# Patient Record
Sex: MALE | Race: WHITE | Employment: OTHER | URBAN - METROPOLITAN AREA
[De-identification: names, ages, dates, MRNs, and addresses within clinical notes are randomized per-mention and may not be internally consistent; named-entity substitution may affect disease eponyms.]

---

## 2019-10-14 ENCOUNTER — TELEPHONE (OUTPATIENT)
Dept: PULMONOLOGY | Age: 84
End: 2019-10-14

## 2019-10-15 PROBLEM — B02.9 HERPES ZOSTER: Status: ACTIVE | Noted: 2017-08-07

## 2019-10-15 PROBLEM — E78.00 HYPERCHOLESTEROLEMIA: Status: ACTIVE | Noted: 2017-08-07

## 2019-10-15 PROBLEM — J45.909 ASTHMA: Status: ACTIVE | Noted: 2017-08-07

## 2019-10-15 PROBLEM — R13.10 DYSPHAGIA: Status: ACTIVE | Noted: 2017-08-07

## 2019-10-15 PROBLEM — I82.409 DEEP VEIN THROMBOSIS OF LOWER EXTREMITY (HCC): Status: ACTIVE | Noted: 2017-08-07

## 2019-10-15 PROBLEM — Z91.09 ENVIRONMENTAL ALLERGIES: Status: ACTIVE | Noted: 2017-08-07

## 2019-10-15 PROBLEM — J01.90 ACUTE SINUSITIS: Status: ACTIVE | Noted: 2017-08-07

## 2019-10-15 PROBLEM — C78.00 MALIGNANT NEOPLASM METASTATIC TO LUNG (HCC): Status: ACTIVE | Noted: 2017-08-07

## 2019-10-15 PROBLEM — I10 HYPERTENSION: Status: ACTIVE | Noted: 2017-08-07

## 2019-10-15 PROBLEM — J15.9 BACTERIAL PNEUMONIA: Status: ACTIVE | Noted: 2017-08-07

## 2019-10-15 PROBLEM — K21.9 GASTROESOPHAGEAL REFLUX DISEASE: Status: ACTIVE | Noted: 2017-08-07

## 2019-10-15 PROBLEM — R05.8 PRODUCTIVE COUGH: Status: ACTIVE | Noted: 2017-08-07

## 2019-10-15 PROBLEM — H65.20 SIMPLE CHRONIC SEROUS OTITIS MEDIA: Status: ACTIVE | Noted: 2017-08-07

## 2019-10-15 PROBLEM — B37.0 CANDIDIASIS OF MOUTH: Status: ACTIVE | Noted: 2017-08-07

## 2019-10-15 PROBLEM — T30.0 RADIATION BURN: Status: ACTIVE | Noted: 2017-08-07

## 2019-10-17 ENCOUNTER — OFFICE VISIT (OUTPATIENT)
Dept: PULMONOLOGY | Age: 84
End: 2019-10-17
Payer: COMMERCIAL

## 2019-10-17 VITALS
TEMPERATURE: 97 F | DIASTOLIC BLOOD PRESSURE: 83 MMHG | HEIGHT: 68 IN | WEIGHT: 167.4 LBS | HEART RATE: 95 BPM | SYSTOLIC BLOOD PRESSURE: 154 MMHG | BODY MASS INDEX: 25.37 KG/M2 | OXYGEN SATURATION: 97 %

## 2019-10-17 DIAGNOSIS — J31.0 CHRONIC RHINITIS: ICD-10-CM

## 2019-10-17 DIAGNOSIS — J45.909 REACTIVE AIRWAY DISEASE WITH WHEEZING WITHOUT COMPLICATION, UNSPECIFIED ASTHMA SEVERITY, UNSPECIFIED WHETHER PERSISTENT: Primary | ICD-10-CM

## 2019-10-17 DIAGNOSIS — R09.82 POST-NASAL DRIP: ICD-10-CM

## 2019-10-17 DIAGNOSIS — Z77.098 CHLORINE GAS EXPOSURE: ICD-10-CM

## 2019-10-17 PROCEDURE — 99204 OFFICE O/P NEW MOD 45 MIN: CPT | Performed by: INTERNAL MEDICINE

## 2019-10-17 RX ORDER — CLINDAMYCIN HYDROCHLORIDE 300 MG/1
1 CAPSULE ORAL 3 TIMES DAILY
Refills: 0 | COMMUNITY
Start: 2019-10-09 | End: 2020-08-06

## 2019-10-17 RX ORDER — FLUTICASONE PROPIONATE 50 MCG
1 SPRAY, SUSPENSION (ML) NASAL 2 TIMES DAILY
COMMUNITY
End: 2020-01-09 | Stop reason: SDUPTHER

## 2019-10-17 RX ORDER — IPRATROPIUM BROMIDE AND ALBUTEROL SULFATE 2.5; .5 MG/3ML; MG/3ML
1 SOLUTION RESPIRATORY (INHALATION) EVERY 6 HOURS PRN
COMMUNITY
End: 2020-02-13 | Stop reason: SDUPTHER

## 2019-10-17 RX ORDER — ALBUTEROL SULFATE 90 UG/1
2 AEROSOL, METERED RESPIRATORY (INHALATION) EVERY 6 HOURS PRN
COMMUNITY
End: 2020-02-13 | Stop reason: SDUPTHER

## 2019-10-17 RX ORDER — SIMVASTATIN 40 MG
1 TABLET ORAL DAILY
COMMUNITY

## 2019-10-17 RX ORDER — GABAPENTIN 100 MG/1
1 CAPSULE ORAL NIGHTLY
Refills: 1 | COMMUNITY
Start: 2019-10-09 | End: 2020-01-09 | Stop reason: ALTCHOICE

## 2019-10-17 RX ORDER — AMLODIPINE BESYLATE 5 MG/1
1 TABLET ORAL DAILY
COMMUNITY
End: 2022-03-24

## 2019-10-17 RX ORDER — BUDESONIDE AND FORMOTEROL FUMARATE DIHYDRATE 160; 4.5 UG/1; UG/1
2 AEROSOL RESPIRATORY (INHALATION) 2 TIMES DAILY
Refills: 11 | COMMUNITY
Start: 2019-09-23 | End: 2020-02-13 | Stop reason: SDUPTHER

## 2019-10-17 RX ORDER — SIMVASTATIN 40 MG
1 TABLET ORAL DAILY
COMMUNITY
Start: 2019-07-10 | End: 2019-10-17

## 2019-10-17 RX ORDER — DEXTROMETHORPHAN HYDROBROMIDE AND PROMETHAZINE HYDROCHLORIDE 15; 6.25 MG/5ML; MG/5ML
5 SYRUP ORAL EVERY 4 HOURS PRN
Refills: 1 | COMMUNITY
Start: 2019-10-09 | End: 2020-10-08 | Stop reason: SDUPTHER

## 2019-10-17 RX ORDER — WARFARIN SODIUM 5 MG/1
1 TABLET ORAL DAILY
COMMUNITY

## 2019-11-07 ENCOUNTER — OFFICE VISIT (OUTPATIENT)
Dept: PULMONOLOGY | Age: 84
End: 2019-11-07
Payer: COMMERCIAL

## 2019-11-07 VITALS
RESPIRATION RATE: 12 BRPM | SYSTOLIC BLOOD PRESSURE: 136 MMHG | HEART RATE: 97 BPM | BODY MASS INDEX: 25.34 KG/M2 | HEIGHT: 68 IN | DIASTOLIC BLOOD PRESSURE: 75 MMHG | WEIGHT: 167.2 LBS | OXYGEN SATURATION: 96 %

## 2019-11-07 DIAGNOSIS — J45.909 REACTIVE AIRWAY DISEASE WITH WHEEZING WITHOUT COMPLICATION, UNSPECIFIED ASTHMA SEVERITY, UNSPECIFIED WHETHER PERSISTENT: Primary | ICD-10-CM

## 2019-11-07 DIAGNOSIS — J31.0 CHRONIC RHINITIS: ICD-10-CM

## 2019-11-07 DIAGNOSIS — R09.82 POST-NASAL DRIP: ICD-10-CM

## 2019-11-07 DIAGNOSIS — Z77.098 CHLORINE GAS EXPOSURE: ICD-10-CM

## 2019-11-07 PROCEDURE — 99214 OFFICE O/P EST MOD 30 MIN: CPT | Performed by: INTERNAL MEDICINE

## 2019-11-07 RX ORDER — IPRATROPIUM BROMIDE 21 UG/1
2 SPRAY, METERED NASAL 2 TIMES DAILY
Qty: 1 BOTTLE | Refills: 3 | Status: SHIPPED | OUTPATIENT
Start: 2019-11-07 | End: 2020-01-09 | Stop reason: ALTCHOICE

## 2019-11-07 RX ORDER — AZELASTINE 1 MG/ML
1 SPRAY, METERED NASAL 2 TIMES DAILY
COMMUNITY
End: 2020-01-09 | Stop reason: SDUPTHER

## 2019-11-08 ENCOUNTER — TELEPHONE (OUTPATIENT)
Dept: PULMONOLOGY | Age: 84
End: 2019-11-08

## 2019-12-05 ENCOUNTER — TELEPHONE (OUTPATIENT)
Dept: PULMONOLOGY | Age: 84
End: 2019-12-05

## 2019-12-20 ENCOUNTER — TELEPHONE (OUTPATIENT)
Dept: PULMONOLOGY | Age: 84
End: 2019-12-20

## 2020-01-09 ENCOUNTER — OFFICE VISIT (OUTPATIENT)
Dept: PULMONOLOGY | Age: 85
End: 2020-01-09
Payer: COMMERCIAL

## 2020-01-09 VITALS
HEIGHT: 68 IN | TEMPERATURE: 97 F | HEART RATE: 74 BPM | BODY MASS INDEX: 24.86 KG/M2 | DIASTOLIC BLOOD PRESSURE: 72 MMHG | RESPIRATION RATE: 18 BRPM | SYSTOLIC BLOOD PRESSURE: 130 MMHG | OXYGEN SATURATION: 94 % | WEIGHT: 164 LBS

## 2020-01-09 PROCEDURE — G8484 FLU IMMUNIZE NO ADMIN: HCPCS | Performed by: INTERNAL MEDICINE

## 2020-01-09 PROCEDURE — 1123F ACP DISCUSS/DSCN MKR DOCD: CPT | Performed by: INTERNAL MEDICINE

## 2020-01-09 PROCEDURE — 4040F PNEUMOC VAC/ADMIN/RCVD: CPT | Performed by: INTERNAL MEDICINE

## 2020-01-09 PROCEDURE — 99214 OFFICE O/P EST MOD 30 MIN: CPT | Performed by: INTERNAL MEDICINE

## 2020-01-09 PROCEDURE — G8420 CALC BMI NORM PARAMETERS: HCPCS | Performed by: INTERNAL MEDICINE

## 2020-01-09 PROCEDURE — 1036F TOBACCO NON-USER: CPT | Performed by: INTERNAL MEDICINE

## 2020-01-09 PROCEDURE — G8427 DOCREV CUR MEDS BY ELIG CLIN: HCPCS | Performed by: INTERNAL MEDICINE

## 2020-01-09 RX ORDER — AZELASTINE 1 MG/ML
1 SPRAY, METERED NASAL 2 TIMES DAILY
Qty: 1 BOTTLE | Refills: 1 | Status: SHIPPED | OUTPATIENT
Start: 2020-01-09

## 2020-01-09 RX ORDER — FLUTICASONE PROPIONATE 50 MCG
1 SPRAY, SUSPENSION (ML) NASAL DAILY
Qty: 1 BOTTLE | Refills: 1 | Status: SHIPPED | OUTPATIENT
Start: 2020-01-09 | End: 2020-05-11

## 2020-01-09 RX ORDER — PREDNISONE 10 MG/1
TABLET ORAL
Qty: 30 TABLET | Refills: 0 | Status: SHIPPED | OUTPATIENT
Start: 2020-01-09 | End: 2020-02-13 | Stop reason: SDUPTHER

## 2020-01-09 ASSESSMENT — SLEEP AND FATIGUE QUESTIONNAIRES
HOW LIKELY ARE YOU TO NOD OFF OR FALL ASLEEP WHILE WATCHING TV: 0
HOW LIKELY ARE YOU TO NOD OFF OR FALL ASLEEP WHILE SITTING AND TALKING TO SOMEONE: 0
HOW LIKELY ARE YOU TO NOD OFF OR FALL ASLEEP WHEN YOU ARE A PASSENGER IN A CAR FOR AN HOUR WITHOUT A BREAK: 0
HOW LIKELY ARE YOU TO NOD OFF OR FALL ASLEEP IN A CAR, WHILE STOPPED FOR A FEW MINUTES IN TRAFFIC: 0
ESS TOTAL SCORE: 1
HOW LIKELY ARE YOU TO NOD OFF OR FALL ASLEEP WHILE LYING DOWN TO REST IN THE AFTERNOON WHEN CIRCUMSTANCES PERMIT: 1
HOW LIKELY ARE YOU TO NOD OFF OR FALL ASLEEP WHILE SITTING AND READING: 0
HOW LIKELY ARE YOU TO NOD OFF OR FALL ASLEEP WHILE SITTING INACTIVE IN A PUBLIC PLACE: 0
HOW LIKELY ARE YOU TO NOD OFF OR FALL ASLEEP WHILE SITTING QUIETLY AFTER LUNCH WITHOUT ALCOHOL: 0

## 2020-01-13 ENCOUNTER — TELEPHONE (OUTPATIENT)
Dept: PULMONOLOGY | Age: 85
End: 2020-01-13

## 2020-01-13 NOTE — PROGRESS NOTES
spray he does not have the Astelin spray and he did not understand that he had to use nasal saline rinse to help clear his secretions prior to using Flonase. LUNG CANCER SCREENING     1. CRITERIA MET    []     CT ORDERED  []      2. CRITERIA NOT MET   [x]      3. REFUSED                    []        REASON CRITERIA NOT MET     1. SMOKING LESS THAN 30 PY  []      2. AGE LESS THAN 55 or GREATER 77 YEARS  []      3. QUIT SMOKING 15 YEARS OR GREATER   []      4. RECENT CT WITH IN 11 MONTHS    []      5. LIFE EXPECTANCY < 5 YEARS   []      6. 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    Asthma 8/7/2017    Bacterial pneumonia 8/7/2017    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/1/2013    Benign prostatic hyperplasia 4/12/2013    Candidiasis of mouth 8/7/2017    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 9/8/2014    Chronic obstructive lung disease ([address] 11/8/2011    Deep vein thrombosis of lower extremity ([address] 8/7/2017    Dizziness and giddiness 12/19/2011    Dysphagia 8/7/2017    Gastroesophageal reflux disease 8/7/2017    Herpes zoster 8/7/2017    Lumbar sprain 9/8/2014    Malignant neoplasm metastatic to lung ([address] 8/7/2017    Malignant tumor of prostate ([address] 2/4/2013    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 No pertinent family history. SURGICAL HISTORY:   History reviewed.  No pertinent surgical history. SOCIAL AND OCCUPATIONAL HEALTH:      There  no history of TB or TB exposure. There  no asbestos or silica dust exposure. The patient reports  no coal, foundry, quarry or [employer] exposure. Travel history reveals non. There  no history of recreational or IV drug use. There  no hot tube exposure. 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 He has never used smokeless tobacco.  ETOH:   reports previous alcohol use. ALLERGIES:      Allergies   Allergen Reactions    Levofloxacin Swelling    Codeine Hives and Rash    Cashew Nut Oil     Levaquin  [Levofloxacin In D5w] Other (See Comments)    Peanut-Containing Drug Products Hives         Home Meds:   Prior to Admission medications    Medication Sig Start Date End Date Taking?  Authorizing Provider   azelastine (ASTELIN) 0.1 % nasal spray 1 spray by Nasal route 2 times daily Use in each nostril as directed 1/9/20  Yes Lucia Douglas MD   predniSONE (DELTASONE) 10 MG tablet 4 tabs once a day for 3 days, 3 tabs once a day for 3 days, 2 tabs once a day for 3 days,1 tabs once a day for 3 days 1/9/20  Yes Lucia Douglas MD   fluticasone (FLONASE) 50 MCG/ACT nasal spray 1 spray by Each Nostril route daily 1/9/20  Yes Lucia Douglas MD   SYMBICORT 160-4.5 MCG/ACT AERO 2 puffs by Infiltration route 2 times daily 9/23/19  Yes Historical Provider, MD   clindamycin (CLEOCIN) 300 MG capsule Take 1 capsule by mouth 3 times daily 10/9/19  Yes Historical Provider, MD   promethazine-dextromethorphan (PROMETHAZINE-DM) 6.25-15 MG/5ML syrup Take 5 mLs by mouth every 4 hours as needed 10/9/19  Yes Historical Provider, MD   simvastatin (ZOCOR) 40 MG tablet Take 1 tablet by mouth daily   Yes Historical Provider, MD   amLODIPine (NORVASC) 5 MG tablet Take 1 tablet by mouth daily   Yes Historical Provider, MD   warfarin (JANTOVEN) 5 MG tablet Take 1 tablet by mouth daily   Yes Historical nasal spray        :                PLAN:      Patient has been complaining his nasal secretions are very thick and hard to clear. I have asked him to stop Atrovent nasal spray and continue his Flonase and Astelin spray. Short course of prednisone for acute bronchitis. Continue bronchodilators DuoNeb and long-acting inhaled corticosteroid and beta agonist.  Follow-up in 3 months        Requested Prescriptions     Signed Prescriptions Disp Refills    azelastine (ASTELIN) 0.1 % nasal spray 1 Bottle 1     Si spray by Nasal route 2 times daily Use in each nostril as directed    predniSONE (DELTASONE) 10 MG tablet 30 tablet 0     Si tabs once a day for 3 days, 3 tabs once a day for 3 days, 2 tabs once a day for 3 days,1 tabs once a day for 3 days    fluticasone (FLONASE) 50 MCG/ACT nasal spray 1 Bottle 1     Si spray by Each Nostril route daily       Medications Discontinued During This Encounter   Medication Reason    gabapentin (NEURONTIN) 100 MG capsule Therapy completed    ipratropium (ATROVENT) 0.03 % nasal spray Therapy completed    azelastine (ASTELIN) 0.1 % nasal spray REORDER    fluticasone (FLONASE) 50 MCG/ACT nasal spray REORDER       Bill received counseling on the following healthy behaviors: nutrition, exercise and medication adherence    Patient given educational materials : see patient instruction       Discussed use, benefit, and side effects of prescribed medications. Barriers to medication compliance addressed. All patient questions answered. Pt voiced understanding. I hope this updates you on my evaluation and clinical thinking. Thank you for allowing me to participate in his care. Sincerely,      Electronically signed by Rakan Geller MD on 2020 at 6:54 PM     Please note that this chart was generated using voice recognition Dragon dictation software.   Although every effort was made to ensure the accuracy of this automated transcription, some errors in

## 2020-01-15 ASSESSMENT — ENCOUNTER SYMPTOMS
EYES NEGATIVE: 1
RESPIRATORY NEGATIVE: 1
RHINORRHEA: 1

## 2020-02-13 ENCOUNTER — TELEPHONE (OUTPATIENT)
Dept: PULMONOLOGY | Age: 85
End: 2020-02-13

## 2020-02-13 ENCOUNTER — OFFICE VISIT (OUTPATIENT)
Dept: PULMONOLOGY | Age: 85
End: 2020-02-13
Payer: COMMERCIAL

## 2020-02-13 VITALS
HEIGHT: 68 IN | OXYGEN SATURATION: 96 % | HEART RATE: 99 BPM | DIASTOLIC BLOOD PRESSURE: 80 MMHG | SYSTOLIC BLOOD PRESSURE: 152 MMHG | WEIGHT: 166 LBS | BODY MASS INDEX: 25.16 KG/M2 | RESPIRATION RATE: 12 BRPM

## 2020-02-13 PROBLEM — Z77.098 CHLORINE GAS EXPOSURE: Status: ACTIVE | Noted: 2020-02-13

## 2020-02-13 PROBLEM — J31.0 CHRONIC RHINITIS: Status: ACTIVE | Noted: 2020-02-13

## 2020-02-13 PROBLEM — R09.82 POST-NASAL DRIP: Status: ACTIVE | Noted: 2020-02-13

## 2020-02-13 PROBLEM — J32.9 CHRONIC SINUSITIS: Status: ACTIVE | Noted: 2020-02-13

## 2020-02-13 PROCEDURE — 99214 OFFICE O/P EST MOD 30 MIN: CPT | Performed by: INTERNAL MEDICINE

## 2020-02-13 RX ORDER — BUDESONIDE AND FORMOTEROL FUMARATE DIHYDRATE 160; 4.5 UG/1; UG/1
2 AEROSOL RESPIRATORY (INHALATION) 2 TIMES DAILY
Qty: 1 INHALER | Refills: 11 | Status: SHIPPED | OUTPATIENT
Start: 2020-02-13 | End: 2021-03-11

## 2020-02-13 RX ORDER — ALBUTEROL SULFATE 90 UG/1
2 AEROSOL, METERED RESPIRATORY (INHALATION) EVERY 6 HOURS PRN
Qty: 1 INHALER | Refills: 11 | Status: SHIPPED | OUTPATIENT
Start: 2020-02-13 | End: 2021-03-08

## 2020-02-13 RX ORDER — IPRATROPIUM BROMIDE AND ALBUTEROL SULFATE 2.5; .5 MG/3ML; MG/3ML
1 SOLUTION RESPIRATORY (INHALATION) EVERY 6 HOURS PRN
Qty: 360 ML | Refills: 11 | Status: SHIPPED | OUTPATIENT
Start: 2020-02-13 | End: 2021-03-11

## 2020-02-13 RX ORDER — PREDNISONE 10 MG/1
10 TABLET ORAL DAILY
Qty: 30 TABLET | Refills: 3 | Status: SHIPPED | OUTPATIENT
Start: 2020-02-13 | End: 2020-03-14

## 2020-02-13 ASSESSMENT — ENCOUNTER SYMPTOMS
RESPIRATORY NEGATIVE: 1
RHINORRHEA: 1
EYES NEGATIVE: 1

## 2020-02-13 NOTE — PROGRESS NOTES
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 year old male    Patient is complaining of worsening wheezing shortness of breath since his prednisone finished. I had given him a prednisone taper but he had been using 10 mg tablet daily while he was on 10 mg tablet daily he felt much better even his nasal discharge was better. Last visit   Since last visit patient did need a course of antibiotic and steroid. That controlled his postnasal drainage and hence his acute bronchitis. He is on nasal steroid nasal his antihistaminics but he continues to have nasal discharge and postnasal drainage. He is tolerating DuoNeb and Symbicort well without any complications. No oral thrush noted. who is to follow with Dr. Alexus Wei . Patient would like to change his pulmonologist.  He has been having recurrent acute bronchitis episode which started as nasal congestion postnasal drainage and then he started wheezing and coughing and is given antibiotic and steroids. Previously he had been given Levaquin which called Achilles tendon so he does not use fluoroquinolones anymore. Patient has had multiple courses of antibiotic and steroids. He has been on Symbicort and using DuoNeb 4 times a day presently he is got Phenergan DM to help with his cough and he is able to sleep better at night. Patient has a history of chlorine gas exposure and because of this he has reactive airway disease and gets exacerbation with weather changes when he had been working with [facility] department. At present he denies any shortness of breath with exertion he denies any wheezing. He is using Symbicort twice a day without a spacer and is using DuoNeb 4 times a day.   He has also seen ENT who advised him to use nasal saline rinse Flonase and Astelin spray he does not have the Astelin spray and he did not understand that he had to use nasal saline rinse to help exposure. The patient reports  no coal, foundry, quarry or [employer] exposure. Travel history reveals non. There  no history of recreational or IV drug use. There  no hot tube exposure. 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 He has never used smokeless tobacco.  ETOH:   reports previous alcohol use. ALLERGIES:      Allergies   Allergen Reactions    Levofloxacin Swelling    Codeine Hives and Rash    Cashew Nut Oil     Levaquin  [Levofloxacin In D5w] Other (See Comments)    Peanut-Containing Drug Products Hives         Home Meds:   Prior to Admission medications    Medication Sig Start Date End Date Taking?  Authorizing Provider   predniSONE (DELTASONE) 10 MG tablet Take 1 tablet by mouth daily 2/13/20 3/14/20 Yes Tabitha Russell MD   albuterol sulfate  (90 Base) MCG/ACT inhaler Inhale 2 puffs into the lungs every 6 hours as needed for Wheezing 2/13/20  Yes Tabitha Russell MD   ipratropium-albuterol (DUONEB) 0.5-2.5 (3) MG/3ML SOLN nebulizer solution Inhale 3 mLs into the lungs every 6 hours as needed for Shortness of Breath 2/13/20  Yes Tabitha Russell MD   SYMBICORT 160-4.5 MCG/ACT AERO Inhale 2 puffs into the lungs 2 times daily 2/13/20  Yes Tabitha Russell MD   azelastine (ASTELIN) 0.1 % nasal spray 1 spray by Nasal route 2 times daily Use in each nostril as directed 1/9/20  Yes Tabitha Russell MD   fluticasone (FLONASE) 50 MCG/ACT nasal spray 1 spray by Each Nostril route daily 1/9/20  Yes Tabitha Russell MD   clindamycin (CLEOCIN) 300 MG capsule Take 1 capsule by mouth 3 times daily 10/9/19  Yes Historical Provider, MD   promethazine-dextromethorphan (PROMETHAZINE-DM) 6.25-15 MG/5ML syrup Take 5 mLs by mouth every 4 hours as needed 10/9/19  Yes Historical Provider, MD   simvastatin (ZOCOR) 40 MG tablet Take 1 tablet by mouth daily   Yes Historical Provider, MD   amLODIPine (NORVASC) 5 MG tablet Take 1 tablet by mouth wants to stay on 10 mg of prednisone chronically because he notices his symptoms stay under control when he is on chronic 10 mg of prednisone. This dose of prednisone also helps with his nasal discharge and chronic rhinitis symptoms  He understands the long-term complications of prednisone but states that at his age he would rather have  a quality of life than worry about long-term complications  Follow-up in 5 weeks        Requested Prescriptions     Signed Prescriptions Disp Refills    predniSONE (DELTASONE) 10 MG tablet 30 tablet 3     Sig: Take 1 tablet by mouth daily    albuterol sulfate  (90 Base) MCG/ACT inhaler 1 Inhaler 11     Sig: Inhale 2 puffs into the lungs every 6 hours as needed for Wheezing    ipratropium-albuterol (DUONEB) 0.5-2.5 (3) MG/3ML SOLN nebulizer solution 360 mL 11     Sig: Inhale 3 mLs into the lungs every 6 hours as needed for Shortness of Breath    SYMBICORT 160-4.5 MCG/ACT AERO 1 Inhaler 11     Sig: Inhale 2 puffs into the lungs 2 times daily       Medications Discontinued During This Encounter   Medication Reason    predniSONE (DELTASONE) 10 MG tablet REORDER    albuterol sulfate  (90 Base) MCG/ACT inhaler REORDER    ipratropium-albuterol (DUONEB) 0.5-2.5 (3) MG/3ML SOLN nebulizer solution REORDER    SYMBICORT 160-4.5 MCG/ACT AERO REORDER       Bill received counseling on the following healthy behaviors: nutrition, exercise and medication adherence    Patient given educational materials : see patient instruction       Discussed use, benefit, and side effects of prescribed medications. Barriers to medication compliance addressed. All patient questions answered. Pt voiced understanding. I hope this updates you on my evaluation and clinical thinking. Thank you for allowing me to participate in his care.      Sincerely,      Electronically signed by Fabrizio Worthington MD on 2/13/2020 at 3:22 PM     Please note that this chart was generated using voice recognition Dragon dictation software. Although every effort was made to ensure the accuracy of this automated transcription, some errors in transcription may have occurred.

## 2020-05-12 RX ORDER — FLUTICASONE PROPIONATE 50 MCG
SPRAY, SUSPENSION (ML) NASAL
Qty: 1 BOTTLE | Refills: 3 | Status: SHIPPED | OUTPATIENT
Start: 2020-05-12 | End: 2020-11-13

## 2020-06-04 ENCOUNTER — TELEPHONE (OUTPATIENT)
Dept: PULMONOLOGY | Age: 85
End: 2020-06-04

## 2020-06-04 ENCOUNTER — OFFICE VISIT (OUTPATIENT)
Dept: PULMONOLOGY | Age: 85
End: 2020-06-04
Payer: MEDICARE

## 2020-06-04 VITALS
RESPIRATION RATE: 12 BRPM | WEIGHT: 167.2 LBS | BODY MASS INDEX: 25.34 KG/M2 | DIASTOLIC BLOOD PRESSURE: 79 MMHG | HEIGHT: 68 IN | OXYGEN SATURATION: 95 % | TEMPERATURE: 97.3 F | HEART RATE: 87 BPM | SYSTOLIC BLOOD PRESSURE: 168 MMHG

## 2020-06-04 PROCEDURE — G8417 CALC BMI ABV UP PARAM F/U: HCPCS | Performed by: INTERNAL MEDICINE

## 2020-06-04 PROCEDURE — G8427 DOCREV CUR MEDS BY ELIG CLIN: HCPCS | Performed by: INTERNAL MEDICINE

## 2020-06-04 PROCEDURE — 4040F PNEUMOC VAC/ADMIN/RCVD: CPT | Performed by: INTERNAL MEDICINE

## 2020-06-04 PROCEDURE — 1036F TOBACCO NON-USER: CPT | Performed by: INTERNAL MEDICINE

## 2020-06-04 PROCEDURE — 99214 OFFICE O/P EST MOD 30 MIN: CPT | Performed by: INTERNAL MEDICINE

## 2020-06-04 PROCEDURE — 1123F ACP DISCUSS/DSCN MKR DOCD: CPT | Performed by: INTERNAL MEDICINE

## 2020-06-04 RX ORDER — PREDNISONE 10 MG/1
1 TABLET ORAL DAILY
COMMUNITY
Start: 2020-05-14 | End: 2020-06-04 | Stop reason: SDUPTHER

## 2020-06-04 RX ORDER — PREDNISONE 10 MG/1
10 TABLET ORAL
Qty: 30 TABLET | Refills: 2 | Status: SHIPPED | OUTPATIENT
Start: 2020-06-05 | End: 2020-10-28

## 2020-06-04 RX ORDER — BUDESONIDE 0.25 MG/2ML
250 INHALANT ORAL 2 TIMES DAILY
Qty: 60 AMPULE | Refills: 3 | Status: SHIPPED | OUTPATIENT
Start: 2020-06-04 | End: 2020-07-07

## 2020-06-05 ENCOUNTER — TELEPHONE (OUTPATIENT)
Dept: PULMONOLOGY | Age: 85
End: 2020-06-05

## 2020-06-05 NOTE — TELEPHONE ENCOUNTER
Patient called and stated that he called yesterday and needed a refill for Prednisone. I informed him that the order to request a refill was to soon and that he has 2 refills left. He stated that when he called the pharmacy they informed him that there were not. I called the [facility] pharmacy and they indeed did have a refill ready for him.  I called and informed patient and he based understanding. -Health system

## 2020-06-08 ASSESSMENT — ENCOUNTER SYMPTOMS
RESPIRATORY NEGATIVE: 1
EYES NEGATIVE: 1
RHINORRHEA: 1

## 2020-06-08 NOTE — PROGRESS NOTES
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 year old male    Patient is taking prednisone 3 times a week because he was concerned about side effects. He is doing well with 3 times a week prednisone but has needed burst 2 times since his last visit. He is compliant with his bronchodilator therapy continues to have chronic symptoms of dyspnea cough and postnasal drip aggravates the symptoms. Last visit   Since last visit patient did need a course of antibiotic and steroid. That controlled his postnasal drainage and hence his acute bronchitis. He is on nasal steroid nasal his antihistaminics but he continues to have nasal discharge and postnasal drainage. He is tolerating DuoNeb and Symbicort well without any complications. No oral thrush noted. who is to follow with Dr. Toney Farrell . Patient would like to change his pulmonologist.  He has been having recurrent acute bronchitis episode which started as nasal congestion postnasal drainage and then he started wheezing and coughing and is given antibiotic and steroids. Previously he had been given Levaquin which called Achilles tendon so he does not use fluoroquinolones anymore. Patient has had multiple courses of antibiotic and steroids. He has been on Symbicort and using DuoNeb 4 times a day presently he is got Phenergan DM to help with his cough and he is able to sleep better at night. Patient has a history of chlorine gas exposure and because of this he has reactive airway disease and gets exacerbation with weather changes when he had been working with [employer] department. At present he denies any shortness of breath with exertion he denies any wheezing. He is using Symbicort twice a day without a spacer and is using DuoNeb 4 times a day.   He has also seen ENT who advised him to use nasal saline rinse Flonase and Astelin spray he does not have the Astelin spray and he did not understand that he had to use nasal saline rinse to help clear his secretions prior to using Flonase. LUNG CANCER SCREENING     1. CRITERIA MET    []     CT ORDERED  []      2. CRITERIA NOT MET   [x]      3. REFUSED                    []        REASON CRITERIA NOT MET     1. SMOKING LESS THAN 30 PY  []      2. AGE LESS THAN 55 or GREATER 77 YEARS  []      3. QUIT SMOKING 15 YEARS OR GREATER   []      4. RECENT CT WITH IN 11 MONTHS    []      5. LIFE EXPECTANCY < 5 YEARS   []      6. 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    Asthma 8/7/2017    Bacterial pneumonia 8/7/2017    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/1/2013    Benign prostatic hyperplasia 4/12/2013    Candidiasis of mouth 8/7/2017    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 9/8/2014    Chronic obstructive lung disease ([address] 11/8/2011    Deep vein thrombosis of lower extremity ([address] 8/7/2017    Dizziness and giddiness 12/19/2011    Dysphagia 8/7/2017    Gastroesophageal reflux disease 8/7/2017    Herpes zoster 8/7/2017    Lumbar sprain 9/8/2014    Malignant neoplasm metastatic to lung ([address] 8/7/2017    Malignant tumor of prostate ([address] 2/4/2013    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 SURGICAL HISTORY:   No past surgical history on file. SOCIAL AND OCCUPATIONAL HEALTH:      There  no history of TB or TB exposure.     There no asbestos or silica dust exposure. The patient reports  no coal, foundry, quarry or [employer] exposure. Travel history reveals non. There  no history of recreational or IV drug use. There  no hot tube exposure. Pets  no    Occupational history [employer]    TOBACCO:   reports that he has never smoked. He has never used smokeless tobacco.  ETOH:   reports previous alcohol use. ALLERGIES:      Allergies   Allergen Reactions    Levofloxacin Swelling    Codeine Hives and Rash    Cashew Nut Oil     Levaquin  [Levofloxacin In D5w] Other (See Comments)    Peanut-Containing Drug Products Hives         Home Meds:   Prior to Admission medications    Medication Sig Start Date End Date Taking?  Authorizing Provider   budesonide (PULMICORT) 0.25 MG/2ML nebulizer suspension Take 2 mLs by nebulization 2 times daily 6/4/20  Yes Franny Vogel MD   predniSONE (DELTASONE) 10 MG tablet Take 1 tablet by mouth three times a week 6/5/20  Yes Franny Vogel MD   fluticasone (FLONASE) 50 MCG/ACT nasal spray SPRAY 1 SPRAY INTO EACH NOSTRIL EVERY DAY 5/12/20  Yes Franny Vogel MD   albuterol sulfate  (90 Base) MCG/ACT inhaler Inhale 2 puffs into the lungs every 6 hours as needed for Wheezing 2/13/20  Yes Franny Vogel MD   ipratropium-albuterol (DUONEB) 0.5-2.5 (3) MG/3ML SOLN nebulizer solution Inhale 3 mLs into the lungs every 6 hours as needed for Shortness of Breath 2/13/20  Yes Franny Vogel MD   SYMBICORT 160-4.5 MCG/ACT AERO Inhale 2 puffs into the lungs 2 times daily 2/13/20  Yes Franny Vogel MD   azelastine (ASTELIN) 0.1 % nasal spray 1 spray by Nasal route 2 times daily Use in each nostril as directed 1/9/20  Yes Franny Vogel MD   clindamycin (CLEOCIN) 300 MG capsule Take 1 capsule by mouth 3 times daily 10/9/19  Yes Historical Provider, MD   promethazine-dextromethorphan (PROMETHAZINE-DM) 6.25-15 MG/5ML syrup Take 5 mLs by mouth every 4 hours as needed 10/9/19  Yes Historical Provider, MD   simvastatin (ZOCOR) 40 MG tablet Take 1 tablet by mouth daily   Yes Historical Provider, MD   amLODIPine (NORVASC) 5 MG tablet Take 1 tablet by mouth daily   Yes Historical Provider, MD   warfarin (JANTOVEN) 5 MG tablet Take 1 tablet by mouth daily   Yes Historical Provider, MD          Review of Systems   Constitutional: Negative. HENT: Positive for congestion, postnasal drip and rhinorrhea. Eyes: Negative. Respiratory: Negative. Cardiovascular: Negative. Vitals:  BP (!) 168/79 (Site: Right Upper Arm, Position: Sitting, Cuff Size: Medium Adult)   Pulse 87   Temp 97.3 °F (36.3 °C) (Temporal)   Resp 12   Ht 5' 8\" (1.727 m)   Wt 167 lb 3.2 oz (75.8 kg)   SpO2 95%   BMI 25.42 kg/m²     PHYSICAL EXAM:  Head and neck atraumatic, normocephalic    Lymph nodes-no cervical, supraclavicular lymphadenopathy    Neck-no JVP elevation    Lungs - Ventilating all lobes without any rales, rhonchi, wheezes or dullness. No bronchial breath sounds. Chest expansion equal bilaterally    CVS- S1, S2 regular. No S3 no S4, no murmurs    Abdomen-nontender, nondistended. Bowel sounds are present. No organomegaly    Lower extremity-no edema    Upper extremity-no edema    Neurological-grossly normal cranial nerves. No overt motor deficit   CT Scans  4/8/2019 done at [facility]  Right apical scarring scarring in the right middle lobe no pulmonary infiltrate no change in intrapulmonary lymph node in the minor fissure    PFT done at [facility] 2015 report reviewed and is normal      IMPRESSION:     Diagnosis Orders   1. Severe persistent asthma without complication  budesonide (PULMICORT) 0.25 MG/2ML nebulizer suspension    predniSONE (DELTASONE) 10 MG tablet   2. Post-nasal drip     3. Chronic rhinitis     4. 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          :                PLAN:       continue DuoNeb as neededContinue prednisone 3 times a week.   Due to his persistent symptoms even with treatment with high dose of

## 2020-06-22 ENCOUNTER — TELEPHONE (OUTPATIENT)
Dept: PULMONOLOGY | Age: 85
End: 2020-06-22

## 2020-06-22 NOTE — TELEPHONE ENCOUNTER
Patient said he got a rash and was itchy when using the Pulmicort for his nebulizer. He stopped using it and is taking Benedryl. I added Pulmicort as an allergy.

## 2020-08-06 ENCOUNTER — OFFICE VISIT (OUTPATIENT)
Dept: PULMONOLOGY | Age: 85
End: 2020-08-06
Payer: COMMERCIAL

## 2020-08-06 VITALS
HEIGHT: 68 IN | RESPIRATION RATE: 16 BRPM | TEMPERATURE: 98 F | DIASTOLIC BLOOD PRESSURE: 82 MMHG | BODY MASS INDEX: 25.85 KG/M2 | SYSTOLIC BLOOD PRESSURE: 140 MMHG | WEIGHT: 170.6 LBS | OXYGEN SATURATION: 96 % | HEART RATE: 68 BPM

## 2020-08-06 PROCEDURE — 99213 OFFICE O/P EST LOW 20 MIN: CPT | Performed by: INTERNAL MEDICINE

## 2020-08-06 ASSESSMENT — ENCOUNTER SYMPTOMS
EYES NEGATIVE: 1
RHINORRHEA: 1
RESPIRATORY NEGATIVE: 1

## 2020-08-06 NOTE — PROGRESS NOTES
4 times a day. He has also seen ENT who advised him to use nasal saline rinse Flonase and Astelin spray he does not have the Astelin spray and he did not understand that he had to use nasal saline rinse to help clear his secretions prior to using Flonase. LUNG CANCER SCREENING     1. CRITERIA MET    []     CT ORDERED  []      2. CRITERIA NOT MET   [x]      3. REFUSED                    []        REASON CRITERIA NOT MET     1. SMOKING LESS THAN 30 PY  []      2. AGE LESS THAN 55 or GREATER 77 YEARS  []      3. QUIT SMOKING 15 YEARS OR GREATER   []      4. RECENT CT WITH IN 11 MONTHS    []      5. LIFE EXPECTANCY < 5 YEARS   []      6. 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    Asthma 8/7/2017    Bacterial pneumonia 8/7/2017    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/1/2013    Benign prostatic hyperplasia 4/12/2013    Candidiasis of mouth 8/7/2017    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 9/8/2014    Chronic obstructive lung disease ([address] 11/8/2011    Deep vein thrombosis of lower extremity ([address] 8/7/2017    Dizziness and giddiness 12/19/2011    Dysphagia 8/7/2017    Gastroesophageal reflux disease 8/7/2017    Herpes zoster 8/7/2017    Lumbar sprain 9/8/2014    Malignant neoplasm metastatic to lung ([address] 8/7/2017    Malignant tumor of prostate ([address] 2/4/2013    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  No pertinent MD   simvastatin (ZOCOR) 40 MG tablet Take 1 tablet by mouth daily   Yes Historical Provider, MD   amLODIPine (NORVASC) 5 MG tablet Take 1 tablet by mouth daily   Yes Historical Provider, MD   warfarin (JANTOVEN) 5 MG tablet Take 1 tablet by mouth daily   Yes Historical Provider, MD          Review of Systems   Constitutional: Negative. HENT: Positive for congestion, postnasal drip and rhinorrhea. Eyes: Negative. Respiratory: Negative. Cardiovascular: Negative. Vitals:  BP (!) 140/82 (Site: Right Lower Arm)   Pulse 68   Temp 98 °F (36.7 °C)   Resp 16   Ht 5' 8\" (1.727 m)   Wt 170 lb 9.6 oz (77.4 kg)   SpO2 96%   BMI 25.94 kg/m²     PHYSICAL EXAM:  Head and neck atraumatic, normocephalic    Lymph nodes-no cervical, supraclavicular lymphadenopathy    Neck-no JVP elevation    Lungs - Ventilating all lobes without any rales, rhonchi, wheezes or dullness. No bronchial breath sounds. Chest expansion equal bilaterally    CVS- S1, S2 regular. No S3 no S4, no murmurs    Abdomen-nontender, nondistended. Bowel sounds are present. No organomegaly    Lower extremity-no edema    Upper extremity-no edema    Neurological-grossly normal cranial nerves. No overt motor deficit   CT Scans  4/8/2019 done at [facility]  Right apical scarring scarring in the right middle lobe no pulmonary infiltrate no change in intrapulmonary lymph node in the minor fissure    PFT done at [facility] 2015 report reviewed and is normal      IMPRESSION:     Diagnosis Orders   1. Severe persistent asthma without complication     2. Chronic rhinitis     3. 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     4.  Chronic sinusitis, unspecified location          :                PLAN:      Continue Symbicort 2 puffs twice a day and continue DuoNeb 4 times a day  Prednisone 10 mg 3 times a week  Continue nasal steroid and antiallergic  Follow-up in 2 months        Requested Prescriptions      No prescriptions requested or ordered in this encounter       Medications Discontinued During This Encounter   Medication Reason    clindamycin (CLEOCIN) 300 MG capsule LIST CLEANUP       Priyank Christianson received counseling on the following healthy behaviors: nutrition, exercise and medication adherence    Patient given educational materials : see patient instruction       Discussed use, benefit, and side effects of prescribed medications. Barriers to medication compliance addressed. All patient questions answered. Pt voiced understanding. I hope this updates you on my evaluation and clinical thinking. Thank you for allowing me to participate in his care. Sincerely,      Electronically signed by Alice Mathias MD on 8/6/2020 at 2:01 PM     Please note that this chart was generated using voice recognition Dragon dictation software. Although every effort was made to ensure the accuracy of this automated transcription, some errors in transcription may have occurred.

## 2020-10-08 ENCOUNTER — OFFICE VISIT (OUTPATIENT)
Dept: PULMONOLOGY | Age: 85
End: 2020-10-08
Payer: COMMERCIAL

## 2020-10-08 VITALS
TEMPERATURE: 97.3 F | DIASTOLIC BLOOD PRESSURE: 78 MMHG | HEART RATE: 93 BPM | SYSTOLIC BLOOD PRESSURE: 132 MMHG | HEIGHT: 68 IN | OXYGEN SATURATION: 96 % | WEIGHT: 169 LBS | BODY MASS INDEX: 25.61 KG/M2

## 2020-10-08 PROBLEM — H25.039 ANTERIOR SUBCAPSULAR POLAR SENILE CATARACT: Status: ACTIVE | Noted: 2020-10-08

## 2020-10-08 PROBLEM — M79.605 PAIN IN LEFT LEG: Status: ACTIVE | Noted: 2020-10-08

## 2020-10-08 PROBLEM — J30.1 ALLERGIC RHINITIS DUE TO POLLEN: Status: ACTIVE | Noted: 2020-10-08

## 2020-10-08 PROBLEM — D68.51 HETEROZYGOUS FACTOR V LEIDEN MUTATION (HCC): Status: ACTIVE | Noted: 2020-10-08

## 2020-10-08 PROBLEM — T34.02XA: Status: ACTIVE | Noted: 2020-10-08

## 2020-10-08 PROBLEM — J44.9 CHRONIC OBSTRUCTIVE PULMONARY DISEASE (HCC): Status: ACTIVE | Noted: 2020-10-08

## 2020-10-08 PROBLEM — K46.9 ABDOMINAL HERNIA: Status: ACTIVE | Noted: 2020-10-08

## 2020-10-08 PROBLEM — M76.60 ACHILLES BURSITIS: Status: ACTIVE | Noted: 2020-10-08

## 2020-10-08 PROBLEM — C68.0 TRANSITIONAL CELL CARCINOMA OF URETHRA (HCC): Status: ACTIVE | Noted: 2020-10-08

## 2020-10-08 PROBLEM — N40.0 ENLARGED PROSTATE: Status: ACTIVE | Noted: 2020-10-08

## 2020-10-08 PROBLEM — R10.31 RIGHT LOWER QUADRANT PAIN: Status: ACTIVE | Noted: 2020-10-08

## 2020-10-08 PROBLEM — R19.7 DIARRHEA: Status: ACTIVE | Noted: 2020-10-08

## 2020-10-08 PROBLEM — K57.32 DIVERTICULITIS OF COLON: Status: ACTIVE | Noted: 2020-10-08

## 2020-10-08 PROBLEM — S39.012A LOW BACK STRAIN: Status: ACTIVE | Noted: 2020-10-08

## 2020-10-08 PROBLEM — R53.83 FATIGUE: Status: ACTIVE | Noted: 2020-10-08

## 2020-10-08 PROBLEM — M54.9 BACKACHE: Status: ACTIVE | Noted: 2020-10-08

## 2020-10-08 PROBLEM — I77.9 DISORDER OF ARTERIES AND ARTERIOLES, UNSPECIFIED (HCC): Status: ACTIVE | Noted: 2020-10-08

## 2020-10-08 PROBLEM — J45.20 MILD INTERMITTENT ASTHMA WITHOUT COMPLICATION: Status: ACTIVE | Noted: 2020-10-08

## 2020-10-08 PROBLEM — L20.9 ATOPIC DERMATITIS: Status: ACTIVE | Noted: 2020-10-08

## 2020-10-08 PROBLEM — R20.0 ANESTHESIA OF SKIN: Status: ACTIVE | Noted: 2020-10-08

## 2020-10-08 PROCEDURE — 99214 OFFICE O/P EST MOD 30 MIN: CPT | Performed by: INTERNAL MEDICINE

## 2020-10-08 RX ORDER — DEXTROMETHORPHAN HYDROBROMIDE AND PROMETHAZINE HYDROCHLORIDE 15; 6.25 MG/5ML; MG/5ML
5 SYRUP ORAL EVERY 4 HOURS PRN
Qty: 180 ML | Refills: 1 | Status: SHIPPED | OUTPATIENT
Start: 2020-10-08

## 2020-10-11 ASSESSMENT — ENCOUNTER SYMPTOMS
RESPIRATORY NEGATIVE: 1
RHINORRHEA: 1
EYES NEGATIVE: 1

## 2020-10-11 NOTE — PROGRESS NOTES
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 year old male    Patient has episode of exacerbation and required prednisone taper , now back on three times a week prednisone   Compliant with bronchodilator   Chronic rhinosinusitis     Last visit   Since last visit patient did need a course of antibiotic and steroid. That controlled his postnasal drainage and hence his acute bronchitis. He is on nasal steroid nasal his antihistaminics but he continues to have nasal discharge and postnasal drainage. He is tolerating DuoNeb and Symbicort well without any complications. No oral thrush noted. who is to follow with Dr. Lupe Bone . Patient would like to change his pulmonologist.  He has been having recurrent acute bronchitis episode which started as nasal congestion postnasal drainage and then he started wheezing and coughing and is given antibiotic and steroids. Previously he had been given Levaquin which called Achilles tendon so he does not use fluoroquinolones anymore. Patient has had multiple courses of antibiotic and steroids. He has been on Symbicort and using DuoNeb 4 times a day presently he is got Phenergan DM to help with his cough and he is able to sleep better at night. Patient has a history of chlorine gas exposure and because of this he has reactive airway disease and gets exacerbation with weather changes when he had been working with [facility] department. At present he denies any shortness of breath with exertion he denies any wheezing. He is using Symbicort twice a day without a spacer and is using DuoNeb 4 times a day. He has also seen ENT who advised him to use nasal saline rinse Flonase and Astelin spray he does not have the Astelin spray and he did not understand that he had to use nasal saline rinse to help clear his secretions prior to using Flonase.        LUNG CANCER SCREENING     1. CRITERIA MET    [] CT ORDERED  []      2. CRITERIA NOT MET   [x]      3. REFUSED                    []        REASON CRITERIA NOT MET     1. SMOKING LESS THAN 30 PY  []      2. AGE LESS THAN 55 or GREATER 77 YEARS  []      3. QUIT SMOKING 15 YEARS OR GREATER   []      4. RECENT CT WITH IN 11 MONTHS    []      5. LIFE EXPECTANCY < 5 YEARS   []      6. SIGNS  AND SYMPTOMS OF LUNG CANCER   []         Immunization   Immunization History   Administered Date(s) Administered    Influenza Vaccine, unspecified formulation 10/06/2015, 11/02/2016    Influenza Whole 11/20/2014    Influenza, High Dose (Fluzone 65 yrs and older) 09/27/2017, 09/27/2018, 10/18/2019    Influenza, High-dose, Quadv, 65 yrs +, IM (Fluzone) 09/09/2020    Pneumococcal Conjugate 13-valent ([id number]) 05/09/2017    Pneumococcal Polysaccharide ([id number]) 11/12/1997, 10/01/2003, 11/20/2017    Tdap (Boostrix, Adacel) 02/08/2018        Pneumococcal Vaccine     [x] Up to date    [] Indicated   [] Refused  [] Contraindicated       Influenza Vaccine   [x] Up to date    [] Indicated   [] Refused  [] Contraindicated          PAST MEDICAL HISTORY:       Diagnosis Date    Acute sinusitis 8/7/2017    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    Asthma 8/7/2017    Bacterial pneumonia 8/7/2017    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/1/2013    Benign prostatic hyperplasia 4/12/2013    Candidiasis of mouth 8/7/2017    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 9/8/2014    Chronic obstructive lung disease ([address] 11/8/2011    Deep vein thrombosis of lower extremity ([address] 8/7/2017    Dizziness and giddiness 12/19/2011    Dysphagia 8/7/2017    Gastroesophageal reflux disease 8/7/2017    Herpes zoster 8/7/2017    Lumbar sprain 9/8/2014    Malignant neoplasm metastatic to lung ([address] 8/7/2017    Malignant tumor of prostate ([address] 2/4/2013    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     SURGICAL HISTORY:   No by mouth daily   Yes Historical Provider, MD   amLODIPine (NORVASC) 5 MG tablet Take 1 tablet by mouth daily   Yes Historical Provider, MD   warfarin (JANTOVEN) 5 MG tablet Take 1 tablet by mouth daily   Yes Historical Provider, MD          Review of Systems   Constitutional: Negative. HENT: Positive for congestion, postnasal drip and rhinorrhea. Eyes: Negative. Respiratory: Negative. Cardiovascular: Negative. Vitals:  /78 (Site: Right Upper Arm, Position: Sitting, Cuff Size: Medium Adult)   Pulse 93   Temp 97.3 °F (36.3 °C) (Temporal)   Ht 5' 8\" (1.727 m)   Wt 169 lb (76.7 kg)   SpO2 96% Comment: room air  BMI 25.70 kg/m²     PHYSICAL EXAM:  Head and neck atraumatic, normocephalic    Lymph nodes-no cervical, supraclavicular lymphadenopathy    Neck-no JVP elevation    Lungs - Ventilating all lobes without any rales, rhonchi, wheezes or dullness. No bronchial breath sounds. Chest expansion equal bilaterally    CVS- S1, S2 regular. No S3 no S4, no murmurs    Abdomen-nontender, nondistended. Bowel sounds are present. No organomegaly    Lower extremity-no edema    Upper extremity-no edema    Neurological-grossly normal cranial nerves. No overt motor deficit   CT Scans  4/8/2019 done at [facility]  Right apical scarring scarring in the right middle lobe no pulmonary infiltrate no change in intrapulmonary lymph node in the minor fissure    PFT done at [facility] 2015 report reviewed and is normal      IMPRESSION:     Diagnosis Orders   1. Severe persistent asthma without complication     2. Chronic rhinitis  promethazine-dextromethorphan (PROMETHAZINE-DM) 6.25-15 MG/5ML syrup   3. 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     4. Chronic sinusitis, unspecified location  promethazine-dextromethorphan (PROMETHAZINE-DM) 6.25-15 MG/5ML syrup   5.  Post-nasal drip          :                PLAN:      Continue Symbicort and duoneb   Cont prednisone   Cont chronic rhino sinusitis treatment with nasal sprays   He is scheduled to follow up with Dr Mala Magana for immunotherapy evaluation . Follow up 3 months         Requested Prescriptions     Signed Prescriptions Disp Refills    promethazine-dextromethorphan (PROMETHAZINE-DM) 6.25-15 MG/5ML syrup 180 mL 1     Sig: Take 5 mLs by mouth every 4 hours as needed for Cough       Medications Discontinued During This Encounter   Medication Reason    promethazine-dextromethorphan (PROMETHAZINE-DM) 6.25-15 MG/5ML syrup Juve Castle received counseling on the following healthy behaviors: nutrition, exercise and medication adherence    Patient given educational materials : see patient instruction       Discussed use, benefit, and side effects of prescribed medications. Barriers to medication compliance addressed. All patient questions answered. Pt voiced understanding. I hope this updates you on my evaluation and clinical thinking. Thank you for allowing me to participate in his care. Sincerely,      Electronically signed by Jelly Cortez MD on 10/11/2020 at 11:06 AM     Please note that this chart was generated using voice recognition Dragon dictation software. Although every effort was made to ensure the accuracy of this automated transcription, some errors in transcription may have occurred.

## 2020-10-28 ENCOUNTER — HOSPITAL ENCOUNTER (EMERGENCY)
Age: 85
Discharge: HOME | End: 2020-10-28
Attending: EMERGENCY MEDICINE
Payer: MEDICARE

## 2020-10-28 ENCOUNTER — APPOINTMENT (OUTPATIENT)
Dept: GENERAL RADIOLOGY | Age: 85
End: 2020-10-28
Payer: MEDICARE

## 2020-10-28 VITALS
RESPIRATION RATE: 19 BRPM | BODY MASS INDEX: 25.76 KG/M2 | TEMPERATURE: 98.4 F | WEIGHT: 170 LBS | OXYGEN SATURATION: 92 % | HEIGHT: 68 IN | SYSTOLIC BLOOD PRESSURE: 160 MMHG | DIASTOLIC BLOOD PRESSURE: 75 MMHG | HEART RATE: 86 BPM

## 2020-10-28 LAB
ABSOLUTE EOS #: 0.46 K/UL (ref 0–0.4)
ABSOLUTE IMMATURE GRANULOCYTE: ABNORMAL K/UL (ref 0–0.3)
ABSOLUTE LYMPH #: 1.08 K/UL (ref 1–4.8)
ABSOLUTE MONO #: 1.08 K/UL (ref 0.1–0.8)
ALBUMIN SERPL-MCNC: 4.1 G/DL (ref 3.5–5.2)
ALBUMIN/GLOBULIN RATIO: 1.8 (ref 1–2.5)
ALP BLD-CCNC: 75 U/L (ref 40–129)
ALT SERPL-CCNC: 17 U/L (ref 5–41)
ANION GAP SERPL CALCULATED.3IONS-SCNC: 13 MMOL/L (ref 9–17)
AST SERPL-CCNC: 23 U/L
BASOPHILS # BLD: 0 % (ref 0–2)
BASOPHILS ABSOLUTE: 0 K/UL (ref 0–0.2)
BILIRUB SERPL-MCNC: 0.58 MG/DL (ref 0.3–1.2)
BNP INTERPRETATION: ABNORMAL
BUN BLDV-MCNC: 15 MG/DL (ref 8–23)
BUN/CREAT BLD: ABNORMAL (ref 9–20)
CALCIUM SERPL-MCNC: 9.5 MG/DL (ref 8.6–10.4)
CHLORIDE BLD-SCNC: 109 MMOL/L (ref 98–107)
CO2: 24 MMOL/L (ref 20–31)
CREAT SERPL-MCNC: 1.01 MG/DL (ref 0.7–1.2)
DIFFERENTIAL TYPE: ABNORMAL
EOSINOPHILS RELATIVE PERCENT: 6 % (ref 1–4)
GFR AFRICAN AMERICAN: >60 ML/MIN
GFR NON-AFRICAN AMERICAN: >60 ML/MIN
GFR SERPL CREATININE-BSD FRML MDRD: ABNORMAL ML/MIN/{1.73_M2}
GFR SERPL CREATININE-BSD FRML MDRD: ABNORMAL ML/MIN/{1.73_M2}
GLUCOSE BLD-MCNC: 91 MG/DL (ref 70–99)
HCT VFR BLD CALC: 42.2 % (ref 41–53)
HEMOGLOBIN: 14.1 G/DL (ref 13.5–17.5)
IMMATURE GRANULOCYTES: ABNORMAL %
LYMPHOCYTES # BLD: 14 % (ref 24–44)
MAGNESIUM: 2.2 MG/DL (ref 1.6–2.6)
MCH RBC QN AUTO: 31 PG (ref 26–34)
MCHC RBC AUTO-ENTMCNC: 33.4 G/DL (ref 31–37)
MCV RBC AUTO: 92.7 FL (ref 80–100)
MONOCYTES # BLD: 14 % (ref 1–7)
MORPHOLOGY: NORMAL
NRBC AUTOMATED: ABNORMAL PER 100 WBC
PDW BLD-RTO: 15.9 % (ref 12.5–15.4)
PLATELET # BLD: 255 K/UL (ref 140–450)
PLATELET ESTIMATE: ABNORMAL
PMV BLD AUTO: 6.8 FL (ref 6–12)
POTASSIUM SERPL-SCNC: 3.9 MMOL/L (ref 3.7–5.3)
PRO-BNP: 435 PG/ML
RBC # BLD: 4.56 M/UL (ref 4.5–5.9)
RBC # BLD: ABNORMAL 10*6/UL
SEG NEUTROPHILS: 66 % (ref 36–66)
SEGMENTED NEUTROPHILS ABSOLUTE COUNT: 5.08 K/UL (ref 1.8–7.7)
SODIUM BLD-SCNC: 146 MMOL/L (ref 135–144)
TOTAL PROTEIN: 6.4 G/DL (ref 6.4–8.3)
TROPONIN INTERP: ABNORMAL
TROPONIN INTERP: ABNORMAL
TROPONIN T: ABNORMAL NG/ML
TROPONIN T: ABNORMAL NG/ML
TROPONIN, HIGH SENSITIVITY: 28 NG/L (ref 0–22)
TROPONIN, HIGH SENSITIVITY: 35 NG/L (ref 0–22)
WBC # BLD: 7.7 K/UL (ref 3.5–11)
WBC # BLD: ABNORMAL 10*3/UL

## 2020-10-28 PROCEDURE — 93005 ELECTROCARDIOGRAM TRACING: CPT | Performed by: EMERGENCY MEDICINE

## 2020-10-28 PROCEDURE — 6370000000 HC RX 637 (ALT 250 FOR IP): Performed by: EMERGENCY MEDICINE

## 2020-10-28 PROCEDURE — 71045 X-RAY EXAM CHEST 1 VIEW: CPT

## 2020-10-28 PROCEDURE — 99284 EMERGENCY DEPT VISIT MOD MDM: CPT

## 2020-10-28 PROCEDURE — 80053 COMPREHEN METABOLIC PANEL: CPT

## 2020-10-28 PROCEDURE — 84484 ASSAY OF TROPONIN QUANT: CPT

## 2020-10-28 PROCEDURE — 83880 ASSAY OF NATRIURETIC PEPTIDE: CPT

## 2020-10-28 PROCEDURE — 85025 COMPLETE CBC W/AUTO DIFF WBC: CPT

## 2020-10-28 PROCEDURE — 36415 COLL VENOUS BLD VENIPUNCTURE: CPT

## 2020-10-28 PROCEDURE — 83735 ASSAY OF MAGNESIUM: CPT

## 2020-10-28 PROCEDURE — 6360000002 HC RX W HCPCS: Performed by: EMERGENCY MEDICINE

## 2020-10-28 PROCEDURE — 2580000003 HC RX 258: Performed by: EMERGENCY MEDICINE

## 2020-10-28 PROCEDURE — 96375 TX/PRO/DX INJ NEW DRUG ADDON: CPT

## 2020-10-28 PROCEDURE — 96365 THER/PROPH/DIAG IV INF INIT: CPT

## 2020-10-28 RX ORDER — DOXYCYCLINE HYCLATE 100 MG
100 TABLET ORAL 2 TIMES DAILY
Qty: 14 TABLET | Refills: 0 | Status: SHIPPED | OUTPATIENT
Start: 2020-10-28 | End: 2020-11-04

## 2020-10-28 RX ORDER — METHYLPREDNISOLONE SODIUM SUCCINATE 125 MG/2ML
125 INJECTION, POWDER, LYOPHILIZED, FOR SOLUTION INTRAMUSCULAR; INTRAVENOUS ONCE
Status: COMPLETED | OUTPATIENT
Start: 2020-10-28 | End: 2020-10-28

## 2020-10-28 RX ORDER — IPRATROPIUM BROMIDE AND ALBUTEROL SULFATE 2.5; .5 MG/3ML; MG/3ML
1 SOLUTION RESPIRATORY (INHALATION) ONCE
Status: COMPLETED | OUTPATIENT
Start: 2020-10-28 | End: 2020-10-28

## 2020-10-28 RX ORDER — PREDNISONE 10 MG/1
10 TABLET ORAL SEE ADMIN INSTRUCTIONS
Qty: 17 TABLET | Refills: 0 | Status: SHIPPED | OUTPATIENT
Start: 2020-10-28 | End: 2020-11-03

## 2020-10-28 RX ORDER — METHYLPREDNISOLONE SODIUM SUCCINATE 125 MG/2ML
80 INJECTION, POWDER, LYOPHILIZED, FOR SOLUTION INTRAMUSCULAR; INTRAVENOUS ONCE
Status: DISCONTINUED | OUTPATIENT
Start: 2020-10-28 | End: 2020-10-28

## 2020-10-28 RX ADMIN — IPRATROPIUM BROMIDE AND ALBUTEROL SULFATE 1 AMPULE: .5; 3 SOLUTION RESPIRATORY (INHALATION) at 11:02

## 2020-10-28 RX ADMIN — METHYLPREDNISOLONE SODIUM SUCCINATE 125 MG: 125 INJECTION, POWDER, FOR SOLUTION INTRAMUSCULAR; INTRAVENOUS at 11:53

## 2020-10-28 RX ADMIN — CEFTRIAXONE SODIUM 1 G: 1 INJECTION, POWDER, FOR SOLUTION INTRAMUSCULAR; INTRAVENOUS at 11:53

## 2020-10-28 ASSESSMENT — ENCOUNTER SYMPTOMS
WHEEZING: 1
SHORTNESS OF BREATH: 0
RHINORRHEA: 1
COUGH: 1
SORE THROAT: 0
DIARRHEA: 0
VOMITING: 0

## 2020-10-28 NOTE — ED PROVIDER NOTES
[id number] [facility] ED  [address] [address] [facility] [place] [address]  Phone: [phone]  Fax: [phone] [facility] ED  Molly      Pt Name: Viraj Dee  MRN: [id number]  [place] 1935  Date of evaluation: 10/28/2020  Provider: Shelley Brown Dr       Chief Complaint   Patient presents with    Cough     pt states he has copd and bronchitis. pt states productive cough with yellow/white sputum.  Shortness of Breath         HISTORY OF PRESENT ILLNESS   (Location/Symptom, Timing/Onset,Context/Setting, Quality, Duration, Modifying Factors, Severity)  Note limiting factors. Viraj Dee is a 81 y.o. male who presents to the emergency department for the evaluation of cough, wheezing and shortness of breath. Patient has a history of chronic and recurring bronchitis and chronic and recurring cough, however, it has been fairly persistent over the course of the last 1 month despite multiple antibiotics, steroids and medication changes. He continues to have cough and wheeze, that the cough is intermittently productive of yellow and clear sputum. No fever. 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 Patient has been on antibiotic, steroid, he takes Flonase, he has nasal spray, he has seen PCP, pulmonology and ENT. Patient states he is just not feeling any better. Denies fever. Denies chest pain. Nursing Notes were reviewed. REVIEW OF SYSTEMS    (2-9systems for level 4, 10 or more for level 5)     Review of Systems   Constitutional: Negative for fever. HENT: Positive for rhinorrhea. Negative for sore throat. Respiratory: Positive for cough and wheezing. Negative for shortness of breath. Cardiovascular: Negative for chest pain. Gastrointestinal: Negative for diarrhea and vomiting. Genitourinary: Negative for dysuria.    Skin: Negative for rash.   Neurological: Negative for weakness. All other systems reviewed and are negative. Except asnoted above the remainder of the review of systems was reviewed and negative. 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 No pertinent surgical history.       CURRENT MEDICATIONS     Previous Medications    ALBUTEROL SULFATE  (90 BASE) MCG/ACT INHALER    Inhale 2 puffs into the lungs every 6 hours as needed for Wheezing    AMLODIPINE (NORVASC) 5 MG TABLET    Take 1 tablet by mouth daily    AZELASTINE (ASTELIN) 0.1 % NASAL SPRAY    1 spray by Nasal route 2 times daily Use in each nostril as directed    CETIRIZINE HCL 10 MG CAPS    Take by mouth    FLUTICASONE (FLONASE) 50 MCG/ACT NASAL SPRAY    SPRAY 1 SPRAY INTO EACH NOSTRIL EVERY DAY    IPRATROPIUM-ALBUTEROL (DUONEB) 0.5-2.5 (3) MG/3ML SOLN NEBULIZER SOLUTION    Inhale 3 mLs into the lungs every 6 hours as needed for Shortness of Breath    PROMETHAZINE-DEXTROMETHORPHAN (PROMETHAZINE-DM) 6.25-15 MG/5ML SYRUP    Take 5 mLs by mouth every 4 hours as needed for Cough    SIMVASTATIN (ZOCOR) 40 MG TABLET    Take 1 tablet by mouth daily    SYMBICORT 160-4.5 MCG/ACT AERO    Inhale 2 puffs into the lungs 2 times daily    WARFARIN (JANTOVEN) 5 MG TABLET    Take 1 tablet by mouth daily       ALLERGIES     Levofloxacin; Codeine; Pulmicort [budesonide]; Cashew nut oil; Levaquin  [levofloxacin in d5w]; and Peanut-containing drug products    FAMILY HISTORY     History reviewed. No pertinent family history. SOCIAL HISTORY       Social History     Socioeconomic History    Marital status:      Spouse name: None    Number of children: None    Years of education: None    Highest education level: None   Occupational History    None   Social Needs    Financial resource strain: None    Food insecurity     Worry: None     Inability: None    Transportation needs     Medical: None     Non-medical: None   Tobacco Use    Smoking status: Never Smoker    Smokeless tobacco: Never Used   Substance and Sexual Activity    Alcohol use: Not Currently    Drug use: Never    Sexual activity: None   Lifestyle    Physical activity     Days per week: None     Minutes per session: None    Stress: None   Relationships    Social connections     Talks on phone: None     Gets together: None     Attends Taoist service: None     Active member of club or organization: None     Attends meetings of clubs or organizations: None     Relationship status: None    Intimate partner violence     Fear of current or ex partner: None     Emotionally abused: None     Physically abused: None     Forced sexual activity: None   Other Topics Concern    None   Social History Narrative    None       SCREENINGS             PHYSICAL EXAM    (up to 7 for level 4, 8 or more for level 5)     ED Triage Vitals [10/28/20 1023]   BP Temp Temp Source Pulse Resp SpO2 Height Weight   (!) 154/94 98.4 °F (36.9 °C) Oral 91 18 94 % 5' 8\" (1.727 m) 170 lb (77.1 kg)       Physical Exam  Vitals signs and nursing note reviewed. Constitutional:       General: He is not in acute distress.      Appearance: Normal appearance. He is not ill-appearing or toxic-appearing. HENT:      Head: Normocephalic and atraumatic. Nose: Nose normal. No congestion. Mouth/Throat:      Mouth: Mucous membranes are moist.   Eyes:      General:         Right eye: No discharge. Left eye: No discharge. Conjunctiva/sclera: Conjunctivae normal.   Neck:      Musculoskeletal: Normal range of motion. Cardiovascular:      Rate and Rhythm: Normal rate and regular rhythm. Pulses: Normal pulses. Heart sounds: Normal heart sounds. No murmur. Pulmonary:      Effort: Pulmonary effort is normal. No tachypnea, accessory muscle usage or respiratory distress. Breath sounds: Examination of the right-lower field reveals decreased breath sounds. Examination of the left-lower field reveals decreased breath sounds. Decreased breath sounds and wheezing present. Comments: Patient speaking in complete sentences  Abdominal:      General: Abdomen is flat. There is no distension. Palpations: Abdomen is soft. Tenderness: There is no abdominal tenderness. Musculoskeletal: Normal range of motion. General: No deformity or signs of injury. Skin:     General: Skin is warm and dry. Capillary Refill: Capillary refill takes less than 2 seconds. Findings: No rash. Neurological:      General: No focal deficit present. Mental Status: He is alert. Mental status is at baseline. Motor: No weakness. Comments: Speaking normally. No facial asymmetry. Moving all 4 extremities. Normal gait.     Psychiatric:         Mood and Affect: Mood normal.         EMERGENCY DEPARTMENT COURSE and DIFFERENTIAL DIAGNOSIS/MDM:   Vitals:    Vitals:    10/28/20 1200 10/28/20 1214 10/28/20 1215 10/28/20 1230   BP: 138/83 (!) 142/71  (!) 147/67   Pulse: 84 78 84 81   Resp: 20  25    Temp:       TempSrc:       SpO2: 91% 90% 93% 90%   Weight:       Height:           Patient presents to the emergency department with the complaint described above. Vital signs show slight hypertension and a low/normal pulse ox. At this time he does have wheezing, it is throughout all lung fields and he has slightly decreased air movement. He is not in any respiratory distress, is not tachypneic and no accessory muscle use. Certainly chest x-ray is indicated, I do also want to get an EKG and some other blood work that includes cardiac enzymes as sometimes this can be the cause of some persistent wheezing as well. I am going to give him a breathing treatment, I will then reevaluate. Differential diagnosis remains broad      DIAGNOSTIC RESULTS     LABS:  Labs Reviewed   CBC WITH AUTO DIFFERENTIAL - Abnormal; Notable for the following components:       Result Value    RDW 15.9 (*)     Lymphocytes 14 (*)     Monocytes 14 (*)     Eosinophils % 6 (*)     Absolute Mono # 1.08 (*)     Absolute Eos # 0.46 (*)     All other components within normal limits   COMPREHENSIVE METABOLIC PANEL - Abnormal; Notable for the following components:    Sodium 146 (*)     Chloride 109 (*)     All other components within normal limits   TROPONIN - Abnormal; Notable for the following components:    Troponin, High Sensitivity 35 (*)     All other components within normal limits   BRAIN NATRIURETIC PEPTIDE - Abnormal; Notable for the following components:    Pro- (*)     All other components within normal limits   TROPONIN - Abnormal; Notable for the following components:    Troponin, High Sensitivity 28 (*)     All other components within normal limits   MAGNESIUM       All other labs were within normal range or not returned as of this dictation. RADIOLOGY:  XR CHEST PORTABLE   Final Result   Basilar opacities, right greater than left. While this may represent   underlying atelectasis or scarring, developing inflammatory process or   infection should be considered in the appropriate clinical setting.                ED Course as of Oct 28 1358   Wed Oct 28, 2020 1101 Twelve lead EKG interpreted by myself:  A 12 lead EKG done at 1050, interpreted by myself, showed a regular rhythm at a rate of 84bpm.  The DC interval was normal.  The QRS complex was normal.  There was no ST segment elevation or depression, T wave inversion not present. QRS progression through precordial leads was grossly normal.  Interpretation: Normal sinus rhythm, no ST segment changes and no pattern consistent with acute ischemia or infarct    [TS]   1130 Patient has no evidence of acute coronary syndrome or congestive heart failure. Troponin is just a little bit elevated, will get a 2-hour troponin, I am going to give some IV Rocephin as well as some IV steroids while waiting and reevaluate    [TS]   1136 On reevaluation, the patient significantly improved. He does feel a lot better. I am still going to do a dose of Rocephin and steroids, will reevaluate after the medications and a second troponin    [TS]   1350 Second troponin lower than the first.    [TS]   1355 Reevaluated the patient, he is up, walking to the bathroom, states he feels significantly better and would like to go home. We are going to try 1 more course of an antibiotic and steroid and I have talked about rapid follow-up with PCP and pulmonology as well as what to return to ER for at this time the patient is without objective evidence of an acute process requiring hospitalization or inpatient management. They have remained hemodynamically stable and are stable for discharge with outpatient follow-up. Standard anticipatory guidance given to patient upon discharge. Have given them a specific time frame in which to follow-up and who to follow-up with. I have also advised them that they should return to the emergency department if they get worse, or not getting better or develop any new or concerning symptoms.   Patient demonstrates understanding.        [TS]      ED Course User Index  [TS] Leandro Bonilla,  PROCEDURES:  Unless otherwise noted below, none     Procedures    FINAL IMPRESSION      1. Acute upper respiratory infection    2.  Moderate persistent asthma with exacerbation          DISPOSITION/PLAN   DISPOSITION Decision To Discharge 10/28/2020 01:56:22 PM      [facility]:  [facility] [address] [facility]  [phone]    In 1 week        DISCHARGE MEDICATIONS:  New Prescriptions    DOXYCYCLINE HYCLATE (VIBRA-TABS) 100 MG TABLET    Take 1 tablet by mouth 2 times daily for 7 days    PREDNISONE (DELTASONE) 10 MG TABLET    Take 1 tablet by mouth See Admin Instructions for 6 days Take 4 tablets by mouth daily for days 1-2   Take 3 tablets by mouth daily for days 3-4   Take 2 tablets by mouth daily day 5   Take 1 tablet by mouth on day 6          (Please note that portions of this note were completed with a voice recognition program.  Efforts were made to edit the dictations but occasionally words are mis-transcribed.)    Chuck Gomes DO (electronically signed)  Board Certified Emergency Physician          Chuck Gomes DO  10/28/20 8820

## 2020-10-29 LAB
EKG ATRIAL RATE: 84 BPM
EKG P AXIS: 60 DEGREES
EKG P-R INTERVAL: 174 MS
EKG Q-T INTERVAL: 360 MS
EKG QRS DURATION: 92 MS
EKG QTC CALCULATION (BAZETT): 425 MS
EKG R AXIS: 1 DEGREES
EKG T AXIS: 75 DEGREES
EKG VENTRICULAR RATE: 84 BPM

## 2020-10-30 ENCOUNTER — OFFICE VISIT (OUTPATIENT)
Dept: PULMONOLOGY | Age: 85
End: 2020-10-30
Payer: COMMERCIAL

## 2020-10-30 VITALS
OXYGEN SATURATION: 95 % | BODY MASS INDEX: 24.25 KG/M2 | TEMPERATURE: 97.5 F | WEIGHT: 160 LBS | RESPIRATION RATE: 18 BRPM | HEIGHT: 68 IN | DIASTOLIC BLOOD PRESSURE: 79 MMHG | SYSTOLIC BLOOD PRESSURE: 158 MMHG | HEART RATE: 88 BPM

## 2020-10-30 PROCEDURE — 99213 OFFICE O/P EST LOW 20 MIN: CPT | Performed by: NURSE PRACTITIONER

## 2020-10-30 RX ORDER — CETIRIZINE HYDROCHLORIDE 10 MG/1
TABLET ORAL
COMMUNITY
Start: 2020-10-14 | End: 2022-03-25

## 2020-10-30 RX ORDER — WARFARIN SODIUM 2 MG/1
TABLET ORAL
COMMUNITY
Start: 2020-10-29

## 2020-10-30 RX ORDER — BENZONATATE 100 MG/1
100 CAPSULE ORAL NIGHTLY PRN
Qty: 30 CAPSULE | Refills: 0 | Status: SHIPPED | OUTPATIENT
Start: 2020-10-30 | End: 2020-11-29

## 2020-10-30 RX ORDER — FAMOTIDINE 20 MG/1
40 TABLET, FILM COATED ORAL DAILY
Qty: 60 TABLET | Refills: 3 | Status: SHIPPED | OUTPATIENT
Start: 2020-10-30 | End: 2021-02-25

## 2020-10-30 RX ORDER — CEPHALEXIN 500 MG/1
CAPSULE ORAL
COMMUNITY
Start: 2020-08-24 | End: 2021-01-07

## 2020-10-30 ASSESSMENT — SLEEP AND FATIGUE QUESTIONNAIRES
HOW LIKELY ARE YOU TO NOD OFF OR FALL ASLEEP WHEN YOU ARE A PASSENGER IN A CAR FOR AN HOUR WITHOUT A BREAK: 0
HOW LIKELY ARE YOU TO NOD OFF OR FALL ASLEEP WHILE LYING DOWN TO REST IN THE AFTERNOON WHEN CIRCUMSTANCES PERMIT: 1
HOW LIKELY ARE YOU TO NOD OFF OR FALL ASLEEP WHILE SITTING AND READING: 2
HOW LIKELY ARE YOU TO NOD OFF OR FALL ASLEEP WHILE SITTING INACTIVE IN A PUBLIC PLACE: 0
HOW LIKELY ARE YOU TO NOD OFF OR FALL ASLEEP WHILE SITTING AND TALKING TO SOMEONE: 0
HOW LIKELY ARE YOU TO NOD OFF OR FALL ASLEEP WHILE WATCHING TV: 2
ESS TOTAL SCORE: 5
HOW LIKELY ARE YOU TO NOD OFF OR FALL ASLEEP IN A CAR, WHILE STOPPED FOR A FEW MINUTES IN TRAFFIC: 0
HOW LIKELY ARE YOU TO NOD OFF OR FALL ASLEEP WHILE SITTING QUIETLY AFTER LUNCH WITHOUT ALCOHOL: 0

## 2020-10-30 ASSESSMENT — ENCOUNTER SYMPTOMS
RHINORRHEA: 1
ALLERGIC/IMMUNOLOGIC NEGATIVE: 1
COUGH: 1

## 2020-10-30 NOTE — PROGRESS NOTES
Subjective:      Patient ID: Sheeba Mathias is a 81 y.o. male. 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 Patient was last seen in the office in October 2020 per Dr. Jasmin Petit. Patient was seen in the emergency room on 10/28/2020 for persistent cough productive of pale yellow sputum. Chest x-ray with basilar opacities right greater than left which may represent underlying atelectasis/scarring or developing inflammatory process/infection. Patient has an extensive history of chlorine gas exposure with severe asthma and chronic cough. He also reports a history of acid reflux and postnasal drip, has previously seen ENT Dr. Sim Davis in the past.  Patient endorses persistent chronic cough that is productive of pale yellow sputum, denies any fever, denies any hemoptysis. Reports persistent sinus drainage and rhinitis that is clear/white/pale yellow. He also reports small amount of weight loss. Approximately 6 pounds. Patient intolerant of promethazine due to hallucinations. Discussed with patient common etiologies for chronic cough including asthma, acid reflux, sinus drainage/postnasal drip. Patient will continue on his prednisone taper as ordered by ER and resume his chronic prednisone at 10 mg on Monday Wednesday and Friday as directed by Dr. Jasmin Petit, he will continue his Symbicort 160-4.5 mcg 2 puffs twice daily. We added Pepcid 40 mg daily and Zyrtec 10 mg daily. Discussed sinus lavage via Parnell pot however will defer at this time until his follow-up appointment if no improvement. Patient may benefit from repeat evaluation per ENT for his chronic sinusitis. Medications:   Prednisone taper. Doxycycline 10/28/2020 through 11/4/2020.   Tessalon Perles 100 mg as needed at bedtime  Albuterol HFA/nebs:  Zyrtec 10 mg daily:  Pepcid 40 mg daily:    PRIOR WORKUP:  PFT:  None on chart    CT Imaging:  None on chart    Chest x-ray 10/28/2020: Basilar opacities, right greater than left. While this may represent underlying atelectasis or scarring, developing inflammatory process or infection should be considered in the appropriate clinical setting.         Sleep Study:  None on chart    Immunizations:   Immunization History   Administered Date(s) Administered    Influenza Vaccine, unspecified formulation 10/06/2015, 11/02/2016    Influenza Whole 11/20/2014    Influenza, High Dose (Fluzone 65 yrs and older) 09/27/2017, 09/27/2018, 10/18/2019    Influenza, High-dose, Lakshmi Dewey, 65 yrs +, IM (Fluzone) 09/09/2020    Pneumococcal Conjugate 13-valent ([id number]) 05/09/2017    Pneumococcal Polysaccharide ([id number]) 11/12/1997, 10/01/2003, 11/20/2017    Tdap (Boostrix, Adacel) 02/08/2018        Sleep Medicine 10/30/2020 1/9/2020   Sitting and reading 2 0   Watching TV 2 0   Sitting, inactive in a public place (e.g. 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    Asthma 8/7/2017    Bacterial pneumonia 8/7/2017    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/1/2013    Benign prostatic hyperplasia 4/12/2013    Candidiasis of mouth 8/7/2017    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 9/8/2014    Chronic obstructive lung disease ([facility] [address] 11/8/2011    Deep vein thrombosis of lower extremity (HCC) 8/7/2017    Dizziness and giddiness 12/19/2011    Dysphagia 8/7/2017    Gastroesophageal reflux disease 8/7/2017    Herpes zoster 8/7/2017    Lumbar sprain 9/8/2014    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 No pertinent surgical history. History reviewed. No pertinent family history. Social History     Socioeconomic History    Marital status:      Spouse name: Not on file    Number of children: Not on file    Years of education: Not on file    Highest education level: Not on file   Occupational History    Not on file   Social Needs    Financial resource strain: Not on file    Food insecurity     Worry: Not on file     Inability: Not on file    Transportation needs     Medical: Not on file     Non-medical: Not on file   Tobacco Use    Smoking status: Never Smoker    Smokeless tobacco: Never Used   Substance and Sexual Activity    Alcohol use: Not Currently    Drug use: Never    Sexual activity: Not on file   Lifestyle    Physical activity     Days per week: Not on file     Minutes per session: Not on file    Stress: Not on file   Relationships    Social connections     Talks on phone: Not on file     Gets together: Not on file     Attends Voodoo service: Not on file     Active member of club or organization: Not on file     Attends meetings of clubs or organizations: Not on file     Relationship status: Not on file    Intimate partner violence     Fear of current or ex partner: Not on file     Emotionally abused: Not on file     Physically abused: Not on file     Forced sexual activity: Not on file   Other Topics Concern    Not on file   Social History Narrative    Not on file       Review of Systems   Constitutional: Positive for unexpected weight change. HENT: Positive for congestion, postnasal drip and rhinorrhea. Respiratory: Positive for cough. Cough productive of pale yellow sputum, denies purulent sputum or hemoptysis. Cardiovascular: Negative. Gastrointestinal:        Patient with a history of acid reflux   Endocrine: Negative. Genitourinary: Negative. Musculoskeletal: Negative. Skin: Negative. Allergic/Immunologic: Negative. Neurological:        Hallucinations with promethazine   Hematological: Negative. Psychiatric/Behavioral: Negative. Objective:       Physical Exam  General appearance - alert, well appearing, and in no distress and oriented to person, place, and time  Mental status - alert, oriented to person, place, and time, normal mood, behavior, speech, dress, motor activity, and thought processes  Eyes - pupils equal and reactive, extraocular eye movements intact  Ears - not examined  Nose - mucosal congestion, rhinorrhea  Mouth - mucous membranes moist, pharynx normal without lesions  Neck - supple, no significant adenopathy  Chest -lung sounds coarse throughout, wheezing with cough.  Harsh productive wheezing cough noted on exam.   Heart -normal rate, regular rhythm, normal S1, S2, no murmurs, rubs, clicks or gallops  Abdomen - soft, nontender, nondistended, no masses or organomegaly  Neuro- alert, oriented, normal speech, no focal findings or movement disorder noted}  Extremities - peripheral pulses normal, no pedal edema, no clubbing or cyanosis  Skin - normal coloration and turgor, no rashes, no suspicious skin lesions noted     Wt Readings from Last 3 Encounters:   10/30/20 160 lb (72.6 kg)   10/28/20 170 lb (77.1 kg)   10/08/20 169 lb (76.7 kg)       Results for orders placed or performed during the hospital encounter of 10/28/20   CBC Auto Differential   Result Value Ref Range    WBC 7.7 3.5 - 11.0 k/uL    RBC 4.56 4.5 - 5.9 m/uL    Hemoglobin 14.1 13.5 - 17.5 g/dL    Hematocrit 42.2 41 - 53 %    MCV 92.7 80 - 100 fL    MCH 31.0 26 - 34 pg    MCHC 33.4 31 - 37 g/dL    RDW 15.9 (H) 12.5 - 15.4 %    Platelets 815 406 - 949 k/uL    MPV 6.8 6.0 - 12.0 fL    NRBC Automated NOT REPORTED per 100 WBC    Differential Type NOT REPORTED     Immature Granulocytes NOT REPORTED 0 %    Absolute Immature Granulocyte NOT REPORTED 0.00 - 0.30 k/uL    WBC Morphology NOT REPORTED     RBC Morphology NOT REPORTED     Platelet Estimate NOT REPORTED     Seg Neutrophils 66 36 - 66 %    Lymphocytes 14 (L) 24 - 44 %    Monocytes 14 (H) 1 - 7 %    Eosinophils % 6 (H) 1 - 4 %    Basophils 0 0 - 2 %    Segs Absolute 5.08 1.8 - 7.7 k/uL    Absolute Lymph # 1.08 1.0 - 4.8 k/uL    Absolute Mono # 1.08 (H) 0.1 - 0.8 k/uL    Absolute Eos # 0.46 (H) 0.0 - 0.4 k/uL    Basophils Absolute 0.00 0.0 - 0.2 k/uL    Morphology Normal    Comprehensive Metabolic Panel   Result Value Ref Range    Glucose 91 70 - 99 mg/dL    BUN 15 8 - 23 mg/dL    CREATININE 1.01 0.70 - 1.20 mg/dL    Bun/Cre Ratio NOT REPORTED 9 - 20    Calcium 9.5 8.6 - 10.4 mg/dL    Sodium 146 (H) 135 - 144 mmol/L    Potassium 3.9 3.7 - 5.3 mmol/L    Chloride 109 (H) 98 - 107 mmol/L    CO2 24 20 - 31 mmol/L    Anion Gap 13 9 - 17 mmol/L    Alkaline Phosphatase 75 40 - 129 U/L    ALT 17 5 - 41 U/L    AST 23 <40 U/L    Total Bilirubin 0.58 0.3 - 1.2 mg/dL    Total Protein 6.4 6.4 - 8.3 g/dL    Alb 4.1 3.5 - 5.2 g/dL    Albumin/Globulin Ratio 1.8 1.0 - 2.5    GFR Non-African American >60 >60 mL/min    GFR African American >60 >60 mL/min    GFR Comment          GFR Staging NOT REPORTED    Magnesium   Result Value Ref Range    Magnesium 2.2 1.6 - 2.6 mg/dL   Troponin   Result Value Ref Range    Troponin, High Sensitivity 35 (H) 0 - 22 ng/L    Troponin T NOT REPORTED <0.03 ng/mL    Troponin Interp NOT REPORTED    Brain Natriuretic Peptide   Result Value Ref Range    Pro- (H) <300 pg/mL    BNP Interpretation Pro-BNP Reference Range:    Troponin   Result Value Ref Range    Troponin, High Sensitivity 28 (H) 0 - 22 ng/L    Troponin T NOT REPORTED <0.03 ng/mL    Troponin Interp NOT REPORTED    EKG 12 Lead   Result Value Ref Range    Ventricular Rate 84 BPM    Atrial Rate 84 BPM    P-R Interval 174 ms    QRS Duration 92 ms    Q-T Interval 360 ms    QTc Calculation (Bazett) 425 ms    P Axis 60 degrees    R Axis 1 degrees    T Axis 75 degrees       Xr Chest Portable    Result Date: 10/28/2020  EXAMINATION: ONE XRAY VIEW OF THE CHEST 10/28/2020 11:07 am COMPARISON: 10/05/2019, 09/25/2019 HISTORY: ORDERING SYSTEM PROVIDED HISTORY: cough, wheeze TECHNOLOGIST PROVIDED HISTORY: cough, wheeze Reason for Exam: pt states he has copd and bronchitis. pt states productive cough with yellow/white sputum. Acuity: Acute Type of Exam: Initial FINDINGS: The cardiomediastinal silhouette is unchanged in appearance. Basilar opacities are noted, right greater than left. Ground-glass opacities in the right lung base appear new or more prominent since the remote prior exam. The curvilinear opacity in the left lung base appears unchanged. The osseous structures are unchanged in appearance. Basilar opacities, right greater than left. While this may represent underlying atelectasis or scarring, developing inflammatory process or infection should be considered in the appropriate clinical setting. Assessment:      1. Chronic sinusitis, unspecified location    2. Chronic rhinitis    3. Post-nasal drip    4. Reactive airway disease with wheezing without complication, unspecified asthma severity, unspecified whether persistent    5. Severe persistent asthma without complication    6. 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    7. Gastroesophageal reflux disease, unspecified whether esophagitis present          Plan:      1. Medications reviewed, continue as ordered. Added Pepcid and Zyrtec. Consider sinus lavage if no improvement. 2. Complete antibiotic and prednisone taper as ordered. 3. Educated and clarified the medication use. 4. Recommend flu vaccination in the fall annually. Up-to-date  5. Patient is up-to-date with vaccinations from pulmonary perspective. 6. Maintain an active lifestyle.   7. Patient's questions were answered to his satisfaction. 8. Chest x-ray was reviewed/ordered.   9. We'll see the patient back in 4 weeks        Electronically signed by VARGAS Ordoñez CNP on 10/30/2020 at 4:59 PM

## 2020-11-13 RX ORDER — FLUTICASONE PROPIONATE 50 MCG
SPRAY, SUSPENSION (ML) NASAL
Qty: 1 BOTTLE | Refills: 10 | Status: SHIPPED | OUTPATIENT
Start: 2020-11-13

## 2020-11-13 NOTE — TELEPHONE ENCOUNTER
Dr Glenys Yeh, patient is current and due in for an appointment on 1/7/21. No mention of this medication in [facility] last dictation but you have prescribed in the past. Please sign for refill if ok. Thank you.

## 2020-11-16 ENCOUNTER — TELEPHONE (OUTPATIENT)
Dept: PULMONOLOGY | Age: 85
End: 2020-11-16

## 2020-11-16 NOTE — TELEPHONE ENCOUNTER
Pt reqeusting 2nd round of AB - Pt last seen by MARISA rivera/cristy from [facility] 231 visit. Pt still coughing thick yellow phlegm - and SOB. Please advise. Pt uses [facility] on [address].

## 2020-11-17 RX ORDER — AZITHROMYCIN 250 MG/1
TABLET, FILM COATED ORAL
Qty: 6 TABLET | Refills: 0 | Status: SHIPPED | OUTPATIENT
Start: 2020-11-17 | End: 2021-01-07

## 2020-12-29 ENCOUNTER — TELEPHONE (OUTPATIENT)
Dept: PULMONOLOGY | Age: 85
End: 2020-12-29

## 2020-12-29 NOTE — TELEPHONE ENCOUNTER
Pt has been coughing x a few months - PCP Dr Xavier Wong gave him Zpak Rx with 3 refills. Pt thinks his just finished one last week. He is requesting cough Rx and asking if he needs to get refill on Z-neto. Pt decided to call PCP to request.  He will call back if he needs Rx from you. Pt coughing/ wheezing - using all inhalers and has no relief.

## 2021-01-07 ENCOUNTER — VIRTUAL VISIT (OUTPATIENT)
Dept: PULMONOLOGY | Age: 86
End: 2021-01-07
Payer: MEDICARE

## 2021-01-07 DIAGNOSIS — J45.50 SEVERE PERSISTENT ASTHMA WITHOUT COMPLICATION: Primary | ICD-10-CM

## 2021-01-07 DIAGNOSIS — J32.9 CHRONIC SINUSITIS, UNSPECIFIED LOCATION: ICD-10-CM

## 2021-01-07 DIAGNOSIS — Z77.098 CHLORINE GAS EXPOSURE: ICD-10-CM

## 2021-01-07 DIAGNOSIS — R09.82 POST-NASAL DRIP: ICD-10-CM

## 2021-01-07 PROCEDURE — 99441 PR PHYS/QHP TELEPHONE EVALUATION 5-10 MIN: CPT | Performed by: INTERNAL MEDICINE

## 2021-01-07 RX ORDER — PREDNISONE 10 MG/1
TABLET ORAL
COMMUNITY
Start: 2020-12-26 | End: 2021-01-07 | Stop reason: SDUPTHER

## 2021-01-07 RX ORDER — PREDNISONE 10 MG/1
10 TABLET ORAL DAILY
Qty: 30 TABLET | Refills: 1 | Status: SHIPPED | OUTPATIENT
Start: 2021-01-07 | End: 2021-03-04

## 2021-01-07 RX ORDER — FEXOFENADINE HCL 180 MG/1
TABLET ORAL
COMMUNITY
End: 2022-03-25

## 2021-01-07 NOTE — PROGRESS NOTES
2021    TELEHEALTH EVALUATION -- Audio/Visual (During [id number] public health emergency)    Patient and physician are located in their individual locations. This is visit is completed via [employer] application / [email or web address] / Telephone     Chief Complaint   Patient presents with    Asthma     follow up        HPI:    Lisa Bustillos (:  1935) has requested an audio/video evaluation for the following concern(s):    Pt has been having recurrent need for prednisone and antibiotics due to acute exacerbation and rhinosinusitis . He is compliant with bronchodilator therapy   Wants steroids . LUNG CANCER SCREENING     1. CRITERIA MET    []     CT ORDERED  []      2. CRITERIA NOT MET   [x]      3. REFUSED                    []        REASON CRITERIA NOT MET     1. SMOKING LESS THAN 30 PY  []      2. AGE LESS THAN 55 or GREATER 77 YEARS  []      3. QUIT SMOKING 15 YEARS OR GREATER   []      4. RECENT CT WITH IN 11 MONTHS    []      5. LIFE EXPECTANCY < 5 YEARS   []      6.  SIGNS  AND SYMPTOMS OF LUNG CANCER   []         Immunization   Immunization History   Administered Date(s) Administered    Influenza Vaccine, unspecified formulation 10/06/2015, 2016    Influenza Whole 2014    Influenza, High Dose (Fluzone 65 yrs and older) 2017, 2018, 10/18/2019    Influenza, High-dose, Quadv, 65 yrs +, IM (Fluzone) 2020    Pneumococcal Conjugate 13-valent ([id number]) 2017    Pneumococcal Polysaccharide ([id number]) 1997, 10/01/2003, 2017    Tdap (Boostrix, Adacel) 2018        Pneumococcal Vaccine     [x] Up to date    [] Indicated   [] Refused  [] Contraindicated       Influenza Vaccine   [x] Up to date    [] Indicated   [] Refused  [] Contraindicated        Pulmonary Rehab   [] Completed   [] Indicated   [] Refused  [] Contraindicated   PAST MEDICAL HISTORY:       Diagnosis Date    Acute sinusitis 2017    Arthropathy 2011    Asthma 2017  Bacterial pneumonia 8/7/2017    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/1/2013    Benign prostatic hyperplasia 4/12/2013    Candidiasis of mouth 8/7/2017    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 9/8/2014    Chronic obstructive lung disease ([facility] [address] 11/8/2011    Deep vein thrombosis of lower extremity (HCC) 8/7/2017    Dizziness and giddiness 12/19/2011    Dysphagia 8/7/2017    Gastroesophageal reflux disease 8/7/2017    Herpes zoster 8/7/2017    Lumbar sprain 9/8/2014    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 No pertinent family history. SURGICAL HISTORY:   History reviewed. No pertinent surgical history. SOCIAL AND OCCUPATIONAL HEALTH:     no changes     TOBACCO:   reports that he has never smoked. He has never used smokeless tobacco.  ETOH:   reports previous alcohol use. ALLERGIES:      Allergies   Allergen Reactions    Levofloxacin Swelling    Codeine Hives and Rash    Pulmicort [Budesonide] Rash     Also itchy    Cashew Nut Oil     Levaquin  [Levofloxacin In D5w] Other (See Comments)    Peanut-Containing Drug Products Hives         Home Meds:   Prior to Admission medications    Medication Sig Start Date End Date Taking?  Authorizing Provider   predniSONE (DELTASONE) 10 MG tablet Take 1 tablet by mouth daily 1/7/21  Yes Sariah Tran MD   fluticasone (FLONASE) 50 MCG/ACT nasal spray SPRAY 1 SPRAY INTO EACH NOSTRIL EVERY DAY 11/13/20  Yes Farooq Barnes MD   JANTOVEN 2 MG tablet  10/29/20  Yes Historical Provider, MD   cetirizine (ZYRTEC) 10 MG tablet TAKE ONE TABLET BY MOUTH ONCE DAILY IN THE PM 10/14/20  Yes Historical Provider, MD   Cetirizine HCl 10 MG CAPS Take 10 mg by mouth daily 10/30/20  Yes VARGAS Burton - CNP

## 2021-02-25 RX ORDER — FAMOTIDINE 20 MG/1
TABLET, FILM COATED ORAL
Qty: 60 TABLET | Refills: 10 | Status: SHIPPED | OUTPATIENT
Start: 2021-02-25 | End: 2021-12-20

## 2021-02-25 NOTE — TELEPHONE ENCOUNTER
Dr Jass Chris, patient is current and is scheduled for follow up on 3/4/21. No mention of this medication in your last dictation but Mary Cotto has prescribed in the past. Please sign for refill if ok. Thank you.

## 2021-03-04 ENCOUNTER — OFFICE VISIT (OUTPATIENT)
Dept: PULMONOLOGY | Age: 86
End: 2021-03-04
Payer: MEDICARE

## 2021-03-04 VITALS
WEIGHT: 166 LBS | HEIGHT: 68 IN | SYSTOLIC BLOOD PRESSURE: 140 MMHG | BODY MASS INDEX: 25.16 KG/M2 | HEART RATE: 106 BPM | RESPIRATION RATE: 18 BRPM | OXYGEN SATURATION: 95 % | DIASTOLIC BLOOD PRESSURE: 79 MMHG

## 2021-03-04 DIAGNOSIS — J45.50 SEVERE PERSISTENT ASTHMA WITHOUT COMPLICATION: ICD-10-CM

## 2021-03-04 DIAGNOSIS — J32.9 CHRONIC SINUSITIS, UNSPECIFIED LOCATION: ICD-10-CM

## 2021-03-04 DIAGNOSIS — R09.82 POST-NASAL DRIP: ICD-10-CM

## 2021-03-04 DIAGNOSIS — Z77.098 CHLORINE GAS EXPOSURE: Primary | ICD-10-CM

## 2021-03-04 PROCEDURE — 99214 OFFICE O/P EST MOD 30 MIN: CPT | Performed by: INTERNAL MEDICINE

## 2021-03-04 RX ORDER — PREDNISONE 10 MG/1
10 TABLET ORAL EVERY OTHER DAY
Qty: 15 TABLET | Refills: 3 | Status: SHIPPED | OUTPATIENT
Start: 2021-03-04 | End: 2021-06-03

## 2021-03-06 DIAGNOSIS — J45.50 SEVERE PERSISTENT ASTHMA WITHOUT COMPLICATION: ICD-10-CM

## 2021-03-06 ASSESSMENT — ENCOUNTER SYMPTOMS
RHINORRHEA: 1
EYES NEGATIVE: 1
RESPIRATORY NEGATIVE: 1

## 2021-03-06 NOTE — PROGRESS NOTES
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 year old male    He is doing well since he started prednisone he is taking 10 mg every day. He does have shortness of breath which persist but is not worsened no purulent sputum cough is better. 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 No chest pain or palpitations or orthopnea    Last visit   Since last visit patient did need a course of antibiotic and steroid. That controlled his postnasal drainage and hence his acute bronchitis. He is on nasal steroid nasal his antihistaminics but he continues to have nasal discharge and postnasal drainage. He is tolerating DuoNeb and Symbicort well without any complications. No oral thrush noted. who is to follow with Dr. Kajal Michelle . Patient would like to change his pulmonologist.  He has been having recurrent acute bronchitis episode which started as nasal congestion postnasal drainage and then he started wheezing and coughing and is given antibiotic and steroids. Previously he had been given Levaquin which called Achilles tendon so he does not use fluoroquinolones anymore. Patient has had multiple courses of antibiotic and steroids. He has been on Symbicort and using DuoNeb 4 times a day presently he is got Phenergan DM to help with his cough and he is able to sleep better at night. Patient has a history of chlorine gas exposure and because of this he has reactive airway disease and gets exacerbation with weather changes when he had been working with [facility] department. At present he denies any shortness of breath with exertion he denies any wheezing. He is using Symbicort twice a day without a spacer and is using DuoNeb 4 times a day.   He has also seen ENT who advised him to use nasal saline rinse Flonase and Astelin spray he does not have the Astelin spray and he did not understand that he had to use nasal saline rinse to help clear his secretions prior to using Flonase. LUNG CANCER SCREENING     1. CRITERIA MET    []     CT ORDERED  []      2. CRITERIA NOT MET   [x]      3. REFUSED                    []        REASON CRITERIA NOT MET     1. SMOKING LESS THAN 30 PY  []      2. AGE LESS THAN 55 or GREATER 77 YEARS  []      3. QUIT SMOKING 15 YEARS OR GREATER   []      4. RECENT CT WITH IN 11 MONTHS    []      5. LIFE EXPECTANCY < 5 YEARS   []      6.  SIGNS  AND SYMPTOMS OF LUNG CANCER   []         Immunization   Immunization History   Administered Date(s) Administered    Influenza Vaccine, unspecified formulation 10/06/2015, 11/02/2016    Influenza Whole 11/20/2014    Influenza, High Dose (Fluzone 65 yrs and older) 09/27/2017, 09/27/2018, 10/18/2019    Influenza, High-dose, Quadv, 65 yrs +, IM (Fluzone) 09/09/2020    Pneumococcal Conjugate 13-valent ([id number]) 05/09/2017    Pneumococcal Polysaccharide ([id number]) 11/12/1997, 10/01/2003, 11/20/2017    Tdap (Boostrix, Adacel) 02/08/2018        Pneumococcal Vaccine     [x] Up to date    [] Indicated   [] Refused  [] Contraindicated       Influenza Vaccine   [x] Up to date    [] Indicated   [] Refused  [] Contraindicated          PAST MEDICAL HISTORY:       Diagnosis Date    Acute sinusitis 8/7/2017    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    Asthma 8/7/2017    Bacterial pneumonia 8/7/2017    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/1/2013    Benign prostatic hyperplasia 4/12/2013    Candidiasis of mouth 8/7/2017    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 9/8/2014    Chronic obstructive lung disease ([address] 11/8/2011    Deep vein thrombosis of lower extremity ([address] 8/7/2017    Dizziness and giddiness 12/19/2011    Dysphagia 8/7/2017    Gastroesophageal reflux disease 8/7/2017    Herpes zoster 8/7/2017    Lumbar sprain 9/8/2014    Malignant neoplasm metastatic to lung ([address] 8/7/2017    Malignant tumor of prostate ([address] 2/4/2013    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 No pertinent family history. SURGICAL HISTORY:   History reviewed. No pertinent surgical history. SOCIAL AND OCCUPATIONAL HEALTH:      There  no history of TB or TB exposure. There  no asbestos or silica dust exposure. The patient reports  no coal, foundry, quarry or [employer] exposure. Travel history reveals non. There  no history of recreational or IV drug use. There  no hot tube exposure. Pets  no    Occupational history [employer] [facility]    TOBACCO:   reports that he has never smoked. He has never used smokeless tobacco.  ETOH:   reports previous alcohol use. ALLERGIES:      Allergies   Allergen Reactions    Levofloxacin Swelling    Codeine Hives and Rash    Pulmicort [Budesonide] Rash     Also itchy    Cashew Nut Oil     Levaquin  [Levofloxacin In D5w] Other (See Comments)    Peanut-Containing Drug Products Hives         Home Meds:   Prior to Admission medications    Medication Sig Start Date End Date Taking?  Authorizing Provider   predniSONE (DELTASONE) 10 MG tablet Take 1 tablet by mouth every other day 3/4/21  Yes Lashon Greene MD   famotidine (PEPCID) 20 MG tablet TAKE 2 TABLETS BY MOUTH EVERY DAY 2/25/21  Yes Lashon Greene MD   fexofenadine (ALLEGRA) 180 MG tablet 1 tablet as needed   Yes Historical Provider, MD   fluticasone (FLONASE) 50 MCG/ACT nasal spray SPRAY 1 SPRAY INTO EACH NOSTRIL EVERY DAY 11/13/20  Yes John Ayala MD   JANTOVEN 2 MG tablet  10/29/20  Yes Historical Provider, MD   cetirizine (ZYRTEC) 10 MG tablet TAKE ONE TABLET BY MOUTH ONCE DAILY IN THE PM 10/14/20  Yes Historical Provider, MD   Cetirizine HCl 10 MG CAPS Take 10 mg by mouth daily 10/30/20  Yes Rebecca Cooper, APRN - CNP   promethazine-dextromethorphan (PROMETHAZINE-DM) 6.25-15 MG/5ML syrup Take 5 mLs by mouth every 4 hours as needed for Cough 10/8/20  Yes Lashon Greene MD [facility] 2015 report reviewed and is normal      IMPRESSION:     Diagnosis Orders   1. 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     2. Severe persistent asthma without complication  predniSONE (DELTASONE) 10 MG tablet   3. Chronic sinusitis, unspecified location     4. Post-nasal drip          :                PLAN:      Continue prednisone but instead of 10 mg daily will change it to 10 mg every other day. Continue DuoNeb and Symbicort  Continue treatment of chronic rhinosinusitis with nasal sprays  Follow-up in 3 months          Requested Prescriptions     Signed Prescriptions Disp Refills    predniSONE (DELTASONE) 10 MG tablet 15 tablet 3     Sig: Take 1 tablet by mouth every other day       Medications Discontinued During This Encounter   Medication Reason    predniSONE (DELTASONE) 10 MG tablet        Tamara Kulkarni received counseling on the following healthy behaviors: nutrition, exercise and medication adherence    Patient given educational materials : see patient instruction       Discussed use, benefit, and side effects of prescribed medications. Barriers to medication compliance addressed. All patient questions answered. Pt voiced understanding. I hope this updates you on my evaluation and clinical thinking. Thank you for allowing me to participate in his care. Sincerely,      Electronically signed by Maddie Gaffney MD on 3/6/2021 at 3:21 PM     Please note that this chart was generated using voice recognition Dragon dictation software. Although every effort was made to ensure the accuracy of this automated transcription, some errors in transcription may have occurred.

## 2021-03-08 RX ORDER — ALBUTEROL SULFATE 90 UG/1
AEROSOL, METERED RESPIRATORY (INHALATION)
Qty: 6.7 INHALER | Refills: 11 | Status: SHIPPED | OUTPATIENT
Start: 2021-03-08 | End: 2021-06-03 | Stop reason: SDUPTHER

## 2021-03-08 NOTE — TELEPHONE ENCOUNTER
A request is coming over for Albuterol HFA from [facility]. Pt is current please sign the attached script.

## 2021-03-11 DIAGNOSIS — J45.50 SEVERE PERSISTENT ASTHMA WITHOUT COMPLICATION: ICD-10-CM

## 2021-03-11 NOTE — TELEPHONE ENCOUNTER
Dr Aramis Hackett, patient is current and is scheduled for follow up on 6/10/21. Per last dictation patient to continue these medications. Please sign for refills if ok. Thank you.

## 2021-03-12 RX ORDER — BUDESONIDE AND FORMOTEROL FUMARATE DIHYDRATE 160; 4.5 UG/1; UG/1
AEROSOL RESPIRATORY (INHALATION)
Qty: 1 INHALER | Refills: 11 | Status: SHIPPED | OUTPATIENT
Start: 2021-03-12

## 2021-03-12 RX ORDER — IPRATROPIUM BROMIDE AND ALBUTEROL SULFATE 2.5; .5 MG/3ML; MG/3ML
1 SOLUTION RESPIRATORY (INHALATION) EVERY 6 HOURS PRN
Qty: 120 VIAL | Refills: 11 | Status: SHIPPED | OUTPATIENT
Start: 2021-03-12 | End: 2021-06-03 | Stop reason: ALTCHOICE

## 2021-06-03 ENCOUNTER — OFFICE VISIT (OUTPATIENT)
Dept: PULMONOLOGY | Age: 86
End: 2021-06-03
Payer: COMMERCIAL

## 2021-06-03 VITALS
WEIGHT: 169 LBS | SYSTOLIC BLOOD PRESSURE: 119 MMHG | HEIGHT: 68 IN | TEMPERATURE: 98.4 F | OXYGEN SATURATION: 95 % | RESPIRATION RATE: 20 BRPM | BODY MASS INDEX: 25.61 KG/M2 | HEART RATE: 85 BPM | DIASTOLIC BLOOD PRESSURE: 62 MMHG

## 2021-06-03 DIAGNOSIS — J32.9 CHRONIC SINUSITIS, UNSPECIFIED LOCATION: ICD-10-CM

## 2021-06-03 DIAGNOSIS — Z77.098 CHLORINE GAS EXPOSURE: ICD-10-CM

## 2021-06-03 DIAGNOSIS — J45.50 SEVERE PERSISTENT ASTHMA WITHOUT COMPLICATION: Primary | ICD-10-CM

## 2021-06-03 DIAGNOSIS — R09.82 POST-NASAL DRIP: ICD-10-CM

## 2021-06-03 PROCEDURE — 1123F ACP DISCUSS/DSCN MKR DOCD: CPT | Performed by: INTERNAL MEDICINE

## 2021-06-03 PROCEDURE — 4040F PNEUMOC VAC/ADMIN/RCVD: CPT | Performed by: INTERNAL MEDICINE

## 2021-06-03 PROCEDURE — G8417 CALC BMI ABV UP PARAM F/U: HCPCS | Performed by: INTERNAL MEDICINE

## 2021-06-03 PROCEDURE — 99214 OFFICE O/P EST MOD 30 MIN: CPT | Performed by: INTERNAL MEDICINE

## 2021-06-03 PROCEDURE — G8427 DOCREV CUR MEDS BY ELIG CLIN: HCPCS | Performed by: INTERNAL MEDICINE

## 2021-06-03 PROCEDURE — 1036F TOBACCO NON-USER: CPT | Performed by: INTERNAL MEDICINE

## 2021-06-03 RX ORDER — ALBUTEROL SULFATE 2.5 MG/3ML
2.5 SOLUTION RESPIRATORY (INHALATION) EVERY 6 HOURS PRN
Qty: 120 EACH | Refills: 3 | Status: SHIPPED | OUTPATIENT
Start: 2021-06-03 | End: 2021-08-12 | Stop reason: ALTCHOICE

## 2021-06-03 RX ORDER — ALBUTEROL SULFATE 90 UG/1
AEROSOL, METERED RESPIRATORY (INHALATION)
Qty: 1 INHALER | Refills: 11 | Status: SHIPPED | OUTPATIENT
Start: 2021-06-03 | End: 2022-02-10

## 2021-06-03 RX ORDER — UMECLIDINIUM 62.5 UG/1
1 AEROSOL, POWDER ORAL DAILY
Qty: 1 EACH | Refills: 5 | Status: SHIPPED | OUTPATIENT
Start: 2021-06-03 | End: 2021-07-03

## 2021-06-03 ASSESSMENT — ENCOUNTER SYMPTOMS
EYES NEGATIVE: 1
RESPIRATORY NEGATIVE: 1
RHINORRHEA: 1

## 2021-06-03 NOTE — PROGRESS NOTES
Flonase. LUNG CANCER SCREENING     1. CRITERIA MET    []     CT ORDERED  []      2. CRITERIA NOT MET   [x]      3. REFUSED                    []        REASON CRITERIA NOT MET     1. SMOKING LESS THAN 30 PY  []      2. AGE LESS THAN 55 or GREATER 77 YEARS  []      3. QUIT SMOKING 15 YEARS OR GREATER   []      4. RECENT CT WITH IN 11 MONTHS    []      5. LIFE EXPECTANCY < 5 YEARS   []      6.  SIGNS  AND SYMPTOMS OF LUNG CANCER   []         Immunization   Immunization History   Administered Date(s) Administered    Influenza Vaccine, unspecified formulation 10/06/2015, 11/02/2016    Influenza Whole 11/20/2014    Influenza, High Dose (Fluzone 65 yrs and older) 09/27/2017, 09/27/2018, 10/18/2019    Influenza, High-dose, Quadv, 65 yrs +, IM (Fluzone) 09/09/2020    Pneumococcal Conjugate 13-valent ([id number]) 05/09/2017    Pneumococcal Polysaccharide ([id number]) 11/12/1997, 10/01/2003, 11/20/2017    Tdap (Boostrix, Adacel) 02/08/2018        Pneumococcal Vaccine     [x] Up to date    [] Indicated   [] Refused  [] Contraindicated       Influenza Vaccine   [x] Up to date    [] Indicated   [] Refused  [] Contraindicated          PAST MEDICAL HISTORY:       Diagnosis Date    Acute sinusitis 8/7/2017    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    Asthma 8/7/2017    Bacterial pneumonia 8/7/2017    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/1/2013    Benign prostatic hyperplasia 4/12/2013    Candidiasis of mouth 8/7/2017    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 9/8/2014    Chronic obstructive lung disease ([address] 11/8/2011    Deep vein thrombosis of lower extremity ([address] 8/7/2017    Dizziness and giddiness 12/19/2011    Dysphagia 8/7/2017    Gastroesophageal reflux disease 8/7/2017    Herpes zoster 8/7/2017    Lumbar sprain 9/8/2014    Malignant neoplasm metastatic to lung ([address] 8/7/2017    Malignant tumor of prostate ([address] 2/4/2013    Neoplasm of bone 8/5/2014    Productive cough 8/7/2017    Wheezing 11/8/2011 cetirizine (ZYRTEC) 10 MG tablet TAKE ONE TABLET BY MOUTH ONCE DAILY IN THE PM 10/14/20  Yes Historical Provider, MD   Cetirizine HCl 10 MG CAPS Take 10 mg by mouth daily 10/30/20  Yes VARGAS Nam - CNP   promethazine-dextromethorphan (PROMETHAZINE-DM) 6.25-15 MG/5ML syrup Take 5 mLs by mouth every 4 hours as needed for Cough 10/8/20  Yes Michaelle Woodard MD   azelastine (ASTELIN) 0.1 % nasal spray 1 spray by Nasal route 2 times daily Use in each nostril as directed 1/9/20  Yes Michaelle Woodard MD   simvastatin (ZOCOR) 40 MG tablet Take 1 tablet by mouth daily   Yes Historical Provider, MD   amLODIPine (NORVASC) 5 MG tablet Take 1 tablet by mouth daily   Yes Historical Provider, MD   warfarin (JANTOVEN) 5 MG tablet Take 1 tablet by mouth daily   Yes Historical Provider, MD          Review of Systems   Constitutional: Negative. HENT: Positive for congestion, postnasal drip and rhinorrhea. Eyes: Negative. Respiratory: Negative. Cardiovascular: Negative. Vitals:  /62 (Site: Right Upper Arm, Position: Sitting, Cuff Size: Medium Adult)   Pulse 85   Temp 98.4 °F (36.9 °C) (Oral)   Resp 20   Ht 5' 8\" (1.727 m)   Wt 169 lb (76.7 kg)   SpO2 95% Comment: sitting in room air  BMI 25.70 kg/m²     PHYSICAL EXAM:  Head and neck atraumatic, normocephalic    Lymph nodes-no cervical, supraclavicular lymphadenopathy    Neck-no JVP elevation    Lungs - Ventilating all lobes without any rales, rhonchi, wheezes or dullness. No bronchial breath sounds. Chest expansion equal bilaterally    CVS- S1, S2 regular. No S3 no S4, no murmurs    Abdomen-nontender, nondistended. Bowel sounds are present. No organomegaly    Lower extremity-no edema    Upper extremity-no edema    Neurological-grossly normal cranial nerves.   No overt motor deficit   CT Scans  4/8/2019 done at [facility]  Right apical scarring scarring in the right middle lobe no pulmonary infiltrate no change in intrapulmonary lymph node in the minor fissure    PFT done at [facility] 2015 report reviewed and is normal      IMPRESSION:     Diagnosis Orders   1. Severe persistent asthma without complication  Umeclidinium Bromide (INCRUSE ELLIPTA) 62.5 MCG/INH AEPB    albuterol (PROVENTIL) (2.5 MG/3ML) 0.083% nebulizer solution    albuterol sulfate  (90 Base) MCG/ACT inhaler   2. 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     3. Chronic sinusitis, unspecified location     4. Post-nasal drip          :                PLAN:      He does not have wheezing today, responded to Spiriva Respimat but is not covered by his insurance. We'll continue Symbicort and add Incruse and use albuterol as needed continue Flonase and Astelin spray for chronic rhinitis and sinusitis  Follow-up in 2 months          Requested Prescriptions     Signed Prescriptions Disp Refills    Umeclidinium Bromide (INCRUSE ELLIPTA) 62.5 MCG/INH AEPB 1 each 5     Sig: Inhale 1 puff into the lungs daily    albuterol (PROVENTIL) (2.5 MG/3ML) 0.083% nebulizer solution 120 each 3     Sig: Take 3 mLs by nebulization every 6 hours as needed for Wheezing    albuterol sulfate  (90 Base) MCG/ACT inhaler 1 Inhaler 11     Sig: TAKE 2 PUFFS BY MOUTH EVERY 6 HOURS AS NEEDED FOR WHEEZE       Medications Discontinued During This Encounter   Medication Reason    ipratropium-albuterol (DUONEB) 0.5-2.5 (3) MG/3ML SOLN nebulizer solution Alternate therapy    albuterol sulfate  (90 Base) MCG/ACT inhaler REORDER    predniSONE (DELTASONE) 10 MG tablet LIST CLEANUP       Verner Fails received counseling on the following healthy behaviors: nutrition, exercise and medication adherence    Patient given educational materials : see patient instruction       Discussed use, benefit, and side effects of prescribed medications. Barriers to medication compliance addressed. All patient questions answered. Pt voiced understanding. I hope this updates you on my evaluation and clinical thinking.  Thank you for allowing me to

## 2021-08-01 NOTE — PROGRESS NOTES
Subjective:      Patient ID: Alyse Cardozo is a 81 y.o. male. HPI  Here for follow-up for asthma secondary to chlorine gas exposure. Patient was last seen in clinic in June 2021 per Dr. Patricia Engle. Patient listed as a post-hospital follow up, but per patient he has only recently been to [facility] after a fall were he caught his thumb on the door jamb and was bleeding requiring dermabond. Denies any recent pulmonary ER visits or hospitalizations. He does state he was recently placed on Cefuroxime and Prednisone burst per PCP for complaint of cough productive of clear mucous- has completed both medications and has not noticed any improvement. Cough is bothersome to patient- he reports difficulty sleeping due to need to cough and expectorate sputum- again states it is clear/white in color- specifically denies yellow, green or bloody secretions. Patient is on Albuterol Nebs 4x daily, Flonase nasal spray, Astelin nasal spray, Zyrtec 10 mg daily, Allegra 180 mg daily, Symbicort 160-4.5 mcg and Incruse 62.5 mcg with no significant change to cough or sputum production. Patient also states he is taking Mucinex OTC. Medications:   Albuterol neb: Albuterol HFA: 4x a day  Symbicort 160-4.5 mcg:  Pepcid 40 mg:  Allegra 180 mg:  Flonase nasal spray:  Cetirizine 10 mg:  Astelin nasal spray:      PRIOR WORKUP:  PFT:  None on chart    CT Imaging:  None on chart    CXR report from Lata Marquez on 5/24/2021: Lungs are stable, Cardiac silhouette and pulmonary vasculature are unchanged.  No focal consolidative airspace disease, pneumothorax or pleural effusion noted- no imaging available to review    Sleep Study:  None on chart    Laboratory Evaluation:  None on chart  Immunizations:   Immunization History   Administered Date(s) Administered    Influenza Vaccine, unspecified formulation 10/06/2015, 11/02/2016    Influenza Whole 11/20/2014    Influenza, High Dose (Fluzone 65 yrs and older) 09/27/2017, 09/27/2018, 10/18/2019  Influenza, High-dose, Quadv, 65 yrs +, IM (Fluzone) 09/09/2020    Pneumococcal Conjugate 13-valent ([id number]) 05/09/2017    Pneumococcal Polysaccharide ([id number]) 11/12/1997, 10/01/2003, 11/20/2017    Tdap (Boostrix, Adacel) 02/08/2018        Sleep Medicine 10/30/2020 1/9/2020   Sitting and reading 2 0   Watching TV 2 0   Sitting, inactive in a public place (e.g. a theatre or a meeting) 0 0   As a passenger in a car for an hour without a break 0 0   Lying down to rest in the afternoon when circumstances permit 1 1   Sitting and talking to someone 0 0   Sitting quietly after a lunch without alcohol 0 0   In a car, while stopped for a few minutes in traffic 0 0   Total score 5 1       /89 (Site: Left Upper Arm, Position: Sitting, Cuff Size: Large Adult)   Pulse 98   Temp 97.4 °F (36.3 °C) (Temporal)   Resp 18   Ht 5' 8\" (1.727 m)   Wt 163 lb (73.9 kg)   SpO2 95% Comment: sitting in room air  BMI 24.78 kg/m²     Past Medical History:   Diagnosis Date    Acute sinusitis 8/7/2017    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    Asthma 8/7/2017    Bacterial pneumonia 8/7/2017    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/1/2013    Benign prostatic hyperplasia 4/12/2013    Candidiasis of mouth 8/7/2017    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 9/8/2014    Chronic obstructive lung disease ([address] 11/8/2011    Deep vein thrombosis of lower extremity ([address] 8/7/2017    Dizziness and giddiness 12/19/2011    Dysphagia 8/7/2017    Gastroesophageal reflux disease 8/7/2017    Herpes zoster 8/7/2017    Lumbar sprain 9/8/2014    Malignant neoplasm metastatic to lung ([address] 8/7/2017    Malignant tumor of prostate ([address] 2/4/2013    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 No pertinent surgical history. History reviewed. No pertinent family history.     Social History     Socioeconomic History    Marital status:      Spouse name: Not on file    Number of children: Not on file    Years of education: Not on file    Highest education level: Not on file   Occupational History    Not on file   Tobacco Use    Smoking status: Never Smoker    Smokeless tobacco: Never Used   Vaping Use    Vaping Use: Never used   Substance and Sexual Activity    Alcohol use: Not Currently    Drug use: Never    Sexual activity: Not on file   Other Topics Concern    Not on file   Social History Narrative    Not on file     Social Determinants of Health     Financial Resource Strain:     Difficulty of Paying Living Expenses:    Food Insecurity:     Worried About Running Out of Food in the Last Year:     [address] in the Last Year:    Transportation Needs:     Lack of Transportation (Medical):  Lack of Transportation (Non-Medical):    Physical Activity:     Days of Exercise per Week:     Minutes of Exercise per Session:    Stress:     Feeling of Stress :    Social Connections:     Frequency of Communication with Friends and Family:     Frequency of Social Gatherings with Friends and Family:     Attends Confucianism Services:     Active Member of Clubs or Organizations:     Attends Club or Organization Meetings:     Marital Status:    Intimate Partner Violence:     Fear of Current or Ex-Partner:     Emotionally Abused:     Physically Abused:     Sexually Abused:        Review of Systems   Constitutional:        Poor sleep due to cough   HENT:        Cough productive of clear/white- specifically denies yellow/green/bloody, chronic rhinorrhea, chronic sinusitis. Eyes: Negative. Respiratory: Negative. Cardiovascular: Negative. Gastrointestinal: Negative. Endocrine: Negative. Genitourinary: Negative. Musculoskeletal: Negative. Skin: Negative. Allergic/Immunologic: Negative. Neurological: Negative. Hematological: Negative. Psychiatric/Behavioral: Negative.         Objective:       Physical Exam  General appearance - alert, well appearing, and in no distress, oriented to person, place, and time and normal appearing weight  Mental status - alert, oriented to person, place, and time, normal mood, behavior, speech, dress, motor activity, and thought processes  Eyes - pupils equal and reactive, extraocular eye movements intact  Ears - not examined  Nose - normal and patent, no erythema, discharge or polyps  Mouth - mucous membranes moist, pharynx normal without lesions  Neck - supple, no significant adenopathy  Chest - Patient with few scattered expiratory wheezes, no rhonchi or rales- no witnessed cough on exam.   Heart -normal rate, regular rhythm, normal S1, S2, no murmurs, rubs, clicks or gallops  Abdomen - soft, nontender, nondistended, no masses or organomegaly  Neuro- alert, oriented, normal speech, no focal findings or movement disorder noted}  Extremities - peripheral pulses normal, no pedal edema, no clubbing or cyanosis Right thumb pad with well approximated and healed laceration covered with bandaid- removed for exam.   Skin - normal coloration and turgor, no rashes, no suspicious skin lesions noted     Wt Readings from Last 3 Encounters:   08/02/21 163 lb (73.9 kg)   06/03/21 169 lb (76.7 kg)   03/04/21 166 lb (75.3 kg)       Results for orders placed or performed during the hospital encounter of 10/28/20   CBC Auto Differential   Result Value Ref Range    WBC 7.7 3.5 - 11.0 k/uL    RBC 4.56 4.5 - 5.9 m/uL    Hemoglobin 14.1 13.5 - 17.5 g/dL    Hematocrit 42.2 41 - 53 %    MCV 92.7 80 - 100 fL    MCH 31.0 26 - 34 pg    MCHC 33.4 31 - 37 g/dL    RDW 15.9 (H) 12.5 - 15.4 %    Platelets 723 402 - 869 k/uL    MPV 6.8 6.0 - 12.0 fL    NRBC Automated NOT REPORTED per 100 WBC    Differential Type NOT REPORTED     Immature Granulocytes NOT REPORTED 0 %    Absolute Immature Granulocyte NOT REPORTED 0.00 - 0.30 k/uL    WBC Morphology NOT REPORTED     RBC Morphology NOT REPORTED     Platelet Estimate NOT REPORTED     Seg Neutrophils 66 36 - 66 %    Lymphocytes 14 (L) 24 - 44 %    Monocytes 14 (H) 1 - 7 %    Eosinophils % 6 (H) 1 - 4 %    Basophils 0 0 - 2 %    Segs Absolute 5.08 1.8 - 7.7 k/uL    Absolute Lymph # 1.08 1.0 - 4.8 k/uL    Absolute Mono # 1.08 (H) 0.1 - 0.8 k/uL    Absolute Eos # 0.46 (H) 0.0 - 0.4 k/uL    Basophils Absolute 0.00 0.0 - 0.2 k/uL    Morphology Normal    Comprehensive Metabolic Panel   Result Value Ref Range    Glucose 91 70 - 99 mg/dL    BUN 15 8 - 23 mg/dL    CREATININE 1.01 0.70 - 1.20 mg/dL    Bun/Cre Ratio NOT REPORTED 9 - 20    Calcium 9.5 8.6 - 10.4 mg/dL    Sodium 146 (H) 135 - 144 mmol/L    Potassium 3.9 3.7 - 5.3 mmol/L    Chloride 109 (H) 98 - 107 mmol/L    CO2 24 20 - 31 mmol/L    Anion Gap 13 9 - 17 mmol/L    Alkaline Phosphatase 75 40 - 129 U/L    ALT 17 5 - 41 U/L    AST 23 <40 U/L    Total Bilirubin 0.58 0.3 - 1.2 mg/dL    Total Protein 6.4 6.4 - 8.3 g/dL    Albumin 4.1 3.5 - 5.2 g/dL    Albumin/Globulin Ratio 1.8 1.0 - 2.5    GFR Non-African American >60 >60 mL/min    GFR African American >60 >60 mL/min    GFR Comment          GFR Staging NOT REPORTED    Magnesium   Result Value Ref Range    Magnesium 2.2 1.6 - 2.6 mg/dL   Troponin   Result Value Ref Range    Troponin, High Sensitivity 35 (H) 0 - 22 ng/L    Troponin T NOT REPORTED <0.03 ng/mL    Troponin Interp NOT REPORTED    Brain Natriuretic Peptide   Result Value Ref Range    Pro- (H) <300 pg/mL    BNP Interpretation Pro-BNP Reference Range:    Troponin   Result Value Ref Range    Troponin, High Sensitivity 28 (H) 0 - 22 ng/L    Troponin T NOT REPORTED <0.03 ng/mL    Troponin Interp NOT REPORTED    EKG 12 Lead   Result Value Ref Range    Ventricular Rate 84 BPM    Atrial Rate 84 BPM    P-R Interval 174 ms    QRS Duration 92 ms    Q-T Interval 360 ms    QTc Calculation (Bazett) 425 ms    P Axis 60 degrees    R Axis 1 degrees    T Axis 75 degrees       No results found. Assessment:      1. 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    2.  Chronic sinusitis, unspecified

## 2021-08-02 ENCOUNTER — TELEPHONE (OUTPATIENT)
Dept: PULMONOLOGY | Age: 86
End: 2021-08-02

## 2021-08-02 ENCOUNTER — OFFICE VISIT (OUTPATIENT)
Dept: PULMONOLOGY | Age: 86
End: 2021-08-02
Payer: COMMERCIAL

## 2021-08-02 VITALS
BODY MASS INDEX: 24.71 KG/M2 | TEMPERATURE: 97.4 F | OXYGEN SATURATION: 95 % | SYSTOLIC BLOOD PRESSURE: 134 MMHG | HEART RATE: 98 BPM | HEIGHT: 68 IN | RESPIRATION RATE: 18 BRPM | DIASTOLIC BLOOD PRESSURE: 89 MMHG | WEIGHT: 163 LBS

## 2021-08-02 DIAGNOSIS — R09.82 POST-NASAL DRIP: ICD-10-CM

## 2021-08-02 DIAGNOSIS — Z77.098 CHLORINE GAS EXPOSURE: Primary | ICD-10-CM

## 2021-08-02 DIAGNOSIS — K21.9 GASTROESOPHAGEAL REFLUX DISEASE, UNSPECIFIED WHETHER ESOPHAGITIS PRESENT: ICD-10-CM

## 2021-08-02 DIAGNOSIS — J32.9 CHRONIC SINUSITIS, UNSPECIFIED LOCATION: ICD-10-CM

## 2021-08-02 DIAGNOSIS — J45.50 SEVERE PERSISTENT ASTHMA WITHOUT COMPLICATION: ICD-10-CM

## 2021-08-02 DIAGNOSIS — J45.909 REACTIVE AIRWAY DISEASE WITH WHEEZING WITHOUT COMPLICATION, UNSPECIFIED ASTHMA SEVERITY, UNSPECIFIED WHETHER PERSISTENT: ICD-10-CM

## 2021-08-02 PROCEDURE — 99213 OFFICE O/P EST LOW 20 MIN: CPT | Performed by: NURSE PRACTITIONER

## 2021-08-02 RX ORDER — PREDNISONE 10 MG/1
TABLET ORAL
Qty: 30 TABLET | Refills: 0 | Status: SHIPPED | OUTPATIENT
Start: 2021-08-02 | End: 2021-08-12

## 2021-08-02 ASSESSMENT — ENCOUNTER SYMPTOMS
GASTROINTESTINAL NEGATIVE: 1
ALLERGIC/IMMUNOLOGIC NEGATIVE: 1
RESPIRATORY NEGATIVE: 1
EYES NEGATIVE: 1

## 2021-08-02 NOTE — TELEPHONE ENCOUNTER
I called the patient and left him a message to call me back. I wanted to see what hospital he was at. He is scheduled to see [facility] today.  KF

## 2021-08-02 NOTE — TELEPHONE ENCOUNTER
Patient called, he was wondering if his workers comp will cover the visit with the nurse practitioner. I told him I wasn't sure. We would have to submit a form. He asked me to keep the appt. But he is going to call the hospital in [facility] to see if they will cover the visit instead. If they do, he is going to cancel his visit with [facility] today.

## 2021-08-12 ENCOUNTER — OFFICE VISIT (OUTPATIENT)
Dept: PULMONOLOGY | Age: 86
End: 2021-08-12
Payer: MEDICARE

## 2021-08-12 VITALS
HEIGHT: 68 IN | HEART RATE: 84 BPM | TEMPERATURE: 98.4 F | DIASTOLIC BLOOD PRESSURE: 79 MMHG | BODY MASS INDEX: 25.4 KG/M2 | RESPIRATION RATE: 16 BRPM | SYSTOLIC BLOOD PRESSURE: 160 MMHG | WEIGHT: 167.6 LBS | OXYGEN SATURATION: 94 %

## 2021-08-12 DIAGNOSIS — R09.82 POST-NASAL DRIP: ICD-10-CM

## 2021-08-12 DIAGNOSIS — J45.50 SEVERE PERSISTENT ASTHMA WITHOUT COMPLICATION: Primary | ICD-10-CM

## 2021-08-12 DIAGNOSIS — J32.9 CHRONIC SINUSITIS, UNSPECIFIED LOCATION: ICD-10-CM

## 2021-08-12 DIAGNOSIS — Z77.098 CHLORINE GAS EXPOSURE: ICD-10-CM

## 2021-08-12 PROCEDURE — 99214 OFFICE O/P EST MOD 30 MIN: CPT | Performed by: INTERNAL MEDICINE

## 2021-08-12 RX ORDER — LEVALBUTEROL INHALATION SOLUTION 0.63 MG/3ML
1 SOLUTION RESPIRATORY (INHALATION) EVERY 6 HOURS PRN
Qty: 360 VIAL | Refills: 3 | Status: SHIPPED | OUTPATIENT
Start: 2021-08-12 | End: 2021-09-07

## 2021-08-12 RX ORDER — DOXYCYCLINE HYCLATE 100 MG/1
100 CAPSULE ORAL 2 TIMES DAILY
Qty: 10 CAPSULE | Refills: 0 | Status: SHIPPED | OUTPATIENT
Start: 2021-08-12 | End: 2021-08-17

## 2021-08-12 RX ORDER — ALBUTEROL SULFATE 2.5 MG/3ML
2.5 SOLUTION RESPIRATORY (INHALATION) EVERY 6 HOURS PRN
Qty: 120 EACH | Refills: 5 | Status: SHIPPED | OUTPATIENT
Start: 2021-08-12 | End: 2021-08-12 | Stop reason: ALTCHOICE

## 2021-08-14 ASSESSMENT — ENCOUNTER SYMPTOMS
EYES NEGATIVE: 1
RHINORRHEA: 1
RESPIRATORY NEGATIVE: 1

## 2021-08-14 NOTE — PROGRESS NOTES
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 year old male    Patient does not feel that albuterol is helping him. He has been using nebulized albuterol multiple times a day. Has been on steroids frequently. Has tried Atrovent but that causes his secretions to thicken up and and cannot expectorate sputum. He gets wheezing with change in weather and exertion. He is compliant with Symbicort  No fever no chills    Last visit   Since last visit patient did need a course of antibiotic and steroid. That controlled his postnasal drainage and hence his acute bronchitis. He is on nasal steroid nasal his antihistaminics but he continues to have nasal discharge and postnasal drainage. He is tolerating DuoNeb and Symbicort well without any complications. No oral thrush noted. who is to follow with Dr. Kaye Dial . Patient would like to change his pulmonologist.  He has been having recurrent acute bronchitis episode which started as nasal congestion postnasal drainage and then he started wheezing and coughing and is given antibiotic and steroids. Previously he had been given Levaquin which called Achilles tendon so he does not use fluoroquinolones anymore. Patient has had multiple courses of antibiotic and steroids. He has been on Symbicort and using DuoNeb 4 times a day presently he is got Phenergan DM to help with his cough and he is able to sleep better at night. Patient has a history of chlorine gas exposure and because of this he has reactive airway disease and gets exacerbation with weather changes when he had been working with [employer] department. At present he denies any shortness of breath with exertion he denies any wheezing. He is using Symbicort twice a day without a spacer and is using DuoNeb 4 times a day.   He has also seen ENT who advised him to use nasal saline rinse Flonase and Astelin spray he does not have the Astelin spray and he did not understand that he had to use nasal saline rinse to help clear his secretions prior to using Flonase. LUNG CANCER SCREENING     1. CRITERIA MET    []     CT ORDERED  []      2. CRITERIA NOT MET   [x]      3. REFUSED                    []        REASON CRITERIA NOT MET     1. SMOKING LESS THAN 30 PY  []      2. AGE LESS THAN 55 or GREATER 77 YEARS  []      3. QUIT SMOKING 15 YEARS OR GREATER   []      4. RECENT CT WITH IN 11 MONTHS    []      5. LIFE EXPECTANCY < 5 YEARS   []      6.  SIGNS  AND SYMPTOMS OF LUNG CANCER   []         Immunization   Immunization History   Administered Date(s) Administered    COVID-19, Pfizer, PF, 30mcg/0.3mL 02/02/2021, 02/23/2021    Influenza Vaccine, unspecified formulation 10/06/2015, 11/02/2016    Influenza Whole 11/20/2014    Influenza, High Dose (Fluzone 65 yrs and older) 09/27/2017, 09/27/2018, 10/18/2019    Influenza, High-dose, Quadv, 65 yrs +, IM (Fluzone) 09/09/2020    Pneumococcal Conjugate 13-valent ([id number]) 05/09/2017    Pneumococcal Polysaccharide ([id number]) 11/12/1997, 10/01/2003, 11/20/2017    Tdap (Boostrix, Adacel) 02/08/2018        Pneumococcal Vaccine     [x] Up to date    [] Indicated   [] Refused  [] Contraindicated       Influenza Vaccine   [x] Up to date    [] Indicated   [] Refused  [] Contraindicated          PAST MEDICAL HISTORY:       Diagnosis Date    Abdominal hernia 10/8/2020    Achilles bursitis 10/8/2020    Acute sinusitis 8/7/2017    Adult body mass index 25-29 10/8/2020    Allergic rhinitis due to pollen 10/8/2020    Anesthesia of skin 10/8/2020    Anterior subcapsular polar senile cataract 10/8/2020    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    Asthma 8/7/2017    Atopic dermatitis 10/8/2020    Backache 10/8/2020    Bacterial pneumonia 8/7/2017    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/1/2013    Benign prostatic hyperplasia 4/12/2013    Candidiasis of mouth 8/7/2017    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 JANTOVEN 2 MG tablet  10/29/20  Yes Historical Provider, MD   cetirizine (ZYRTEC) 10 MG tablet TAKE ONE TABLET BY MOUTH ONCE DAILY IN THE PM 10/14/20  Yes Historical Provider, MD   Cetirizine HCl 10 MG CAPS Take 10 mg by mouth daily 10/30/20  Yes VARGAS Ferreira - CNP   promethazine-dextromethorphan (PROMETHAZINE-DM) 6.25-15 MG/5ML syrup Take 5 mLs by mouth every 4 hours as needed for Cough 10/8/20  Yes Dakota Luna MD   azelastine (ASTELIN) 0.1 % nasal spray 1 spray by Nasal route 2 times daily Use in each nostril as directed 1/9/20  Yes Dakota Luna MD   simvastatin (ZOCOR) 40 MG tablet Take 1 tablet by mouth daily   Yes Historical Provider, MD   amLODIPine (NORVASC) 5 MG tablet Take 1 tablet by mouth daily   Yes Historical Provider, MD   warfarin (JANTOVEN) 5 MG tablet Take 1 tablet by mouth daily   Yes Historical Provider, MD          Review of Systems   Constitutional: Negative. HENT: Positive for congestion, postnasal drip and rhinorrhea. Eyes: Negative. Respiratory: Negative. Cardiovascular: Negative. Vitals:  BP (!) 160/79   Pulse 84   Temp 98.4 °F (36.9 °C)   Resp 16   Ht 5' 8\" (1.727 m)   Wt 167 lb 9.6 oz (76 kg)   SpO2 94% Comment: Room air at rest  BMI 25.48 kg/m²     PHYSICAL EXAM:  Head and neck atraumatic, normocephalic    Lymph nodes-no cervical, supraclavicular lymphadenopathy    Neck-no JVP elevation    Lungs - Ventilating all lobes without any rales, rhonchi, wheezes or dullness. No bronchial breath sounds. Chest expansion equal bilaterally    CVS- S1, S2 regular. No S3 no S4, no murmurs    Abdomen-nontender, nondistended. Bowel sounds are present. No organomegaly    Lower extremity-no edema    Upper extremity-no edema    Neurological-grossly normal cranial nerves.   No overt motor deficit   CT Scans  4/8/2019 done at [facility]  Right apical scarring scarring in the right middle lobe no pulmonary infiltrate no change in intrapulmonary lymph node in the minor fissure    PFT done at [facility] 2015 report reviewed and is normal      IMPRESSION:     Diagnosis Orders   1. Severe persistent asthma without complication  doxycycline hyclate (VIBRAMYCIN) 100 MG capsule    levalbuterol (XOPENEX) 0.63 MG/3ML nebulization    DISCONTINUED: albuterol (PROVENTIL) (2.5 MG/3ML) 0.083% nebulizer solution   2. Chronic sinusitis, unspecified location     3. 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     4. Post-nasal drip          :                PLAN:      We will start him on levalbuterol to help with bronchodilation. DuoNeb and albuterol do not help his wheezing and bronchospasm and to not help with secretion clearance. Continue Symbicort  Antibiotic to help clear up his purulent secretion. Follow-up in 2 months          Requested Prescriptions     Signed Prescriptions Disp Refills    doxycycline hyclate (VIBRAMYCIN) 100 MG capsule 10 capsule 0     Sig: Take 1 capsule by mouth 2 times daily for 5 days    levalbuterol (XOPENEX) 0.63 MG/3ML nebulization 360 vial 3     Sig: Take 3 mLs by nebulization every 6 hours as needed for Wheezing       Medications Discontinued During This Encounter   Medication Reason    albuterol (PROVENTIL) (2.5 MG/3ML) 0.083% nebulizer solution Alternate therapy    predniSONE (DELTASONE) 10 MG tablet LIST CLEANUP    albuterol (PROVENTIL) (2.5 MG/3ML) 0.083% nebulizer solution Alternate therapy       Shani Montilla received counseling on the following healthy behaviors: nutrition, exercise and medication adherence    Patient given educational materials : see patient instruction       Discussed use, benefit, and side effects of prescribed medications. Barriers to medication compliance addressed. All patient questions answered. Pt voiced understanding. I hope this updates you on my evaluation and clinical thinking. Thank you for allowing me to participate in his care.      Sincerely,      Electronically signed by Kamron Ferrer MD on 8/14/2021 at 2:59 PM     Please note that this chart was generated using voice recognition Dragon dictation software. Although every effort was made to ensure the accuracy of this automated transcription, some errors in transcription may have occurred.

## 2021-08-16 ENCOUNTER — TELEPHONE (OUTPATIENT)
Dept: PULMONOLOGY | Age: 86
End: 2021-08-16

## 2021-08-16 NOTE — TELEPHONE ENCOUNTER
Patient called and is questioning two things. He wonders why you only have him on 5 days of Doxycycline and why the AVS says to \"change how you take Albuterol sulfate\" without any other directions?

## 2021-08-16 NOTE — TELEPHONE ENCOUNTER
5 days of doxycyline will be ok due to recurrent use of antibiotics   I ordered xopenex nebulized tt for him   Albuterol 90 mcg inhaled MDI can be done every 4 hrs as needed for wheezing or sob

## 2021-09-06 DIAGNOSIS — J45.50 SEVERE PERSISTENT ASTHMA WITHOUT COMPLICATION: ICD-10-CM

## 2021-09-07 RX ORDER — LEVALBUTEROL INHALATION SOLUTION 0.63 MG/3ML
SOLUTION RESPIRATORY (INHALATION)
Qty: 360 ML | Refills: 5 | Status: SHIPPED | OUTPATIENT
Start: 2021-09-07

## 2021-09-07 NOTE — TELEPHONE ENCOUNTER
A request from pharmacy is coming over for levalbuterol (xopenex) pt is current with us. Please sign the attached script.

## 2021-10-07 ENCOUNTER — OFFICE VISIT (OUTPATIENT)
Dept: PULMONOLOGY | Age: 86
End: 2021-10-07
Payer: COMMERCIAL

## 2021-10-07 VITALS
HEART RATE: 95 BPM | HEIGHT: 68 IN | OXYGEN SATURATION: 96 % | BODY MASS INDEX: 24.64 KG/M2 | DIASTOLIC BLOOD PRESSURE: 83 MMHG | SYSTOLIC BLOOD PRESSURE: 139 MMHG | RESPIRATION RATE: 14 BRPM | TEMPERATURE: 98.2 F | WEIGHT: 162.6 LBS

## 2021-10-07 DIAGNOSIS — J45.50 SEVERE PERSISTENT ASTHMA WITHOUT COMPLICATION: Primary | ICD-10-CM

## 2021-10-07 DIAGNOSIS — J32.9 CHRONIC SINUSITIS, UNSPECIFIED LOCATION: ICD-10-CM

## 2021-10-07 DIAGNOSIS — Z77.098 CHLORINE GAS EXPOSURE: ICD-10-CM

## 2021-10-07 DIAGNOSIS — R09.82 POST-NASAL DRIP: ICD-10-CM

## 2021-10-07 PROCEDURE — 99213 OFFICE O/P EST LOW 20 MIN: CPT | Performed by: INTERNAL MEDICINE

## 2021-10-08 ASSESSMENT — ENCOUNTER SYMPTOMS
RHINORRHEA: 1
EYES NEGATIVE: 1
RESPIRATORY NEGATIVE: 1

## 2021-10-08 NOTE — PROGRESS NOTES
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 year old male    Feels much better since he started Xopenex. Using Symbicort twice a day. No wheezing does have rhinitis and postnasal drainage but no hemoptysis or purulent sputum. Last visit   Since last visit patient did need a course of antibiotic and steroid. That controlled his postnasal drainage and hence his acute bronchitis. He is on nasal steroid nasal his antihistaminics but he continues to have nasal discharge and postnasal drainage. He is tolerating DuoNeb and Symbicort well without any complications. No oral thrush noted. who is to follow with Dr. Erica Garcias . Patient would like to change his pulmonologist.  He has been having recurrent acute bronchitis episode which started as nasal congestion postnasal drainage and then he started wheezing and coughing and is given antibiotic and steroids. Previously he had been given Levaquin which called Achilles tendon so he does not use fluoroquinolones anymore. Patient has had multiple courses of antibiotic and steroids. He has been on Symbicort and using DuoNeb 4 times a day presently he is got Phenergan DM to help with his cough and he is able to sleep better at night. Patient has a history of chlorine gas exposure and because of this he has reactive airway disease and gets exacerbation with weather changes when he had been working with [facility] department. At present he denies any shortness of breath with exertion he denies any wheezing. He is using Symbicort twice a day without a spacer and is using DuoNeb 4 times a day. He has also seen ENT who advised him to use nasal saline rinse Flonase and Astelin spray he does not have the Astelin spray and he did not understand that he had to use nasal saline rinse to help clear his secretions prior to using Flonase.        LUNG CANCER SCREENING     1. 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    Herpes zoster 2017    Heterozygous factor V Leiden mutation ([address] 10/8/2020    Hypercholesterolemia 2017    Hypertension 2017    Increased frequency of urination 2011    Low back strain 10/8/2020    Lumbar sprain 2014    Lumbosacral spondylosis without myelopathy 2014    Malignant neoplasm metastatic to lung ([address] 2017    Malignant tumor of prostate ([address] 2013    Mild intermittent asthma without complication     Neoplasm of bone 2014    Nocturia 2011    Omphalitis of  10/8/2020    Pain in left leg 10/8/2020    Peripheral vascular disease ([address] 2011    Post-nasal drip 2020    Postoperative seroma 10/15/2019    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 No pertinent family history. SURGICAL HISTORY:   History reviewed.  No pertinent surgical history. SOCIAL AND OCCUPATIONAL HEALTH:      There  no history of TB or TB exposure. There  no asbestos or silica dust exposure. The patient reports  no coal, foundry, quarry or [employer] exposure. Travel history reveals non. There  no history of recreational or IV drug use. There  no hot tube exposure. Pets  no    Occupational history [employer]    TOBACCO:   reports that he has never smoked. He has never used smokeless tobacco.  ETOH:   reports previous alcohol use. ALLERGIES:      Allergies   Allergen Reactions    Levofloxacin Swelling    Codeine Hives and Rash    Pulmicort [Budesonide] Rash     Also itchy    Cashew Nut Oil     Levaquin  [Levofloxacin In D5w] Other (See Comments)    Peanut-Containing Drug Products Hives         Home Meds:   Prior to Admission medications    Medication Sig Start Date End Date Taking?  Authorizing Provider   levalbuterol (XOPENEX) 0.63 MG/3ML nebulization USE 1 VIAL IN NEBULIZER EVERY 6 HOURS AS NEEDED FOR WHEEZING 9/7/21  Yes Ray Greco MD   albuterol sulfate  (90 Base) MCG/ACT inhaler TAKE 2 PUFFS BY MOUTH EVERY 6 HOURS AS NEEDED FOR WHEEZE 6/3/21  Yes Lenora Humphrey MD   SYMBICORT 160-4.5 MCG/ACT AERO TAKE 2 PUFFS BY MOUTH TWICE A DAY 3/12/21  Yes Lenora Humphrey MD   famotidine (PEPCID) 20 MG tablet TAKE 2 TABLETS BY MOUTH EVERY DAY 2/25/21  Yes Lenora Humphrey MD   fexofenadine (ALLEGRA) 180 MG tablet 1 tablet as needed   Yes Historical Provider, MD   fluticasone (FLONASE) 50 MCG/ACT nasal spray SPRAY 1 SPRAY INTO EACH NOSTRIL EVERY DAY 11/13/20  Yes Hart Leventhal, MD   JANTOVEN 2 MG tablet  10/29/20  Yes Historical Provider, MD   cetirizine (ZYRTEC) 10 MG tablet TAKE ONE TABLET BY MOUTH ONCE DAILY IN THE PM 10/14/20  Yes Historical Provider, MD   Cetirizine HCl 10 MG CAPS Take 10 mg by mouth daily 10/30/20  Yes Blanche Curtis, APRN - CNP   promethazine-dextromethorphan (PROMETHAZINE-DM) 6.25-15 bronchodilation. Continue Symbicort   Follow-up in 2 months                 Requested Prescriptions      No prescriptions requested or ordered in this encounter       There are no discontinued medications. Lilia Pulido received counseling on the following healthy behaviors: nutrition, exercise and medication adherence    Patient given educational materials : see patient instruction       Discussed use, benefit, and side effects of prescribed medications. Barriers to medication compliance addressed. All patient questions answered. Pt voiced understanding. I hope this updates you on my evaluation and clinical thinking. Thank you for allowing me to participate in his care. Sincerely,      Electronically signed by Gretel Gentile MD on 10/8/2021 at 5:57 PM     Please note that this chart was generated using voice recognition Dragon dictation software. Although every effort was made to ensure the accuracy of this automated transcription, some errors in transcription may have occurred.

## 2021-11-08 ENCOUNTER — HOSPITAL ENCOUNTER (EMERGENCY)
Age: 86
Discharge: HOME | End: 2021-11-08
Attending: EMERGENCY MEDICINE
Payer: COMMERCIAL

## 2021-11-08 ENCOUNTER — APPOINTMENT (OUTPATIENT)
Dept: GENERAL RADIOLOGY | Age: 86
End: 2021-11-08
Payer: COMMERCIAL

## 2021-11-08 VITALS
TEMPERATURE: 97.5 F | OXYGEN SATURATION: 94 % | DIASTOLIC BLOOD PRESSURE: 73 MMHG | HEART RATE: 88 BPM | SYSTOLIC BLOOD PRESSURE: 148 MMHG | HEIGHT: 68 IN | RESPIRATION RATE: 18 BRPM | BODY MASS INDEX: 25.01 KG/M2 | WEIGHT: 165 LBS

## 2021-11-08 DIAGNOSIS — J45.41 MODERATE PERSISTENT ASTHMATIC BRONCHITIS WITH ACUTE EXACERBATION: Primary | ICD-10-CM

## 2021-11-08 LAB
ABSOLUTE EOS #: 0.7 K/UL (ref 0–0.4)
ABSOLUTE IMMATURE GRANULOCYTE: ABNORMAL K/UL (ref 0–0.3)
ABSOLUTE LYMPH #: 0.7 K/UL (ref 1–4.8)
ABSOLUTE MONO #: 0.8 K/UL (ref 0.1–1.2)
ALBUMIN SERPL-MCNC: 4.1 G/DL (ref 3.5–5.2)
ALBUMIN/GLOBULIN RATIO: 2 (ref 1–2.5)
ALP BLD-CCNC: 95 U/L (ref 40–129)
ALT SERPL-CCNC: 13 U/L (ref 5–41)
ANION GAP SERPL CALCULATED.3IONS-SCNC: 14 MMOL/L (ref 9–17)
AST SERPL-CCNC: 26 U/L
BASOPHILS # BLD: 1 % (ref 0–2)
BASOPHILS ABSOLUTE: 0.1 K/UL (ref 0–0.2)
BILIRUB SERPL-MCNC: 0.54 MG/DL (ref 0.3–1.2)
BNP INTERPRETATION: ABNORMAL
BUN BLDV-MCNC: 10 MG/DL (ref 8–23)
BUN/CREAT BLD: ABNORMAL (ref 9–20)
CALCIUM SERPL-MCNC: 9 MG/DL (ref 8.6–10.4)
CHLORIDE BLD-SCNC: 105 MMOL/L (ref 98–107)
CO2: 23 MMOL/L (ref 20–31)
CREAT SERPL-MCNC: 0.67 MG/DL (ref 0.7–1.2)
DIFFERENTIAL TYPE: ABNORMAL
EOSINOPHILS RELATIVE PERCENT: 15 % (ref 1–4)
GFR AFRICAN AMERICAN: >60 ML/MIN
GFR NON-AFRICAN AMERICAN: >60 ML/MIN
GFR SERPL CREATININE-BSD FRML MDRD: ABNORMAL ML/MIN/{1.73_M2}
GFR SERPL CREATININE-BSD FRML MDRD: ABNORMAL ML/MIN/{1.73_M2}
GLUCOSE BLD-MCNC: 89 MG/DL (ref 70–99)
HCT VFR BLD CALC: 41.2 % (ref 41–53)
HEMOGLOBIN: 14.1 G/DL (ref 13.5–17.5)
IMMATURE GRANULOCYTES: ABNORMAL %
LYMPHOCYTES # BLD: 14 % (ref 24–44)
MAGNESIUM: 2.1 MG/DL (ref 1.6–2.6)
MCH RBC QN AUTO: 32.4 PG (ref 26–34)
MCHC RBC AUTO-ENTMCNC: 34.1 G/DL (ref 31–37)
MCV RBC AUTO: 95 FL (ref 80–100)
MONOCYTES # BLD: 17 % (ref 2–11)
NRBC AUTOMATED: ABNORMAL PER 100 WBC
PDW BLD-RTO: 16.6 % (ref 12.5–15.4)
PLATELET # BLD: 245 K/UL (ref 140–450)
PLATELET ESTIMATE: ABNORMAL
PMV BLD AUTO: 7 FL (ref 6–12)
POTASSIUM SERPL-SCNC: 3.4 MMOL/L (ref 3.7–5.3)
PRO-BNP: 450 PG/ML
RBC # BLD: 4.33 M/UL (ref 4.5–5.9)
RBC # BLD: ABNORMAL 10*6/UL
SEG NEUTROPHILS: 53 % (ref 36–66)
SEGMENTED NEUTROPHILS ABSOLUTE COUNT: 2.5 K/UL (ref 1.8–7.7)
SODIUM BLD-SCNC: 142 MMOL/L (ref 135–144)
TOTAL PROTEIN: 6.2 G/DL (ref 6.4–8.3)
TROPONIN INTERP: ABNORMAL
TROPONIN INTERP: ABNORMAL
TROPONIN T: ABNORMAL NG/ML
TROPONIN T: ABNORMAL NG/ML
TROPONIN, HIGH SENSITIVITY: 32 NG/L (ref 0–22)
TROPONIN, HIGH SENSITIVITY: 38 NG/L (ref 0–22)
WBC # BLD: 4.8 K/UL (ref 3.5–11)
WBC # BLD: ABNORMAL 10*3/UL

## 2021-11-08 PROCEDURE — 85025 COMPLETE CBC W/AUTO DIFF WBC: CPT

## 2021-11-08 PROCEDURE — 80053 COMPREHEN METABOLIC PANEL: CPT

## 2021-11-08 PROCEDURE — 83735 ASSAY OF MAGNESIUM: CPT

## 2021-11-08 PROCEDURE — 83880 ASSAY OF NATRIURETIC PEPTIDE: CPT

## 2021-11-08 PROCEDURE — 96374 THER/PROPH/DIAG INJ IV PUSH: CPT

## 2021-11-08 PROCEDURE — 6360000002 HC RX W HCPCS: Performed by: EMERGENCY MEDICINE

## 2021-11-08 PROCEDURE — 99284 EMERGENCY DEPT VISIT MOD MDM: CPT

## 2021-11-08 PROCEDURE — 2580000003 HC RX 258: Performed by: EMERGENCY MEDICINE

## 2021-11-08 PROCEDURE — 36415 COLL VENOUS BLD VENIPUNCTURE: CPT

## 2021-11-08 PROCEDURE — 71045 X-RAY EXAM CHEST 1 VIEW: CPT

## 2021-11-08 PROCEDURE — 6370000000 HC RX 637 (ALT 250 FOR IP)

## 2021-11-08 PROCEDURE — 84484 ASSAY OF TROPONIN QUANT: CPT

## 2021-11-08 PROCEDURE — 6370000000 HC RX 637 (ALT 250 FOR IP): Performed by: EMERGENCY MEDICINE

## 2021-11-08 RX ORDER — GUAIFENESIN 600 MG/1
600 TABLET, EXTENDED RELEASE ORAL 2 TIMES DAILY
Qty: 20 TABLET | Refills: 0 | Status: SHIPPED | OUTPATIENT
Start: 2021-11-08 | End: 2021-11-18

## 2021-11-08 RX ORDER — IPRATROPIUM BROMIDE AND ALBUTEROL SULFATE 2.5; .5 MG/3ML; MG/3ML
1 SOLUTION RESPIRATORY (INHALATION)
Status: DISCONTINUED | OUTPATIENT
Start: 2021-11-08 | End: 2021-11-08

## 2021-11-08 RX ORDER — SODIUM CHLORIDE 9 MG/ML
1000 INJECTION, SOLUTION INTRAVENOUS CONTINUOUS
Status: DISCONTINUED | OUTPATIENT
Start: 2021-11-08 | End: 2021-11-08 | Stop reason: HOSPADM

## 2021-11-08 RX ORDER — METHYLPREDNISOLONE SODIUM SUCCINATE 125 MG/2ML
125 INJECTION, POWDER, LYOPHILIZED, FOR SOLUTION INTRAMUSCULAR; INTRAVENOUS ONCE
Status: COMPLETED | OUTPATIENT
Start: 2021-11-08 | End: 2021-11-08

## 2021-11-08 RX ORDER — BENZONATATE 100 MG/1
100 CAPSULE ORAL 3 TIMES DAILY PRN
Qty: 20 CAPSULE | Refills: 0 | Status: SHIPPED | OUTPATIENT
Start: 2021-11-08 | End: 2022-02-14 | Stop reason: SDUPTHER

## 2021-11-08 RX ORDER — IPRATROPIUM BROMIDE AND ALBUTEROL SULFATE 2.5; .5 MG/3ML; MG/3ML
1 SOLUTION RESPIRATORY (INHALATION)
Status: DISCONTINUED | OUTPATIENT
Start: 2021-11-08 | End: 2021-11-08 | Stop reason: HOSPADM

## 2021-11-08 RX ORDER — IPRATROPIUM BROMIDE AND ALBUTEROL SULFATE 2.5; .5 MG/3ML; MG/3ML
SOLUTION RESPIRATORY (INHALATION)
Status: COMPLETED
Start: 2021-11-08 | End: 2021-11-08

## 2021-11-08 RX ORDER — DOXYCYCLINE HYCLATE 100 MG
100 TABLET ORAL 2 TIMES DAILY
Qty: 20 TABLET | Refills: 0 | Status: SHIPPED | OUTPATIENT
Start: 2021-11-08 | End: 2021-11-18

## 2021-11-08 RX ADMIN — SODIUM CHLORIDE 1000 ML: 9 INJECTION, SOLUTION INTRAVENOUS at 12:37

## 2021-11-08 RX ADMIN — IPRATROPIUM BROMIDE AND ALBUTEROL SULFATE 1 AMPULE: .5; 3 SOLUTION RESPIRATORY (INHALATION) at 13:10

## 2021-11-08 RX ADMIN — METHYLPREDNISOLONE SODIUM SUCCINATE 125 MG: 125 INJECTION, POWDER, FOR SOLUTION INTRAMUSCULAR; INTRAVENOUS at 12:36

## 2021-11-08 RX ADMIN — IPRATROPIUM BROMIDE AND ALBUTEROL SULFATE 3 ML: .5; 2.5 SOLUTION RESPIRATORY (INHALATION) at 13:09

## 2021-11-08 ASSESSMENT — PAIN SCALES - GENERAL: PAINLEVEL_OUTOF10: 6
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
## 2021-11-08 NOTE — ED NOTES
AVS reviewed with the patient. Discussed follow-up care, taking medications as prescribed, and when to call 911. VSS. All questions answered. IV Removed per order. Prescriptions sent to patient's pharmacy. Ambulatory out of the department with a steady, unassisted gait.         Patrice Cano RN  11/08/21 2171

## 2021-12-01 ENCOUNTER — TELEPHONE (OUTPATIENT)
Dept: PULMONOLOGY | Age: 86
End: 2021-12-01

## 2021-12-01 NOTE — TELEPHONE ENCOUNTER
A call was made to reschedule Bill's 12/9/21 appt. With Dr. Andrew Carrillo. Delfino Severin is concerned and doesn't want to wait until January for a clinic visit, but he agreed to book on 1/13/22 in [facility] clinic. He reports that he went to ER on 11/8/21 for his worsening symptoms of difficulty with his cough and breathing. He states the albuterol and medicines are no longer helping. The ER physician has him following up with a cardiologist who is doing testing on 12/6/21. He wanted Dr. Andrew Carrillo to be informed and if possible to speak with him before 1/13/22.

## 2021-12-20 RX ORDER — FAMOTIDINE 20 MG/1
TABLET, FILM COATED ORAL
Qty: 180 TABLET | Refills: 2 | Status: SHIPPED | OUTPATIENT
Start: 2021-12-20 | End: 2022-09-19

## 2021-12-20 NOTE — TELEPHONE ENCOUNTER
Dr Tatiana Finney, patient is current but not scheduled for follow up. I sent a note to pharmacy asking that patient call for an appointment. No mention of this medication in your last dictation but you have prescribed in the past. Please sign for refill if ok. Thank you.

## 2022-02-10 ENCOUNTER — OFFICE VISIT (OUTPATIENT)
Dept: PULMONOLOGY | Age: 87
End: 2022-02-10
Payer: COMMERCIAL

## 2022-02-10 VITALS
OXYGEN SATURATION: 96 % | WEIGHT: 158 LBS | DIASTOLIC BLOOD PRESSURE: 66 MMHG | SYSTOLIC BLOOD PRESSURE: 139 MMHG | HEART RATE: 71 BPM | BODY MASS INDEX: 23.95 KG/M2 | HEIGHT: 68 IN

## 2022-02-10 DIAGNOSIS — Z77.098 CHLORINE GAS EXPOSURE: ICD-10-CM

## 2022-02-10 DIAGNOSIS — J32.9 CHRONIC SINUSITIS, UNSPECIFIED LOCATION: Primary | ICD-10-CM

## 2022-02-10 DIAGNOSIS — J45.50 SEVERE PERSISTENT ASTHMA WITHOUT COMPLICATION: ICD-10-CM

## 2022-02-10 DIAGNOSIS — R09.82 POST-NASAL DRIP: ICD-10-CM

## 2022-02-10 PROCEDURE — 99213 OFFICE O/P EST LOW 20 MIN: CPT | Performed by: INTERNAL MEDICINE

## 2022-02-10 RX ORDER — ALBUTEROL SULFATE 90 UG/1
2 AEROSOL, METERED RESPIRATORY (INHALATION) EVERY 6 HOURS PRN
Qty: 1 EACH | Refills: 11 | Status: SHIPPED | OUTPATIENT
Start: 2022-02-10 | End: 2022-05-17

## 2022-02-10 RX ORDER — ASPIRIN 81 MG/1
81 TABLET, CHEWABLE ORAL DAILY
COMMUNITY
Start: 2021-12-21 | End: 2022-02-10

## 2022-02-10 ASSESSMENT — ENCOUNTER SYMPTOMS
RESPIRATORY NEGATIVE: 1
EYES NEGATIVE: 1
RHINORRHEA: 1

## 2022-02-11 NOTE — PROGRESS NOTES
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 year old male    In November patient went to the ER at [facility] and was found to have mildly elevated troponins. 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 Pulmonary wise he continues to have on and off wheezing. Is using his bronchodilator therapy does not complain of any acute hemoptysis or sputum production which is purulent. Last visit   Since last visit patient did need a course of antibiotic and steroid. That controlled his postnasal drainage and hence his acute bronchitis. He is on nasal steroid nasal his antihistaminics but he continues to have nasal discharge and postnasal drainage. He is tolerating DuoNeb and Symbicort well without any complications. No oral thrush noted. who is to follow with Dr. Lilo Mederos . Patient would like to change his pulmonologist.  He has been having recurrent acute bronchitis episode which started as nasal congestion postnasal drainage and then he started wheezing and coughing and is given antibiotic and steroids. Previously he had been given Levaquin which called Achilles tendon so he does not use fluoroquinolones anymore. Patient has had multiple courses of antibiotic and steroids. He has been on Symbicort and using DuoNeb 4 times a day presently he is got Phenergan DM to help with his cough and he is able to sleep better at night. Patient has a history of chlorine gas exposure and because of this he has reactive airway disease and gets exacerbation with weather changes when he had been working with [facility] department. At present he denies any shortness of breath with exertion he denies any wheezing.   He is using Symbicort twice a day without a spacer and is using DuoNeb 4 times a day. He has also seen ENT who advised him to use nasal saline rinse Flonase and Astelin spray he does not have the Astelin spray and he did not understand that he had to use nasal saline rinse to help clear his secretions prior to using Flonase. LUNG CANCER SCREENING     1. CRITERIA MET    []     CT ORDERED  []      2. CRITERIA NOT MET   [x]      3. REFUSED                    []        REASON CRITERIA NOT MET     1. SMOKING LESS THAN 30 PY  []      2. AGE LESS THAN 55 or GREATER 77 YEARS  []      3. QUIT SMOKING 15 YEARS OR GREATER   []      4. RECENT CT WITH IN 11 MONTHS    []      5. LIFE EXPECTANCY < 5 YEARS   []      6.  SIGNS  AND SYMPTOMS OF LUNG CANCER   []         Immunization   Immunization History   Administered Date(s) Administered    COVID-19, Pfizer Purple top, DILUTE for use, 12+ yrs, 30mcg/0.3mL dose 02/02/2021, 02/23/2021, 10/21/2021    Influenza Vaccine, unspecified formulation 10/06/2015, 11/02/2016    Influenza Whole 11/20/2014    Influenza, High Dose (Fluzone 65 yrs and older) 09/27/2017, 09/27/2018, 10/18/2019    Influenza, High-dose, Quadv, 65 yrs +, IM (Fluzone) 09/09/2020    Pneumococcal Conjugate 13-valent ([id number]) 05/09/2017    Pneumococcal Polysaccharide ([id number]) 11/12/1997, 10/01/2003, 11/20/2017    Tdap (Boostrix, Adacel) 02/08/2018        Pneumococcal Vaccine     [x] Up to date    [] Indicated   [] Refused  [] Contraindicated       Influenza Vaccine   [x] Up to date    [] Indicated   [] Refused  [] Contraindicated          PAST MEDICAL HISTORY:       Diagnosis Date    Abdominal hernia 10/8/2020    Achilles bursitis 10/8/2020    Acute sinusitis 8/7/2017    Adult body mass index 25-29 10/8/2020    Allergic rhinitis due to pollen 10/8/2020    Anesthesia of skin 10/8/2020    Anterior subcapsular polar senile cataract 10/8/2020    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    Asthma 8/7/2017    Atopic dermatitis 10/8/2020    Backache 10/8/2020    Bacterial pneumonia 2017    Benign neoplasm of soft tissue 2013    Benign prostatic hyperplasia 2013    Candidiasis of mouth 2017    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 2014    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 2020    Chronic obstructive lung disease ([address] 2011    Chronic obstructive pulmonary disease (HCC) 10/8/2020    Chronic rhinitis 2020    Chronic sinusitis 2020    Deep vein thrombosis of lower extremity ([address] 2017    Diarrhea 10/8/2020    Disorder of arteries and arterioles, unspecified ([address] 10/8/2020    Diverticulitis of colon 10/8/2020    Dizziness and giddiness 2011    Dysphagia 2017    Enlarged prostate 10/8/2020    Environmental allergies 2017    Fatigue 10/8/2020    Frostbite with tissue necrosis of nose 10/8/2020    Gastroesophageal reflux disease 2017    Heartburn 2011    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    Herpes zoster 2017    Heterozygous factor V Leiden mutation ([address] 10/8/2020    Hypercholesterolemia 2017    Hypertension 2017    Increased frequency of urination 2011    Low back strain 10/8/2020    Lumbar sprain 2014    Lumbosacral spondylosis without myelopathy 2014    Malignant neoplasm metastatic to lung ([address] 2017    Malignant tumor of prostate ([address] 2013    Mild intermittent asthma without complication     Neoplasm of bone 2014    Nocturia 2011    Omphalitis of  10/8/2020    Pain in left leg 10/8/2020    Peripheral vascular disease ([address] 2011    Post-nasal drip 2020    Postoperative seroma 10/15/2019    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 SURGICAL HISTORY:   No past surgical history on file. SOCIAL AND OCCUPATIONAL HEALTH:      There  no history of TB or TB exposure. There  no asbestos or silica dust exposure. The patient reports  no coal, foundry, quarry or [employer] exposure. Travel history reveals non. There  no history of recreational or IV drug use. There  no hot tube exposure. 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 He has never used smokeless tobacco.  ETOH:   reports previous alcohol use. ALLERGIES:      Allergies   Allergen Reactions    Levofloxacin Swelling    Codeine Hives and Rash    Pulmicort [Budesonide] Rash     Also itchy    Cashew Nut Oil     Levaquin  [Levofloxacin In D5w] Other (See Comments)    Peanut-Containing Drug Products Hives         Home Meds:   Prior to Admission medications    Medication Sig Start Date End Date Taking?  Authorizing Provider   albuterol sulfate  (90 Base) MCG/ACT inhaler Inhale 2 puffs into the lungs every 6 hours as needed for Wheezing 2/10/22  Yes Geo Rosales MD   famotidine (PEPCID) 20 MG tablet TAKE 2 TABLETS BY MOUTH EVERY DAY 12/20/21  Yes Geo Rosales MD   levalbuterol (XOPENEX) 0.63 MG/3ML nebulization USE 1 VIAL IN NEBULIZER EVERY 6 HOURS AS NEEDED FOR WHEEZING 9/7/21  Yes Mary Gayle MD   SYMBICORT 160-4.5 MCG/ACT AERO TAKE 2 PUFFS BY MOUTH TWICE A DAY 3/12/21  Yes Geo Rosales MD   fexofenadine (ALLEGRA) 180 MG tablet 1 tablet as needed   Yes Historical Provider, MD   fluticasone (FLONASE) 50 MCG/ACT nasal spray SPRAY 1 SPRAY INTO EACH NOSTRIL EVERY DAY 11/13/20  Yes Rocío Quintanilla MD   JANTOVEN 2 MG tablet  10/29/20  Yes Historical Provider, MD   cetirizine (ZYRTEC) 10 MG tablet TAKE ONE TABLET BY MOUTH ONCE DAILY IN THE PM 10/14/20  Yes Historical Provider, MD   Cetirizine HCl 10 MG CAPS Take 10 mg by mouth daily 10/30/20  Yes Abdulaziz Mina APRN - CNP   promethazine-dextromethorphan (PROMETHAZINE-DM) 6.25-15 MG/5ML syrup Take 5 mLs by mouth every 4 hours as needed for Cough 10/8/20  Yes Alisia Hernandez MD   azelastine (ASTELIN) 0.1 % nasal spray 1 spray by Nasal route 2 times daily Use in each nostril as directed 1/9/20  Yes Alisia Hernandez MD   simvastatin (ZOCOR) 40 MG tablet Take 1 tablet by mouth daily   Yes Historical Provider, MD   amLODIPine (NORVASC) 5 MG tablet Take 1 tablet by mouth daily   Yes Historical Provider, MD   warfarin (JANTOVEN) 5 MG tablet Take 1 tablet by mouth daily   Yes Historical Provider, MD          Review of Systems   Constitutional: Negative. HENT: Positive for congestion, postnasal drip and rhinorrhea. Eyes: Negative. Respiratory: Negative. Cardiovascular: Negative. Vitals:  /66   Pulse 71   Ht 5' 8\" (1.727 m)   Wt 158 lb (71.7 kg)   SpO2 96%   BMI 24.02 kg/m²     PHYSICAL EXAM:  Head and neck atraumatic, normocephalic    Lymph nodes-no cervical, supraclavicular lymphadenopathy    Neck-no JVP elevation    Lungs - Ventilating all lobes without any rales, rhonchi, \dullness. No bronchial breath sounds. Chest expansion equal bilaterally, exp wheeze     CVS- S1, S2 regular. No S3 no S4, no murmurs    Abdomen-nontender, nondistended. Bowel sounds are present. No organomegaly    Lower extremity-no edema    Upper extremity-no edema    Neurological-grossly normal cranial nerves.   No overt motor deficit   CT Scans  4/8/2019 done at [facility]  Right apical scarring scarring in the right middle lobe no pulmonary infiltrate no change in intrapulmonary lymph node in the minor fissure    PFT done at [facility] 2015 report reviewed and is normal      IMPRESSION:     Diagnosis Orders   1. Chronic sinusitis, unspecified location     2. Severe persistent asthma without complication  albuterol sulfate  (90 Base) MCG/ACT inhaler   3. 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     4. Post-nasal drip          :                PLAN:      Patient has chronic wheezing. Continue Symbicort. Use ProAir as needed and for nebulization refill Xopenex. Continue nasal steroids  Patient will follow up with cardiologist to discuss plan of action for his coronary artery disease. Follow-up in pulmonary clinic in 2 months                Requested Prescriptions     Signed Prescriptions Disp Refills    albuterol sulfate  (90 Base) MCG/ACT inhaler 1 each 11     Sig: Inhale 2 puffs into the lungs every 6 hours as needed for Wheezing       Medications Discontinued During This Encounter   Medication Reason    aspirin 81 MG chewable tablet LIST CLEANUP    albuterol sulfate  (90 Base) MCG/ACT inhaler LIST CLEANUP       Estephania Chau received counseling on the following healthy behaviors: nutrition, exercise and medication adherence    Patient given educational materials : see patient instruction       Discussed use, benefit, and side effects of prescribed medications. Barriers to medication compliance addressed. All patient questions answered. Pt voiced understanding. I hope this updates you on my evaluation and clinical thinking. Thank you for allowing me to participate in his care. Sincerely,      Electronically signed by Chanelle De Los Santos MD on 2/10/2022 at 7:23 PM     Please note that this chart was generated using voice recognition Dragon dictation software. Although every effort was made to ensure the accuracy of this automated transcription, some errors in transcription may have occurred.

## 2022-02-14 RX ORDER — BENZONATATE 100 MG/1
100 CAPSULE ORAL 3 TIMES DAILY PRN
Qty: 90 CAPSULE | Refills: 1 | Status: SHIPPED | OUTPATIENT
Start: 2022-02-14 | End: 2022-03-16

## 2022-02-14 RX ORDER — PREDNISONE 10 MG/1
TABLET ORAL
Qty: 30 TABLET | Refills: 2 | Status: SHIPPED | OUTPATIENT
Start: 2022-02-14 | End: 2022-03-25

## 2022-02-14 NOTE — TELEPHONE ENCOUNTER
Patient called and wants a refill on Prednisone he is wheezing. He states he has not taken it in a year and wants to know if he can get quite a few. .along with Antonette Balbuena. I did not exactly what RX you would want for prednisone so I did not do a pending order. Goes to [facility] pharmacy. Thanks!

## 2022-03-24 ENCOUNTER — OFFICE VISIT (OUTPATIENT)
Dept: PULMONOLOGY | Age: 87
End: 2022-03-24
Payer: COMMERCIAL

## 2022-03-24 VITALS
OXYGEN SATURATION: 98 % | HEIGHT: 68 IN | BODY MASS INDEX: 23.95 KG/M2 | RESPIRATION RATE: 16 BRPM | DIASTOLIC BLOOD PRESSURE: 74 MMHG | WEIGHT: 158 LBS | SYSTOLIC BLOOD PRESSURE: 132 MMHG | HEART RATE: 75 BPM | TEMPERATURE: 97.2 F

## 2022-03-24 DIAGNOSIS — Z77.098 CHLORINE GAS EXPOSURE: ICD-10-CM

## 2022-03-24 DIAGNOSIS — J32.9 CHRONIC SINUSITIS, UNSPECIFIED LOCATION: ICD-10-CM

## 2022-03-24 DIAGNOSIS — I25.10 CAD, MULTIPLE VESSEL: ICD-10-CM

## 2022-03-24 DIAGNOSIS — Z01.818 PREOPERATIVE CLEARANCE: ICD-10-CM

## 2022-03-24 DIAGNOSIS — J45.50 SEVERE PERSISTENT ASTHMA WITHOUT COMPLICATION: Primary | ICD-10-CM

## 2022-03-24 PROCEDURE — 1123F ACP DISCUSS/DSCN MKR DOCD: CPT | Performed by: INTERNAL MEDICINE

## 2022-03-24 PROCEDURE — 4040F PNEUMOC VAC/ADMIN/RCVD: CPT | Performed by: INTERNAL MEDICINE

## 2022-03-24 PROCEDURE — G8484 FLU IMMUNIZE NO ADMIN: HCPCS | Performed by: INTERNAL MEDICINE

## 2022-03-24 PROCEDURE — 99214 OFFICE O/P EST MOD 30 MIN: CPT | Performed by: INTERNAL MEDICINE

## 2022-03-24 PROCEDURE — G8427 DOCREV CUR MEDS BY ELIG CLIN: HCPCS | Performed by: INTERNAL MEDICINE

## 2022-03-24 PROCEDURE — 1036F TOBACCO NON-USER: CPT | Performed by: INTERNAL MEDICINE

## 2022-03-24 PROCEDURE — G8420 CALC BMI NORM PARAMETERS: HCPCS | Performed by: INTERNAL MEDICINE

## 2022-03-24 RX ORDER — ISOSORBIDE MONONITRATE 30 MG/1
TABLET, EXTENDED RELEASE ORAL
COMMUNITY
Start: 2022-02-24

## 2022-03-24 RX ORDER — ASPIRIN 81 MG/1
81 TABLET ORAL DAILY
COMMUNITY

## 2022-03-24 RX ORDER — METOPROLOL SUCCINATE 50 MG/1
1 TABLET, EXTENDED RELEASE ORAL DAILY
COMMUNITY
Start: 2022-03-19

## 2022-03-25 ENCOUNTER — OFFICE VISIT (OUTPATIENT)
Dept: PULMONOLOGY | Age: 87
End: 2022-03-25
Payer: COMMERCIAL

## 2022-03-25 VITALS
OXYGEN SATURATION: 96 % | HEIGHT: 68 IN | TEMPERATURE: 99 F | SYSTOLIC BLOOD PRESSURE: 152 MMHG | DIASTOLIC BLOOD PRESSURE: 73 MMHG | WEIGHT: 158 LBS | BODY MASS INDEX: 23.95 KG/M2 | HEART RATE: 65 BPM

## 2022-03-25 DIAGNOSIS — J45.50 SEVERE PERSISTENT ASTHMA WITHOUT COMPLICATION: ICD-10-CM

## 2022-03-25 DIAGNOSIS — R09.82 POST-NASAL DRIP: ICD-10-CM

## 2022-03-25 DIAGNOSIS — Z01.818 PREOPERATIVE CLEARANCE: ICD-10-CM

## 2022-03-25 DIAGNOSIS — J32.9 CHRONIC SINUSITIS, UNSPECIFIED LOCATION: ICD-10-CM

## 2022-03-25 DIAGNOSIS — I25.10 CAD, MULTIPLE VESSEL: ICD-10-CM

## 2022-03-25 DIAGNOSIS — J45.40 MODERATE PERSISTENT ASTHMA WITHOUT COMPLICATION: Primary | ICD-10-CM

## 2022-03-25 DIAGNOSIS — Z77.098 CHLORINE GAS EXPOSURE: ICD-10-CM

## 2022-03-25 PROCEDURE — 94726 PLETHYSMOGRAPHY LUNG VOLUMES: CPT | Performed by: INTERNAL MEDICINE

## 2022-03-25 PROCEDURE — 94010 BREATHING CAPACITY TEST: CPT | Performed by: INTERNAL MEDICINE

## 2022-03-25 PROCEDURE — 94729 DIFFUSING CAPACITY: CPT | Performed by: INTERNAL MEDICINE

## 2022-03-25 PROCEDURE — 99214 OFFICE O/P EST MOD 30 MIN: CPT | Performed by: INTERNAL MEDICINE

## 2022-03-25 ASSESSMENT — ENCOUNTER SYMPTOMS
RESPIRATORY NEGATIVE: 1
EYES NEGATIVE: 1
RHINORRHEA: 0

## 2022-03-25 NOTE — PROGRESS NOTES
Pulmonary function test  3/25/2022    Patient spirometry shows an obstructive flow-volume loop  FEV1 78% predicted, FVC 84% predicted, FEV1 FVC ratio 71    Total lung capacity by body box 107% predicted % predicted    Diffusion capacity uncorrected 75% predicted corrected for alveolar volume 81% predicted    Airway resistance increased    Final impression  Mild to moderate obstructive ventilatory dysfunction with mild air trapping.     Clinical correlation advised    Electronically signed by Keri Faust MD on 3/25/2022 at 5:16 PM

## 2022-03-25 NOTE — PROGRESS NOTES
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 year old male    Patient was seen today to perform pulmonary function test to help assess whether his dyspnea is pulmonary or cardiac in origin as per his cardiologist's request.    Last visit  In November patient went to the ER at [facility] and was found to have mildly elevated troponins. 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 Pulmonary wise he continues to have on and off wheezing. Is using his bronchodilator therapy does not complain of any acute hemoptysis or sputum production which is purulent. Last visit   Since last visit patient did need a course of antibiotic and steroid. That controlled his postnasal drainage and hence his acute bronchitis. He is on nasal steroid nasal his antihistaminics but he continues to have nasal discharge and postnasal drainage. He is tolerating DuoNeb and Symbicort well without any complications. No oral thrush noted. who is to follow with Dr. River Yap . Patient would like to change his pulmonologist.  He has been having recurrent acute bronchitis episode which started as nasal congestion postnasal drainage and then he started wheezing and coughing and is given antibiotic and steroids. Previously he had been given Levaquin which called Achilles tendon so he does not use fluoroquinolones anymore. Patient has had multiple courses of antibiotic and steroids. He has been on Symbicort and using DuoNeb 4 times a day presently he is got Phenergan DM to help with his cough and he is able to sleep better at night.   Patient has a history of chlorine gas exposure and because of this he has reactive airway disease and gets exacerbation with weather changes when he had been working with [facility] department. At present he denies any shortness of breath with exertion he denies any wheezing. He is using Symbicort twice a day without a spacer and is using DuoNeb 4 times a day. He has also seen ENT who advised him to use nasal saline rinse Flonase and Astelin spray he does not have the Astelin spray and he did not understand that he had to use nasal saline rinse to help clear his secretions prior to using Flonase. LUNG CANCER SCREENING     1. CRITERIA MET    []     CT ORDERED  []      2. CRITERIA NOT MET   [x]      3. REFUSED                    []        REASON CRITERIA NOT MET     1. SMOKING LESS THAN 30 PY  []      2. AGE LESS THAN 55 or GREATER 77 YEARS  []      3. QUIT SMOKING 15 YEARS OR GREATER   []      4. RECENT CT WITH IN 11 MONTHS    []      5. LIFE EXPECTANCY < 5 YEARS   []      6.  SIGNS  AND SYMPTOMS OF LUNG CANCER   []         Immunization   Immunization History   Administered Date(s) Administered    COVID-19, Pfizer Purple top, DILUTE for use, 12+ yrs, 30mcg/0.3mL dose 02/02/2021, 02/23/2021, 10/21/2021    Influenza Vaccine, unspecified formulation 10/06/2015, 11/02/2016    Influenza Whole 11/20/2014    Influenza, High Dose (Fluzone 65 yrs and older) 09/27/2017, 09/27/2018, 10/18/2019    Influenza, High-dose, Quadv, 65 yrs +, IM (Fluzone) 09/09/2020    Pneumococcal Conjugate 13-valent ([id number]) 05/09/2017    Pneumococcal Polysaccharide ([id number]) 11/12/1997, 10/01/2003, 11/20/2017    Tdap (Boostrix, Adacel) 02/08/2018        Pneumococcal Vaccine     [x] Up to date    [] Indicated   [] Refused  [] Contraindicated       Influenza Vaccine   [x] Up to date    [] Indicated   [] Refused  [] Contraindicated          PAST MEDICAL HISTORY:       Diagnosis Date    Abdominal hernia 10/8/2020    Achilles bursitis 10/8/2020    Acute sinusitis 8/7/2017    Adult body mass index 25-29 10/8/2020    Allergic rhinitis due to pollen 10/8/2020    Anesthesia of skin 10/8/2020    Anterior subcapsular polar senile cataract 10/8/2020    Arthropathy 2011    Asthma 2017    Atopic dermatitis 10/8/2020    Backache 10/8/2020    Bacterial pneumonia 2017    Benign neoplasm of soft tissue 2013    Benign prostatic hyperplasia 2013    Candidiasis of mouth 2017    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 2014    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 2020    Chronic obstructive lung disease ([address] 2011    Chronic obstructive pulmonary disease ([address] 10/8/2020    Chronic rhinitis 2020    Chronic sinusitis 2020    Deep vein thrombosis of lower extremity ([address] 2017    Diarrhea 10/8/2020    Disorder of arteries and arterioles, unspecified ([address] 10/8/2020    Diverticulitis of colon 10/8/2020    Dizziness and giddiness 2011    Dysphagia 2017    Enlarged prostate 10/8/2020    Environmental allergies 2017    Fatigue 10/8/2020    Gabe hematuria 2013    Frostbite with tissue necrosis of nose 10/8/2020    Gastroesophageal reflux disease 2017    Heartburn 2011    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    Herpes zoster 2017    Heterozygous factor V Leiden mutation ([address] 10/8/2020    Hypercholesterolemia 2017    Hypertension 2017    Increased frequency of urination 2011    Low back strain 10/8/2020    Lumbar sprain 2014    Lumbosacral spondylosis without myelopathy 2014    Malignant neoplasm metastatic to lung ([address] 2017    Malignant tumor of prostate ([address] 2013    Mild intermittent asthma without complication     Neoplasm of bone 2014    Nocturia 2011    Omphalitis of  10/8/2020    Pain in left leg 10/8/2020    Peripheral vascular disease ([address] 2011    Post-nasal drip 2020    Postoperative seroma 10/15/2019    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 No pertinent family history. SURGICAL HISTORY:   History reviewed. No pertinent surgical history. SOCIAL AND OCCUPATIONAL HEALTH:      There  no history of TB or TB exposure. There  no asbestos or silica dust exposure. The patient reports  no coal, foundry, quarry or [employer] exposure. Travel history reveals non. There  no history of recreational or IV drug use. There  no hot tube exposure. Pets  no    Occupational history [employer]    TOBACCO:   reports that he has never smoked. He has never used smokeless tobacco.  ETOH:   reports previous alcohol use. ALLERGIES:      Allergies   Allergen Reactions    Levofloxacin Swelling    Codeine Hives and Rash    Pulmicort [Budesonide] Rash     Also itchy    Cashew Nut Oil     Levaquin  [Levofloxacin In D5w] Other (See Comments)    Peanut-Containing Drug Products Hives         Home Meds:   Prior to Admission medications    Medication Sig Start Date End Date Taking?  Authorizing Provider   aspirin 81 MG EC tablet Take 81 mg by mouth daily   Yes Historical Provider, MD   metoprolol succinate (TOPROL XL) 50 MG extended release tablet 1 tablet daily 3/19/22  Yes Historical Provider, MD   isosorbide mononitrate (IMDUR) 30 MG extended release tablet TAKE 1 TABLET BY MOUTH EVERY DAY 2/24/22  Yes Historical Provider, MD   Umeclidinium Bromide 62.5 MCG/INH AEPB Inhale 1 puff into the lungs daily   Yes Historical Provider, MD   albuterol sulfate  (90 Base) MCG/ACT inhaler Inhale 2 puffs into the lungs every 6 hours as needed for Wheezing 2/10/22  Yes Tato Ferrer MD   famotidine (PEPCID) 20 MG tablet TAKE 2 TABLETS BY MOUTH EVERY DAY 12/20/21  Yes Tato Ferrer MD   levalbuterol (XOPENEX) 0.63 MG/3ML nebulization USE 1 VIAL IN NEBULIZER EVERY 6 HOURS AS NEEDED FOR WHEEZING 9/7/21  Yes Taya Cason MD   SYMBICORT 160-4.5 MCG/ACT AERO TAKE 2 PUFFS BY MOUTH TWICE A DAY 3/12/21  Yes Tato Ferrer MD   fluticasone (FLONASE) 50 MCG/ACT nasal spray SPRAY 1 SPRAY INTO EACH NOSTRIL EVERY DAY 11/13/20  Yes Tabitha Lopez MD   JANTOVEN 2 MG tablet  10/29/20  Yes Historical Provider, MD   promethazine-dextromethorphan (PROMETHAZINE-DM) 6.25-15 MG/5ML syrup Take 5 mLs by mouth every 4 hours as needed for Cough 10/8/20  Yes Tato Ferrer MD   azelastine (ASTELIN) 0.1 % nasal spray 1 spray by Nasal route 2 times daily Use in each nostril as directed 1/9/20  Yes Tato Ferrer MD   simvastatin (ZOCOR) 40 MG tablet Take 1 tablet by mouth daily   Yes Historical Provider, MD   warfarin (JANTOVEN) 5 MG tablet Take 1 tablet by mouth daily   Yes Historical Provider, MD          Review of Systems   Constitutional: Negative. HENT: Negative for congestion, postnasal drip and rhinorrhea. Eyes: Negative. Respiratory: Negative. Cardiovascular: Negative. Vitals:  BP (!) 152/73 (Site: Left Upper Arm, Position: Sitting, Cuff Size: Medium Adult)   Pulse 65   Temp 99 °F (37.2 °C) (Temporal)   Ht 5' 8\" (1.727 m)   Wt 158 lb (71.7 kg)   SpO2 96% Comment: ra  BMI 24.02 kg/m²     PHYSICAL EXAM:  Head and neck atraumatic, normocephalic    Lymph nodes-no cervical, supraclavicular lymphadenopathy    Neck-no JVP elevation    Lungs - Ventilating all lobes without any rales, rhonchi, \dullness. No bronchial breath sounds. Chest expansion equal bilaterally, exp wheeze     CVS- S1, S2 regular.   No S3 no S4, no murmurs    Abdomen-nontender, nondistended. Bowel sounds are present. No organomegaly    Lower extremity-no edema    Upper extremity-no edema    Neurological-grossly normal cranial nerves. No overt motor deficit   CT Scans  4/8/2019 done at [facility]  Right apical scarring scarring in the right middle lobe no pulmonary infiltrate no change in intrapulmonary lymph node in the minor fissure    PFT done at [facility] 2015 report reviewed and is normal      IMPRESSION:     Diagnosis Orders   1. Asthma, unspecified asthma severity, unspecified whether complicated, unspecified whether persistent  NV DIFFUSING CAPACITY    NV PLETHYSMOGRAPHY LUNG VOLUMES W/WO AIRWAY RESIST    NV RESPIRATORY FLOW VOLUME LOOP        :                PLAN:      Pulmonary function test today reviewed FEV1 is 78% predicted FVC 84% predicted FEV1 FVC ratio 71, total lung capacity 107% predicted diffusion capacity uncorrected 75% predicted and corrected with the lower volume and hemoglobin is 81% predicted. This does not explain the patient's degree of dyspnea. This was reviewed with patient and his wife in detail. No pulmonary contraindication to proceed with coronary angiography for  treatment of    Multivessel CAD.     Discussed with Dr. Sylvain Frederick, patient's intervention  cardiologist.    Follow-up as scheduled  Continue bronchodilator therapy, Symbicort, Incruse, albuterol as needed            Requested Prescriptions      No prescriptions requested or ordered in this encounter       Medications Discontinued During This Encounter   Medication Reason    cetirizine (ZYRTEC) 10 MG tablet LIST CLEANUP    Cetirizine HCl 10 MG CAPS LIST CLEANUP    fexofenadine (ALLEGRA) 180 MG tablet LIST CLEANUP    predniSONE (DELTASONE) 10 MG tablet LIST CLEANUP       Mathews Josefa received counseling on the following healthy behaviors: nutrition, exercise and medication adherence    Patient given educational materials : see patient instruction       Discussed use, benefit, and side effects of prescribed medications. Barriers to medication compliance addressed. All patient questions answered. Pt voiced understanding. I hope this updates you on my evaluation and clinical thinking. Thank you for allowing me to participate in his care. Sincerely,      Electronically signed by Chanelle De Los Santos MD on 3/25/2022 at 4:58 PM     Please note that this chart was generated using voice recognition Dragon dictation software. Although every effort was made to ensure the accuracy of this automated transcription, some errors in transcription may have occurred.

## 2022-03-27 ASSESSMENT — ENCOUNTER SYMPTOMS
EYES NEGATIVE: 1
RESPIRATORY NEGATIVE: 1
RHINORRHEA: 1

## 2022-03-27 NOTE — PROGRESS NOTES
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 year old male    Noted to have multivessel CAD and is under evaluation for PCI. Has been referred by cardiologist for further evaluation of his pulmonary status and whether dyspnea can be explained by pulmonary disease alone. Last visit  In November patient went to the ER at [facility] and was found to have mildly elevated troponins. 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 Pulmonary wise he continues to have on and off wheezing. Is using his bronchodilator therapy does not complain of any acute hemoptysis or sputum production which is purulent. Last visit   Since last visit patient did need a course of antibiotic and steroid. That controlled his postnasal drainage and hence his acute bronchitis. He is on nasal steroid nasal his antihistaminics but he continues to have nasal discharge and postnasal drainage. He is tolerating DuoNeb and Symbicort well without any complications. No oral thrush noted. who is to follow with Dr. Nadia Goins . Patient would like to change his pulmonologist.  He has been having recurrent acute bronchitis episode which started as nasal congestion postnasal drainage and then he started wheezing and coughing and is given antibiotic and steroids. Previously he had been given Levaquin which called Achilles tendon so he does not use fluoroquinolones anymore. Patient has had multiple courses of antibiotic and steroids. He has been on Symbicort and using DuoNeb 4 times a day presently he is got Phenergan DM to help with his cough and he is able to sleep better at night.   Patient has a history of chlorine gas exposure and because of this he has reactive airway disease and gets exacerbation with weather changes when he had been working with [employer] [facility] [facility] department. At present he denies any shortness of breath with exertion he denies any wheezing. He is using Symbicort twice a day without a spacer and is using DuoNeb 4 times a day. He has also seen ENT who advised him to use nasal saline rinse Flonase and Astelin spray he does not have the Astelin spray and he did not understand that he had to use nasal saline rinse to help clear his secretions prior to using Flonase. LUNG CANCER SCREENING     1. CRITERIA MET    []     CT ORDERED  []      2. CRITERIA NOT MET   [x]      3. REFUSED                    []        REASON CRITERIA NOT MET     1. SMOKING LESS THAN 30 PY  []      2. AGE LESS THAN 55 or GREATER 77 YEARS  []      3. QUIT SMOKING 15 YEARS OR GREATER   []      4. RECENT CT WITH IN 11 MONTHS    []      5. LIFE EXPECTANCY < 5 YEARS   []      6.  SIGNS  AND SYMPTOMS OF LUNG CANCER   []         Immunization   Immunization History   Administered Date(s) Administered    COVID-19, Pfizer Purple top, DILUTE for use, 12+ yrs, 30mcg/0.3mL dose 02/02/2021, 02/23/2021, 10/21/2021    Influenza Vaccine, unspecified formulation 10/06/2015, 11/02/2016    Influenza Whole 11/20/2014    Influenza, High Dose (Fluzone 65 yrs and older) 09/27/2017, 09/27/2018, 10/18/2019    Influenza, High-dose, Quadv, 65 yrs +, IM (Fluzone) 09/09/2020    Pneumococcal Conjugate 13-valent ([id number]) 05/09/2017    Pneumococcal Polysaccharide ([id number]) 11/12/1997, 10/01/2003, 11/20/2017    Tdap (Boostrix, Adacel) 02/08/2018        Pneumococcal Vaccine     [x] Up to date    [] Indicated   [] Refused  [] Contraindicated       Influenza Vaccine   [x] Up to date    [] Indicated   [] Refused  [] Contraindicated          PAST MEDICAL HISTORY:       Diagnosis Date    Abdominal hernia 10/8/2020    Achilles bursitis 10/8/2020    Acute sinusitis 8/7/2017    Adult body mass index 25-29 10/8/2020    Allergic rhinitis due to pollen 10/8/2020    Anesthesia of skin 10/8/2020    Anterior subcapsular polar senile cataract 10/8/2020    Arthropathy 2011    Asthma 2017    Atopic dermatitis 10/8/2020    Backache 10/8/2020    Bacterial pneumonia 2017    Benign neoplasm of soft tissue 2013    Benign prostatic hyperplasia 2013    Candidiasis of mouth 2017    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 2014    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 2020    Chronic obstructive lung disease ([address] 2011    Chronic obstructive pulmonary disease ([address] 10/8/2020    Chronic rhinitis 2020    Chronic sinusitis 2020    Deep vein thrombosis of lower extremity ([address] 2017    Diarrhea 10/8/2020    Disorder of arteries and arterioles, unspecified ([address] 10/8/2020    Diverticulitis of colon 10/8/2020    Dizziness and giddiness 2011    Dysphagia 2017    Enlarged prostate 10/8/2020    Environmental allergies 2017    Fatigue 10/8/2020    Gabe hematuria 2013    Frostbite with tissue necrosis of nose 10/8/2020    Gastroesophageal reflux disease 2017    Heartburn 2011    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    Herpes zoster 2017    Heterozygous factor V Leiden mutation ([address] 10/8/2020    Hypercholesterolemia 2017    Hypertension 2017    Increased frequency of urination 2011    Low back strain 10/8/2020    Lumbar sprain 2014    Lumbosacral spondylosis without myelopathy 2014    Malignant neoplasm metastatic to lung ([address] 2017    Malignant tumor of prostate ([address] 2013    Mild intermittent asthma without complication 6097    Neoplasm of bone 2014    Nocturia 2011    Omphalitis of  10/8/2020    Pain in left leg 10/8/2020    Peripheral vascular disease ([address] 2011    Post-nasal drip 2020    Postoperative seroma 10/15/2019    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 ([facility] 12/12/2011    Productive cough 8/7/2017    Radiation burn 8/7/2017    Retention of urine 4/18/2013    Right lower quadrant pain 10/8/2020    Sensorineural hearing loss, bilateral 9/27/2016    Shoulder joint pain 6/13/2012    Simple chronic serous otitis media 8/7/2017    Sprain of shoulder and upper arm 9/8/2014    Tinnitus 11/8/2011    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 SURGICAL HISTORY:   No past surgical history on file. SOCIAL AND OCCUPATIONAL HEALTH:      There  no history of TB or TB exposure. There  no asbestos or silica dust exposure. The patient reports  no coal, foundry, quarry or [employer] exposure. Travel history reveals non. There  no history of recreational or IV drug use. There  no hot tube exposure. 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 He has never used smokeless tobacco.  ETOH:   reports previous alcohol use. ALLERGIES:      Allergies   Allergen Reactions    Levofloxacin Swelling    Codeine Hives and Rash    Pulmicort [Budesonide] Rash     Also itchy    Cashew Nut Oil     Levaquin  [Levofloxacin In D5w] Other (See Comments)    Peanut-Containing Drug Products Hives         Home Meds:   Prior to Admission medications    Medication Sig Start Date End Date Taking?  Authorizing Provider   aspirin 81 MG EC tablet Take 81 mg by mouth daily   Yes Historical Provider, MD   metoprolol succinate (TOPROL XL) 50 MG extended release tablet 1 tablet daily 3/19/22  Yes Historical Provider, MD   isosorbide mononitrate (IMDUR) 30 MG extended release tablet TAKE 1 TABLET BY MOUTH EVERY DAY 2/24/22  Yes Historical Provider, MD   Umeclidinium Bromide 62.5 MCG/INH AEPB Inhale 1 puff into the lungs daily   Yes Historical Provider, MD   albuterol sulfate  (90 Base) MCG/ACT inhaler Inhale 2 puffs into the lungs every 6 hours as needed for Wheezing 2/10/22  Yes Lyndon Solis MD   famotidine (PEPCID) 20 MG tablet TAKE 2 TABLETS BY MOUTH EVERY DAY 12/20/21  Yes Lyndon Solis MD   levalbuterol (XOPENEX) 0.63 MG/3ML nebulization USE 1 VIAL IN NEBULIZER EVERY 6 HOURS AS NEEDED FOR WHEEZING 9/7/21  Yes Lefty Luna MD   SYMBICORT 160-4.5 MCG/ACT AERO TAKE 2 PUFFS BY MOUTH TWICE A DAY 3/12/21  Yes Lyndon Solis MD   fluticasone (FLONASE) 50 MCG/ACT nasal spray SPRAY 1 SPRAY INTO EACH NOSTRIL EVERY DAY 11/13/20  Yes Colin Torres MD   JANTOVEN 2 MG tablet  10/29/20  Yes Historical Provider, MD   promethazine-dextromethorphan (PROMETHAZINE-DM) 6.25-15 MG/5ML syrup Take 5 mLs by mouth every 4 hours as needed for Cough 10/8/20  Yes Lyndon Solis MD   azelastine (ASTELIN) 0.1 % nasal spray 1 spray by Nasal route 2 times daily Use in each nostril as directed 1/9/20  Yes Lyndon Solis MD   simvastatin (ZOCOR) 40 MG tablet Take 1 tablet by mouth daily   Yes Historical Provider, MD   warfarin (JANTOVEN) 5 MG tablet Take 1 tablet by mouth daily   Yes Historical Provider, MD          Review of Systems   Constitutional: Negative. HENT: Positive for congestion, postnasal drip and rhinorrhea. Eyes: Negative. Respiratory: Negative. Cardiovascular: Negative. Vitals:  /74   Pulse 75   Temp 97.2 °F (36.2 °C)   Resp 16   Ht 5' 8\" (1.727 m)   Wt 158 lb (71.7 kg)   SpO2 98% Comment: room air  BMI 24.02 kg/m²     PHYSICAL EXAM:  Head and neck atraumatic, normocephalic    Lymph nodes-no cervical, supraclavicular lymphadenopathy    Neck-no JVP elevation    Lungs - Ventilating all lobes without any rales, rhonchi, \dullness. No bronchial breath sounds. Chest expansion equal bilaterally, exp wheeze     CVS- S1, S2 regular. No S3 no S4, no murmurs    Abdomen-nontender, nondistended.   Bowel sounds are present. No organomegaly    Lower extremity-no edema    Upper extremity-no edema    Neurological-grossly normal cranial nerves. No overt motor deficit   CT Scans  4/8/2019 done at [facility]  Right apical scarring scarring in the right middle lobe no pulmonary infiltrate no change in intrapulmonary lymph node in the minor fissure    PFT done at [facility] 2015 report reviewed and is normal      IMPRESSION:     Diagnosis Orders   1. Severe persistent asthma without complication  Full PFT Study With Bronchodilator   2. Preoperative clearance  Full PFT Study With Bronchodilator   3. 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     4. Chronic sinusitis, unspecified location     5. CAD, multiple vessel          :                PLAN:      Referred by interventional cardiologist to evaluate whether dyspnea is pulmonary versus cardiac. Patient has multivessel CAD. For further evaluation will obtain PFT and then will update patient's interventional cardiologist.  PFT scheduled for tomorrow. Requested Prescriptions      No prescriptions requested or ordered in this encounter       Medications Discontinued During This Encounter   Medication Reason    amLODIPine (NORVASC) 5 MG tablet        Brooks Hussein received counseling on the following healthy behaviors: nutrition, exercise and medication adherence    Patient given educational materials : see patient instruction       Discussed use, benefit, and side effects of prescribed medications. Barriers to medication compliance addressed. All patient questions answered. Pt voiced understanding. I hope this updates you on my evaluation and clinical thinking. Thank you for allowing me to participate in his care. Sincerely,      Electronically signed by Corey Busch MD on 3/24/2022 at 3:19 PM     Please note that this chart was generated using voice recognition Dragon dictation software.   Although every effort was made to ensure the accuracy of this automated transcription, some errors in transcription may have occurred.

## 2022-05-17 ENCOUNTER — TELEPHONE (OUTPATIENT)
Dept: INFECTIOUS DISEASES | Age: 87
End: 2022-05-17

## 2022-05-17 ENCOUNTER — OFFICE VISIT (OUTPATIENT)
Dept: PULMONOLOGY | Age: 87
End: 2022-05-17
Payer: COMMERCIAL

## 2022-05-17 VITALS
SYSTOLIC BLOOD PRESSURE: 159 MMHG | RESPIRATION RATE: 17 BRPM | HEART RATE: 66 BPM | DIASTOLIC BLOOD PRESSURE: 78 MMHG | WEIGHT: 152.3 LBS | HEIGHT: 68 IN | BODY MASS INDEX: 23.08 KG/M2 | TEMPERATURE: 97.7 F | OXYGEN SATURATION: 98 %

## 2022-05-17 DIAGNOSIS — J32.9 CHRONIC SINUSITIS, UNSPECIFIED LOCATION: ICD-10-CM

## 2022-05-17 DIAGNOSIS — R09.82 POST-NASAL DRIP: ICD-10-CM

## 2022-05-17 DIAGNOSIS — Z77.098 CHLORINE GAS EXPOSURE: ICD-10-CM

## 2022-05-17 DIAGNOSIS — I25.10 CAD, MULTIPLE VESSEL: ICD-10-CM

## 2022-05-17 DIAGNOSIS — J45.40 MODERATE PERSISTENT ASTHMA WITHOUT COMPLICATION: Primary | ICD-10-CM

## 2022-05-17 PROCEDURE — 99213 OFFICE O/P EST LOW 20 MIN: CPT | Performed by: INTERNAL MEDICINE

## 2022-05-17 RX ORDER — ALBUTEROL SULFATE 90 UG/1
2 AEROSOL, METERED RESPIRATORY (INHALATION) EVERY 6 HOURS PRN
Qty: 18 G | Refills: 11 | Status: SHIPPED | OUTPATIENT
Start: 2022-05-17 | End: 2022-06-16

## 2022-05-17 ASSESSMENT — ENCOUNTER SYMPTOMS
RESPIRATORY NEGATIVE: 1
RHINORRHEA: 0
EYES NEGATIVE: 1

## 2022-05-17 NOTE — PROGRESS NOTES
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 Pulmonary wise he continues to have on and off wheezing. Is using his bronchodilator therapy does not complain of any acute hemoptysis or sputum production which is purulent. Last visit   Since last visit patient did need a course of antibiotic and steroid. That controlled his postnasal drainage and hence his acute bronchitis. He is on nasal steroid nasal his antihistaminics but he continues to have nasal discharge and postnasal drainage. He is tolerating DuoNeb and Symbicort well without any complications. No oral thrush noted. who is to follow with Dr. Alexus Decker . Patient would like to change his pulmonologist.  He has been having recurrent acute bronchitis episode which started as nasal congestion postnasal drainage and then he started wheezing and coughing and is given antibiotic and steroids. Previously he had been given Levaquin which called Achilles tendon so he does not use fluoroquinolones anymore. Patient has had multiple courses of antibiotic and steroids. He has been on Symbicort and using DuoNeb 4 times a day presently he is got Phenergan DM to help with his cough and he is able to sleep better at night.   Patient has a history of chlorine gas exposure and because of this he has reactive airway disease and gets exacerbation with weather changes when he had been working with [facility] department. At present he denies any shortness of breath with exertion he denies any wheezing. He is using Symbicort twice a day without a spacer and is using DuoNeb 4 times a day. He has also seen ENT who advised him to use nasal saline rinse Flonase and Astelin spray he does not have the Astelin spray and he did not understand that he had to use nasal saline rinse to help clear his secretions prior to using Flonase. LUNG CANCER SCREENING     1. CRITERIA MET    []     CT ORDERED  []      2. CRITERIA NOT MET   [x]      3. REFUSED                    []        REASON CRITERIA NOT MET     1. SMOKING LESS THAN 30 PY  []      2. AGE LESS THAN 55 or GREATER 77 YEARS  []      3. QUIT SMOKING 15 YEARS OR GREATER   []      4. RECENT CT WITH IN 11 MONTHS    []      5. LIFE EXPECTANCY < 5 YEARS   []      6.  SIGNS  AND SYMPTOMS OF LUNG CANCER   []         Immunization   Immunization History   Administered Date(s) Administered    COVID-19, Pfizer Purple top, DILUTE for use, 12+ yrs, 30mcg/0.3mL dose 02/02/2021, 02/23/2021, 10/21/2021    Influenza Vaccine, unspecified formulation 10/06/2015, 11/02/2016    Influenza Whole 11/20/2014    Influenza, High Dose (Fluzone 65 yrs and older) 09/27/2017, 09/27/2018, 10/18/2019    Influenza, High-dose, Quadv, 65 yrs +, IM (Fluzone) 09/09/2020    Pneumococcal Conjugate 13-valent ([id number]) 05/09/2017    Pneumococcal Polysaccharide ([id number]) 11/12/1997, 10/01/2003, 11/20/2017    Tdap (Boostrix, Adacel) 02/08/2018        Pneumococcal Vaccine     [x] Up to date    [] Indicated   [] Refused  [] Contraindicated       Influenza Vaccine   [x] Up to date    [] Indicated   [] Refused  [] Contraindicated          PAST MEDICAL HISTORY:       Diagnosis Date    Abdominal hernia 10/8/2020    Achilles bursitis 10/8/2020    Acute sinusitis 8/7/2017    Adult body mass index 25-29 10/8/2020    Allergic rhinitis due to pollen 10/8/2020    Anesthesia of skin 10/8/2020    Anterior subcapsular polar senile cataract 10/8/2020    Arthropathy 2011    Asthma 2017    Atopic dermatitis 10/8/2020    Backache 10/8/2020    Bacterial pneumonia 2017    Benign neoplasm of soft tissue 2013    Benign prostatic hyperplasia 2013    Candidiasis of mouth 2017    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 2014    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 2020    Chronic obstructive lung disease ([address] 2011    Chronic obstructive pulmonary disease ([address] 10/8/2020    Chronic rhinitis 2020    Chronic sinusitis 2020    Deep vein thrombosis of lower extremity ([address] 2017    Diarrhea 10/8/2020    Disorder of arteries and arterioles, unspecified ([address] 10/8/2020    Diverticulitis of colon 10/8/2020    Dizziness and giddiness 2011    Dysphagia 2017    Enlarged prostate 10/8/2020    Environmental allergies 2017    Fatigue 10/8/2020    Gabe hematuria 2013    Frostbite with tissue necrosis of nose 10/8/2020    Gastroesophageal reflux disease 2017    Heartburn 2011    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    Herpes zoster 2017    Heterozygous factor V Leiden mutation ([address] 10/8/2020    Hypercholesterolemia 2017    Hypertension 2017    Increased frequency of urination 2011    Low back strain 10/8/2020    Lumbar sprain 2014    Lumbosacral spondylosis without myelopathy 2014    Malignant neoplasm metastatic to lung ([address] 2017    Malignant tumor of prostate ([address] 2013    Mild intermittent asthma without complication     Neoplasm of bone 2014    Nocturia 2011    Omphalitis of  10/8/2020    Pain in left leg 10/8/2020    Peripheral vascular disease ([address] 2011    Post-nasal drip 2020    Postoperative seroma 10/15/2019    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 SURGICAL HISTORY:   No past surgical history on file. SOCIAL AND OCCUPATIONAL HEALTH:      There  no history of TB or TB exposure. There  no asbestos or silica dust exposure. The patient reports  no coal, foundry, quarry or [employer] exposure. Travel history reveals non. There  no history of recreational or IV drug use. There  no hot tube exposure. 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 He has never used smokeless tobacco.  ETOH:   reports previous alcohol use. ALLERGIES:      Allergies   Allergen Reactions    Levofloxacin Swelling    Codeine Hives and Rash    Pulmicort [Budesonide] Rash     Also itchy    Cashew Nut Oil     Levaquin  [Levofloxacin In D5w] Other (See Comments)    Peanut-Containing Drug Products Hives         Home Meds:   Prior to Admission medications    Medication Sig Start Date End Date Taking?  Authorizing Provider   albuterol sulfate  (90 Base) MCG/ACT inhaler Inhale 2 puffs into the lungs every 6 hours as needed for Wheezing 5/17/22 6/16/22 Yes Gianluca Hou MD   aspirin 81 MG EC tablet Take 81 mg by mouth daily    Historical Provider, MD   metoprolol succinate (TOPROL XL) 50 MG extended release tablet 1 tablet daily 3/19/22   Historical Provider, MD   isosorbide mononitrate (IMDUR) 30 MG extended release tablet TAKE 1 TABLET BY MOUTH EVERY DAY 2/24/22   Historical Provider, MD   Umeclidinium Bromide 62.5 MCG/INH AEPB Inhale 1 puff into the lungs daily    Historical Provider, MD   famotidine (PEPCID) 20 MG tablet TAKE 2 TABLETS BY MOUTH EVERY DAY 12/20/21   Shun Rodriguez MD   levalbuterol (XOPENEX) 0.63 MG/3ML nebulization USE 1 VIAL IN NEBULIZER EVERY 6 HOURS AS NEEDED FOR WHEEZING 9/7/21   Emilie Carr MD   SYMBICORT 160-4.5 MCG/ACT AERO TAKE 2 PUFFS BY MOUTH TWICE A DAY 3/12/21   Shun Rodriguez MD   fluticasone (FLONASE) 50 MCG/ACT nasal spray SPRAY 1 SPRAY INTO EACH NOSTRIL EVERY DAY 11/13/20   Kitty Carpenter MD   JANTOVEN 2 MG tablet  10/29/20   Historical Provider, MD   promethazine-dextromethorphan (PROMETHAZINE-DM) 6.25-15 MG/5ML syrup Take 5 mLs by mouth every 4 hours as needed for Cough 10/8/20   Shun Rodriguez MD   azelastine (ASTELIN) 0.1 % nasal spray 1 spray by Nasal route 2 times daily Use in each nostril as directed 1/9/20   Shun Rodriguez MD   simvastatin (ZOCOR) 40 MG tablet Take 1 tablet by mouth daily    Historical Provider, MD   warfarin (JANTOVEN) 5 MG tablet Take 1 tablet by mouth daily    Historical Provider, MD          Review of Systems   Constitutional: Negative. HENT: Negative for congestion, postnasal drip and rhinorrhea. Eyes: Negative. Respiratory: Negative. Cardiovascular: Negative. Vitals:  BP (!) 159/78 (Site: Left Upper Arm, Position: Sitting, Cuff Size: Large Adult)   Pulse 66   Temp 97.7 °F (36.5 °C) (Temporal)   Resp 17   Ht 5' 8\" (1.727 m)   Wt 152 lb 4.8 oz (69.1 kg)   SpO2 98% Comment: room air  BMI 23.16 kg/m²     PHYSICAL EXAM:  Head and neck atraumatic, normocephalic    Lymph nodes-no cervical, supraclavicular lymphadenopathy    Neck-no JVP elevation    Lungs - Ventilating all lobes without any rales, rhonchi, \dullness. No bronchial breath sounds. Chest expansion equal bilaterally, exp wheeze     CVS- S1, S2 regular. No S3 no S4, no murmurs    Abdomen-nontender, nondistended. Bowel sounds are present.   No organomegaly    Lower extremity-no edema    Upper extremity-no edema    Neurological-grossly normal cranial nerves. No overt motor deficit   CT Scans  4/8/2019 done at [facility]  Right apical scarring scarring in the right middle lobe no pulmonary infiltrate no change in intrapulmonary lymph node in the minor fissure    PFT done at [facility] 2015 report reviewed and is normal      IMPRESSION:     Diagnosis Orders   1. Moderate persistent asthma without complication  albuterol sulfate  (90 Base) MCG/ACT inhaler    DME Order for Nebulizer as OP        :                PLAN:      Continue incruse , Symbicort   Use pro air prn   Xopenex nebulized for exacerbation   Follow up with cardiology as scheduled   Needs nebulizer supplies     Follow up 4 months           Requested Prescriptions     Signed Prescriptions Disp Refills    albuterol sulfate  (90 Base) MCG/ACT inhaler 18 g 11     Sig: Inhale 2 puffs into the lungs every 6 hours as needed for Wheezing       Medications Discontinued During This Encounter   Medication Reason    albuterol sulfate  (90 Base) MCG/ACT inhaler LIST CLEANUP       Woodrow Epp received counseling on the following healthy behaviors: nutrition, exercise and medication adherence    Patient given educational materials : see patient instruction       Discussed use, benefit, and side effects of prescribed medications. Barriers to medication compliance addressed. All patient questions answered. Pt voiced understanding. I hope this updates you on my evaluation and clinical thinking. Thank you for allowing me to participate in his care. Sincerely,      Electronically signed by Michelle Pan MD on 5/17/2022 at 11:48 AM     Please note that this chart was generated using voice recognition Dragon dictation software. Although every effort was made to ensure the accuracy of this automated transcription, some errors in transcription may have occurred.

## 2022-05-19 NOTE — TELEPHONE ENCOUNTER
Patient called and stated that needs a C-9 for this order you faxed and once done the C-9  Can be faxed to  [phone]. Thanks!

## 2022-05-27 RX ORDER — LIDOCAINE 4 G/G
1 PATCH TOPICAL DAILY
Qty: 30 PATCH | Refills: 0 | Status: CANCELLED | OUTPATIENT
Start: 2022-05-27 | End: 2022-06-26

## 2022-06-09 ENCOUNTER — TELEPHONE (OUTPATIENT)
Dept: PULMONOLOGY | Age: 87
End: 2022-06-09

## 2022-06-09 NOTE — TELEPHONE ENCOUNTER
Patient called, you spoke to patient back in May, he needs parts for his nebulizer machine however if his machine is too old for parts, he will need a whole new machine. He is [employer] and a C9 is required. Please take care of this, keep patient in the loop and he also needs a DME company. Thanks!

## 2022-07-07 NOTE — TELEPHONE ENCOUNTER
Patient called, states [address] [place] [address] didn't get my information. Everything was re-faxed.

## 2022-07-20 NOTE — TELEPHONE ENCOUNTER
Patient called, states they still dont have the information. Again I faxed the C9 approval letter, the order for the nebulizer and the office dictation. I put a note on it to call me with questions and also stating that this is a [employer].

## 2022-09-19 RX ORDER — FAMOTIDINE 20 MG/1
TABLET, FILM COATED ORAL
Qty: 180 TABLET | Refills: 2 | Status: SHIPPED | OUTPATIENT
Start: 2022-09-19

## 2022-09-19 NOTE — TELEPHONE ENCOUNTER
LAST VISIT: 5/17/22  NEXT VISIT: 9/27/22    No mention of this medication in your last dictation but you have prescribed in the past. Please sign for refill if ok. Thank you.

## 2022-09-23 ENCOUNTER — TELEPHONE (OUTPATIENT)
Dept: PULMONOLOGY | Age: 87
End: 2022-09-23

## 2022-09-23 NOTE — TELEPHONE ENCOUNTER
Pt has COPD and recently tested positive for COVID. He was prescribed Paxlovid by PCP. His wife called and wondered if there is anything else he should be doing besides getting rest, staying hydrated and using his  nebulizer? Please advise.

## 2022-09-30 ENCOUNTER — OFFICE VISIT (OUTPATIENT)
Dept: PULMONOLOGY | Age: 87
End: 2022-09-30
Payer: COMMERCIAL

## 2022-09-30 VITALS
DIASTOLIC BLOOD PRESSURE: 83 MMHG | BODY MASS INDEX: 22.28 KG/M2 | WEIGHT: 147 LBS | HEIGHT: 68 IN | RESPIRATION RATE: 14 BRPM | SYSTOLIC BLOOD PRESSURE: 150 MMHG | OXYGEN SATURATION: 96 % | HEART RATE: 88 BPM

## 2022-09-30 DIAGNOSIS — J32.9 CHRONIC SINUSITIS, UNSPECIFIED LOCATION: ICD-10-CM

## 2022-09-30 DIAGNOSIS — Z77.098 CHLORINE GAS EXPOSURE: ICD-10-CM

## 2022-09-30 DIAGNOSIS — I25.10 CAD, MULTIPLE VESSEL: ICD-10-CM

## 2022-09-30 DIAGNOSIS — J45.50 SEVERE PERSISTENT ASTHMA WITHOUT COMPLICATION: Primary | ICD-10-CM

## 2022-09-30 PROCEDURE — 99214 OFFICE O/P EST MOD 30 MIN: CPT | Performed by: INTERNAL MEDICINE

## 2022-09-30 PROCEDURE — 1123F ACP DISCUSS/DSCN MKR DOCD: CPT | Performed by: INTERNAL MEDICINE

## 2022-09-30 RX ORDER — PREDNISONE 1 MG/1
TABLET ORAL
Qty: 60 TABLET | Refills: 2 | Status: SHIPPED | OUTPATIENT
Start: 2022-09-30

## 2022-10-01 ASSESSMENT — ENCOUNTER SYMPTOMS
EYES NEGATIVE: 1
RHINORRHEA: 0
RESPIRATORY NEGATIVE: 1

## 2022-10-01 NOTE — PROGRESS NOTES
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 year old male    Here for sick visit , has been complaining of wheezing , increased non purulent sputum . Has been using his bronchodilator regularly . No fever no chills , no sick contacts         Last visit  In November patient went to the ER at [facility] and was found to have mildly elevated troponins. 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 Pulmonary wise he continues to have on and off wheezing. Is using his bronchodilator therapy does not complain of any acute hemoptysis or sputum production which is purulent. Last visit   Since last visit patient did need a course of antibiotic and steroid. That controlled his postnasal drainage and hence his acute bronchitis. He is on nasal steroid nasal his antihistaminics but he continues to have nasal discharge and postnasal drainage. He is tolerating DuoNeb and Symbicort well without any complications. No oral thrush noted. who is to follow with Dr. Leticia Ferrer . Patient would like to change his pulmonologist.  He has been having recurrent acute bronchitis episode which started as nasal congestion postnasal drainage and then he started wheezing and coughing and is given antibiotic and steroids. Previously he had been given Levaquin which called Achilles tendon so he does not use fluoroquinolones anymore. Patient has had multiple courses of antibiotic and steroids. He has been on Symbicort and using DuoNeb 4 times a day presently he is got Phenergan DM to help with his cough and he is able to sleep better at night.   Patient has a history of chlorine gas exposure and because of this he has reactive airway disease and gets exacerbation with weather changes when he had been working with [facility] department. At present he denies any shortness of breath with exertion he denies any wheezing. He is using Symbicort twice a day without a spacer and is using DuoNeb 4 times a day. He has also seen ENT who advised him to use nasal saline rinse Flonase and Astelin spray he does not have the Astelin spray and he did not understand that he had to use nasal saline rinse to help clear his secretions prior to using Flonase.        LUNG CANCER SCREENING     CRITERIA MET    []     CT ORDERED  []      CRITERIA NOT MET   [x]      REFUSED                    []        REASON CRITERIA NOT MET     SMOKING LESS THAN 30 PY  []      AGE LESS THAN 55 or GREATER 77 YEARS  []      QUIT SMOKING 15 YEARS OR GREATER   []      RECENT CT WITH IN 11 MONTHS    []      LIFE EXPECTANCY < 5 YEARS   []      SIGNS  AND SYMPTOMS OF LUNG CANCER   []         Immunization   Immunization History   Administered Date(s) Administered    COVID-19, PFIZER PURPLE top, DILUTE for use, (age 15 y+), 30mcg/0.3mL 02/02/2021, 02/23/2021, 10/21/2021    Influenza Vaccine, unspecified formulation 10/06/2015, 11/02/2016    Influenza Whole 11/20/2014    Influenza, FLUZONE (age 72 y+), High Dose, 0.7mL 09/09/2020    Influenza, High Dose (Fluzone 65 yrs and older) 09/27/2017, 09/27/2018, 10/18/2019    Pneumococcal Conjugate 13-valent ([id number]) 05/09/2017    Pneumococcal Polysaccharide ([id number]) 11/12/1997, 10/01/2003, 11/20/2017    Tdap (Boostrix, Adacel) 02/08/2018        Pneumococcal Vaccine     [x] Up to date    [] Indicated   [] Refused  [] Contraindicated       Influenza Vaccine   [x] Up to date    [] Indicated   [] Refused  [] Contraindicated          PAST MEDICAL HISTORY:       Diagnosis Date    Abdominal hernia 10/8/2020    Achilles bursitis 10/8/2020    Acute sinusitis 8/7/2017    Adult body mass index 25-29 10/8/2020    Allergic rhinitis due to pollen 10/8/2020    Anesthesia of skin 10/8/2020    Anterior subcapsular polar senile cataract 10/8/2020    Arthropathy 2011    Asthma 2017    Atopic dermatitis 10/8/2020    Backache 10/8/2020    Bacterial pneumonia 2017    Benign neoplasm of soft tissue 2013    Benign prostatic hyperplasia 2013    Candidiasis of mouth 2017    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 2014    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 2020    Chronic obstructive lung disease ([address] 2011    Chronic obstructive pulmonary disease ([address] 10/8/2020    Chronic rhinitis 2020    Chronic sinusitis 2020    Deep vein thrombosis of lower extremity ([address] 2017    Diarrhea 10/8/2020    Disorder of arteries and arterioles, unspecified ([address] 10/8/2020    Diverticulitis of colon 10/8/2020    Dizziness and giddiness 2011    Dysphagia 2017    Enlarged prostate 10/8/2020    Environmental allergies 2017    Fatigue 10/8/2020    Gabe hematuria 2013    Frostbite with tissue necrosis of nose 10/8/2020    Gastroesophageal reflux disease 2017    Heartburn 2011    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    Herpes zoster 2017    Heterozygous factor V Leiden mutation ([address] 10/8/2020    Hypercholesterolemia 2017    Hypertension 2017    Increased frequency of urination 2011    Low back strain 10/8/2020    Lumbar sprain 2014    Lumbosacral spondylosis without myelopathy 2014    Malignant neoplasm metastatic to lung ([address] 2017    Malignant tumor of prostate ([address] 2013    Mild intermittent asthma without complication 82/3/0540    Neoplasm of bone 2014    Nocturia 2011    Omphalitis of  10/8/2020    Pain in left leg 10/8/2020    Peripheral vascular disease ([address] 2011    Post-nasal drip 2020    Postoperative seroma 10/15/2019    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 SURGICAL HISTORY:   No past surgical history on file. SOCIAL AND OCCUPATIONAL HEALTH:      There  no history of TB or TB exposure. There  no asbestos or silica dust exposure. The patient reports  no coal, foundry, quarry or [employer] exposure. Travel history reveals non. There  no history of recreational or IV drug use. There  no hot tube exposure. 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 He has never used smokeless tobacco.  ETOH:   reports that he does not currently use alcohol. ALLERGIES:      Allergies   Allergen Reactions    Levofloxacin Swelling    Codeine Hives and Rash    Pulmicort [Budesonide] Rash     Also itchy    Cashew Nut Oil     Levaquin  [Levofloxacin In D5w] Other (See Comments)    Peanut-Containing Drug Products Hives         Home Meds:   Prior to Admission medications    Medication Sig Start Date End Date Taking?  Authorizing Provider   predniSONE (DELTASONE) 5 MG tablet 4 tabs once a day for 3 days, 3 tabs once a day for 3 days, 2 tabs once a day for 3 days,1 tabs once every day 9/30/22  Yes Rajni Clark MD   famotidine (PEPCID) 20 MG tablet TAKE 2 TABLETS BY MOUTH EVERY DAY 9/19/22  Yes Rajni Clark MD   aspirin 81 MG EC tablet Take 81 mg by mouth daily   Yes Historical Provider, MD   metoprolol succinate (TOPROL XL) 50 MG extended release tablet 1 tablet daily 3/19/22  Yes Historical Provider, MD   isosorbide mononitrate (IMDUR) 30 MG extended release tablet TAKE 1 TABLET BY MOUTH EVERY DAY 2/24/22  Yes Historical Provider, MD   Umeclidinium Bromide 62.5 MCG/INH AEPB Inhale 1 puff into the lungs daily   Yes Historical Provider, MD   levalbuterol (XOPENEX) 0.63 MG/3ML nebulization USE 1 VIAL IN NEBULIZER EVERY 6 HOURS AS NEEDED FOR WHEEZING 9/7/21  Yes Shelly Jackson MD   SYMBICORT 160-4.5 MCG/ACT AERO TAKE 2 PUFFS BY MOUTH TWICE A DAY 3/12/21  Yes Laney Mireles MD   fluticasone (FLONASE) 50 MCG/ACT nasal spray SPRAY 1 SPRAY INTO EACH NOSTRIL EVERY DAY 11/13/20  Yes Ekaterina Rhodes MD   JANTOVEN 2 MG tablet  10/29/20  Yes Historical Provider, MD   promethazine-dextromethorphan (PROMETHAZINE-DM) 6.25-15 MG/5ML syrup Take 5 mLs by mouth every 4 hours as needed for Cough 10/8/20  Yes Laney Mireles MD   azelastine (ASTELIN) 0.1 % nasal spray 1 spray by Nasal route 2 times daily Use in each nostril as directed 1/9/20  Yes Laney Mireles MD   simvastatin (ZOCOR) 40 MG tablet Take 1 tablet by mouth daily   Yes Historical Provider, MD   warfarin (COUMADIN) 5 MG tablet Take 1 tablet by mouth daily   Yes Historical Provider, MD   albuterol sulfate  (90 Base) MCG/ACT inhaler Inhale 2 puffs into the lungs every 6 hours as needed for Wheezing 5/17/22 6/16/22  Laney Mireles MD          Review of Systems   Constitutional: Negative. HENT:  Negative for congestion, postnasal drip and rhinorrhea. Eyes: Negative. Respiratory: Negative. Cardiovascular: Negative. Vitals:  BP (!) 150/83   Pulse 88   Resp 14   Ht 5' 8\" (1.727 m)   Wt 147 lb (66.7 kg)   SpO2 96% Comment: room air at rst  BMI 22.35 kg/m²     PHYSICAL EXAM:  Head and neck atraumatic, normocephalic    Lymph nodes-no cervical, supraclavicular lymphadenopathy    Neck-no JVP elevation    Lungs - Ventilating all lobes without any rales,  \dullness. No bronchial breath sounds. Chest expansion equal bilaterally, exp wheeze     CVS- S1, S2 regular.   No S3 no S4, no murmurs    Abdomen-nontender, nondistended. Bowel sounds are present. No organomegaly    Lower extremity-no edema    Upper extremity-no edema    Neurological-grossly normal cranial nerves. No overt motor deficit   CT Scans  2019 done at [facility]  Right apical scarring scarring in the right middle lobe no pulmonary infiltrate no change in intrapulmonary lymph node in the minor fissure    PFT done at [facility] 2015 report reviewed and is normal      IMPRESSION:     Diagnosis Orders   1. Severe persistent asthma without complication  predniSONE (DELTASONE) 5 MG tablet      2. 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        3. Chronic sinusitis, unspecified location        4. CAD, multiple vessel             :                PLAN:      Will start prednisone taper   He wants to continue prednisone 10 mg daily   Continue incruse and symbicort   Xopenex   Follow up 4 months         Follow up 4 months           Requested Prescriptions     Signed Prescriptions Disp Refills    predniSONE (DELTASONE) 5 MG tablet 60 tablet 2     Si tabs once a day for 3 days, 3 tabs once a day for 3 days, 2 tabs once a day for 3 days,1 tabs once every day       There are no discontinued medications. Jose Gonzalez received counseling on the following healthy behaviors: nutrition, exercise and medication adherence    Patient given educational materials : see patient instruction       Discussed use, benefit, and side effects of prescribed medications. Barriers to medication compliance addressed. All patient questions answered. Pt voiced understanding. I hope this updates you on my evaluation and clinical thinking. Thank you for allowing me to participate in his care. Sincerely,      Electronically signed by Marlee Nicole MD on 10/1/2022 at 1:37 AM     Please note that this chart was generated using voice recognition Dragon dictation software.   Although every effort was made to ensure the accuracy of this automated transcription, some errors in transcription may have occurred.

## 2023-02-07 ENCOUNTER — OFFICE VISIT (OUTPATIENT)
Dept: PULMONOLOGY | Age: 88
End: 2023-02-07
Payer: MEDICARE

## 2023-02-07 VITALS
BODY MASS INDEX: 22.73 KG/M2 | OXYGEN SATURATION: 98 % | RESPIRATION RATE: 16 BRPM | HEART RATE: 60 BPM | TEMPERATURE: 97.9 F | SYSTOLIC BLOOD PRESSURE: 135 MMHG | WEIGHT: 150 LBS | DIASTOLIC BLOOD PRESSURE: 59 MMHG | HEIGHT: 68 IN

## 2023-02-07 DIAGNOSIS — Z77.098 CHLORINE GAS EXPOSURE: ICD-10-CM

## 2023-02-07 DIAGNOSIS — J32.9 CHRONIC SINUSITIS, UNSPECIFIED LOCATION: ICD-10-CM

## 2023-02-07 DIAGNOSIS — I25.10 CAD, MULTIPLE VESSEL: ICD-10-CM

## 2023-02-07 DIAGNOSIS — J45.50 SEVERE PERSISTENT ASTHMA WITHOUT COMPLICATION: Primary | ICD-10-CM

## 2023-02-07 PROCEDURE — 99213 OFFICE O/P EST LOW 20 MIN: CPT | Performed by: INTERNAL MEDICINE

## 2023-02-07 PROCEDURE — 1123F ACP DISCUSS/DSCN MKR DOCD: CPT | Performed by: INTERNAL MEDICINE

## 2023-02-07 PROCEDURE — 1036F TOBACCO NON-USER: CPT | Performed by: INTERNAL MEDICINE

## 2023-02-07 PROCEDURE — G8420 CALC BMI NORM PARAMETERS: HCPCS | Performed by: INTERNAL MEDICINE

## 2023-02-07 PROCEDURE — G8427 DOCREV CUR MEDS BY ELIG CLIN: HCPCS | Performed by: INTERNAL MEDICINE

## 2023-02-07 PROCEDURE — G8484 FLU IMMUNIZE NO ADMIN: HCPCS | Performed by: INTERNAL MEDICINE

## 2023-02-07 RX ORDER — LEVALBUTEROL INHALATION SOLUTION 0.63 MG/3ML
SOLUTION RESPIRATORY (INHALATION)
Qty: 360 ML | Refills: 11 | Status: SHIPPED | OUTPATIENT
Start: 2023-02-07

## 2023-02-08 RX ORDER — M-VIT,TX,IRON,MINS/CALC/FOLIC 27MG-0.4MG
1 TABLET ORAL DAILY
COMMUNITY

## 2023-02-08 RX ORDER — LISINOPRIL 20 MG/1
TABLET ORAL
COMMUNITY
Start: 2023-01-17

## 2023-02-08 RX ORDER — MECLIZINE HCL 12.5 MG/1
TABLET ORAL
COMMUNITY
Start: 2023-01-30

## 2023-02-08 RX ORDER — METHYLPREDNISOLONE 4 MG/1
TABLET ORAL
COMMUNITY
Start: 2023-02-03

## 2023-02-08 RX ORDER — GINGER ROOT/GINGER ROOT EXT 262.5 MG
1 CAPSULE ORAL DAILY
COMMUNITY

## 2023-02-08 RX ORDER — IPRATROPIUM BROMIDE 21 UG/1
SPRAY, METERED NASAL
COMMUNITY
Start: 2022-12-19

## 2023-02-08 RX ORDER — CARVEDILOL 3.12 MG/1
TABLET ORAL
COMMUNITY
Start: 2022-12-19

## 2023-02-08 ASSESSMENT — ENCOUNTER SYMPTOMS
RHINORRHEA: 0
EYES NEGATIVE: 1
RESPIRATORY NEGATIVE: 1

## 2023-02-08 NOTE — PROGRESS NOTES
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 year old male    Patient is feeling better. He is not wheezing denies any cough he is taking a prednisone taper. Has been compliant with his bronchodilator therapy and notices that using Xopenex 4 times a day helps his breathing and wheezing. Last visit  In November patient went to the ER at [facility] and was found to have mildly elevated troponins. 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 Pulmonary wise he continues to have on and off wheezing. Is using his bronchodilator therapy does not complain of any acute hemoptysis or sputum production which is purulent. Last visit   Since last visit patient did need a course of antibiotic and steroid. That controlled his postnasal drainage and hence his acute bronchitis. He is on nasal steroid nasal his antihistaminics but he continues to have nasal discharge and postnasal drainage. He is tolerating DuoNeb and Symbicort well without any complications. No oral thrush noted. who is to follow with Dr. Evangelina Gaxiola . Patient would like to change his pulmonologist.  He has been having recurrent acute bronchitis episode which started as nasal congestion postnasal drainage and then he started wheezing and coughing and is given antibiotic and steroids. Previously he had been given Levaquin which called Achilles tendon so he does not use fluoroquinolones anymore. Patient has had multiple courses of antibiotic and steroids. He has been on Symbicort and using DuoNeb 4 times a day presently he is got Phenergan DM to help with his cough and he is able to sleep better at night.   Patient has a history of chlorine gas exposure and because of this he has reactive airway disease and gets exacerbation with weather changes when he had been working with [facility] department. At present he denies any shortness of breath with exertion he denies any wheezing. He is using Symbicort twice a day without a spacer and is using DuoNeb 4 times a day. He has also seen ENT who advised him to use nasal saline rinse Flonase and Astelin spray he does not have the Astelin spray and he did not understand that he had to use nasal saline rinse to help clear his secretions prior to using Flonase.        LUNG CANCER SCREENING     CRITERIA MET    []     CT ORDERED  []      CRITERIA NOT MET   [x]      REFUSED                    []        REASON CRITERIA NOT MET     SMOKING LESS THAN 30 PY  []      AGE LESS THAN 55 or GREATER 77 YEARS  []      QUIT SMOKING 15 YEARS OR GREATER   []      RECENT CT WITH IN 11 MONTHS    []      LIFE EXPECTANCY < 5 YEARS   []      SIGNS  AND SYMPTOMS OF LUNG CANCER   []         Immunization   Immunization History   Administered Date(s) Administered    COVID-19, PFIZER Bivalent BOOSTER, DO NOT Dilute, (age 12y+), IM, 30 mcg/0.3 mL 12/04/2022    COVID-19, PFIZER PURPLE top, DILUTE for use, (age 15 y+), 30mcg/0.3mL 02/02/2021, 02/23/2021, 10/21/2021    Influenza Vaccine, unspecified formulation 10/06/2015, 11/02/2016    Influenza Whole 11/20/2014    Influenza, FLUZONE (age 72 y+), High Dose, 0.7mL 09/09/2020    Influenza, High Dose (Fluzone 65 yrs and older) 09/27/2017, 09/27/2018, 10/18/2019    Pneumococcal Conjugate 13-valent ([id number]) 05/09/2017    Pneumococcal Polysaccharide ([id number]) 11/12/1997, 10/01/2003, 11/20/2017    Tdap (Boostrix, Adacel) 02/08/2018        Pneumococcal Vaccine     [x] Up to date    [] Indicated   [] Refused  [] Contraindicated       Influenza Vaccine   [x] Up to date    [] Indicated   [] Refused  [] Contraindicated          PAST MEDICAL HISTORY:       Diagnosis Date    Abdominal hernia 10/8/2020    Achilles bursitis 10/8/2020    Acute sinusitis 2017    Adult body mass index 25-29 10/8/2020    Allergic rhinitis due to pollen 10/8/2020    Anesthesia of skin 10/8/2020    Anterior subcapsular polar senile cataract 10/8/2020    Arthropathy 2011    Asthma 2017    Atopic dermatitis 10/8/2020    Backache 10/8/2020    Bacterial pneumonia 2017    Benign neoplasm of soft tissue 2013    Benign prostatic hyperplasia 2013    Candidiasis of mouth 2017    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 2014    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 2020    Chronic obstructive lung disease ([address] 2011    Chronic obstructive pulmonary disease ([address] 10/8/2020    Chronic rhinitis 2020    Chronic sinusitis 2020    Deep vein thrombosis of lower extremity ([address] 2017    Diarrhea 10/8/2020    Disorder of arteries and arterioles, unspecified ([address] 10/8/2020    Diverticulitis of colon 10/8/2020    Dizziness and giddiness 2011    Dysphagia 2017    Enlarged prostate 10/8/2020    Environmental allergies 2017    Fatigue 10/8/2020    Gabe hematuria 2013    Frostbite with tissue necrosis of nose 10/8/2020    Gastroesophageal reflux disease 2017    Heartburn 2011    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    Herpes zoster 2017    Heterozygous factor V Leiden mutation ([address] 10/8/2020    Hypercholesterolemia 2017    Hypertension 2017    Increased frequency of urination 2011    Low back strain 10/8/2020    Lumbar sprain 2014    Lumbosacral spondylosis without myelopathy 2014    Malignant neoplasm metastatic to lung ([address] 2017    Malignant tumor of prostate ([address] 2013    Mild intermittent asthma without complication 9454    Neoplasm of bone 2014    Nocturia 2011    Omphalitis of  10/8/2020    Pain in left leg 10/8/2020    Peripheral vascular disease ([address] 2011    Post-nasal drip 2020    Postoperative seroma 10/15/2019 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 SURGICAL HISTORY:   No past surgical history on file. SOCIAL AND OCCUPATIONAL HEALTH:      There  no history of TB or TB exposure. There  no asbestos or silica dust exposure. The patient reports  no coal, foundry, quarry or [employer] exposure. Travel history reveals non. There  no history of recreational or IV drug use. There  no hot tube exposure. Pets  no    Occupational history [employer] [employer]    TOBACCO:   reports that he has never smoked. He has never used smokeless tobacco.  ETOH:   reports that he does not currently use alcohol. ALLERGIES:      Allergies   Allergen Reactions    Levofloxacin Swelling    Codeine Hives and Rash    Pulmicort [Budesonide] Rash     Also itchy    Cashew Nut Oil     Levaquin  [Levofloxacin In D5w] Other (See Comments)    Peanut-Containing Drug Products Hives         Home Meds:   Prior to Admission medications    Medication Sig Start Date End Date Taking?  Authorizing Provider   carvedilol (COREG) 3.125 MG tablet TAKE 1 TABLET BY MOUTH TWICE A DAY 12/19/22  Yes Historical Provider, MD   ipratropium (ATROVENT) 0.03 % nasal spray SPRAY 2 SPRAYS INTO EACH NOSTRIL AS NEEDED FOR RUNNY/DRIPPY NOSE 3 TIMES A DAY 30 12/19/22  Yes Historical Provider, MD   lisinopril (PRINIVIL;ZESTRIL) 20 MG tablet TAKE 1 TABLET BY MOUTH EVERY DAY FOR 30 DAYS 1/17/23  Yes Historical Provider, MD   methylPREDNISolone (MEDROL DOSEPACK) 4 MG tablet TAKE 6 TABLETS ON DAY 1 AS DIRECTED ON PACKAGE AND DECREASE BY 1 TAB EACH DAY FOR A TOTAL OF 6 DAYS 2/3/23  Yes Historical Provider, MD   Multiple Vitamins-Minerals (THERAPEUTIC MULTIVITAMIN-MINERALS) tablet Take 1 tablet by mouth daily   Yes Historical Provider, MD   calcium carb-cholecalciferol (CALCIUM 600/VITAMIN D3) 600-20 MG-MCG TABS Take 1 tablet by mouth daily   Yes Historical Provider, MD   NONFORMULARY Coricidin Cough- Day and Night   Yes Historical Provider, MD   levalbuterol (XOPENEX) 0.63 MG/3ML nebulization USE 1 VIAL IN NEBULIZER EVERY 6 HOURS AS NEEDED FOR WHEEZING 2/7/23  Yes Jeffrey Alonso MD   predniSONE (DELTASONE) 5 MG tablet 4 tabs once a day for 3 days, 3 tabs once a day for 3 days, 2 tabs once a day for 3 days,1 tabs once every day 9/30/22  Yes Jeffrey Alonso MD   famotidine (PEPCID) 20 MG tablet TAKE 2 TABLETS BY MOUTH EVERY DAY 9/19/22  Yes Jeffrey Alonso MD   aspirin 81 MG EC tablet Take 81 mg by mouth daily Pt reported taking 2 pills daily   Yes Historical Provider, MD   isosorbide mononitrate (IMDUR) 30 MG extended release tablet TAKE 1 TABLET BY MOUTH EVERY DAY 2/24/22  Yes Historical Provider, MD   SYMBICORT 160-4.5 MCG/ACT AERO TAKE 2 PUFFS BY MOUTH TWICE A DAY 3/12/21  Yes Jeffrey Alonso MD   fluticasone (FLONASE) 50 MCG/ACT nasal spray SPRAY 1 SPRAY INTO EACH NOSTRIL EVERY DAY 11/13/20  Yes Aramis Voss MD   JANTOVEN 2 MG tablet Jantoven (Warfarin) 4mg- 5 days, 3mg- 2 days: Thurs and Jono 10/29/20  Yes Historical Provider, MD   simvastatin (ZOCOR) 40 MG tablet Take 1 tablet by mouth daily   Yes Historical Provider, MD   warfarin (COUMADIN) 5 MG tablet Take 1 tablet by mouth daily   Yes Historical Provider, MD   meclizine (ANTIVERT) 12.5 MG tablet TAKE 1 TABLET BY MOUTH EVERY 12 HOURS FOR BALANCE FOR 30 DAYS 1/30/23   Historical Provider, MD   albuterol sulfate  (90 Base) MCG/ACT inhaler Inhale 2 puffs into the lungs every 6 hours as needed for Wheezing 5/17/22 6/16/22  Meri Gomez MD   metoprolol succinate (TOPROL XL) 50 MG extended release tablet 1 tablet daily 3/19/22   Historical Provider, MD   Umeclidinium Bromide 62.5 MCG/INH AEPB Inhale 1 puff into the lungs daily    Historical Provider, MD   promethazine-dextromethorphan (PROMETHAZINE-DM) 6.25-15 MG/5ML syrup Take 5 mLs by mouth every 4 hours as needed for Cough 10/8/20   Meri Gomez MD   azelastine (ASTELIN) 0.1 % nasal spray 1 spray by Nasal route 2 times daily Use in each nostril as directed  Patient not taking: Reported on 2/7/2023 1/9/20   Meri Gomez MD          Review of Systems   Constitutional: Negative. HENT:  Negative for congestion, postnasal drip and rhinorrhea. Eyes: Negative. Respiratory: Negative. Cardiovascular: Negative. Vitals:  BP (!) 135/59 (Site: Left Upper Arm, Position: Sitting)   Pulse 60   Temp 97.9 °F (36.6 °C)   Resp 16   Ht 5' 8\" (1.727 m)   Wt 150 lb (68 kg)   SpO2 98% Comment: room air at rest  BMI 22.81 kg/m²     PHYSICAL EXAM:  Head and neck atraumatic, normocephalic    Lymph nodes-no cervical, supraclavicular lymphadenopathy    Neck-no JVP elevation    Lungs - Ventilating all lobes without any rales,  \dullness. No bronchial breath sounds. Chest expansion equal bilaterally, no wheezing    CVS- S1, S2 regular. No S3 no S4, no murmurs    Abdomen-nontender, nondistended. Bowel sounds are present. No organomegaly    Lower extremity-no edema    Upper extremity-no edema    Neurological-grossly normal cranial nerves. No overt motor deficit   CT Scans  4/8/2019 done at [facility]  Right apical scarring scarring in the right middle lobe no pulmonary infiltrate no change in intrapulmonary lymph node in the minor fissure    PFT done at [facility] 2015 report reviewed and is normal      IMPRESSION:     Diagnosis Orders   1.  Severe persistent asthma without complication  levalbuterol (XOPENEX) 0.63 MG/3ML nebulization      2. 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        3. Chronic sinusitis, unspecified location        4. CAD, multiple vessel             :                PLAN:      Complete prednisone taper to baseline dose of 10 mg a day, continue Incruse and Symbicort. Continue Xopenex 4 times a day  Follow-up in 3 months  He wants to continue prednisone 10 mg daily   Continue incruse and symbicort   Xopenex           Requested Prescriptions     Signed Prescriptions Disp Refills    levalbuterol (XOPENEX) 0.63 MG/3ML nebulization 360 mL 11     Sig: USE 1 VIAL IN NEBULIZER EVERY 6 HOURS AS NEEDED FOR WHEEZING       Medications Discontinued During This Encounter   Medication Reason    levalbuterol (XOPENEX) 0.63 MG/3ML nebulization REORDER         Osorio Tovar received counseling on the following healthy behaviors: nutrition, exercise and medication adherence    Patient given educational materials : see patient instruction       Discussed use, benefit, and side effects of prescribed medications. Barriers to medication compliance addressed. All patient questions answered. Pt voiced understanding. I hope this updates you on my evaluation and clinical thinking. Thank you for allowing me to participate in his care. Sincerely,      Electronically signed by Kim Knox MD on 2/8/2023 at 3:10 PM     Please note that this chart was generated using voice recognition Dragon dictation software. Although every effort was made to ensure the accuracy of this automated transcription, some errors in transcription may have occurred.

## 2023-02-14 ENCOUNTER — TELEPHONE (OUTPATIENT)
Dept: PULMONOLOGY | Age: 88
End: 2023-02-14

## 2023-02-14 NOTE — TELEPHONE ENCOUNTER
Patient's wife Saint Nash called the office stating that the pharmacy is billing the levalbuterol nebulizer solution that was written on 2/7/23 to Medicare and it should be sent to workers comp. Writer advised Saint Nash to contact the pharmacy and inform them of this information. Writer also advised Saint Nash that Leonie Figueredo would send a message to Tova in the [facility] office to request that she fill out a [employer] for this medication since it is being processed through [employer]. Saint Nash voiced understanding. Saritha Reddy, please complete [employer] for Levalbuterol script sent on 2/7/23. Thank you.

## 2023-02-15 ENCOUNTER — TELEPHONE (OUTPATIENT)
Dept: PULMONOLOGY | Age: 88
End: 2023-02-15

## 2023-02-15 NOTE — TELEPHONE ENCOUNTER
2/15/2023 8:58 AM  Dr. Diane Marshall, I need to send in a [employer] form for Xopenex for patient Juan Barajas,  37. Are you in a location to sign the form if I fax it to you? Please advise. Thanks, [employer]. Read 2/15/2023 9:09 AM  2/15/2023 9:10 AM  I am in [facility]. 2/15/2023 9:11 AM  may I fax it there for your signature? Read 2/15/2023 9:39 AM  2/15/2023 9:39 AM  Ok to fax.

## 2023-05-09 ENCOUNTER — OFFICE VISIT (OUTPATIENT)
Dept: PULMONOLOGY | Age: 88
End: 2023-05-09
Payer: MEDICARE

## 2023-05-09 VITALS
RESPIRATION RATE: 16 BRPM | DIASTOLIC BLOOD PRESSURE: 66 MMHG | BODY MASS INDEX: 23.19 KG/M2 | OXYGEN SATURATION: 98 % | HEART RATE: 58 BPM | WEIGHT: 153 LBS | TEMPERATURE: 97.3 F | SYSTOLIC BLOOD PRESSURE: 137 MMHG | HEIGHT: 68 IN

## 2023-05-09 DIAGNOSIS — R09.82 POST-NASAL DRIP: ICD-10-CM

## 2023-05-09 DIAGNOSIS — J45.51 SEVERE PERSISTENT ASTHMA WITH ACUTE EXACERBATION: Primary | ICD-10-CM

## 2023-05-09 DIAGNOSIS — I25.10 CAD, MULTIPLE VESSEL: ICD-10-CM

## 2023-05-09 DIAGNOSIS — Z77.098 CHLORINE GAS EXPOSURE: ICD-10-CM

## 2023-05-09 DIAGNOSIS — J32.9 CHRONIC SINUSITIS, UNSPECIFIED LOCATION: ICD-10-CM

## 2023-05-09 PROCEDURE — G8420 CALC BMI NORM PARAMETERS: HCPCS | Performed by: INTERNAL MEDICINE

## 2023-05-09 PROCEDURE — G8427 DOCREV CUR MEDS BY ELIG CLIN: HCPCS | Performed by: INTERNAL MEDICINE

## 2023-05-09 PROCEDURE — 1123F ACP DISCUSS/DSCN MKR DOCD: CPT | Performed by: INTERNAL MEDICINE

## 2023-05-09 PROCEDURE — 1036F TOBACCO NON-USER: CPT | Performed by: INTERNAL MEDICINE

## 2023-05-09 PROCEDURE — 99214 OFFICE O/P EST MOD 30 MIN: CPT | Performed by: INTERNAL MEDICINE

## 2023-05-09 RX ORDER — PREDNISONE 10 MG/1
TABLET ORAL
Qty: 15 TABLET | Refills: 0 | Status: SHIPPED | OUTPATIENT
Start: 2023-05-09

## 2023-05-09 ASSESSMENT — ENCOUNTER SYMPTOMS
RESPIRATORY NEGATIVE: 1
EYES NEGATIVE: 1
RHINORRHEA: 1

## 2023-05-09 NOTE — PROGRESS NOTES
Historical Provider, MD   warfarin (COUMADIN) 5 MG tablet Take 1 tablet by mouth daily   Yes Historical Provider, MD   albuterol sulfate  (90 Base) MCG/ACT inhaler Inhale 2 puffs into the lungs every 6 hours as needed for Wheezing 5/17/22 6/16/22  Mynor Huber MD   azelastine (ASTELIN) 0.1 % nasal spray 1 spray by Nasal route 2 times daily Use in each nostril as directed  Patient not taking: Reported on 2/7/2023 1/9/20   Mynor Huber MD          Review of Systems   Constitutional: Negative. HENT:  Positive for congestion and rhinorrhea. Negative for postnasal drip. Eyes: Negative. Respiratory: Negative. Cardiovascular: Negative. Vitals:  /66 (Site: Left Upper Arm, Position: Sitting)   Pulse 58   Temp 97.3 °F (36.3 °C)   Resp 16   Ht 5' 8\" (1.727 m)   Wt 153 lb (69.4 kg)   SpO2 98% Comment: ra  BMI 23.26 kg/m²     PHYSICAL EXAM:  Head and neck atraumatic, normocephalic    Lymph nodes-no cervical, supraclavicular lymphadenopathy    Neck-no JVP elevation    Lungs - Ventilating all lobes without any rales,  \dullness. No bronchial breath sounds. Chest expansion equal bilaterally, wheezing    CVS- S1, S2 regular. No S3 no S4, no murmurs    Abdomen-nontender, nondistended. Bowel sounds are present. No organomegaly    Lower extremity-no edema    Upper extremity-no edema    Neurological-grossly normal cranial nerves. No overt motor deficit   CT Scans  4/8/2019 done at [facility]  Right apical scarring scarring in the right middle lobe no pulmonary infiltrate no change in intrapulmonary lymph node in the minor fissure    PFT done at [facility] 2015 report reviewed and is normal      IMPRESSION:     Diagnosis Orders   1. Severe persistent asthma with acute exacerbation  predniSONE (DELTASONE) 10 MG tablet      2. 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        3. Chronic sinusitis, unspecified location        4. CAD, multiple vessel        5.  Post-nasal drip             :                PLAN:      Prednisone

## 2023-05-18 ENCOUNTER — HOSPITAL ENCOUNTER (OUTPATIENT)
Dept: MRI IMAGING | Age: 88
Discharge: HOME | End: 2023-05-20
Payer: MEDICARE

## 2023-05-18 DIAGNOSIS — S46.911D STRAIN OF RIGHT SHOULDER, SUBSEQUENT ENCOUNTER: ICD-10-CM

## 2023-05-18 PROCEDURE — 73221 MRI JOINT UPR EXTREM W/O DYE: CPT

## 2023-07-24 ENCOUNTER — APPOINTMENT (OUTPATIENT)
Dept: GENERAL RADIOLOGY | Age: 88
End: 2023-07-24
Payer: MEDICARE

## 2023-07-24 ENCOUNTER — HOSPITAL ENCOUNTER (OUTPATIENT)
Age: 88
Setting detail: OBSERVATION
Discharge: HOME | End: 2023-07-26
Attending: EMERGENCY MEDICINE
Payer: MEDICARE

## 2023-07-24 DIAGNOSIS — R55 PRE-SYNCOPE: ICD-10-CM

## 2023-07-24 DIAGNOSIS — I10 ESSENTIAL HYPERTENSION: Primary | ICD-10-CM

## 2023-07-24 PROBLEM — I95.9 HYPOTENSION: Status: ACTIVE | Noted: 2023-07-24

## 2023-07-24 LAB
ANION GAP SERPL CALCULATED.3IONS-SCNC: 12 MMOL/L (ref 9–17)
BASOPHILS # BLD: 0 K/UL (ref 0–0.2)
BASOPHILS NFR BLD: 0 % (ref 0–2)
BUN SERPL-MCNC: 14 MG/DL (ref 8–23)
CALCIUM SERPL-MCNC: 9.4 MG/DL (ref 8.6–10.4)
CHLORIDE SERPL-SCNC: 104 MMOL/L (ref 98–107)
CO2 SERPL-SCNC: 25 MMOL/L (ref 20–31)
CREAT SERPL-MCNC: 0.9 MG/DL (ref 0.7–1.2)
EOSINOPHIL # BLD: 1.36 K/UL (ref 0–0.4)
EOSINOPHILS RELATIVE PERCENT: 23 % (ref 1–4)
ERYTHROCYTE [DISTWIDTH] IN BLOOD BY AUTOMATED COUNT: 17.5 % (ref 12.5–15.4)
GFR SERPL CREATININE-BSD FRML MDRD: >60 ML/MIN/1.73M2
GLUCOSE SERPL-MCNC: 91 MG/DL (ref 70–99)
HCT VFR BLD AUTO: 37.2 % (ref 41–53)
HGB BLD-MCNC: 12.6 G/DL (ref 13.5–17.5)
INR PPP: 2.4
LYMPHOCYTES NFR BLD: 0.94 K/UL (ref 1–4.8)
LYMPHOCYTES RELATIVE PERCENT: 16 % (ref 24–44)
MAGNESIUM SERPL-MCNC: 2.1 MG/DL (ref 1.6–2.6)
MCH RBC QN AUTO: 31.2 PG (ref 26–34)
MCHC RBC AUTO-ENTMCNC: 33.8 G/DL (ref 31–37)
MCV RBC AUTO: 92.3 FL (ref 80–100)
MONOCYTES NFR BLD: 0.35 K/UL (ref 0.1–0.8)
MONOCYTES NFR BLD: 6 % (ref 1–7)
MORPHOLOGY: NORMAL
NEUTROPHILS NFR BLD: 55 % (ref 36–66)
NEUTS SEG NFR BLD: 3.25 K/UL (ref 1.8–7.7)
PARTIAL THROMBOPLASTIN TIME: 35 SEC (ref 21.3–31.3)
PLATELET # BLD AUTO: 235 K/UL (ref 140–450)
PMV BLD AUTO: 6.9 FL (ref 6–12)
POTASSIUM SERPL-SCNC: 4.3 MMOL/L (ref 3.7–5.3)
PROTHROMBIN TIME: 25 SEC (ref 9.4–12.6)
RBC # BLD AUTO: 4.03 M/UL (ref 4.5–5.9)
SODIUM SERPL-SCNC: 141 MMOL/L (ref 135–144)
TROPONIN I SERPL HS-MCNC: 23 NG/L (ref 0–22)
WBC OTHER # BLD: 5.9 K/UL (ref 3.5–11)

## 2023-07-24 PROCEDURE — 71046 X-RAY EXAM CHEST 2 VIEWS: CPT

## 2023-07-24 PROCEDURE — 84484 ASSAY OF TROPONIN QUANT: CPT

## 2023-07-24 PROCEDURE — 93005 ELECTROCARDIOGRAM TRACING: CPT | Performed by: EMERGENCY MEDICINE

## 2023-07-24 PROCEDURE — G0378 HOSPITAL OBSERVATION PER HR: HCPCS

## 2023-07-24 PROCEDURE — 85610 PROTHROMBIN TIME: CPT

## 2023-07-24 PROCEDURE — 80048 BASIC METABOLIC PNL TOTAL CA: CPT

## 2023-07-24 PROCEDURE — 85027 COMPLETE CBC AUTOMATED: CPT

## 2023-07-24 PROCEDURE — 83735 ASSAY OF MAGNESIUM: CPT

## 2023-07-24 PROCEDURE — 85730 THROMBOPLASTIN TIME PARTIAL: CPT

## 2023-07-24 PROCEDURE — 99285 EMERGENCY DEPT VISIT HI MDM: CPT

## 2023-07-24 ASSESSMENT — ENCOUNTER SYMPTOMS
NAUSEA: 0
COUGH: 0
VOMITING: 0
SHORTNESS OF BREATH: 0
CHEST TIGHTNESS: 0
CONSTIPATION: 0
EYE REDNESS: 0
ABDOMINAL PAIN: 0
DIARRHEA: 0

## 2023-07-24 ASSESSMENT — PAIN - FUNCTIONAL ASSESSMENT: PAIN_FUNCTIONAL_ASSESSMENT: 0-10

## 2023-07-25 ENCOUNTER — APPOINTMENT (OUTPATIENT)
Dept: CT IMAGING | Age: 88
End: 2023-07-25
Payer: MEDICARE

## 2023-07-25 LAB
CHOLEST SERPL-MCNC: 129 MG/DL
CHOLESTEROL/HDL RATIO: 2.6
EKG ATRIAL RATE: 69 BPM
EKG ATRIAL RATE: 75 BPM
EKG ATRIAL RATE: 77 BPM
EKG P AXIS: 72 DEGREES
EKG P AXIS: 73 DEGREES
EKG P AXIS: 81 DEGREES
EKG P-R INTERVAL: 236 MS
EKG P-R INTERVAL: 236 MS
EKG P-R INTERVAL: 256 MS
EKG Q-T INTERVAL: 396 MS
EKG Q-T INTERVAL: 414 MS
EKG Q-T INTERVAL: 416 MS
EKG QRS DURATION: 108 MS
EKG QRS DURATION: 108 MS
EKG QRS DURATION: 116 MS
EKG QTC CALCULATION (BAZETT): 442 MS
EKG QTC CALCULATION (BAZETT): 445 MS
EKG QTC CALCULATION (BAZETT): 468 MS
EKG R AXIS: 10 DEGREES
EKG R AXIS: 29 DEGREES
EKG R AXIS: 6 DEGREES
EKG T AXIS: 78 DEGREES
EKG T AXIS: 82 DEGREES
EKG T AXIS: 84 DEGREES
EKG VENTRICULAR RATE: 69 BPM
EKG VENTRICULAR RATE: 75 BPM
EKG VENTRICULAR RATE: 77 BPM
ERYTHROCYTE [DISTWIDTH] IN BLOOD BY AUTOMATED COUNT: 17.8 % (ref 12.5–15.4)
HCT VFR BLD AUTO: 38.2 % (ref 41–53)
HDLC SERPL-MCNC: 50 MG/DL
HGB BLD-MCNC: 12.6 G/DL (ref 13.5–17.5)
INR PPP: 2.3
LDLC SERPL CALC-MCNC: 66 MG/DL (ref 0–130)
MAGNESIUM SERPL-MCNC: 2.1 MG/DL (ref 1.6–2.6)
MCH RBC QN AUTO: 30.8 PG (ref 26–34)
MCHC RBC AUTO-ENTMCNC: 33 G/DL (ref 31–37)
MCV RBC AUTO: 93.4 FL (ref 80–100)
PLATELET # BLD AUTO: 233 K/UL (ref 140–450)
PMV BLD AUTO: 7 FL (ref 6–12)
PROTHROMBIN TIME: 24.2 SEC (ref 9.4–12.6)
RBC # BLD AUTO: 4.09 M/UL (ref 4.5–5.9)
TRIGL SERPL-MCNC: 64 MG/DL
TROPONIN I SERPL HS-MCNC: 25 NG/L (ref 0–22)
TROPONIN I SERPL HS-MCNC: 27 NG/L (ref 0–22)
TROPONIN I SERPL HS-MCNC: 27 NG/L (ref 0–22)
WBC OTHER # BLD: 5.6 K/UL (ref 3.5–11)

## 2023-07-25 PROCEDURE — 6360000002 HC RX W HCPCS: Performed by: INTERNAL MEDICINE

## 2023-07-25 PROCEDURE — 84484 ASSAY OF TROPONIN QUANT: CPT

## 2023-07-25 PROCEDURE — 97162 PT EVAL MOD COMPLEX 30 MIN: CPT

## 2023-07-25 PROCEDURE — 6370000000 HC RX 637 (ALT 250 FOR IP): Performed by: INTERNAL MEDICINE

## 2023-07-25 PROCEDURE — 2580000003 HC RX 258: Performed by: INTERNAL MEDICINE

## 2023-07-25 PROCEDURE — 6370000000 HC RX 637 (ALT 250 FOR IP): Performed by: NURSE PRACTITIONER

## 2023-07-25 PROCEDURE — 97530 THERAPEUTIC ACTIVITIES: CPT

## 2023-07-25 PROCEDURE — 99222 1ST HOSP IP/OBS MODERATE 55: CPT | Performed by: INTERNAL MEDICINE

## 2023-07-25 PROCEDURE — G0378 HOSPITAL OBSERVATION PER HR: HCPCS

## 2023-07-25 PROCEDURE — 85610 PROTHROMBIN TIME: CPT

## 2023-07-25 PROCEDURE — 70450 CT HEAD/BRAIN W/O DYE: CPT

## 2023-07-25 PROCEDURE — 96361 HYDRATE IV INFUSION ADD-ON: CPT

## 2023-07-25 PROCEDURE — 2580000003 HC RX 258: Performed by: NURSE PRACTITIONER

## 2023-07-25 PROCEDURE — 6360000002 HC RX W HCPCS: Performed by: NURSE PRACTITIONER

## 2023-07-25 PROCEDURE — 36415 COLL VENOUS BLD VENIPUNCTURE: CPT

## 2023-07-25 PROCEDURE — 83735 ASSAY OF MAGNESIUM: CPT

## 2023-07-25 PROCEDURE — 96374 THER/PROPH/DIAG INJ IV PUSH: CPT

## 2023-07-25 PROCEDURE — 93005 ELECTROCARDIOGRAM TRACING: CPT | Performed by: NURSE PRACTITIONER

## 2023-07-25 PROCEDURE — 97116 GAIT TRAINING THERAPY: CPT

## 2023-07-25 PROCEDURE — 94760 N-INVAS EAR/PLS OXIMETRY 1: CPT

## 2023-07-25 PROCEDURE — 94640 AIRWAY INHALATION TREATMENT: CPT

## 2023-07-25 PROCEDURE — 80061 LIPID PANEL: CPT

## 2023-07-25 PROCEDURE — 85027 COMPLETE CBC AUTOMATED: CPT

## 2023-07-25 PROCEDURE — 96375 TX/PRO/DX INJ NEW DRUG ADDON: CPT

## 2023-07-25 RX ORDER — HYDRALAZINE HYDROCHLORIDE 20 MG/ML
10 INJECTION INTRAMUSCULAR; INTRAVENOUS EVERY 6 HOURS PRN
Status: DISCONTINUED | OUTPATIENT
Start: 2023-07-25 | End: 2023-07-26 | Stop reason: HOSPADM

## 2023-07-25 RX ORDER — LABETALOL HYDROCHLORIDE 5 MG/ML
10 INJECTION, SOLUTION INTRAVENOUS ONCE
Status: COMPLETED | OUTPATIENT
Start: 2023-07-25 | End: 2023-07-25

## 2023-07-25 RX ORDER — ALBUTEROL SULFATE 90 UG/1
2 AEROSOL, METERED RESPIRATORY (INHALATION) EVERY 6 HOURS PRN
Status: DISCONTINUED | OUTPATIENT
Start: 2023-07-25 | End: 2023-07-25 | Stop reason: SDUPTHER

## 2023-07-25 RX ORDER — MECLIZINE HCL 12.5 MG/1
12.5 TABLET ORAL 2 TIMES DAILY PRN
Status: DISCONTINUED | OUTPATIENT
Start: 2023-07-25 | End: 2023-07-26 | Stop reason: HOSPADM

## 2023-07-25 RX ORDER — POTASSIUM CHLORIDE 7.45 MG/ML
10 INJECTION INTRAVENOUS PRN
Status: DISCONTINUED | OUTPATIENT
Start: 2023-07-25 | End: 2023-07-26 | Stop reason: HOSPADM

## 2023-07-25 RX ORDER — SODIUM CHLORIDE 9 MG/ML
INJECTION, SOLUTION INTRAVENOUS PRN
Status: DISCONTINUED | OUTPATIENT
Start: 2023-07-25 | End: 2023-07-26 | Stop reason: HOSPADM

## 2023-07-25 RX ORDER — ALBUTEROL SULFATE 90 UG/1
2 AEROSOL, METERED RESPIRATORY (INHALATION) EVERY 4 HOURS PRN
Status: DISCONTINUED | OUTPATIENT
Start: 2023-07-25 | End: 2023-07-26 | Stop reason: HOSPADM

## 2023-07-25 RX ORDER — SODIUM CHLORIDE 450 MG/100ML
INJECTION, SOLUTION INTRAVENOUS CONTINUOUS
Status: DISCONTINUED | OUTPATIENT
Start: 2023-07-25 | End: 2023-07-26 | Stop reason: HOSPADM

## 2023-07-25 RX ORDER — BUDESONIDE AND FORMOTEROL FUMARATE DIHYDRATE 160; 4.5 UG/1; UG/1
2 AEROSOL RESPIRATORY (INHALATION) 2 TIMES DAILY
Status: DISCONTINUED | OUTPATIENT
Start: 2023-07-25 | End: 2023-07-26 | Stop reason: HOSPADM

## 2023-07-25 RX ORDER — ASPIRIN 81 MG/1
81 TABLET, CHEWABLE ORAL DAILY
Status: DISCONTINUED | OUTPATIENT
Start: 2023-07-25 | End: 2023-07-26 | Stop reason: HOSPADM

## 2023-07-25 RX ORDER — ALBUTEROL SULFATE 2.5 MG/3ML
2.5 SOLUTION RESPIRATORY (INHALATION)
Status: DISCONTINUED | OUTPATIENT
Start: 2023-07-25 | End: 2023-07-26 | Stop reason: HOSPADM

## 2023-07-25 RX ORDER — WARFARIN SODIUM 1 MG/1
4 TABLET ORAL
Status: DISCONTINUED | OUTPATIENT
Start: 2023-07-26 | End: 2023-07-25

## 2023-07-25 RX ORDER — ACETAMINOPHEN 650 MG/1
650 SUPPOSITORY RECTAL EVERY 6 HOURS PRN
Status: DISCONTINUED | OUTPATIENT
Start: 2023-07-25 | End: 2023-07-26 | Stop reason: HOSPADM

## 2023-07-25 RX ORDER — WARFARIN SODIUM 4 MG/1
4 TABLET ORAL
Status: DISCONTINUED | OUTPATIENT
Start: 2023-07-26 | End: 2023-07-26 | Stop reason: HOSPADM

## 2023-07-25 RX ORDER — CARVEDILOL 3.12 MG/1
3.12 TABLET ORAL 2 TIMES DAILY
Status: DISCONTINUED | OUTPATIENT
Start: 2023-07-25 | End: 2023-07-25

## 2023-07-25 RX ORDER — LEVALBUTEROL INHALATION SOLUTION 0.63 MG/3ML
0.63 SOLUTION RESPIRATORY (INHALATION) EVERY 6 HOURS PRN
Status: DISCONTINUED | OUTPATIENT
Start: 2023-07-25 | End: 2023-07-25 | Stop reason: SDUPTHER

## 2023-07-25 RX ORDER — FLUTICASONE PROPIONATE 50 MCG
1 SPRAY, SUSPENSION (ML) NASAL DAILY
Status: DISCONTINUED | OUTPATIENT
Start: 2023-07-25 | End: 2023-07-26 | Stop reason: HOSPADM

## 2023-07-25 RX ORDER — SIMVASTATIN 40 MG
40 TABLET ORAL NIGHTLY
Status: DISCONTINUED | OUTPATIENT
Start: 2023-07-25 | End: 2023-07-26 | Stop reason: HOSPADM

## 2023-07-25 RX ORDER — LISINOPRIL 20 MG/1
20 TABLET ORAL DAILY
Status: DISCONTINUED | OUTPATIENT
Start: 2023-07-25 | End: 2023-07-26 | Stop reason: HOSPADM

## 2023-07-25 RX ORDER — WARFARIN SODIUM 2 MG/1
3 TABLET ORAL
Status: DISCONTINUED | OUTPATIENT
Start: 2023-07-25 | End: 2023-07-26 | Stop reason: HOSPADM

## 2023-07-25 RX ORDER — POTASSIUM CHLORIDE 20 MEQ/1
40 TABLET, EXTENDED RELEASE ORAL PRN
Status: DISCONTINUED | OUTPATIENT
Start: 2023-07-25 | End: 2023-07-26 | Stop reason: HOSPADM

## 2023-07-25 RX ORDER — WARFARIN SODIUM 5 MG/1
5 TABLET ORAL DAILY
Status: DISCONTINUED | OUTPATIENT
Start: 2023-07-25 | End: 2023-07-25

## 2023-07-25 RX ORDER — SODIUM CHLORIDE 0.9 % (FLUSH) 0.9 %
10 SYRINGE (ML) INJECTION PRN
Status: DISCONTINUED | OUTPATIENT
Start: 2023-07-25 | End: 2023-07-26 | Stop reason: HOSPADM

## 2023-07-25 RX ORDER — MAGNESIUM SULFATE 1 G/100ML
1000 INJECTION INTRAVENOUS PRN
Status: DISCONTINUED | OUTPATIENT
Start: 2023-07-25 | End: 2023-07-26 | Stop reason: HOSPADM

## 2023-07-25 RX ORDER — ALBUTEROL SULFATE 90 UG/1
2 AEROSOL, METERED RESPIRATORY (INHALATION)
Status: DISCONTINUED | OUTPATIENT
Start: 2023-07-25 | End: 2023-07-25

## 2023-07-25 RX ORDER — ASPIRIN 81 MG/1
81 TABLET ORAL DAILY
Status: DISCONTINUED | OUTPATIENT
Start: 2023-07-25 | End: 2023-07-25

## 2023-07-25 RX ORDER — NITROGLYCERIN 0.4 MG/1
0.4 TABLET SUBLINGUAL EVERY 5 MIN PRN
Status: DISCONTINUED | OUTPATIENT
Start: 2023-07-25 | End: 2023-07-26 | Stop reason: HOSPADM

## 2023-07-25 RX ORDER — ALBUTEROL SULFATE 90 UG/1
2 AEROSOL, METERED RESPIRATORY (INHALATION) EVERY 4 HOURS PRN
Status: DISCONTINUED | OUTPATIENT
Start: 2023-07-25 | End: 2023-07-25

## 2023-07-25 RX ORDER — SODIUM CHLORIDE 0.9 % (FLUSH) 0.9 %
5-40 SYRINGE (ML) INJECTION EVERY 12 HOURS SCHEDULED
Status: DISCONTINUED | OUTPATIENT
Start: 2023-07-25 | End: 2023-07-26 | Stop reason: HOSPADM

## 2023-07-25 RX ORDER — ONDANSETRON 2 MG/ML
4 INJECTION INTRAMUSCULAR; INTRAVENOUS EVERY 6 HOURS PRN
Status: DISCONTINUED | OUTPATIENT
Start: 2023-07-25 | End: 2023-07-26 | Stop reason: HOSPADM

## 2023-07-25 RX ORDER — LEVALBUTEROL INHALATION SOLUTION 0.63 MG/3ML
SOLUTION RESPIRATORY (INHALATION)
Status: DISCONTINUED
Start: 2023-07-25 | End: 2023-07-25 | Stop reason: WASHOUT

## 2023-07-25 RX ORDER — ACETAMINOPHEN 325 MG/1
650 TABLET ORAL EVERY 6 HOURS PRN
Status: DISCONTINUED | OUTPATIENT
Start: 2023-07-25 | End: 2023-07-26 | Stop reason: HOSPADM

## 2023-07-25 RX ORDER — ATORVASTATIN CALCIUM 20 MG/1
20 TABLET, FILM COATED ORAL DAILY
Status: DISCONTINUED | OUTPATIENT
Start: 2023-07-25 | End: 2023-07-25

## 2023-07-25 RX ORDER — ONDANSETRON 4 MG/1
4 TABLET, ORALLY DISINTEGRATING ORAL EVERY 8 HOURS PRN
Status: DISCONTINUED | OUTPATIENT
Start: 2023-07-25 | End: 2023-07-26 | Stop reason: HOSPADM

## 2023-07-25 RX ORDER — CARVEDILOL 12.5 MG/1
6.25 TABLET ORAL 2 TIMES DAILY
Status: DISCONTINUED | OUTPATIENT
Start: 2023-07-25 | End: 2023-07-26 | Stop reason: HOSPADM

## 2023-07-25 RX ORDER — FAMOTIDINE 20 MG/1
40 TABLET, FILM COATED ORAL DAILY
Status: DISCONTINUED | OUTPATIENT
Start: 2023-07-25 | End: 2023-07-26 | Stop reason: HOSPADM

## 2023-07-25 RX ORDER — ALBUTEROL SULFATE 2.5 MG/3ML
2.5 SOLUTION RESPIRATORY (INHALATION)
Status: DISCONTINUED | OUTPATIENT
Start: 2023-07-25 | End: 2023-07-25

## 2023-07-25 RX ADMIN — ALBUTEROL SULFATE 2 PUFF: 90 AEROSOL, METERED RESPIRATORY (INHALATION) at 14:19

## 2023-07-25 RX ADMIN — CARVEDILOL 6.25 MG: 12.5 TABLET ORAL at 21:39

## 2023-07-25 RX ADMIN — WARFARIN SODIUM 3 MG: 2 TABLET ORAL at 18:01

## 2023-07-25 RX ADMIN — CARVEDILOL 6.25 MG: 12.5 TABLET ORAL at 15:43

## 2023-07-25 RX ADMIN — ASPIRIN 81 MG: 81 TABLET, CHEWABLE ORAL at 10:02

## 2023-07-25 RX ADMIN — SODIUM CHLORIDE, PRESERVATIVE FREE 10 ML: 5 INJECTION INTRAVENOUS at 21:37

## 2023-07-25 RX ADMIN — SODIUM CHLORIDE: 4.5 INJECTION, SOLUTION INTRAVENOUS at 12:22

## 2023-07-25 RX ADMIN — ALBUTEROL SULFATE 2.5 MG: 2.5 SOLUTION RESPIRATORY (INHALATION) at 19:50

## 2023-07-25 RX ADMIN — LISINOPRIL 20 MG: 20 TABLET ORAL at 14:08

## 2023-07-25 RX ADMIN — BUDESONIDE AND FORMOTEROL FUMARATE DIHYDRATE 2 PUFF: 160; 4.5 AEROSOL RESPIRATORY (INHALATION) at 19:50

## 2023-07-25 RX ADMIN — SODIUM CHLORIDE, PRESERVATIVE FREE 10 ML: 5 INJECTION INTRAVENOUS at 10:02

## 2023-07-25 RX ADMIN — LABETALOL HYDROCHLORIDE 10 MG: 5 INJECTION, SOLUTION INTRAVENOUS at 03:06

## 2023-07-25 RX ADMIN — HYDRALAZINE HYDROCHLORIDE 10 MG: 20 INJECTION INTRAMUSCULAR; INTRAVENOUS at 19:02

## 2023-07-25 RX ADMIN — FAMOTIDINE 40 MG: 20 TABLET, FILM COATED ORAL at 14:08

## 2023-07-25 RX ADMIN — ALBUTEROL SULFATE 2 PUFF: 90 AEROSOL, METERED RESPIRATORY (INHALATION) at 02:57

## 2023-07-25 RX ADMIN — Medication 40 MG: at 21:38

## 2023-07-25 NOTE — ED PROVIDER NOTES
[facility] Emergency Department  [id number] [facility] [address]  Phone: [phone]  Fax: [phone]        Pt Name: Kaylee Lefort  MRN: [id number]  [address] 1935  Date of evaluation: 7/24/23      CHIEF COMPLAINT     Chief Complaint   Patient presents with    Hypotension     Blood pressure 06-24'W systolic at home on machine; dizzy and lightheaded x weeks. No chest pain or SOB         HISTORY OF PRESENT ILLNESS    Kaylee Lefort is a 81 y.o. male who presents to our Emergency Department. Complains of hypertension. Patient was sent in by his primary care physician secondary to low blood pressure as well as lightheadedness. Patient states that he has progressive lightheadedness ongoing for the past couple weeks. Patient also states that he has had low blood pressure at the PCPs office today with a BP in the 53V/11P systolic. States that he was sent to the ER for further evaluation and treatment. States that he takes lisinopril, carvedilol, isosorbide nitrate for blood pressure management. Patient states that he takes lisinopril only once a day and carvedilol 2 times a day. Has been taking as directed. Patient states that he does not have any chest pain or shortness of breath. No nausea or vomiting. No diarrhea no constipation. Just had some lightheadedness. Patient denies any numbness, tingling, weakness. REVIEW OF SYSTEMS       Review of Systems   Constitutional:  Negative for chills, diaphoresis and fever. HENT:  Negative for drooling. Eyes:  Negative for redness. Respiratory:  Negative for cough, chest tightness and shortness of breath. Cardiovascular:  Negative for chest pain and palpitations. Gastrointestinal:  Negative for abdominal pain, constipation, diarrhea, nausea and vomiting. Genitourinary:  Negative for dysuria and hematuria. Musculoskeletal:  Negative for neck stiffness. Skin:  Negative for rash.

## 2023-07-25 NOTE — CARE COORDINATION
Case Management Assessment  Initial Evaluation    Date/Time of Evaluation: 7/25/2023 3:16 PM  Assessment Completed by: Rebecca Regalado RN    If patient is discharged prior to next notation, then this note serves as note for discharge by case management. Patient Name: Cornell BRADSHAW [facility]                   YOB: 1935  Diagnosis: Hypotension [I95.9]                   Date / Time: 7/24/2023  7:57 PM    Patient Admission Status: Observation   Readmission Risk (Low < 19, Mod (19-27), High > 27): No data recorded  Current PCP: Kelsie Soto MD  PCP verified by CM? No    Chart Reviewed: Yes      History Provided by: Patient  Patient Orientation: Alert and Oriented    Patient Cognition: Alert    Hospitalization in the last 30 days (Readmission):  No    If yes, Readmission Assessment in  Navigator will be completed. Advance Directives:      Code Status: Full Code   Patient's Primary Decision Maker is: Legal Next of Kin    Primary Decision Maker: Jenny Rodney - Spouse - [phone]    Discharge Planning:    Patient lives with: Spouse/Significant Other Type of Home: House  Primary Care Giver: Self  Patient Support Systems include: Spouse/Significant Other   Current Financial resources: None  Current community resources: None  Current services prior to admission: None            Current DME:              Type of Home Care services:  None    ADLS  Prior functional level: Independent in ADLs/IADLs  Current functional level: Independent in ADLs/IADLs    PT AM-PAC:   /24  OT AM-PAC:   /24    Family can provide assistance at DC: Yes  Would you like Case Management to discuss the discharge plan with any other family members/significant others, and if so, who?  No  Plans to Return to Present Housing: Yes  Other Identified Issues/Barriers to RETURNING to current housing: none   Potential Assistance needed at discharge: N/A            Potential DME:    Patient expects to discharge to: [address] [place] [address]

## 2023-07-25 NOTE — CARE COORDINATION
Advance Care Planning     Advance Care Planning Activator (Inpatient)  Conversation Note      Date of ACP Conversation: 7/25/2023     Conversation Conducted with: Patient with Decision Making Capacity    ACP Activator: Eliane Jeong RN          Health Care Decision Maker:     Current Designated Health Care Decision Maker:     Primary Decision Maker: Jenny Rodney - Spouse - [phone]  Click here to complete Healthcare Decision Makers including section of the Healthcare Decision Maker Relationship (ie \"Primary\")  Today we documented Decision Maker(s) consistent with Legal Next of Kin hierarchy. Care Preferences    Ventilation: \"If you were in your present state of health and suddenly became very ill and were unable to breathe on your own, what would your preference be about the use of a ventilator (breathing machine) if it were available to you? \"      Would the patient desire the use of ventilator (breathing machine)?: yes    \"If your health worsens and it becomes clear that your chance of recovery is unlikely, what would your preference be about the use of a ventilator (breathing machine) if it were available to you? \"     Would the patient desire the use of ventilator (breathing machine)?: No      Resuscitation  \"CPR works best to restart the heart when there is a sudden event, like a heart attack, in someone who is otherwise healthy. Unfortunately, CPR does not typically restart the heart for people who have serious health conditions or who are very sick. \"    \"In the event your heart stopped as a result of an underlying serious health condition, would you want attempts to be made to restart your heart (answer \"yes\" for attempt to resuscitate) or would you prefer a natural death (answer \"no\" for do not attempt to resuscitate)? \" yes       [] Yes   [] No   Educated Patient / Ann-Marie Mancia regarding differences between Advance Directives and portable DNR orders.     Length of ACP Conversation in

## 2023-07-25 NOTE — H&P
[facility]  Office: 7900  1826, DO, Mraia Dolores Rowee, DO, Nenzel Me, DO, Oralee Fonder Blood, DO, Baribe Brody MD, Salvador Flores MD, Cristin Valencia MD, Keyanna Caballero MD,  Ruby Moreira MD, Varsha Solitario MD, Afshan Corea, DO, Laura Gentile MD,  Pati Byers MD, Di Carrillo MD, Tameka Linton DO, Mary Jane Hernandez MD,  Torrie Ndiaye DO, Michael Tripp MD, Marcello Sanchez MD, Jeanne Diggs MD, Jacinto Clark MD,  Gunjan Tamayo MD, Remedios Serrano MD, Raghu Guerrero DO, Urbano Juarez MD,  Bonnie Sevilla MD, Ermelinda Starr, CNP,  Rosa Recinos, CNP, Karen Kohli, CNP, Delbert Christensen, CNP,  Lg Couch, DNP, Sam Hutson, CNP, David Terrazas, CNP, Meagan Moreno, CNP, Fei Mason, CNP, Charanjit Conteh, CNP, Stalin Luna PAClairC, Stalin Anguiano, CNS, Joan Infante, CNP, Alton Lua    HISTORY AND PHYSICAL EXAMINATION            Date:   7/25/2023  Patient name:  Leah Bone [facility]  Date of admission:  7/24/2023  7:57 PM  MRN:   [id number]  Account:  [de-identified]  YOB: 1935  PCP:    Olga Daily MD  Room:   [facility]/082-  Code Status:    Full Code    Chief Complaint:     Chief Complaint   Patient presents with    Hypotension     Blood pressure 12-24'J systolic at home on machine; dizzy and lightheaded x weeks. No chest pain or SOB     History Obtained From:     patient, electronic medical record    History of Present Illness:     Abby Rey is a 81 y.o. Non- / non  male who presents with Hypotension (Blood pressure 69-37'I systolic at home on machine; dizzy and lightheaded x weeks. No chest pain or SOB)   and is admitted to the hospital for the management of Hypotension. 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

## 2023-07-25 NOTE — PLAN OF CARE
Problem: Respiratory - Adult  Goal: Achieves optimal ventilation and oxygenation  7/25/2023 1957 by Liliam Apodaca RCP  Outcome: Progressing  Flowsheets (Taken 7/25/2023 1957)  Achieves optimal ventilation and oxygenation:   Assess for changes in respiratory status   Assess and instruct to report shortness of breath or any respiratory difficulty   Respiratory therapy support as indicated

## 2023-07-25 NOTE — PLAN OF CARE
Problem: Respiratory - Adult  Goal: Achieves optimal ventilation and oxygenation  Outcome: Progressing  Flowsheets (Taken 7/25/2023 9193)  Achieves optimal ventilation and oxygenation:   Assess for changes in respiratory status   Assess and instruct to report shortness of breath or any respiratory difficulty   Respiratory therapy support as indicated  Note: Care plan initiated.

## 2023-07-25 NOTE — PLAN OF CARE
Problem: Safety - Adult  Goal: Free from fall injury  Outcome: Progressing     Problem: ABCDS Injury Assessment  Goal: Absence of physical injury  Outcome: Progressing     Problem: Cardiovascular - Adult  Goal: Maintains optimal cardiac output and hemodynamic stability  Outcome: Progressing     Problem: Pain  Goal: Verbalizes/displays adequate comfort level or baseline comfort level  Outcome: Progressing

## 2023-07-25 NOTE — PLAN OF CARE
Problem: Respiratory - Adult  Goal: Achieves optimal ventilation and oxygenation  7/25/2023 1036 by Michael Dominguez RCP  Outcome: Progressing  Flowsheets (Taken 7/25/2023 1036)  Achieves optimal ventilation and oxygenation:   Assess for changes in respiratory status   Assess for changes in mentation and behavior   Respiratory therapy support as indicated   Oxygen supplementation based on oxygen saturation or arterial blood gases

## 2023-07-26 VITALS
SYSTOLIC BLOOD PRESSURE: 178 MMHG | HEIGHT: 68 IN | OXYGEN SATURATION: 100 % | DIASTOLIC BLOOD PRESSURE: 78 MMHG | HEART RATE: 80 BPM | WEIGHT: 158.73 LBS | RESPIRATION RATE: 16 BRPM | BODY MASS INDEX: 24.06 KG/M2 | TEMPERATURE: 97.8 F

## 2023-07-26 LAB
INR PPP: 2
PROTHROMBIN TIME: 21.2 SEC (ref 9.4–12.6)

## 2023-07-26 PROCEDURE — 96361 HYDRATE IV INFUSION ADD-ON: CPT

## 2023-07-26 PROCEDURE — 99239 HOSP IP/OBS DSCHRG MGMT >30: CPT | Performed by: INTERNAL MEDICINE

## 2023-07-26 PROCEDURE — 85610 PROTHROMBIN TIME: CPT

## 2023-07-26 PROCEDURE — G0378 HOSPITAL OBSERVATION PER HR: HCPCS

## 2023-07-26 PROCEDURE — 6360000002 HC RX W HCPCS: Performed by: INTERNAL MEDICINE

## 2023-07-26 PROCEDURE — 97166 OT EVAL MOD COMPLEX 45 MIN: CPT

## 2023-07-26 PROCEDURE — 6370000000 HC RX 637 (ALT 250 FOR IP): Performed by: INTERNAL MEDICINE

## 2023-07-26 PROCEDURE — 94640 AIRWAY INHALATION TREATMENT: CPT

## 2023-07-26 PROCEDURE — 97535 SELF CARE MNGMENT TRAINING: CPT

## 2023-07-26 PROCEDURE — 94760 N-INVAS EAR/PLS OXIMETRY 1: CPT

## 2023-07-26 PROCEDURE — 36415 COLL VENOUS BLD VENIPUNCTURE: CPT

## 2023-07-26 RX ADMIN — ALBUTEROL SULFATE 2.5 MG: 2.5 SOLUTION RESPIRATORY (INHALATION) at 08:39

## 2023-07-26 RX ADMIN — CARVEDILOL 6.25 MG: 12.5 TABLET ORAL at 08:50

## 2023-07-26 RX ADMIN — BUDESONIDE AND FORMOTEROL FUMARATE DIHYDRATE 2 PUFF: 160; 4.5 AEROSOL RESPIRATORY (INHALATION) at 08:39

## 2023-07-26 RX ADMIN — ASPIRIN 81 MG: 81 TABLET, CHEWABLE ORAL at 08:50

## 2023-07-26 RX ADMIN — LISINOPRIL 20 MG: 20 TABLET ORAL at 08:50

## 2023-07-26 RX ADMIN — FAMOTIDINE 40 MG: 20 TABLET, FILM COATED ORAL at 08:50

## 2023-07-26 NOTE — PLAN OF CARE
Problem: Discharge Planning  Goal: Discharge to home or other facility with appropriate resources  7/26/2023 0938 by Gaby Valdez RN  Outcome: Adequate for Discharge  7/25/2023 2308 by Shanell Blankenship RN  Outcome: Progressing     Problem: Pain  Goal: Verbalizes/displays adequate comfort level or baseline comfort level  7/26/2023 0938 by Gaby Valdez RN  Outcome: Adequate for Discharge  7/25/2023 2308 by Shanell Blankenship RN  Outcome: Progressing     Problem: Safety - Adult  Goal: Free from fall injury  7/26/2023 0938 by Gaby Valdez RN  Outcome: Adequate for Discharge  7/25/2023 2308 by Shanell Blankenship RN  Outcome: Progressing     Problem: ABCDS Injury Assessment  Goal: Absence of physical injury  7/26/2023 0938 by Gaby Valdez RN  Outcome: Adequate for Discharge  7/25/2023 2308 by Shanell Blankenship RN  Outcome: Progressing     Problem: Cardiovascular - Adult  Goal: Maintains optimal cardiac output and hemodynamic stability  7/26/2023 0938 by Gaby Valdez RN  Outcome: Adequate for Discharge  7/25/2023 2308 by Shanell Blankenship RN  Outcome: Progressing     Problem: Respiratory - Adult  Goal: Achieves optimal ventilation and oxygenation  7/26/2023 0938 by Gaby Valdez RN  Outcome: Adequate for Discharge  7/26/2023 0841 by Jamie Fajardo RCP  Flowsheets (Taken 7/26/2023 9419)  Achieves optimal ventilation and oxygenation:   Assess for changes in respiratory status   Respiratory therapy support as indicated   Assess and instruct to report shortness of breath or any respiratory difficulty  7/25/2023 2308 by Shanell Blankenship RN  Outcome: Progressing  7/25/2023 1957 by Steve Chen RCP  Outcome: Progressing  Flowsheets (Taken 7/25/2023 1957)  Achieves optimal ventilation and oxygenation:   Assess for changes in respiratory status   Assess and instruct to report shortness of breath or any respiratory difficulty   Respiratory therapy support as indicated

## 2023-07-26 NOTE — DISCHARGE SUMMARY
[facility]  Office: 7900  1826, DO, Benita Dennis, DO, Lyle Krause, DO, Arianne Mariee Blood, DO, Jaylyn Diaz MD, Charles Nguyen MD, Santos Jenkins MD, Diane Gomez MD,  Albertina Gaston MD, Elizabeth Jimenez MD, Mathew Faust, DO, Zac Birmingham MD,  Lisa Molina MD, Ledy Antony MD, Charlesetta Burkitt, DO, Tammie Esteban MD,  Uzma Hansen DO, Gail Ocampo MD, Ciara Mejia MD, Carter Burns MD, Lisa Butt MD,  Yong Bamberger, MD, Pamela Ibarra MD, Tri Nichols DO, Marsha Rodriguez MD,  Ren Montero MD, Skye Starr, Moe Barnard, CNP, Delmer Pete, CNP, Beata Angel, CNP,  Nito Cates, DNP, Elvia Soto, CNP, Sylvia Beaulieu, CNP, Glenna Fraser, CNP, Ragena Rubinstein, CNP, Robyn Samano, CNP, Melonie Palacio PAClairC, Samir Cevallos, [facility], Luis Christian, CNP, Sallie Sellers [facility]    Discharge Summary     Patient ID: Kaley De Los Santos  :  1935   MRN: [id number]     ACCOUNT:  [de-identified]   Patient's PCP: Bin Hendricks MD  Admit Date: 2023   Discharge Date: 2023     Length of Stay: 2  Code Status:  Full Code  Admitting Physician: No admitting provider for patient encounter. Discharge Physician: Melissa Samuel DO     Active Discharge Diagnoses:     Hospital Problem Lists:  Principal Problem:    Hypotension  Active Problems:    Deep vein thrombosis of lower extremity (HCC)    Dizziness    Primary hypertension    Chronic obstructive pulmonary disease ([address])  Resolved Problems:    * No resolved hospital problems. *      Admission Condition:  poor     Discharged Condition: stable    Hospital Stay:     Hospital Course:  Kaley Client is a 81 y.o. male who was admitted for the management of  Hypotension , presented to ER with Hypotension (Blood pressure 19-42'Y systolic at home on machine; dizzy and lightheaded x weeks.  No chest pain or

## 2023-07-26 NOTE — PLAN OF CARE
Problem: Respiratory - Adult  Goal: Achieves optimal ventilation and oxygenation  7/26/2023 0841 by Rafia Crump RCP  Flowsheets (Taken 7/26/2023 7817)  Achieves optimal ventilation and oxygenation:   Assess for changes in respiratory status   Respiratory therapy support as indicated   Assess and instruct to report shortness of breath or any respiratory difficulty

## 2023-07-26 NOTE — PROGRESS NOTES
[facility]  PROGRESS NOTE    Room # 338/338-01   Name: Nathan Correia            Jewish: [address]     Reason for visit: Routine    I visited the patient. Admit Date & Time: 7/24/2023  7:57 PM    Assessment:  Nathan Correia is a 81 y.o. male in the hospital because \"hypotension\". Upon entering the room patient was lying in bed. Patient appeared to be calm and coping. Patient told this writer that his spouse should  be visiting soon. Patient engaged in lengthy life review. Patient appeared to be encouraged by conversation with this writer. Intervention:  I introduced myself and my title as  I offered space for pt  to express feelings, needs, and concerns and provided a ministry presence. This writer actively listened to patient and provided words of affirmation and encouragement. Outcome:  Patient expressed gratitude for this  visit     Plan:  Chaplains will remain available to offer spiritual and emotional support as needed. Electronically signed by Maude Singh on 7/25/2023 at 12:23 PM.  [address] [place] [address] Drive -        07/25/23 1220   Encounter Summary   Service Provided For: Patient   Referral/Consult From: [facility]   Support System Spouse   Last Encounter  07/25/23   Complexity of Encounter Low   Begin Time 1125   End Time  1140   Total Time Calculated 15 min   Encounter    Type Initial Screen/Assessment   Spiritual/Emotional needs   Type Spiritual Support   Assessment/Intervention/Outcome   Assessment Calm;Coping   Intervention Active listening;Sustaining Presence/Ministry of presence; Explored/Affirmed feelings, thoughts, concerns; Life review/Legacy; Discussed belief system/Religion practices/remigio   Outcome Coping;Engaged in conversation;Expressed Gratitude
[facility]  Office: 7900  1826, DO, Cecy Cutlerter, DO, Jerry Gomez, DO, [facility] Blood, DO, Jeanne Delgadillo MD, Oli Faust MD, Pito Gatica MD, Caitlin Heath MD,  Kyle Wasserman MD, Michelle Frye MD, Bibi Lobato, DO, Zheng Andino MD,  Delfina Martinez MD, Yany Quiros MD, Huma Bourgeois, DO, Jamar Mohan MD,  Jerry Todd, DO, Alirio Mata MD, Ruba Evangelista MD, Jose Wagner MD, Jennie Argueta MD,  Jose De Jesus Velasquez MD, Duane Campanile, MD, Yagn Singh, DO, Silvia Mina MD,  Kimberlee Dalton MD, Christopher Willoughby, CNP,  Catarino Pollard, CNP, Eliazar Cifuentes, CNP, Suraj Glaser, CNP,  Patrick Michelle, [facility], Micaela East, CNP, Tonio Collins, CNP, Abe Arevalo, CNP, Liliam Godfrey, CNP, Ramon Juarez, CNP, Hayder Falk PA-C, Federico Weir, YANNICK, Erick Moses, PRIETO, Alton Cornejo    Progress Note    7/26/2023    8:47 AM    Name:   Marilynn BRADSHAW [facility]  MRN:     [id number]     Acct:      [de-identified]   Room:   [address] [place] [address] Day:  0  Admit Date:  7/24/2023  7:57 PM    PCP:   Fawad Howard MD  Code Status:  Full Code    Subjective:     C/C:   Chief Complaint   Patient presents with    Hypotension     Blood pressure 34-79'N systolic at home on machine; dizzy and lightheaded x weeks. No chest pain or SOB     Interval History Status: improved. 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 He has had lightheadedness for months and doesn't know why. He denies hitting his head or falling. He did have some right ear hearing loss and impaction of cerumen that a nurse removed at his PCP's office.   Apparently, he had some bleeding in the ear canal a while
Pharmacy Note  Warfarin Consult    Mirta Barnhart is a 81 y.o. male for whom pharmacy has been consulted to manage warfarin therapy.      Consulting Physician: Jay Jackson  Reason for Admission: hypotension    Warfarin dose prior to admission: 4 mg on mon/wed/fri and 3 mg all other days  Warfarin indication: DVT  Target INR range: 2-3     Past Medical History:   Diagnosis Date    Abdominal hernia 10/8/2020    Achilles bursitis 10/8/2020    Acute sinusitis 8/7/2017    Adult body mass index 25-29 10/8/2020    Allergic rhinitis due to pollen 10/8/2020    Anesthesia of skin 10/8/2020    Anterior subcapsular polar senile cataract 10/8/2020    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    Asthma 8/7/2017    Atopic dermatitis 10/8/2020    Backache 10/8/2020    Bacterial pneumonia 8/7/2017    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/1/2013    Benign prostatic hyperplasia 4/12/2013    Candidiasis of mouth 8/7/2017    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 9/8/2014    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 2/13/2020    Chronic obstructive lung disease ([address]) 11/8/2011    Chronic obstructive pulmonary disease ([address]) 10/8/2020    Chronic rhinitis 2/13/2020    Chronic sinusitis 2/13/2020    Deep vein thrombosis of lower extremity ([address]) 8/7/2017    Diarrhea 10/8/2020    Disorder of arteries and arterioles, unspecified ([address]) 10/8/2020    Diverticulitis of colon 10/8/2020    Dizziness and giddiness 12/19/2011    Dysphagia 8/7/2017    Enlarged prostate 10/8/2020    Environmental allergies 8/7/2017    Fatigue 10/8/2020    Gabe hematuria 4/12/2013    Frostbite with tissue necrosis of nose 10/8/2020    Gastroesophageal reflux disease 8/7/2017    Heartburn 11/8/2011    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
Pharmacy Note  Warfarin Consult follow-up      Recent Labs     07/25/23  0321   INR 2.3     Recent Labs     07/24/23  2042 07/25/23  0321   HGB 12.6* 12.6*   HCT 37.2* 38.2*    233         Notes: INR within therapeutic range. Will continue home regimen and give warfarin PO 3mg this evening, 7/25/23                    Daily PT/INR while inpatient. Azar Gregg,   PharmD  7/25/2023 7:46 AM
Pt and wife given discharge instructions. All questions answered. Pt discharged home independently with all documented belongings.
training  Safety Devices  Type of Devices: Patient at risk for falls, Call light within reach, Gait belt, Left in bed, Nurse notified  Restraints  Restraints Initially in Place: No     Restrictions  Restrictions/Precautions  Restrictions/Precautions: Fall Risk, Up Ad [place]  Required Braces or Orthoses?: No     Subjective   General  Patient assessed for rehabilitation services?: Yes  Response To Previous Treatment: Not applicable  Family / Caregiver Present: Yes (Spouse)  Diagnosis: hypotension  Follows Commands: Within Functional Limits  General Comment  Comments: Pt states he was receiving outpatient physical therapy for vertigo, as well as for right RTC pain. Pt reports he gets tired if he has to stand for  prolonged periods of time and that he uses the motorized scooter when he goes to the store to grocery shop. Subjective  Subjective: Pt and nursing agreeable to PT evaluation. Pt with c/o lbp 7/10, right arm pain 7/10. Pt denies numbness and tingling at this time. Social/Functional History  Social/Functional History  Lives With: Spouse  Type of Home: House  Home Layout: One level, Laundry in basement  Home Access: Stairs to enter with rails  Entrance Stairs - Number of Steps: 3  Bathroom Shower/Tub: Tub/Shower unit  Bathroom Toilet: Handicap height (needs grab bars in the shower stall,)  Home Equipment: [employer], rolling  Has the patient had two or more falls in the past year or any fall with injury in the past year?: Yes  ADL Assistance: Independent  Homemaking Responsibilities: No  Ambulation Assistance: Independent  Transfer Assistance: Independent  Active : Yes  Mode of Transportation: Car  Occupation: Retired  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 Vision/Hearing  Vision  Vision: Impaired  Vision Exceptions:  (has glasses but state he does not use them. . states he sees well.)  Hearing  Hearing: Within functional limits    Cognition   Orientation  Overall Orientation
training, Safety education & training, Self-Care / ADL, Home management training, Coordination training, Equipment evaluation, education, & procurement     Restrictions  Restrictions/Precautions  Restrictions/Precautions: Up as Tolerated  Required Braces or Orthoses?: No  Position Activity Restriction  Other position/activity restrictions: Orthostatic BP    Subjective   General  Patient assessed for rehabilitation services?: Yes  Family / Caregiver Present: No  General Comment  Comments: RN ok'd for therapy this AM. Pt agreeable to participate in session, pleasant/cooperative throughout. Pt denies pain. Pt up in bathroom upon arrival.       Social/Functional History  Social/Functional History  Lives With: Spouse  Type of Home: House  Home Layout: One level, Laundry in basement  Home Access: Stairs to enter without rails  Entrance Stairs - Number of Steps: 3  Entrance Stairs - Rails: Both  Bathroom Shower/Tub: Tub/Shower unit  Bathroom Toilet: Handicap height  Bathroom Accessibility: Walker accessible  Home Equipment: Deryl Linton, rolling  Has the patient had two or more falls in the past year or any fall with injury in the past year?: No  ADL Assistance: [address] Independent  Homemaking Responsibilities: No  Ambulation Assistance: Needs assistance (uses single point cane for long distance)  Transfer Assistance: Independent  Active : Yes  Mode of Transportation: Car  Occupation: Retired  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 Leisure & Hobbies: watch TV       Objective   Balance  Sitting: Impaired (Static - SUP, Dynamic - SBA, pt sitting up in recliner for ROM/MMT for ~2 minutes)  Standing: Impaired (Static - CGA, Dynamic -CGA, pt performed functional standing tolerance to perform sink side ADLs for ~2-3 minutes)    Functional Mobility  Overall Level of Assistance: Contact-guard assistance; Additional time (Pt performed functional mobility from bathroom and within

## 2023-07-26 NOTE — PLAN OF CARE
Problem: Pain  Goal: Verbalizes/displays adequate comfort level or baseline comfort level  Outcome: Progressing     Problem: Cardiovascular - Adult  Goal: Maintains optimal cardiac output and hemodynamic stability  Outcome: Progressing     Problem: Respiratory - Adult  Goal: Achieves optimal ventilation and oxygenation  7/25/2023 2308 by Denia Stern RN  Outcome: Progressing     Problem: ABCDS Injury Assessment  Goal: Absence of physical injury  Outcome: Progressing

## 2023-07-27 RX ORDER — FAMOTIDINE 20 MG/1
TABLET, FILM COATED ORAL
Qty: 180 TABLET | Refills: 2 | Status: SHIPPED | OUTPATIENT
Start: 2023-07-27

## 2023-07-27 NOTE — TELEPHONE ENCOUNTER
LAST VISIT: 5/9/23  NEXT VISIT: 8/8/23    No mention of this medication in your last dictation but you have prescribed in the past. Please sign for refill if ok. Thank you.

## 2023-08-08 ENCOUNTER — OFFICE VISIT (OUTPATIENT)
Dept: PULMONOLOGY | Age: 88
End: 2023-08-08
Payer: COMMERCIAL

## 2023-08-08 VITALS
RESPIRATION RATE: 16 BRPM | HEART RATE: 63 BPM | HEIGHT: 68 IN | SYSTOLIC BLOOD PRESSURE: 133 MMHG | DIASTOLIC BLOOD PRESSURE: 63 MMHG | TEMPERATURE: 97.3 F | WEIGHT: 147 LBS | OXYGEN SATURATION: 99 % | BODY MASS INDEX: 22.28 KG/M2

## 2023-08-08 DIAGNOSIS — J32.9 CHRONIC SINUSITIS, UNSPECIFIED LOCATION: ICD-10-CM

## 2023-08-08 DIAGNOSIS — J45.51 SEVERE PERSISTENT ASTHMA WITH ACUTE EXACERBATION: Primary | ICD-10-CM

## 2023-08-08 DIAGNOSIS — Z77.098 CHLORINE GAS EXPOSURE: ICD-10-CM

## 2023-08-08 DIAGNOSIS — I25.10 CAD, MULTIPLE VESSEL: ICD-10-CM

## 2023-08-08 PROCEDURE — 1123F ACP DISCUSS/DSCN MKR DOCD: CPT | Performed by: INTERNAL MEDICINE

## 2023-08-08 PROCEDURE — 99214 OFFICE O/P EST MOD 30 MIN: CPT | Performed by: INTERNAL MEDICINE

## 2023-08-08 RX ORDER — LISINOPRIL 10 MG/1
TABLET ORAL
COMMUNITY
Start: 2023-07-24

## 2023-08-08 RX ORDER — IPRATROPIUM BROMIDE AND ALBUTEROL SULFATE 2.5; .5 MG/3ML; MG/3ML
1 SOLUTION RESPIRATORY (INHALATION) EVERY 6 HOURS PRN
Qty: 360 ML | Refills: 4 | Status: SHIPPED | OUTPATIENT
Start: 2023-08-08

## 2023-08-08 RX ORDER — BUDESONIDE AND FORMOTEROL FUMARATE DIHYDRATE 160; 4.5 UG/1; UG/1
AEROSOL RESPIRATORY (INHALATION)
COMMUNITY

## 2023-08-08 RX ORDER — FAMOTIDINE 20 MG/1
20 TABLET, FILM COATED ORAL DAILY
Qty: 30 TABLET | Refills: 2 | Status: SHIPPED | OUTPATIENT
Start: 2023-08-08 | End: 2023-09-07

## 2023-08-08 RX ORDER — ALBUTEROL SULFATE 90 UG/1
2 AEROSOL, METERED RESPIRATORY (INHALATION) EVERY 6 HOURS PRN
Qty: 18 G | Refills: 11 | Status: SHIPPED | OUTPATIENT
Start: 2023-08-08 | End: 2023-09-07

## 2023-08-08 RX ORDER — PREDNISONE 10 MG/1
TABLET ORAL
Qty: 18 TABLET | Refills: 0 | Status: SHIPPED | OUTPATIENT
Start: 2023-08-08

## 2023-08-08 ASSESSMENT — ENCOUNTER SYMPTOMS
RHINORRHEA: 1
RESPIRATORY NEGATIVE: 1
EYES NEGATIVE: 1

## 2023-08-08 NOTE — PROGRESS NOTES
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 year old male    He is wheezing and using xopenex but doesnot feel is effective . Using symbicort and incruse . No purulent sputum . No fever , has cough with clear sputum   Last visit  In November patient went to the ER at [facility] and was found to have mildly elevated troponins. 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 Pulmonary wise he continues to have on and off wheezing. Is using his bronchodilator therapy does not complain of any acute hemoptysis or sputum production which is purulent. Last visit   Since last visit patient did need a course of antibiotic and steroid. That controlled his postnasal drainage and hence his acute bronchitis. He is on nasal steroid nasal his antihistaminics but he continues to have nasal discharge and postnasal drainage. He is tolerating DuoNeb and Symbicort well without any complications. No oral thrush noted. who is to follow with Dr. Marquez Trujillo . Patient would like to change his pulmonologist.  He has been having recurrent acute bronchitis episode which started as nasal congestion postnasal drainage and then he started wheezing and coughing and is given antibiotic and steroids. Previously he had been given Levaquin which called Achilles tendon so he does not use fluoroquinolones anymore. Patient has had multiple courses of antibiotic and steroids. He has been on Symbicort and using DuoNeb 4 times a day presently he is got Phenergan DM to help with his cough and he is able to sleep better at night.   Patient has a history of chlorine gas exposure and because of this he has reactive airway disease and gets exacerbation with weather changes when he had been

## 2023-08-08 NOTE — PATIENT INSTRUCTIONS
[facility] is needed for any of the medications prescribed at today's visit.

## 2023-08-21 ENCOUNTER — TELEPHONE (OUTPATIENT)
Dept: PULMONOLOGY | Age: 88
End: 2023-08-21

## 2023-08-21 NOTE — TELEPHONE ENCOUNTER
Patient wife notified and understands. She was reminded of appt time and will contact patient's cardiologist to see if they have any recommendations.

## 2023-08-21 NOTE — TELEPHONE ENCOUNTER
Patient's wife called stating patient was seen 8/8 and was prescribed a prednisone taper dose for concerns of cough and generalized weakness and fatigue. Patient is still having problems and they want to know what else you recommend. Wife requests a message be left on their machine.

## 2023-08-29 ENCOUNTER — TELEPHONE (OUTPATIENT)
Age: 88
End: 2023-08-29

## 2023-08-31 RX ORDER — SIMVASTATIN 40 MG
TABLET ORAL
Qty: 90 TABLET | Refills: 3 | OUTPATIENT
Start: 2023-08-31

## 2023-08-31 RX ORDER — SIMVASTATIN 40 MG
40 TABLET ORAL DAILY
Qty: 30 TABLET | Refills: 5 | Status: SHIPPED | OUTPATIENT
Start: 2023-08-31

## 2023-08-31 NOTE — TELEPHONE ENCOUNTER
Awais Hernandez is calling to request a refill on the following medication(s):    Medication Request:  Requested Prescriptions     Pending Prescriptions Disp Refills    simvastatin (ZOCOR) 40 MG tablet 30 tablet 5     Sig: Take 1 tablet by mouth daily       Last Visit Date (If Applicable):  Visit date not found    Next Visit Date:    11/27/2023

## 2023-09-01 RX ORDER — FAMOTIDINE 20 MG/1
TABLET, FILM COATED ORAL
Qty: 30 TABLET | Refills: 5 | Status: SHIPPED | OUTPATIENT
Start: 2023-09-01 | End: 2023-10-01

## 2023-09-05 ENCOUNTER — OFFICE VISIT (OUTPATIENT)
Dept: PULMONOLOGY | Age: 88
End: 2023-09-05
Payer: COMMERCIAL

## 2023-09-05 VITALS
SYSTOLIC BLOOD PRESSURE: 126 MMHG | WEIGHT: 155 LBS | TEMPERATURE: 98 F | HEIGHT: 68 IN | OXYGEN SATURATION: 96 % | RESPIRATION RATE: 13 BRPM | BODY MASS INDEX: 23.49 KG/M2 | DIASTOLIC BLOOD PRESSURE: 67 MMHG | HEART RATE: 68 BPM

## 2023-09-05 DIAGNOSIS — J32.9 CHRONIC SINUSITIS, UNSPECIFIED LOCATION: ICD-10-CM

## 2023-09-05 DIAGNOSIS — I25.10 CAD, MULTIPLE VESSEL: ICD-10-CM

## 2023-09-05 DIAGNOSIS — Z77.098 CHLORINE GAS EXPOSURE: ICD-10-CM

## 2023-09-05 DIAGNOSIS — J45.51 SEVERE PERSISTENT ASTHMA WITH ACUTE EXACERBATION: Primary | ICD-10-CM

## 2023-09-05 PROCEDURE — 1123F ACP DISCUSS/DSCN MKR DOCD: CPT | Performed by: INTERNAL MEDICINE

## 2023-09-05 PROCEDURE — 99214 OFFICE O/P EST MOD 30 MIN: CPT | Performed by: INTERNAL MEDICINE

## 2023-09-05 RX ORDER — PREDNISONE 10 MG/1
10 TABLET ORAL DAILY
Qty: 30 TABLET | Refills: 0 | Status: SHIPPED | OUTPATIENT
Start: 2023-09-05 | End: 2023-10-05

## 2023-09-07 ASSESSMENT — ENCOUNTER SYMPTOMS
RESPIRATORY NEGATIVE: 1
RHINORRHEA: 1
EYES NEGATIVE: 1

## 2023-09-07 NOTE — PROGRESS NOTES
Base) MCG/ACT inhaler Inhale 2 puffs into the lungs every 6 hours as needed for Wheezing 8/8/23 9/7/23 Yes Mercedes Estrada MD   ipratropium 0.5 mg-albuterol 2.5 mg (DUONEB) 0.5-2.5 (3) MG/3ML SOLN nebulizer solution Inhale 3 mLs into the lungs every 6 hours as needed for Shortness of Breath 8/8/23  Yes Mercedes Estrada MD   carvedilol (COREG) 3.125 MG tablet TAKE 1 TABLET BY MOUTH TWICE A DAY 12/19/22  Yes Historical Provider, MD   ipratropium (ATROVENT) 0.03 % nasal spray SPRAY 2 SPRAYS INTO EACH NOSTRIL AS NEEDED FOR RUNNY/DRIPPY NOSE 3 TIMES A DAY 30 12/19/22  Yes Historical Provider, MD   lisinopril (PRINIVIL;ZESTRIL) 20 MG tablet  1/17/23  Yes Historical Provider, MD   meclizine (ANTIVERT) 12.5 MG tablet TAKE 1 TABLET BY MOUTH EVERY 12 HOURS FOR BALANCE FOR 30 DAYS 1/30/23  Yes Historical Provider, MD   Multiple Vitamins-Minerals (THERAPEUTIC MULTIVITAMIN-MINERALS) tablet Take 1 tablet by mouth daily   Yes Historical Provider, MD   calcium carb-cholecalciferol 600-20 MG-MCG TABS Take 1 tablet by mouth daily   Yes Historical Provider, MD   NONFORMULARY Coricidin Cough- Day and Night   Yes Historical Provider, MD   aspirin 81 MG EC tablet Take 1 tablet by mouth daily Pt reported taking 2 pills daily   Yes Historical Provider, MD   isosorbide mononitrate (IMDUR) 30 MG extended release tablet TAKE 1 TABLET BY MOUTH EVERY DAY 2/24/22  Yes Historical Provider, MD   Umeclidinium Bromide 62.5 MCG/INH AEPB Inhale 1 puff into the lungs daily   Yes Historical Provider, MD   SYMBICORT 160-4.5 MCG/ACT AERO TAKE 2 PUFFS BY MOUTH TWICE A DAY 3/12/21  Yes Mercedes Estrada MD   fluticasone (FLONASE) 50 MCG/ACT nasal spray SPRAY 1 SPRAY INTO EACH NOSTRIL EVERY DAY 11/13/20  Yes Hollis Carreon MD   JANTOVEN 2 MG tablet Jantoven (Warfarin) 4mg- 5 days, 3mg- 2 days: Thurs and Jono 10/29/20  Yes Historical Provider, MD   promethazine-dextromethorphan (PROMETHAZINE-DM) 6.25-15 MG/5ML syrup Take 5 mLs by mouth every 4 hours as

## 2023-09-14 DIAGNOSIS — E78.2 MIXED HYPERLIPIDEMIA: Primary | ICD-10-CM

## 2023-09-14 NOTE — TELEPHONE ENCOUNTER
Glenys Ac, patients wife, is requesting a refill for patient sent to [facility]:  Simvastatin 40mg, 1 every evening. Glenys Ac states that they requested to have script refilled through [facility] but the prescription was  and needed to have a new script sent to them. Please advise.

## 2023-09-16 RX ORDER — SIMVASTATIN 40 MG
40 TABLET ORAL DAILY
Qty: 30 TABLET | Refills: 5 | Status: SHIPPED | OUTPATIENT
Start: 2023-09-16

## 2023-09-26 ENCOUNTER — TELEPHONE (OUTPATIENT)
Age: 88
End: 2023-09-26

## 2023-09-26 NOTE — TELEPHONE ENCOUNTER
Patient's wife called for patient, he is still having the dizziness. Pt thinks is it could be the medication Lisinopril or Carvedilol. Pt stated that his cardiologist will not take him off these med's.  Arlette Laughlin - wife @ [phone]

## 2023-09-27 ENCOUNTER — NURSE ONLY (OUTPATIENT)
Age: 88
End: 2023-09-27

## 2023-09-27 VITALS — DIASTOLIC BLOOD PRESSURE: 54 MMHG | HEART RATE: 70 BPM | SYSTOLIC BLOOD PRESSURE: 116 MMHG

## 2023-09-27 NOTE — PROGRESS NOTES
Blood pressure checks are low when patient stands  Decrease carvedilol to one tablet in the evening. Continue lisinopril, 10 mg one in the morning. If dizziness continues, may need to see cardiologist again.

## 2023-10-10 ENCOUNTER — OFFICE VISIT (OUTPATIENT)
Dept: PULMONOLOGY | Age: 88
End: 2023-10-10
Payer: COMMERCIAL

## 2023-10-10 VITALS
DIASTOLIC BLOOD PRESSURE: 75 MMHG | OXYGEN SATURATION: 98 % | RESPIRATION RATE: 14 BRPM | WEIGHT: 150 LBS | SYSTOLIC BLOOD PRESSURE: 193 MMHG | BODY MASS INDEX: 22.73 KG/M2 | HEART RATE: 70 BPM | HEIGHT: 68 IN

## 2023-10-10 DIAGNOSIS — J45.51 SEVERE PERSISTENT ASTHMA WITH ACUTE EXACERBATION: Primary | ICD-10-CM

## 2023-10-10 PROCEDURE — 99214 OFFICE O/P EST MOD 30 MIN: CPT | Performed by: INTERNAL MEDICINE

## 2023-10-10 PROCEDURE — 1123F ACP DISCUSS/DSCN MKR DOCD: CPT | Performed by: INTERNAL MEDICINE

## 2023-10-10 RX ORDER — PREDNISONE 5 MG/1
5 TABLET ORAL DAILY
Qty: 30 TABLET | Refills: 0 | Status: SHIPPED | OUTPATIENT
Start: 2023-10-10 | End: 2023-11-09

## 2023-10-10 ASSESSMENT — ENCOUNTER SYMPTOMS
SHORTNESS OF BREATH: 1
EYES NEGATIVE: 1
COUGH: 1
RHINORRHEA: 1
WHEEZING: 0

## 2023-10-10 NOTE — PROGRESS NOTES
route.    Kirill Trevizo MD   albuterol sulfate HFA (PROVENTIL;VENTOLIN;PROAIR) 108 (90 Base) MCG/ACT inhaler Inhale 2 puffs into the lungs every 6 hours as needed for Wheezing 8/8/23 9/7/23  Chica Frank MD   ipratropium 0.5 mg-albuterol 2.5 mg (DUONEB) 0.5-2.5 (3) MG/3ML SOLN nebulizer solution Inhale 3 mLs into the lungs every 6 hours as needed for Shortness of Breath 8/8/23   Chica Frank MD   carvedilol (COREG) 3.125 MG tablet TAKE 1 TABLET BY MOUTH TWICE A DAY 12/19/22   Kirill Trevizo MD   ipratropium (ATROVENT) 0.03 % nasal spray SPRAY 2 SPRAYS INTO EACH NOSTRIL AS NEEDED FOR RUNNY/DRIPPY NOSE 3 TIMES A DAY 30 12/19/22   Kirill Trevizo MD   lisinopril (PRINIVIL;ZESTRIL) 20 MG tablet  1/17/23   Kirill Trevizo MD   meclizine (ANTIVERT) 12.5 MG tablet TAKE 1 TABLET BY MOUTH EVERY 12 HOURS FOR BALANCE FOR 30 DAYS 1/30/23   Kirill Trevizo MD   Multiple Vitamins-Minerals (THERAPEUTIC MULTIVITAMIN-MINERALS) tablet Take 1 tablet by mouth daily    Kirill Trevizo MD   calcium carb-cholecalciferol 600-20 MG-MCG TABS Take 1 tablet by mouth daily    Kirill Trevizo MD   NONFORMULARY Coricidin Cough- Day and Night    Kirill Trevizo MD   aspirin 81 MG EC tablet Take 1 tablet by mouth daily Pt reported taking 2 pills daily    Kirill Trevizo MD   isosorbide mononitrate (IMDUR) 30 MG extended release tablet TAKE 1 TABLET BY MOUTH EVERY DAY 2/24/22   Kirill Trevizo MD   Umeclidinium Bromide 62.5 MCG/INH AEPB Inhale 1 puff into the lungs daily    Kirill Trevizo MD   SYMBICORT 160-4.5 MCG/ACT AERO TAKE 2 PUFFS BY MOUTH TWICE A DAY 3/12/21   Chica Frank MD   fluticasone (FLONASE) 50 MCG/ACT nasal spray SPRAY 1 SPRAY INTO EACH NOSTRIL EVERY DAY 11/13/20   Cruzito Alonso MD   JANTOVEN 2 MG tablet Jantoven (Warfarin) 4mg- 5 days, 3mg- 2 days: Thurs and Jono 10/29/20   Provider, MD Kirill   promethazine-dextromethorphan

## 2023-10-11 ENCOUNTER — OFFICE VISIT (OUTPATIENT)
Age: 88
End: 2023-10-11

## 2023-10-11 VITALS
BODY MASS INDEX: 22.86 KG/M2 | RESPIRATION RATE: 16 BRPM | HEART RATE: 78 BPM | SYSTOLIC BLOOD PRESSURE: 120 MMHG | DIASTOLIC BLOOD PRESSURE: 80 MMHG | TEMPERATURE: 97.3 F | WEIGHT: 150.38 LBS

## 2023-10-11 DIAGNOSIS — J40 BRONCHITIS: Primary | ICD-10-CM

## 2023-10-11 DIAGNOSIS — J41.8 MIXED SIMPLE AND MUCOPURULENT CHRONIC BRONCHITIS (HCC): ICD-10-CM

## 2023-10-11 DIAGNOSIS — I10 ESSENTIAL HYPERTENSION: ICD-10-CM

## 2023-10-11 RX ORDER — METHYLPREDNISOLONE ACETATE 40 MG/ML
40 INJECTION, SUSPENSION INTRA-ARTICULAR; INTRALESIONAL; INTRAMUSCULAR; SOFT TISSUE ONCE
Status: COMPLETED | OUTPATIENT
Start: 2023-10-11 | End: 2023-10-11

## 2023-10-11 RX ORDER — CEFUROXIME AXETIL 500 MG/1
500 TABLET ORAL 2 TIMES DAILY
Qty: 20 TABLET | Refills: 1 | Status: SHIPPED | OUTPATIENT
Start: 2023-10-11 | End: 2023-10-21

## 2023-10-11 RX ORDER — BENZONATATE 100 MG/1
100 CAPSULE ORAL 3 TIMES DAILY PRN
Qty: 30 CAPSULE | Refills: 3 | Status: SHIPPED | OUTPATIENT
Start: 2023-10-11 | End: 2023-10-11

## 2023-10-11 RX ORDER — CEFUROXIME AXETIL 500 MG/1
500 TABLET ORAL 2 TIMES DAILY
Qty: 20 TABLET | Refills: 1 | Status: SHIPPED | OUTPATIENT
Start: 2023-10-11 | End: 2023-10-11

## 2023-10-11 RX ORDER — BENZONATATE 100 MG/1
100 CAPSULE ORAL 3 TIMES DAILY PRN
Qty: 30 CAPSULE | Refills: 3 | Status: SHIPPED | OUTPATIENT
Start: 2023-10-11 | End: 2023-10-21

## 2023-10-11 RX ADMIN — METHYLPREDNISOLONE ACETATE 40 MG: 40 INJECTION, SUSPENSION INTRA-ARTICULAR; INTRALESIONAL; INTRAMUSCULAR; SOFT TISSUE at 11:59

## 2023-10-11 SDOH — ECONOMIC STABILITY: INCOME INSECURITY: HOW HARD IS IT FOR YOU TO PAY FOR THE VERY BASICS LIKE FOOD, HOUSING, MEDICAL CARE, AND HEATING?: NOT HARD AT ALL

## 2023-10-11 SDOH — ECONOMIC STABILITY: HOUSING INSECURITY
IN THE LAST 12 MONTHS, WAS THERE A TIME WHEN YOU DID NOT HAVE A STEADY PLACE TO SLEEP OR SLEPT IN A SHELTER (INCLUDING NOW)?: NO

## 2023-10-11 SDOH — ECONOMIC STABILITY: FOOD INSECURITY: WITHIN THE PAST 12 MONTHS, YOU WORRIED THAT YOUR FOOD WOULD RUN OUT BEFORE YOU GOT MONEY TO BUY MORE.: NEVER TRUE

## 2023-10-11 SDOH — ECONOMIC STABILITY: FOOD INSECURITY: WITHIN THE PAST 12 MONTHS, THE FOOD YOU BOUGHT JUST DIDN'T LAST AND YOU DIDN'T HAVE MONEY TO GET MORE.: NEVER TRUE

## 2023-10-11 ASSESSMENT — PATIENT HEALTH QUESTIONNAIRE - PHQ9
SUM OF ALL RESPONSES TO PHQ QUESTIONS 1-9: 0
SUM OF ALL RESPONSES TO PHQ QUESTIONS 1-9: 0
SUM OF ALL RESPONSES TO PHQ9 QUESTIONS 1 & 2: 0
2. FEELING DOWN, DEPRESSED OR HOPELESS: 0
SUM OF ALL RESPONSES TO PHQ QUESTIONS 1-9: 0
1. LITTLE INTEREST OR PLEASURE IN DOING THINGS: 0
SUM OF ALL RESPONSES TO PHQ QUESTIONS 1-9: 0

## 2023-10-11 ASSESSMENT — ENCOUNTER SYMPTOMS
ABDOMINAL DISTENTION: 1
BACK PAIN: 1
COUGH: 1

## 2023-10-11 NOTE — PROGRESS NOTES
presence of Noemy Bernardo MD.     Electronically signed by Fawad Howard MD on 10/11/2023 at 2:13 PM

## 2023-11-09 RX ORDER — WARFARIN SODIUM 2 MG/1
TABLET ORAL
Qty: 60 TABLET | Refills: 5 | Status: SHIPPED | OUTPATIENT
Start: 2023-11-09

## 2023-11-09 NOTE — TELEPHONE ENCOUNTER
Last visit: 10/11/23  Last Med refill: 10/30/2020 (?)  Does patient have enough medication for 72 hours: NO    Next Visit Date:  Future Appointments   Date Time Provider [address]   11/21/2023  2:15 PM MD DANIS Bruce [facility]   11/27/2023  1:40 PM MD Maris Kline   11/28/2023 11:00 AM Marilu Mandel MD [facility]   11/28/2023  2:15 PM MD DANIS Bruce [facility]   12/5/2023  2:15 PM Ruby Rojas  [facility]   Topic Date Due    Shingles vaccine (1 of 2) 10/11/2024 (Originally 11/21/1954)    COVID-19 Vaccine (6 - 2023-24 season) 11/20/2023    Depression Screen  10/11/2024    DTaP/Tdap/Td vaccine (2 - Td or Tdap) 02/08/2028    Flu vaccine  Completed    Pneumococcal 65+ years Vaccine  Completed    Hepatitis A vaccine  Aged Out    Hepatitis B vaccine  Aged Out    Hib vaccine  Aged Out    Meningococcal (ACWY) vaccine  Aged Out    Lipids  Discontinued       No results found for: \"LABA1C\"          ( goal A1C is < 7)   No components found for: \"LABMICR\"  LDL Cholesterol (mg/dL)   Date Value   07/25/2023 66       (goal LDL is <100)   AST (U/L)   Date Value   11/08/2021 26     ALT (U/L)   Date Value   11/08/2021 13     BUN (mg/dL)   Date Value   07/24/2023 14     BP Readings from Last 3 Encounters:   10/11/23 120/80   10/10/23 (!) 193/75   09/27/23 (!) 116/54          (goal 120/80)    All Future Testing planned in [facility]  Lab Frequency Next Occurrence   EKG 12 lead - PRN for Chest Pain or symptoms of ACS PRN                Patient Active Problem List:     Lumbar sprain     Acute sinusitis     Arthropathy     Asthma     Bacterial pneumonia     Benign neoplasm of soft tissue     Benign prostatic hyperplasia     Gastroesophageal reflux disease     Candidiasis of mouth     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

## 2023-11-20 ENCOUNTER — TELEPHONE (OUTPATIENT)
Dept: PULMONOLOGY | Age: 88
End: 2023-11-20

## 2023-11-20 NOTE — TELEPHONE ENCOUNTER
Patient called stating that he is still struggling with shortness of breath and feels that his nebulizer treatments may not be working. He has been doing them 4 times a day. He also said that Dr. Aysha Winn took him off the prednisone. He states he is wheezing and having a productive cough with a yellowish sputum. .      Patient has a visit with Dr. Aysha Winn on 11/27 and Dr. Shanda Ambriz on 11/28/23. Would like to know what he should do in the meantime? I told patient I would get a message to Dr. Shanda Ambriz however if he felt at any point his symptoms were getting worse he should go to the emergency room. Mr. Franklyn Batres expressed understanding. This was information is from his last visit with Dr. Shanda Ambriz. 10/10/23    IMPRESSION:     Diagnosis Orders   1.  Severe persistent asthma with acute exacerbation  predniSONE (DELTASONE) 5 MG tablet           :                PLAN:      Continue symbicort , incruse , duoneb   Prednisone for 30 days dec to 5 mg po daily   Steroid dependent asthma   Follow up 1 month

## 2023-11-20 NOTE — TELEPHONE ENCOUNTER
Dr Lake DANG: Patient last saw you on 10/10 and you put him on 5 mg of prednisone for 30 days. He saw PCP next day 10/11 and they gave him injection of depo-medrol and told him to stop taking prednisone. Following are notes from PCP visist:     1. Bronchitis  -     cefUROXime (CEFTIN) 500 MG tablet; Take 1 tablet by mouth 2 times daily for 10 days, Disp-20 tablet, R-1Normal  -     methylPREDNISolone acetate (DEPO-MEDROL) injection 40 mg; 40 mg, IntraMUSCular, ONCE, 1 dose, On Wed 10/11/23 at 1215  -     benzonatate (TESSALON) 100 MG capsule; Take 1 capsule by mouth 3 times daily as needed for Cough, Disp-30 capsule, R-3Normal  2. Mixed simple and mucopurulent chronic bronchitis ([address])  3. Essential hypertension    Please advise, Thank you.

## 2023-11-21 RX ORDER — PREDNISONE 10 MG/1
TABLET ORAL
Qty: 30 TABLET | Refills: 0 | Status: SHIPPED | OUTPATIENT
Start: 2023-11-21

## 2023-11-21 NOTE — TELEPHONE ENCOUNTER
Called patient to let him know Prednisone burst was sent in to [facility] pharmacy and  wanted him to start on medication. No answer.   Had to leave message

## 2023-11-22 LAB
ABSOLUTE BASO #: 0.04 K/UL (ref 0–0.2)
ABSOLUTE EOS #: 0.01 K/UL (ref 0–0.5)
ABSOLUTE LYMPH #: 1.06 K/UL (ref 1–4)
ABSOLUTE MONO #: 0.22 K/UL (ref 0.2–1)
ABSOLUTE NEUT #: 3.98 K/UL (ref 1.5–7.5)
ALBUMIN SERPL-MCNC: 4.9 G/DL (ref 3.5–5.2)
ALK PHOSPHATASE: 104 U/L (ref 40–125)
ALT SERPL-CCNC: 9 U/L (ref 5–50)
ANION GAP SERPL CALCULATED.3IONS-SCNC: 14 MEQ/L (ref 7–16)
AST SERPL-CCNC: 29 U/L (ref 9–50)
BASOPHILS RELATIVE PERCENT: 0.8 %
BILIRUB SERPL-MCNC: 0.6 MG/DL
BUN BLDV-MCNC: 12 MG/DL (ref 8–23)
CALCIUM SERPL-MCNC: 9.4 MG/DL (ref 8.5–10.5)
CHLORIDE BLD-SCNC: 102 MEQ/L (ref 95–107)
CHOLESTEROL/HDL RATIO: 2.2 RATIO
CHOLESTEROL: 155 MG/DL
CO2: 23 MEQ/L (ref 19–31)
CREAT SERPL-MCNC: 0.93 MG/DL (ref 0.8–1.4)
EGFR IF NONAFRICAN AMERICAN: 79 ML/MIN/1.73
EOSINOPHILS RELATIVE PERCENT: 0.2 %
GLUCOSE: 157 MG/DL (ref 70–99)
HCT VFR BLD CALC: 39 % (ref 40–51)
HDLC SERPL-MCNC: 71 MG/DL
HEMOGLOBIN: 13.4 G/DL (ref 13.5–17)
LDL CHOLESTEROL CALCULATED: 74 MG/DL
LDL/HDL RATIO: 1 RATIO
LYMPHOCYTE %: 19.9 %
MCH RBC QN AUTO: 31.2 PG (ref 25–33)
MCHC RBC AUTO-ENTMCNC: 34.4 G/DL (ref 31–36)
MCV RBC AUTO: 90.7 FL (ref 80–99)
MONOCYTES # BLD: 4.1 %
NEUTROPHILS RELATIVE PERCENT: 74.6 %
PDW BLD-RTO: 15.3 % (ref 11.5–15)
PLATELETS: 277 K/UL (ref 130–400)
PMV BLD AUTO: 8.8 FL (ref 9.3–13)
POTASSIUM SERPL-SCNC: 4.5 MEQ/L (ref 3.5–5.4)
RBC: 4.3 M/UL (ref 4.5–6.1)
SODIUM BLD-SCNC: 139 MEQ/L (ref 133–146)
TOTAL PROTEIN: 6.4 G/DL (ref 6.1–8.3)
TRIGL SERPL-MCNC: 49 MG/DL
VLDLC SERPL CALC-MCNC: 10 MG/DL
WBC: 5.3 K/UL (ref 3.5–11)

## 2023-11-27 ENCOUNTER — OFFICE VISIT (OUTPATIENT)
Age: 88
End: 2023-11-27

## 2023-11-27 VITALS
SYSTOLIC BLOOD PRESSURE: 120 MMHG | DIASTOLIC BLOOD PRESSURE: 78 MMHG | TEMPERATURE: 97.8 F | WEIGHT: 152.25 LBS | RESPIRATION RATE: 14 BRPM | BODY MASS INDEX: 23.15 KG/M2 | OXYGEN SATURATION: 99 % | HEART RATE: 70 BPM

## 2023-11-27 DIAGNOSIS — M47.817 LUMBOSACRAL SPONDYLOSIS WITHOUT MYELOPATHY: ICD-10-CM

## 2023-11-27 DIAGNOSIS — I73.9 PERIPHERAL VASCULAR DISEASE (HCC): ICD-10-CM

## 2023-11-27 DIAGNOSIS — J45.40 MODERATE PERSISTENT ASTHMA, UNSPECIFIED WHETHER COMPLICATED: ICD-10-CM

## 2023-11-27 DIAGNOSIS — J41.8 MIXED SIMPLE AND MUCOPURULENT CHRONIC BRONCHITIS (HCC): ICD-10-CM

## 2023-11-27 DIAGNOSIS — K21.9 GASTROESOPHAGEAL REFLUX DISEASE WITHOUT ESOPHAGITIS: ICD-10-CM

## 2023-11-27 DIAGNOSIS — I10 PRIMARY HYPERTENSION: Primary | ICD-10-CM

## 2023-11-27 NOTE — PROGRESS NOTES
(around 5/27/2024).     Disposition and Communication: Ana TURK, scribed for and in the presence of Demi Mariano MD.     Electronically signed by Pipe Arnold MD on 11/27/2023 at 5:36 PM

## 2023-11-28 ENCOUNTER — OFFICE VISIT (OUTPATIENT)
Dept: PULMONOLOGY | Age: 88
End: 2023-11-28
Payer: MEDICARE

## 2023-11-28 ENCOUNTER — OFFICE VISIT (OUTPATIENT)
Age: 88
End: 2023-11-28

## 2023-11-28 VITALS — HEIGHT: 68 IN | WEIGHT: 150 LBS | BODY MASS INDEX: 22.73 KG/M2

## 2023-11-28 VITALS
BODY MASS INDEX: 22.73 KG/M2 | HEART RATE: 68 BPM | RESPIRATION RATE: 12 BRPM | HEIGHT: 68 IN | OXYGEN SATURATION: 98 % | DIASTOLIC BLOOD PRESSURE: 91 MMHG | TEMPERATURE: 97.9 F | WEIGHT: 150 LBS | SYSTOLIC BLOOD PRESSURE: 171 MMHG

## 2023-11-28 DIAGNOSIS — Z77.098 CHLORINE GAS EXPOSURE: ICD-10-CM

## 2023-11-28 DIAGNOSIS — M17.11 ARTHRITIS OF RIGHT KNEE: ICD-10-CM

## 2023-11-28 DIAGNOSIS — I25.10 CAD, MULTIPLE VESSEL: ICD-10-CM

## 2023-11-28 DIAGNOSIS — J45.51 SEVERE PERSISTENT ASTHMA WITH ACUTE EXACERBATION: Primary | ICD-10-CM

## 2023-11-28 DIAGNOSIS — M17.12 ARTHRITIS OF LEFT KNEE: Primary | ICD-10-CM

## 2023-11-28 DIAGNOSIS — R09.82 POST-NASAL DRIP: ICD-10-CM

## 2023-11-28 PROCEDURE — 99214 OFFICE O/P EST MOD 30 MIN: CPT | Performed by: INTERNAL MEDICINE

## 2023-11-28 PROCEDURE — 1123F ACP DISCUSS/DSCN MKR DOCD: CPT | Performed by: INTERNAL MEDICINE

## 2023-11-28 RX ORDER — PREDNISONE 10 MG/1
TABLET ORAL
Qty: 30 TABLET | Refills: 2 | Status: SHIPPED | OUTPATIENT
Start: 2023-11-28

## 2023-11-28 ASSESSMENT — ENCOUNTER SYMPTOMS
EYES NEGATIVE: 1
WHEEZING: 0
RHINORRHEA: 1
SHORTNESS OF BREATH: 1
COUGH: 1

## 2023-11-28 NOTE — PROGRESS NOTES
[place]  [facility] AND SPORTS MEDICINE  [address] [place] [address] [address]  [place] [address]  Dept: [phone]  Dept Fax: [phone]    Chief Compliant:  Chief Complaint   Patient presents with    Knee Pain     2nd Orthovisc gel injection. History of Present Illness: This is a 81 y.o. male who presents to the clinic today for evaluation / follow up of bilateral knee arthritis. He is done well with gel injections in the past he would like Orthovisc injections repeated. He has return of dull aching pain. Physical Exam:    On examination there is no effusion he 8 maintains full range of motion good quadricep strength tenderness over the joint lines there is crepitus present    Nursing note and vitals reviewed. Labs and Imaging:     XR taken today:  No results found. No orders of the defined types were placed in this encounter. Assessment and Plan:  No diagnosis found. This is a 81 y.o. male with bilateral knee arthritis. I cleaned over both knees and injected the Orthovisc solutions to both knees. He tolerated this well. Risks including pain infection and stiffness were explained to him. Will see him back next week for the next injection. .         Review of Systems   Constitutional: Negative for fever, chills, sweats. Neurological: Negative numbness, or weakness. Integumentary: Negative for rash, itching, laceration, or abrasion.    Musculoskeletal: Positive for Knee Pain (2nd Orthovisc gel injection.)           Past History:    Current Outpatient Medications:     predniSONE (DELTASONE) 10 MG tablet, 4 tabs once a day for 3 days, 3 tabs once a day for 3 days, 2 tabs once a day for 3 days,1 tabs once a day for 3 days, Disp: 30 tablet, Rfl: 2    JANTOVEN 2 MG tablet, Jantoven (Warfarin) 4mg- 5 days, 3mg- 2 days: Thurs and Sunday, Disp: 60 tablet, Rfl: 5    simvastatin

## 2023-11-29 NOTE — PROGRESS NOTES
because of this he has reactive airway disease and gets exacerbation with weather changes when he had been working with [facility] department. At present he denies any shortness of breath with exertion he denies any wheezing. He is using Symbicort twice a day without a spacer and is using DuoNeb 4 times a day. He has also seen ENT who advised him to use nasal saline rinse Flonase and Astelin spray he does not have the Astelin spray and he did not understand that he had to use nasal saline rinse to help clear his secretions prior to using Flonase.        LUNG CANCER SCREENING     CRITERIA MET    []     CT ORDERED  []      CRITERIA NOT MET   [x]      REFUSED                    []        REASON CRITERIA NOT MET     SMOKING LESS THAN 30 PY  []      AGE LESS THAN 55 or GREATER 77 YEARS  []      QUIT SMOKING 15 YEARS OR GREATER   []      RECENT CT WITH IN 11 MONTHS    []      LIFE EXPECTANCY < 5 YEARS   []      SIGNS  AND SYMPTOMS OF LUNG CANCER   []         Immunization   Immunization History   Administered Date(s) Administered    COVID-19, PFIZER Bivalent, DO NOT Dilute, (age 12y+), IM, 30 mcg/0.3 mL 12/04/2022, 09/25/2023    COVID-19, PFIZER PURPLE top, DILUTE for use, (age 15 y+), 30mcg/0.3mL 02/02/2021, 02/23/2021, 10/21/2021    Influenza Vaccine, unspecified formulation 10/06/2015, 11/02/2016    Influenza Virus Vaccine 09/25/2023    Influenza Whole 11/20/2014    Influenza, FLUZONE (age 72 y+), High Dose, 0.7mL 09/09/2020    Influenza, High Dose (Fluzone 65 yrs and older) 09/27/2017, 09/27/2018, 10/18/2019    Pneumococcal, PCV-13, PREVNAR 13, (age 6w+), IM, 0.5mL 05/09/2017    Pneumococcal, PPSV23, PNEUMOVAX 23, (age 2y+), SC/IM, 0.5mL 11/12/1997, 10/01/2003, 11/20/2017    TDaP, ADACEL (age 6y-58y), BOOSTRIX (age 10y+), IM, 0.5mL 02/08/2018          PAST MEDICAL HISTORY:       Diagnosis Date    Abdominal hernia 10/8/2020    Achilles bursitis 10/8/2020    Acute sinusitis 8/7/2017    Adult body mass index

## 2023-11-29 NOTE — PROGRESS NOTES
Administrations This Visit       hyaluronan (ORTHOVISC) 30 MG/2ML injection 30 mg       Admin Date  11/28/2023  08:48 Action  Given Dose  30 mg Route  Intra-artICUlar Site  Knee Left Administered By  Raj Michelle MA    Ordering Provider: Charley Rollins MD    NDC: 65923-878-56    : [address] [place] [address]    Patient Supplied?: No               Admin Date  11/29/2023  08:48 Action  Given Dose  30 mg Route  Intra-artICUlar Site  Knee Left Administered By  Raj Michelle MA    Ordering Provider: Charley Rollins MD    NDC: 74806-120-58    : [address] [place] [address]    Patient Supplied?: No                    Medication was administered by provider, Dr Sergei Gallardo. No adverse reactions.     Raj Michelle MA.

## 2023-12-05 ENCOUNTER — OFFICE VISIT (OUTPATIENT)
Age: 88
End: 2023-12-05

## 2023-12-05 VITALS — WEIGHT: 150 LBS | BODY MASS INDEX: 22.73 KG/M2 | HEIGHT: 68 IN

## 2023-12-05 DIAGNOSIS — M70.62 TROCHANTERIC BURSITIS OF LEFT HIP: ICD-10-CM

## 2023-12-05 DIAGNOSIS — M17.12 ARTHRITIS OF LEFT KNEE: Primary | ICD-10-CM

## 2023-12-05 DIAGNOSIS — M17.11 ARTHRITIS OF RIGHT KNEE: ICD-10-CM

## 2023-12-05 NOTE — PROGRESS NOTES
[facility] AND [facility]ORTS MEDICINE  [address] [place] [id number]     Surgery:    No surgery found  No surgery found    History of Present Illness: This is a 81 y.o. male who presents to the clinic today for post op follow up for No surgery found on No surgery found . Orthovisc injections #3. He had no adverse reactions he also complains of some greater trochanteric bursa pain over the lateral aspect. He is requesting an injection here. On examination both knees without any effusion. I cleaned over both knees and injected the Orthovisc solution. He tolerated this well. Risks are infection pain and stiffness. Over the lateral aspect left hip skin is intact he has some tenderness over the greater troches bursa I cleaned over this area injected 80 mg Depo-Medrol and lidocaine solution. We are at risk would be infection elevated blood sugars and continued pain.   We will see him back if these injections not help          Electronically signed by Lisy Storm MD on 12/5/2023 at 4:58 PM

## 2023-12-06 RX ORDER — LIDOCAINE HYDROCHLORIDE 10 MG/ML
2 INJECTION, SOLUTION INFILTRATION; PERINEURAL ONCE
Status: COMPLETED | OUTPATIENT
Start: 2023-12-06 | End: 2023-12-06

## 2023-12-06 RX ORDER — METHYLPREDNISOLONE ACETATE 80 MG/ML
80 INJECTION, SUSPENSION INTRA-ARTICULAR; INTRALESIONAL; INTRAMUSCULAR; SOFT TISSUE ONCE
Status: COMPLETED | OUTPATIENT
Start: 2023-12-06 | End: 2023-12-06

## 2023-12-06 RX ADMIN — METHYLPREDNISOLONE ACETATE 80 MG: 80 INJECTION, SUSPENSION INTRA-ARTICULAR; INTRALESIONAL; INTRAMUSCULAR; SOFT TISSUE at 09:11

## 2023-12-06 RX ADMIN — LIDOCAINE HYDROCHLORIDE 2 ML: 10 INJECTION, SOLUTION INFILTRATION; PERINEURAL at 09:11

## 2023-12-06 NOTE — PROGRESS NOTES
Administrations This Visit       hyaluronan (ORTHOVISC) 30 MG/2ML injection 30 mg       Admin Date  12/05/2023  09:10 Action  Given by Other Dose  30 mg Route  Intra-artICUlar Site   Administered By  Kristopher Zaragoza MA    Ordering Provider: Abbey Calvert MD    NDC: 55858-886-40    : [facility]    Patient Supplied?: No               Admin Date  12/06/2023  09:10 Action  Given by Other Dose  30 mg Route  Intra-artICUlar Site   Administered By  Kristopher Zaragoza MA    Ordering Provider: Abbey Calvert MD    NDC: 17713-914-32    : [facility]    Patient Supplied?: No              lidocaine 1 % injection 2 mL       Admin Date  12/06/2023  09:11 Action  Given Dose  2 mL Route  Intra-artICUlar Site  Hip Left Administered By  Kristopher Zaragoza MA    Ordering Provider: Abbey Calvert MD    [address]: [address]-624-08    : [address]    Patient Supplied?: No              methylPREDNISolone acetate (DEPO-MEDROL) injection 80 mg       Admin Date  12/06/2023  09:11 Action  Given Dose  80 mg Route  Intra-artICUlar Site  Hip Left Administered By  Kristopher Zaragoza MA    Ordering Provider: Abbey Calvert MD    NDC: 5197-2702-33    : Gill Fagan. Patient Supplied?: No                    Medication was administered by provider, Dr Ledy Rivera. No adverse reactions.     Kristopher Zaragoza MA.

## 2023-12-12 ENCOUNTER — OFFICE VISIT (OUTPATIENT)
Age: 88
End: 2023-12-12

## 2023-12-12 VITALS — BODY MASS INDEX: 22.73 KG/M2 | HEIGHT: 68 IN | WEIGHT: 150 LBS

## 2023-12-12 DIAGNOSIS — M19.011 ARTHRITIS OF RIGHT SHOULDER REGION: ICD-10-CM

## 2023-12-12 DIAGNOSIS — M13.811 ALLERGIC ARTHRITIS OF RIGHT SHOULDER REGION: ICD-10-CM

## 2023-12-12 DIAGNOSIS — M17.12 ARTHRITIS OF LEFT KNEE: Primary | ICD-10-CM

## 2023-12-12 RX ADMIN — METHYLPREDNISOLONE ACETATE 80 MG: 80 INJECTION, SUSPENSION INTRA-ARTICULAR; INTRALESIONAL; INTRAMUSCULAR; SOFT TISSUE at 15:02

## 2023-12-12 RX ADMIN — LIDOCAINE HYDROCHLORIDE 2 ML: 10 INJECTION, SOLUTION INFILTRATION; PERINEURAL at 15:02

## 2023-12-12 NOTE — PROGRESS NOTES
[address]  [place] [address]  Dept: [phone]  Dept Fax: [phone]    Chief Compliant:  Chief Complaint   Patient presents with    Shoulder Pain    Hip Pain    Knee Pain        History of Present Illness: This is a 81 y.o. 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 No issues with injections last week, here for repeat orthovisc injections. Patient also reporting worsening right shoulder pain, some difficulty with ROM. Would like to try steroid injection in shoulder today. Physical Exam:  No difference noted between bilateral knees. Full ROM in bilateral knees with tenderness over medial and lateral joint lines bilaterally. No muscular tenderness, no effusions on either side. Right shoulder has limited ROM, patient had some difficulty untucking back of shirt. Anatomy of shoulder altered because of previous procedure. , and healed surgical scarring noted on right shoulder. No tenderness noted with palpation. Nursing note and vitals reviewed. Labs and Imaging:     XR taken today:  No results found. No orders of the defined types were placed in this encounter. Assessment and Plan:  No diagnosis found. This is a 81 y.o. male with bilateral knee arthritis. Patient noted improvement in knee pain with last week's injection and tolerated procedure well, here for repeat injection. Also complaining of right shoulder pain and limited ROM, requested steroid injection for this as well. Bilateral knee injections:  Risks are pain, infection, joint stiffness, and increased blood glucose. The area was prepped with an alcohol swab. I then injected orthovisc into the intra-articular space of the knee.  A band-aid was

## 2023-12-13 RX ORDER — METHYLPREDNISOLONE ACETATE 80 MG/ML
80 INJECTION, SUSPENSION INTRA-ARTICULAR; INTRALESIONAL; INTRAMUSCULAR; SOFT TISSUE ONCE
Status: COMPLETED | OUTPATIENT
Start: 2023-12-13 | End: 2023-12-12

## 2023-12-13 RX ORDER — LIDOCAINE HYDROCHLORIDE 10 MG/ML
2 INJECTION, SOLUTION INFILTRATION; PERINEURAL ONCE
Status: COMPLETED | OUTPATIENT
Start: 2023-12-13 | End: 2023-12-12

## 2023-12-28 DIAGNOSIS — E78.2 MIXED HYPERLIPIDEMIA: ICD-10-CM

## 2023-12-28 RX ORDER — SIMVASTATIN 40 MG
40 TABLET ORAL DAILY
Qty: 90 TABLET | Refills: 1 | Status: SHIPPED | OUTPATIENT
Start: 2023-12-28

## 2023-12-28 NOTE — TELEPHONE ENCOUNTER
Mara Healy is calling to request a refill on the following medication(s):    Medication Request:  Requested Prescriptions     Pending Prescriptions Disp Refills    simvastatin (ZOCOR) 40 MG tablet 90 tablet 1     Sig: Take 1 tablet by mouth daily       Last Visit Date (If Applicable):  Visit date not found    Next Visit Date:    Visit date not found

## 2024-01-12 ENCOUNTER — TELEPHONE (OUTPATIENT)
Age: 89
End: 2024-01-12

## 2024-01-12 NOTE — TELEPHONE ENCOUNTER
Patient is calling asking if you fill Jantoven 1 mg due to the 2 mg being so hard to cut in half. Please send the refill to the mail order in chart

## 2024-01-16 RX ORDER — WARFARIN SODIUM 1 MG/1
1 TABLET ORAL DAILY
Qty: 90 TABLET | Refills: 3 | Status: SHIPPED | OUTPATIENT
Start: 2024-01-16 | End: 2025-01-10

## 2024-01-26 ENCOUNTER — TELEPHONE (OUTPATIENT)
Age: 89
End: 2024-01-26

## 2024-01-26 NOTE — TELEPHONE ENCOUNTER
Patient states he needs a refill of his warfarin 2mg but he isn't due for a refill to [facility] until 2/18/24. He went through these quickly because he was cutting the 2mg in half for 1mg. Can I call [facility] and give them a verbal okay to refill early?

## 2024-01-29 RX ORDER — WARFARIN SODIUM 2 MG/1
TABLET ORAL
Qty: 60 TABLET | Refills: 5 | Status: CANCELLED | OUTPATIENT
Start: 2024-01-29

## 2024-01-29 NOTE — TELEPHONE ENCOUNTER
Pt advised, I called [facility] and they said patient needs to call them to request a refill early because of a copay involved. I called patient he was advised. Number [facility] gave me to give him was [phone]

## 2024-02-09 ENCOUNTER — TELEPHONE (OUTPATIENT)
Age: 89
End: 2024-02-09

## 2024-02-09 RX ORDER — WARFARIN SODIUM 2 MG/1
TABLET ORAL
Qty: 60 TABLET | Refills: 5 | Status: CANCELLED | OUTPATIENT
Start: 2024-02-09

## 2024-02-09 NOTE — TELEPHONE ENCOUNTER
Bill Rodney is calling to request a refill on the following medication(s):    Medication Request:  Requested Prescriptions     Pending Prescriptions Disp Refills    JANTOVEN 2 MG tablet 60 tablet 5     Sig: Jantoven (Warfarin) 4mg- 5 days, 3mg- 2 days: Thurs and Sunday       Last Visit Date (If Applicable):  11/27/2023    Next Visit Date:    5/29/2024

## 2024-02-12 RX ORDER — WARFARIN SODIUM 2 MG/1
TABLET ORAL
Qty: 60 TABLET | Refills: 11 | Status: SHIPPED | OUTPATIENT
Start: 2024-02-12

## 2024-02-20 ENCOUNTER — OFFICE VISIT (OUTPATIENT)
Dept: PULMONOLOGY | Age: 89
End: 2024-02-20
Payer: COMMERCIAL

## 2024-02-20 VITALS
WEIGHT: 147.8 LBS | SYSTOLIC BLOOD PRESSURE: 154 MMHG | DIASTOLIC BLOOD PRESSURE: 73 MMHG | HEART RATE: 62 BPM | OXYGEN SATURATION: 97 % | BODY MASS INDEX: 22.47 KG/M2

## 2024-02-20 DIAGNOSIS — J45.50 SEVERE PERSISTENT ASTHMA WITHOUT COMPLICATION: Primary | ICD-10-CM

## 2024-02-20 DIAGNOSIS — Z77.098 CHLORINE GAS EXPOSURE: ICD-10-CM

## 2024-02-20 DIAGNOSIS — J32.9 CHRONIC SINUSITIS, UNSPECIFIED LOCATION: ICD-10-CM

## 2024-02-20 DIAGNOSIS — I25.10 CAD, MULTIPLE VESSEL: ICD-10-CM

## 2024-02-20 PROCEDURE — 1123F ACP DISCUSS/DSCN MKR DOCD: CPT | Performed by: INTERNAL MEDICINE

## 2024-02-20 PROCEDURE — 99213 OFFICE O/P EST LOW 20 MIN: CPT | Performed by: INTERNAL MEDICINE

## 2024-02-20 ASSESSMENT — ENCOUNTER SYMPTOMS
SHORTNESS OF BREATH: 1
WHEEZING: 0
COUGH: 1
EYES NEGATIVE: 1
RHINORRHEA: 1

## 2024-02-20 NOTE — PROGRESS NOTES
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 year old male continues to have symptoms of severe persistent asthma without exacerbation.  He stopped prednisone because it was not helping.  He is using his bronchodilator therapy which is Symbicort, Incruse and he uses albuterol as needed.  Last visit  In November patient went to the ER at [place] and was found to have mildly elevated troponins.  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  Pulmonary wise he continues to have on and off wheezing.  Is using his bronchodilator therapy does not complain of any acute hemoptysis or sputum production which is purulent.    Last visit   Since last visit patient did need a course of antibiotic and steroid.  That controlled his postnasal drainage and hence his acute bronchitis.  He is on nasal steroid nasal his antihistaminics but he continues to have nasal discharge and postnasal drainage.  He is tolerating DuoNeb and Symbicort well without any complications.  No oral thrush noted.  who is to follow with Dr. Hyde .  Patient would like to change his pulmonologist.  He has been having recurrent acute bronchitis episode which started as nasal congestion postnasal drainage and then he started wheezing and coughing and is given antibiotic and steroids.  Previously he had been given Levaquin which called Achilles tendon so he does not use fluoroquinolones anymore.  Patient has had multiple courses of antibiotic and steroids.  He has been on Symbicort and using DuoNeb 4 times a day presently he is got Phenergan DM to help with his cough and he is able to sleep better at night.  Patient has a history of chlorine gas exposure and because of this he has reactive

## 2024-03-26 ENCOUNTER — TELEPHONE (OUTPATIENT)
Age: 89
End: 2024-03-26

## 2024-03-26 ENCOUNTER — OFFICE VISIT (OUTPATIENT)
Age: 89
End: 2024-03-26
Payer: MEDICARE

## 2024-03-26 VITALS
BODY MASS INDEX: 22.73 KG/M2 | TEMPERATURE: 96.9 F | OXYGEN SATURATION: 96 % | HEIGHT: 68 IN | WEIGHT: 150 LBS | DIASTOLIC BLOOD PRESSURE: 88 MMHG | HEART RATE: 64 BPM | RESPIRATION RATE: 14 BRPM | SYSTOLIC BLOOD PRESSURE: 128 MMHG

## 2024-03-26 DIAGNOSIS — J41.8 MIXED SIMPLE AND MUCOPURULENT CHRONIC BRONCHITIS (HCC): ICD-10-CM

## 2024-03-26 DIAGNOSIS — J45.40 MODERATE PERSISTENT ASTHMA, UNSPECIFIED WHETHER COMPLICATED: Primary | ICD-10-CM

## 2024-03-26 PROBLEM — M19.90 ARTHRITIS: Status: ACTIVE | Noted: 2017-08-07

## 2024-03-26 PROBLEM — Z79.01 LONG TERM CURRENT USE OF ANTICOAGULANT THERAPY: Status: ACTIVE | Noted: 2022-03-09

## 2024-03-26 PROBLEM — R06.09 DYSPNEA ON EXERTION: Status: ACTIVE | Noted: 2021-12-08

## 2024-03-26 PROBLEM — J20.9 ACUTE BRONCHITIS: Status: ACTIVE | Noted: 2017-08-07

## 2024-03-26 PROBLEM — I25.10 ARTERIOSCLEROSIS OF CORONARY ARTERY: Status: ACTIVE | Noted: 2022-02-07

## 2024-03-26 PROCEDURE — 1123F ACP DISCUSS/DSCN MKR DOCD: CPT | Performed by: FAMILY MEDICINE

## 2024-03-26 PROCEDURE — G8427 DOCREV CUR MEDS BY ELIG CLIN: HCPCS | Performed by: FAMILY MEDICINE

## 2024-03-26 PROCEDURE — G8420 CALC BMI NORM PARAMETERS: HCPCS | Performed by: FAMILY MEDICINE

## 2024-03-26 PROCEDURE — 3023F SPIROM DOC REV: CPT | Performed by: FAMILY MEDICINE

## 2024-03-26 PROCEDURE — 1036F TOBACCO NON-USER: CPT | Performed by: FAMILY MEDICINE

## 2024-03-26 PROCEDURE — G8484 FLU IMMUNIZE NO ADMIN: HCPCS | Performed by: FAMILY MEDICINE

## 2024-03-26 PROCEDURE — 99213 OFFICE O/P EST LOW 20 MIN: CPT | Performed by: FAMILY MEDICINE

## 2024-03-26 RX ORDER — DEXTROMETHORPHAN HYDROBROMIDE AND PROMETHAZINE HYDROCHLORIDE 15; 6.25 MG/5ML; MG/5ML
5 SYRUP ORAL 4 TIMES DAILY PRN
Qty: 240 ML | Refills: 0 | Status: SHIPPED | OUTPATIENT
Start: 2024-03-26 | End: 2024-04-07

## 2024-03-26 RX ORDER — FAMOTIDINE 20 MG/1
TABLET, FILM COATED ORAL
COMMUNITY

## 2024-03-26 ASSESSMENT — PATIENT HEALTH QUESTIONNAIRE - PHQ9
SUM OF ALL RESPONSES TO PHQ9 QUESTIONS 1 & 2: 0
2. FEELING DOWN, DEPRESSED OR HOPELESS: NOT AT ALL
SUM OF ALL RESPONSES TO PHQ QUESTIONS 1-9: 0
1. LITTLE INTEREST OR PLEASURE IN DOING THINGS: NOT AT ALL
SUM OF ALL RESPONSES TO PHQ QUESTIONS 1-9: 0

## 2024-03-26 NOTE — TELEPHONE ENCOUNTER
Patient went to  inhaler -$400 out of pocket - insurance did not cover    They will call the lung doctor to see if they have something for him.    They just wanted Dr ERNANDEZ to know that they did not  inhaler

## 2024-03-26 NOTE — PROGRESS NOTES
[facility]     Date of Visit:  3/26/2024  Patient Name: Bill Rodney   Patient :  1935     CHIEF COMPLAINT/HPI:     Bill Rodney is a 88 y.o. 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   Patient following with Dr. Kramer- he changed chlorine gas containers and caused damage to his lungs.  Has had problems with chronic cough.  Has tried coriciden and robitussin without relief.  He is c/o barking cough.    REVIEW OF SYSTEM      Review of Systems   Respiratory:  Negative for chest tightness and shortness of breath.    Cardiovascular:  Negative for chest pain.         REVIEWED INFORMATION      Allergies   Allergen Reactions    Levofloxacin Swelling    Codeine Hives and Rash    Pulmicort [Budesonide] Rash     Also itchy    Cashew Nut Oil     Levaquin  [Levofloxacin In D5w] Other (See Comments)    Peanut-Containing Drug Products Hives       Current Outpatient Medications   Medication Sig Dispense Refill    Cetirizine HCl 10 MG CAPS Take by mouth      famotidine (PEPCID) 20 MG tablet Take by mouth      LUTEIN PO Take 1 tablet by mouth      Budeson-Glycopyrrol-Formoterol 160-9-4.8 MCG/ACT AERO Inhale 2 Inhalations into the lungs in the morning and at bedtime 10.7 g 3    promethazine-dextromethorphan (PROMETHAZINE-DM) 6.25-15 MG/5ML syrup Take 5 mLs by mouth 4 times daily as needed for Cough 240 mL 0    JANTOVEN 2 MG tablet Jantoven (Warfarin) 4mg- 5 days, 3mg- 2 days: Thurs and  60 tablet 11    warfarin (COUMADIN) 1 MG tablet Take 1 tablet by mouth daily 90 tablet 3    simvastatin (ZOCOR) 40 MG tablet Take 1 tablet by mouth daily 90 tablet 1    lisinopril (PRINIVIL;ZESTRIL) 10 MG tablet       budesonide-formoterol (SYMBICORT) 160-4.5 MCG/ACT AERO Inhale 1 puff twice a day by inhalation route.      albuterol sulfate HFA

## 2024-03-29 ENCOUNTER — TELEPHONE (OUTPATIENT)
Dept: PULMONOLOGY | Age: 89
End: 2024-03-29

## 2024-03-29 NOTE — TELEPHONE ENCOUNTER
Staff at the main office took a call in regards to the pts [facility]. OV from 2/23, 5/23, and 11/23 were submitted to Medicare instead of [facility]. Writer called pt and spoke to wife. She was assured this is being addressed.

## 2024-03-30 ASSESSMENT — ENCOUNTER SYMPTOMS
SHORTNESS OF BREATH: 0
CHEST TIGHTNESS: 0

## 2024-04-01 NOTE — TELEPHONE ENCOUNTER
Keri-  The dates 2/23, 5/23, and 11/23 were billed to Medicare and should have been billed to [facility]. It was changed by manager in [facility] on 3/29/24. Please let me know if there is anything I can do to follow up on this to be sure [facility] pays these claims. Thank you.

## 2024-04-02 ENCOUNTER — TELEPHONE (OUTPATIENT)
Age: 89
End: 2024-04-02

## 2024-04-02 DIAGNOSIS — J41.8 MIXED SIMPLE AND MUCOPURULENT CHRONIC BRONCHITIS (HCC): Primary | ICD-10-CM

## 2024-04-02 NOTE — TELEPHONE ENCOUNTER
Patient was here recently with you. You prescribed him a cough medicine and it started to work, but it tempered off. He said if that did not work, you told him there is something else that you could send for him.     He would just like the NAME OF IT.  Do not send it in. He will be getting it from [employer]'s [employer].

## 2024-04-03 RX ORDER — HYDROCODONE BITARTRATE AND HOMATROPINE METHYLBROMIDE ORAL SOLUTION 5; 1.5 MG/5ML; MG/5ML
2.5 LIQUID ORAL 4 TIMES DAILY PRN
Qty: 120 ML | Refills: 0 | Status: SHIPPED | OUTPATIENT
Start: 2024-04-03 | End: 2024-04-15

## 2024-04-03 NOTE — TELEPHONE ENCOUNTER
Patient advised he said he will try that medication instead.   [facility] on [address].    decreased strength

## 2024-04-03 NOTE — TELEPHONE ENCOUNTER
Bz has not seen for months  He saw DR. OLIVER recently who prescribed   PROMETHAZINE DM as cough med

## 2024-04-03 NOTE — TELEPHONE ENCOUNTER
If promethazine/dextromethorphan did not work the next step up would be for hycodan syrup (hydrocodone-homatropine)- OK to advise patient

## 2024-04-08 ENCOUNTER — OFFICE VISIT (OUTPATIENT)
Age: 89
End: 2024-04-08
Payer: MEDICARE

## 2024-04-08 VITALS
BODY MASS INDEX: 22.73 KG/M2 | HEART RATE: 82 BPM | RESPIRATION RATE: 18 BRPM | OXYGEN SATURATION: 98 % | DIASTOLIC BLOOD PRESSURE: 78 MMHG | TEMPERATURE: 98 F | SYSTOLIC BLOOD PRESSURE: 128 MMHG | WEIGHT: 150 LBS | HEIGHT: 68 IN

## 2024-04-08 DIAGNOSIS — S27.309D INJURY OF LUNG, SUBSEQUENT ENCOUNTER: ICD-10-CM

## 2024-04-08 DIAGNOSIS — J41.8 MIXED SIMPLE AND MUCOPURULENT CHRONIC BRONCHITIS (HCC): Primary | ICD-10-CM

## 2024-04-08 PROCEDURE — G8420 CALC BMI NORM PARAMETERS: HCPCS | Performed by: FAMILY MEDICINE

## 2024-04-08 PROCEDURE — 1036F TOBACCO NON-USER: CPT | Performed by: FAMILY MEDICINE

## 2024-04-08 PROCEDURE — 1123F ACP DISCUSS/DSCN MKR DOCD: CPT | Performed by: FAMILY MEDICINE

## 2024-04-08 PROCEDURE — 3023F SPIROM DOC REV: CPT | Performed by: FAMILY MEDICINE

## 2024-04-08 PROCEDURE — 99214 OFFICE O/P EST MOD 30 MIN: CPT | Performed by: FAMILY MEDICINE

## 2024-04-08 PROCEDURE — G8427 DOCREV CUR MEDS BY ELIG CLIN: HCPCS | Performed by: FAMILY MEDICINE

## 2024-04-08 ASSESSMENT — ENCOUNTER SYMPTOMS
SHORTNESS OF BREATH: 0
CHEST TIGHTNESS: 0

## 2024-04-08 NOTE — PROGRESS NOTES
[facility]     Date of Visit:  2024  Patient Name: Bill Rodney   Patient :  1935     CHIEF COMPLAINT/HPI:     Bill Rodney is a 88 y.o. male who presents today for an general visit to be evaluated for the following condition(s):  Chief Complaint   Patient presents with    Cough     Patient is here for an ongoing productive cough for a couple years that has recently gotten worse.  States most recent cough syrup sent in has helped a little      Hycodan seems to be helping his cough.  CONT to have f/u with pulm for occupational hazards causing chronic cough.  REVIEW OF SYSTEM      Review of Systems   Respiratory:  Negative for chest tightness and shortness of breath.    Cardiovascular:  Negative for chest pain.         REVIEWED INFORMATION      Allergies   Allergen Reactions    Levofloxacin Swelling    Codeine Hives and Rash    Pulmicort [Budesonide] Rash     Also itchy    Cashew Nut Oil     Levaquin  [Levofloxacin In D5w] Other (See Comments)    Peanut-Containing Drug Products Hives       Current Outpatient Medications   Medication Sig Dispense Refill    HYDROcodone homatropine (HYCODAN) 5-1.5 MG/5ML solution Take 2.5 mLs by mouth 4 times daily as needed (cough) for up to 12 days. Max Daily Amount: 10 mLs 120 mL 0    Cetirizine HCl 10 MG CAPS Take 1 tablet by mouth daily      famotidine (PEPCID) 20 MG tablet Take 1 tablet by mouth daily      LUTEIN PO Take 1 tablet by mouth      Budeson-Glycopyrrol-Formoterol 160-9-4.8 MCG/ACT AERO Inhale 2 Inhalations into the lungs in the morning and at bedtime 10.7 g 3    JANTOVEN 2 MG tablet Jantoven (Warfarin) 4mg- 5 days, 3mg- 2 days: Thurs and  60 tablet 11    warfarin (COUMADIN) 1 MG tablet Take 1 tablet by mouth daily 90 tablet 3    simvastatin (ZOCOR) 40 MG tablet Take 1 tablet by mouth daily 90 tablet 1    lisinopril (PRINIVIL;ZESTRIL) 10 MG tablet Take 1 tablet by mouth as needed      budesonide-formoterol (SYMBICORT) 160-4.5

## 2024-04-10 ENCOUNTER — APPOINTMENT (OUTPATIENT)
Dept: CT IMAGING | Age: 89
DRG: 202 | End: 2024-04-10
Payer: COMMERCIAL

## 2024-04-10 ENCOUNTER — TELEPHONE (OUTPATIENT)
Dept: PULMONOLOGY | Age: 89
End: 2024-04-10

## 2024-04-10 ENCOUNTER — HOSPITAL ENCOUNTER (INPATIENT)
Age: 89
LOS: 5 days | Discharge: HOME | DRG: 202 | End: 2024-04-15
Attending: EMERGENCY MEDICINE | Admitting: STUDENT IN AN ORGANIZED HEALTH CARE EDUCATION/TRAINING PROGRAM
Payer: COMMERCIAL

## 2024-04-10 ENCOUNTER — APPOINTMENT (OUTPATIENT)
Dept: GENERAL RADIOLOGY | Age: 89
DRG: 202 | End: 2024-04-10
Payer: COMMERCIAL

## 2024-04-10 DIAGNOSIS — Z86.711 HISTORY OF PULMONARY EMBOLISM: ICD-10-CM

## 2024-04-10 DIAGNOSIS — M17.12 ARTHRITIS OF LEFT KNEE: ICD-10-CM

## 2024-04-10 DIAGNOSIS — I50.9 ACUTE CONGESTIVE HEART FAILURE, UNSPECIFIED HEART FAILURE TYPE (HCC): ICD-10-CM

## 2024-04-10 DIAGNOSIS — R05.9 COUGH, UNSPECIFIED TYPE: Primary | ICD-10-CM

## 2024-04-10 DIAGNOSIS — R06.09 DYSPNEA ON EXERTION: ICD-10-CM

## 2024-04-10 DIAGNOSIS — J45.50 SEVERE PERSISTENT ASTHMA WITHOUT COMPLICATION: ICD-10-CM

## 2024-04-10 DIAGNOSIS — R79.89 ELEVATED BRAIN NATRIURETIC PEPTIDE (BNP) LEVEL: ICD-10-CM

## 2024-04-10 DIAGNOSIS — Z79.01 LONG TERM CURRENT USE OF ANTICOAGULANT THERAPY: ICD-10-CM

## 2024-04-10 PROBLEM — J45.901 ACUTE ASTHMA EXACERBATION: Status: ACTIVE | Noted: 2024-04-10

## 2024-04-10 LAB
ALBUMIN SERPL-MCNC: 4.3 G/DL (ref 3.5–5.2)
ALBUMIN/GLOB SERPL: 1.7 {RATIO} (ref 1–2.5)
ALP SERPL-CCNC: 102 U/L (ref 40–129)
ALT SERPL-CCNC: 13 U/L (ref 5–41)
ANION GAP SERPL CALCULATED.3IONS-SCNC: 8 MMOL/L (ref 9–17)
AST SERPL-CCNC: 27 U/L
BASOPHILS # BLD: 0.1 K/UL (ref 0–0.2)
BASOPHILS NFR BLD: 1 % (ref 0–2)
BILIRUB SERPL-MCNC: 0.7 MG/DL (ref 0.3–1.2)
BNP SERPL-MCNC: 1506 PG/ML
BUN SERPL-MCNC: 14 MG/DL (ref 8–23)
CALCIUM SERPL-MCNC: 9.2 MG/DL (ref 8.6–10.4)
CHLORIDE SERPL-SCNC: 109 MMOL/L (ref 98–107)
CO2 SERPL-SCNC: 30 MMOL/L (ref 20–31)
CREAT SERPL-MCNC: 1 MG/DL (ref 0.7–1.2)
EOSINOPHIL # BLD: 1.2 K/UL (ref 0–0.4)
EOSINOPHILS RELATIVE PERCENT: 15 % (ref 1–4)
ERYTHROCYTE [DISTWIDTH] IN BLOOD BY AUTOMATED COUNT: 16.6 % (ref 12.5–15.4)
FLUAV AG SPEC QL: NEGATIVE
FLUBV AG SPEC QL: NEGATIVE
GFR SERPL CREATININE-BSD FRML MDRD: 73 ML/MIN/1.73M2
GLUCOSE SERPL-MCNC: 91 MG/DL (ref 70–99)
HCT VFR BLD AUTO: 38.4 % (ref 41–53)
HGB BLD-MCNC: 13 G/DL (ref 13.5–17.5)
INR PPP: 1.7
LACTATE BLDV-SCNC: 1.1 MMOL/L (ref 0.5–1.9)
LYMPHOCYTES NFR BLD: 0.9 K/UL (ref 1–4.8)
LYMPHOCYTES RELATIVE PERCENT: 12 % (ref 24–44)
MAGNESIUM SERPL-MCNC: 2.2 MG/DL (ref 1.6–2.6)
MCH RBC QN AUTO: 32.2 PG (ref 26–34)
MCHC RBC AUTO-ENTMCNC: 33.8 G/DL (ref 31–37)
MCV RBC AUTO: 95.4 FL (ref 80–100)
MONOCYTES NFR BLD: 1 K/UL (ref 0.1–1.2)
MONOCYTES NFR BLD: 12 % (ref 2–11)
NEUTROPHILS NFR BLD: 60 % (ref 36–66)
NEUTS SEG NFR BLD: 4.8 K/UL (ref 1.8–7.7)
PLATELET # BLD AUTO: 224 K/UL (ref 140–450)
PMV BLD AUTO: 6.6 FL (ref 6–12)
POTASSIUM SERPL-SCNC: 4.8 MMOL/L (ref 3.7–5.3)
PROT SERPL-MCNC: 6.8 G/DL (ref 6.4–8.3)
PROTHROMBIN TIME: 17.8 SEC (ref 9.4–12.6)
RBC # BLD AUTO: 4.02 M/UL (ref 4.5–5.9)
RSV BY PCR: NEGATIVE
SARS-COV-2 RDRP RESP QL NAA+PROBE: NOT DETECTED
SODIUM SERPL-SCNC: 140 MMOL/L (ref 135–144)
SODIUM SERPL-SCNC: 147 MMOL/L (ref 135–144)
SPECIMEN DESCRIPTION: NORMAL
SPECIMEN SOURCE: NORMAL
TROPONIN I SERPL HS-MCNC: 30 NG/L (ref 0–22)
WBC OTHER # BLD: 8 K/UL (ref 3.5–11)

## 2024-04-10 PROCEDURE — 71260 CT THORAX DX C+: CPT

## 2024-04-10 PROCEDURE — 6360000002 HC RX W HCPCS: Performed by: PHYSICIAN ASSISTANT

## 2024-04-10 PROCEDURE — 96375 TX/PRO/DX INJ NEW DRUG ADDON: CPT

## 2024-04-10 PROCEDURE — 84295 ASSAY OF SERUM SODIUM: CPT

## 2024-04-10 PROCEDURE — 83880 ASSAY OF NATRIURETIC PEPTIDE: CPT

## 2024-04-10 PROCEDURE — 87635 SARS-COV-2 COVID-19 AMP PRB: CPT

## 2024-04-10 PROCEDURE — 85610 PROTHROMBIN TIME: CPT

## 2024-04-10 PROCEDURE — 83605 ASSAY OF LACTIC ACID: CPT

## 2024-04-10 PROCEDURE — 87040 BLOOD CULTURE FOR BACTERIA: CPT

## 2024-04-10 PROCEDURE — 83735 ASSAY OF MAGNESIUM: CPT

## 2024-04-10 PROCEDURE — 87798 DETECT AGENT NOS DNA AMP: CPT

## 2024-04-10 PROCEDURE — 99285 EMERGENCY DEPT VISIT HI MDM: CPT

## 2024-04-10 PROCEDURE — 93005 ELECTROCARDIOGRAM TRACING: CPT

## 2024-04-10 PROCEDURE — 87804 INFLUENZA ASSAY W/OPTIC: CPT

## 2024-04-10 PROCEDURE — 36415 COLL VENOUS BLD VENIPUNCTURE: CPT

## 2024-04-10 PROCEDURE — 85025 COMPLETE CBC W/AUTO DIFF WBC: CPT

## 2024-04-10 PROCEDURE — 6360000004 HC RX CONTRAST MEDICATION: Performed by: PHYSICIAN ASSISTANT

## 2024-04-10 PROCEDURE — 71046 X-RAY EXAM CHEST 2 VIEWS: CPT

## 2024-04-10 PROCEDURE — 6360000002 HC RX W HCPCS: Performed by: EMERGENCY MEDICINE

## 2024-04-10 PROCEDURE — 1200000000 HC SEMI PRIVATE

## 2024-04-10 PROCEDURE — 96374 THER/PROPH/DIAG INJ IV PUSH: CPT

## 2024-04-10 PROCEDURE — 6370000000 HC RX 637 (ALT 250 FOR IP): Performed by: STUDENT IN AN ORGANIZED HEALTH CARE EDUCATION/TRAINING PROGRAM

## 2024-04-10 PROCEDURE — 94640 AIRWAY INHALATION TREATMENT: CPT

## 2024-04-10 PROCEDURE — 80053 COMPREHEN METABOLIC PANEL: CPT

## 2024-04-10 PROCEDURE — 2580000003 HC RX 258: Performed by: PHYSICIAN ASSISTANT

## 2024-04-10 PROCEDURE — 84484 ASSAY OF TROPONIN QUANT: CPT

## 2024-04-10 RX ORDER — ASPIRIN 81 MG/1
81 TABLET ORAL DAILY
Status: DISCONTINUED | OUTPATIENT
Start: 2024-04-11 | End: 2024-04-15 | Stop reason: HOSPADM

## 2024-04-10 RX ORDER — MAGNESIUM SULFATE IN WATER 40 MG/ML
2000 INJECTION, SOLUTION INTRAVENOUS PRN
Status: DISCONTINUED | OUTPATIENT
Start: 2024-04-10 | End: 2024-04-15 | Stop reason: HOSPADM

## 2024-04-10 RX ORDER — HYDROCODONE BITARTRATE AND HOMATROPINE METHYLBROMIDE ORAL SOLUTION 5; 1.5 MG/5ML; MG/5ML
2.5 LIQUID ORAL 4 TIMES DAILY PRN
Status: DISCONTINUED | OUTPATIENT
Start: 2024-04-10 | End: 2024-04-11 | Stop reason: CLARIF

## 2024-04-10 RX ORDER — SODIUM CHLORIDE 0.9 % (FLUSH) 0.9 %
10 SYRINGE (ML) INJECTION ONCE
Status: COMPLETED | OUTPATIENT
Start: 2024-04-10 | End: 2024-04-10

## 2024-04-10 RX ORDER — FUROSEMIDE 10 MG/ML
20 INJECTION INTRAMUSCULAR; INTRAVENOUS ONCE
Status: COMPLETED | OUTPATIENT
Start: 2024-04-10 | End: 2024-04-10

## 2024-04-10 RX ORDER — SODIUM CHLORIDE 0.9 % (FLUSH) 0.9 %
5-40 SYRINGE (ML) INJECTION PRN
Status: DISCONTINUED | OUTPATIENT
Start: 2024-04-10 | End: 2024-04-15 | Stop reason: HOSPADM

## 2024-04-10 RX ORDER — POLYETHYLENE GLYCOL 3350 17 G/17G
17 POWDER, FOR SOLUTION ORAL DAILY PRN
Status: DISCONTINUED | OUTPATIENT
Start: 2024-04-10 | End: 2024-04-15 | Stop reason: HOSPADM

## 2024-04-10 RX ORDER — ONDANSETRON 4 MG/1
4 TABLET, ORALLY DISINTEGRATING ORAL EVERY 8 HOURS PRN
Status: DISCONTINUED | OUTPATIENT
Start: 2024-04-10 | End: 2024-04-15 | Stop reason: HOSPADM

## 2024-04-10 RX ORDER — ALBUTEROL SULFATE 90 UG/1
2 AEROSOL, METERED RESPIRATORY (INHALATION) EVERY 6 HOURS PRN
Status: DISCONTINUED | OUTPATIENT
Start: 2024-04-10 | End: 2024-04-15 | Stop reason: HOSPADM

## 2024-04-10 RX ORDER — ONDANSETRON 2 MG/ML
4 INJECTION INTRAMUSCULAR; INTRAVENOUS EVERY 6 HOURS PRN
Status: DISCONTINUED | OUTPATIENT
Start: 2024-04-10 | End: 2024-04-15 | Stop reason: HOSPADM

## 2024-04-10 RX ORDER — ACETAMINOPHEN 325 MG/1
650 TABLET ORAL EVERY 6 HOURS PRN
Status: DISCONTINUED | OUTPATIENT
Start: 2024-04-10 | End: 2024-04-15 | Stop reason: HOSPADM

## 2024-04-10 RX ORDER — SODIUM CHLORIDE 9 MG/ML
INJECTION, SOLUTION INTRAVENOUS PRN
Status: DISCONTINUED | OUTPATIENT
Start: 2024-04-10 | End: 2024-04-15 | Stop reason: HOSPADM

## 2024-04-10 RX ORDER — WARFARIN SODIUM 1 MG/1
3 TABLET ORAL
Status: DISCONTINUED | OUTPATIENT
Start: 2024-04-10 | End: 2024-04-11

## 2024-04-10 RX ORDER — CARVEDILOL 12.5 MG/1
12.5 TABLET ORAL 2 TIMES DAILY
Status: DISCONTINUED | OUTPATIENT
Start: 2024-04-10 | End: 2024-04-15 | Stop reason: HOSPADM

## 2024-04-10 RX ORDER — ATORVASTATIN CALCIUM 10 MG/1
10 TABLET, FILM COATED ORAL DAILY
Status: DISCONTINUED | OUTPATIENT
Start: 2024-04-11 | End: 2024-04-15 | Stop reason: HOSPADM

## 2024-04-10 RX ORDER — POTASSIUM CHLORIDE 7.45 MG/ML
10 INJECTION INTRAVENOUS PRN
Status: DISCONTINUED | OUTPATIENT
Start: 2024-04-10 | End: 2024-04-15 | Stop reason: HOSPADM

## 2024-04-10 RX ORDER — ENOXAPARIN SODIUM 100 MG/ML
1 INJECTION SUBCUTANEOUS 2 TIMES DAILY
Status: DISCONTINUED | OUTPATIENT
Start: 2024-04-10 | End: 2024-04-10

## 2024-04-10 RX ORDER — ACETAMINOPHEN 650 MG/1
650 SUPPOSITORY RECTAL EVERY 6 HOURS PRN
Status: DISCONTINUED | OUTPATIENT
Start: 2024-04-10 | End: 2024-04-15 | Stop reason: HOSPADM

## 2024-04-10 RX ORDER — WARFARIN SODIUM 2 MG/1
4 TABLET ORAL
Status: DISCONTINUED | OUTPATIENT
Start: 2024-04-16 | End: 2024-04-11

## 2024-04-10 RX ORDER — LISINOPRIL 10 MG/1
10 TABLET ORAL PRN
Status: DISCONTINUED | OUTPATIENT
Start: 2024-04-10 | End: 2024-04-15 | Stop reason: HOSPADM

## 2024-04-10 RX ORDER — ALBUTEROL SULFATE 2.5 MG/3ML
2.5 SOLUTION RESPIRATORY (INHALATION) ONCE
Status: COMPLETED | OUTPATIENT
Start: 2024-04-10 | End: 2024-04-10

## 2024-04-10 RX ORDER — IPRATROPIUM BROMIDE AND ALBUTEROL SULFATE 2.5; .5 MG/3ML; MG/3ML
1 SOLUTION RESPIRATORY (INHALATION) EVERY 6 HOURS PRN
Status: DISCONTINUED | OUTPATIENT
Start: 2024-04-10 | End: 2024-04-12

## 2024-04-10 RX ORDER — POTASSIUM CHLORIDE 20 MEQ/1
40 TABLET, EXTENDED RELEASE ORAL PRN
Status: DISCONTINUED | OUTPATIENT
Start: 2024-04-10 | End: 2024-04-15 | Stop reason: HOSPADM

## 2024-04-10 RX ORDER — CALCIUM CARBONATE/VITAMIN D3 600 MG-10
1 TABLET ORAL DAILY
Status: DISCONTINUED | OUTPATIENT
Start: 2024-04-11 | End: 2024-04-15 | Stop reason: HOSPADM

## 2024-04-10 RX ORDER — 0.9 % SODIUM CHLORIDE 0.9 %
80 INTRAVENOUS SOLUTION INTRAVENOUS ONCE
Status: DISCONTINUED | OUTPATIENT
Start: 2024-04-10 | End: 2024-04-15 | Stop reason: HOSPADM

## 2024-04-10 RX ORDER — SODIUM CHLORIDE 0.9 % (FLUSH) 0.9 %
5-40 SYRINGE (ML) INJECTION EVERY 12 HOURS SCHEDULED
Status: DISCONTINUED | OUTPATIENT
Start: 2024-04-11 | End: 2024-04-15 | Stop reason: HOSPADM

## 2024-04-10 RX ORDER — HYDRALAZINE HYDROCHLORIDE 20 MG/ML
10 INJECTION INTRAMUSCULAR; INTRAVENOUS ONCE
Status: COMPLETED | OUTPATIENT
Start: 2024-04-10 | End: 2024-04-10

## 2024-04-10 RX ORDER — WARFARIN SODIUM 2 MG/1
2 TABLET ORAL DAILY
Status: DISCONTINUED | OUTPATIENT
Start: 2024-04-11 | End: 2024-04-10

## 2024-04-10 RX ADMIN — FUROSEMIDE 20 MG: 10 INJECTION, SOLUTION INTRAMUSCULAR; INTRAVENOUS at 21:16

## 2024-04-10 RX ADMIN — SODIUM CHLORIDE, PRESERVATIVE FREE 10 ML: 5 INJECTION INTRAVENOUS at 18:19

## 2024-04-10 RX ADMIN — CARVEDILOL 12.5 MG: 12.5 TABLET, FILM COATED ORAL at 22:51

## 2024-04-10 RX ADMIN — Medication 80 ML: at 18:19

## 2024-04-10 RX ADMIN — ALBUTEROL SULFATE 2.5 MG: 2.5 SOLUTION RESPIRATORY (INHALATION) at 19:05

## 2024-04-10 RX ADMIN — HYDRALAZINE HYDROCHLORIDE 10 MG: 20 INJECTION INTRAMUSCULAR; INTRAVENOUS at 21:16

## 2024-04-10 RX ADMIN — IOPAMIDOL 75 ML: 755 INJECTION, SOLUTION INTRAVENOUS at 18:19

## 2024-04-10 ASSESSMENT — PAIN - FUNCTIONAL ASSESSMENT: PAIN_FUNCTIONAL_ASSESSMENT: 0-10

## 2024-04-10 ASSESSMENT — PAIN SCALES - GENERAL
PAINLEVEL_OUTOF10: 8
PAINLEVEL_OUTOF10: 0

## 2024-04-10 ASSESSMENT — PAIN DESCRIPTION - LOCATION: LOCATION: CHEST

## 2024-04-10 NOTE — TELEPHONE ENCOUNTER
Patient called office in distress with breathing.  Very heavy wheezing, rasping, difficulty speaking.  Cough sounded very deep and congested. Recommend patient go to hospital.  Wife present with patient at home and she does not drive.  Recommend patient call 911>  Patient was going to call son to see if he could take him to ED.  Advised to call 911 if worsened.

## 2024-04-10 NOTE — ED PROVIDER NOTES
[facility] Emergency Department  [address]  [place] [facility] [address]  Phone: [phone]  Fax: [phone]      Attending Physician Attestation    I performed a history and physical examination of the patient and discussed management with the mid level provideer. I reviewed the mid level provider's note and agree with the documented findings and plan of care. Any areas of disagreement are noted on the chart. I was personally present for the key portions of any procedures. I have documented in the chart those procedures where I was not present during the key portions. I have reviewed the emergency nurses triage note. I agree with the chief complaint, past medical history, past surgical history, allergies, medications, social and family history as documented unless otherwise noted below. Documentation of the HPI, Physical Exam and Medical Decision Making performed by mid level providers is based on my personal performance of the HPI, PE and MDM. For Physician Assistant/ Nurse Practitioner cases/documentation I have personally evaluated this patient and have completed at least one if not all key elements of the E/M (history, physical exam, and MDM). Additional findings are as noted.      CHIEF COMPLAINT       Chief Complaint   Patient presents with    Cough     Pt arrives with co chronic cough dt previous occupation. Pt states dr ugalde can not get him in until may 29th. Pt states he has been trying a nebulizer and recently cough syrup without relief.          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 
level    4. Arthritis of left knee          DISPOSITION/PLAN   DISPOSITION Decision To Admit 04/10/2024 08:36:43 PM      OUTPATIENT FOLLOW UP THE PATIENT:  No follow-up provider specified.    YANET Jett Jennifer, PA-C  04/10/24 2783

## 2024-04-11 PROBLEM — R79.1 SUBTHERAPEUTIC INTERNATIONAL NORMALIZED RATIO (INR): Status: ACTIVE | Noted: 2024-04-11

## 2024-04-11 PROBLEM — R79.1 SUBTHERAPEUTIC INTERNATIONAL NORMALIZED RATIO (INR): Chronic | Status: ACTIVE | Noted: 2024-04-11

## 2024-04-11 PROBLEM — I50.9 CHF (CONGESTIVE HEART FAILURE) (HCC): Status: ACTIVE | Noted: 2024-04-11

## 2024-04-11 PROBLEM — R79.89 ELEVATED BRAIN NATRIURETIC PEPTIDE (BNP) LEVEL: Status: ACTIVE | Noted: 2024-04-11

## 2024-04-11 PROBLEM — Z86.711 HISTORY OF PULMONARY EMBOLISM: Status: ACTIVE | Noted: 2024-04-11

## 2024-04-11 LAB
ALBUMIN SERPL-MCNC: 3.9 G/DL (ref 3.5–5.2)
ALBUMIN/GLOB SERPL: 1.7 {RATIO} (ref 1–2.5)
ALP SERPL-CCNC: 98 U/L (ref 40–129)
ALT SERPL-CCNC: 11 U/L (ref 5–41)
ANION GAP SERPL CALCULATED.3IONS-SCNC: 13 MMOL/L (ref 9–17)
AST SERPL-CCNC: 25 U/L
BASOPHILS # BLD: 0 K/UL (ref 0–0.2)
BASOPHILS NFR BLD: 1 % (ref 0–2)
BILIRUB SERPL-MCNC: 0.8 MG/DL (ref 0.3–1.2)
BUN SERPL-MCNC: 16 MG/DL (ref 8–23)
CALCIUM SERPL-MCNC: 9.1 MG/DL (ref 8.6–10.4)
CHLORIDE SERPL-SCNC: 106 MMOL/L (ref 98–107)
CO2 SERPL-SCNC: 25 MMOL/L (ref 20–31)
CREAT SERPL-MCNC: 0.8 MG/DL (ref 0.7–1.2)
EOSINOPHIL # BLD: 0.7 K/UL (ref 0–0.4)
EOSINOPHILS RELATIVE PERCENT: 11 % (ref 1–4)
ERYTHROCYTE [DISTWIDTH] IN BLOOD BY AUTOMATED COUNT: 16.4 % (ref 12.5–15.4)
GFR SERPL CREATININE-BSD FRML MDRD: 86 ML/MIN/1.73M2
GLUCOSE SERPL-MCNC: 78 MG/DL (ref 70–99)
HCT VFR BLD AUTO: 36.9 % (ref 41–53)
HGB BLD-MCNC: 12.8 G/DL (ref 13.5–17.5)
INR PPP: 1.7
LYMPHOCYTES NFR BLD: 1 K/UL (ref 1–4.8)
LYMPHOCYTES RELATIVE PERCENT: 15 % (ref 24–44)
MCH RBC QN AUTO: 32.8 PG (ref 26–34)
MCHC RBC AUTO-ENTMCNC: 34.6 G/DL (ref 31–37)
MCV RBC AUTO: 94.7 FL (ref 80–100)
MONOCYTES NFR BLD: 0.9 K/UL (ref 0.1–1.2)
MONOCYTES NFR BLD: 13 % (ref 2–11)
NEUTROPHILS NFR BLD: 60 % (ref 36–66)
NEUTS SEG NFR BLD: 4 K/UL (ref 1.8–7.7)
PLATELET # BLD AUTO: 200 K/UL (ref 140–450)
PMV BLD AUTO: 7 FL (ref 6–12)
POTASSIUM SERPL-SCNC: 3.6 MMOL/L (ref 3.7–5.3)
PROT SERPL-MCNC: 6.2 G/DL (ref 6.4–8.3)
PROTHROMBIN TIME: 17.9 SEC (ref 9.4–12.6)
RBC # BLD AUTO: 3.89 M/UL (ref 4.5–5.9)
SODIUM SERPL-SCNC: 144 MMOL/L (ref 135–144)
TROPONIN I SERPL HS-MCNC: 29 NG/L (ref 0–22)
WBC OTHER # BLD: 6.7 K/UL (ref 3.5–11)

## 2024-04-11 PROCEDURE — 1200000000 HC SEMI PRIVATE

## 2024-04-11 PROCEDURE — 85610 PROTHROMBIN TIME: CPT

## 2024-04-11 PROCEDURE — 6370000000 HC RX 637 (ALT 250 FOR IP): Performed by: INTERNAL MEDICINE

## 2024-04-11 PROCEDURE — 2580000003 HC RX 258: Performed by: STUDENT IN AN ORGANIZED HEALTH CARE EDUCATION/TRAINING PROGRAM

## 2024-04-11 PROCEDURE — 99223 1ST HOSP IP/OBS HIGH 75: CPT | Performed by: STUDENT IN AN ORGANIZED HEALTH CARE EDUCATION/TRAINING PROGRAM

## 2024-04-11 PROCEDURE — 6370000000 HC RX 637 (ALT 250 FOR IP): Performed by: STUDENT IN AN ORGANIZED HEALTH CARE EDUCATION/TRAINING PROGRAM

## 2024-04-11 PROCEDURE — 6360000002 HC RX W HCPCS: Performed by: STUDENT IN AN ORGANIZED HEALTH CARE EDUCATION/TRAINING PROGRAM

## 2024-04-11 PROCEDURE — 94640 AIRWAY INHALATION TREATMENT: CPT

## 2024-04-11 PROCEDURE — 36415 COLL VENOUS BLD VENIPUNCTURE: CPT

## 2024-04-11 PROCEDURE — 85025 COMPLETE CBC W/AUTO DIFF WBC: CPT

## 2024-04-11 PROCEDURE — 99254 IP/OBS CNSLTJ NEW/EST MOD 60: CPT | Performed by: INTERNAL MEDICINE

## 2024-04-11 PROCEDURE — 84484 ASSAY OF TROPONIN QUANT: CPT

## 2024-04-11 PROCEDURE — 80053 COMPREHEN METABOLIC PANEL: CPT

## 2024-04-11 PROCEDURE — 94760 N-INVAS EAR/PLS OXIMETRY 1: CPT

## 2024-04-11 RX ORDER — WARFARIN SODIUM 1 MG/1
4 TABLET ORAL
Status: COMPLETED | OUTPATIENT
Start: 2024-04-11 | End: 2024-04-11

## 2024-04-11 RX ORDER — BUDESONIDE AND FORMOTEROL FUMARATE DIHYDRATE 160; 4.5 UG/1; UG/1
2 AEROSOL RESPIRATORY (INHALATION)
Status: DISCONTINUED | OUTPATIENT
Start: 2024-04-11 | End: 2024-04-15 | Stop reason: HOSPADM

## 2024-04-11 RX ORDER — HYDROCODONE POLISTIREX AND CHLORPHENIRAMINE POLISTIREX 10; 8 MG/5ML; MG/5ML
2.5 SUSPENSION, EXTENDED RELEASE ORAL EVERY 12 HOURS PRN
Status: DISCONTINUED | OUTPATIENT
Start: 2024-04-11 | End: 2024-04-15 | Stop reason: HOSPADM

## 2024-04-11 RX ORDER — ALBUTEROL SULFATE 2.5 MG/3ML
2.5 SOLUTION RESPIRATORY (INHALATION)
Status: DISCONTINUED | OUTPATIENT
Start: 2024-04-11 | End: 2024-04-12

## 2024-04-11 RX ADMIN — CARVEDILOL 12.5 MG: 12.5 TABLET, FILM COATED ORAL at 08:18

## 2024-04-11 RX ADMIN — CARVEDILOL 12.5 MG: 12.5 TABLET, FILM COATED ORAL at 21:27

## 2024-04-11 RX ADMIN — ALBUTEROL SULFATE 2.5 MG: 2.5 SOLUTION RESPIRATORY (INHALATION) at 20:08

## 2024-04-11 RX ADMIN — SODIUM CHLORIDE, PRESERVATIVE FREE 10 ML: 5 INJECTION INTRAVENOUS at 08:18

## 2024-04-11 RX ADMIN — SODIUM CHLORIDE, PRESERVATIVE FREE 10 ML: 5 INJECTION INTRAVENOUS at 21:30

## 2024-04-11 RX ADMIN — ASPIRIN 81 MG: 81 TABLET, COATED ORAL at 08:18

## 2024-04-11 RX ADMIN — BUDESONIDE AND FORMOTEROL FUMARATE DIHYDRATE 2 PUFF: 160; 4.5 AEROSOL RESPIRATORY (INHALATION) at 19:53

## 2024-04-11 RX ADMIN — WARFARIN SODIUM 4 MG: 1 TABLET ORAL at 17:13

## 2024-04-11 RX ADMIN — ALBUTEROL SULFATE 2 PUFF: 90 AEROSOL, METERED RESPIRATORY (INHALATION) at 19:53

## 2024-04-11 RX ADMIN — CALCIUM CARBONATE 600 MG (1,500 MG)-VITAMIN D3 400 UNIT TABLET 1 TABLET: at 08:18

## 2024-04-11 RX ADMIN — ALBUTEROL SULFATE 2 PUFF: 90 AEROSOL, METERED RESPIRATORY (INHALATION) at 14:47

## 2024-04-11 RX ADMIN — ATORVASTATIN CALCIUM 10 MG: 10 TABLET, FILM COATED ORAL at 08:17

## 2024-04-11 ASSESSMENT — PAIN SCALES - GENERAL: PAINLEVEL_OUTOF10: 0

## 2024-04-11 NOTE — PLAN OF CARE
Problem: Discharge Planning  Goal: Discharge to home or other facility with appropriate resources  4/11/2024 0929 by Thania Vargas RN  Outcome: Progressing  4/11/2024 0215 by Angela De Jesus RN  Outcome: Progressing  Flowsheets (Taken 4/11/2024 0215)  Discharge to home or other facility with appropriate resources:   Identify barriers to discharge with patient and caregiver   Arrange for needed discharge resources and transportation as appropriate   Identify discharge learning needs (meds, wound care, etc)     Problem: Pain  Goal: Verbalizes/displays adequate comfort level or baseline comfort level  4/11/2024 0929 by Thania Vargas RN  Outcome: Progressing  4/11/2024 0215 by Angela De Jesus RN  Outcome: Progressing  Flowsheets (Taken 4/11/2024 0215)  Verbalizes/displays adequate comfort level or baseline comfort level:   Encourage patient to monitor pain and request assistance   Assess pain using appropriate pain scale   Administer analgesics based on type and severity of pain and evaluate response   Implement non-pharmacological measures as appropriate and evaluate response     Problem: ABCDS Injury Assessment  Goal: Absence of physical injury  4/11/2024 0929 by Thania Vargas RN  Outcome: Progressing  4/11/2024 0215 by Angela De Jesus RN  Outcome: Progressing  Flowsheets (Taken 4/11/2024 0215)  Absence of Physical Injury: Implement safety measures based on patient assessment     Problem: Safety - Adult  Goal: Free from fall injury  4/11/2024 0929 by Thania Vargas RN  Outcome: Progressing  4/11/2024 0215 by Angela De Jesus RN  Outcome: Progressing  Flowsheets (Taken 4/11/2024 0215)  Free From Fall Injury: Instruct family/caregiver on patient safety

## 2024-04-11 NOTE — PLAN OF CARE
Problem: Discharge Planning  Goal: Discharge to home or other facility with appropriate resources  Outcome: Progressing  Flowsheets (Taken 4/11/2024 0215)  Discharge to home or other facility with appropriate resources:   Identify barriers to discharge with patient and caregiver   Arrange for needed discharge resources and transportation as appropriate   Identify discharge learning needs (meds, wound care, etc)     Problem: Pain  Goal: Verbalizes/displays adequate comfort level or baseline comfort level  Outcome: Progressing  Flowsheets (Taken 4/11/2024 0215)  Verbalizes/displays adequate comfort level or baseline comfort level:   Encourage patient to monitor pain and request assistance   Assess pain using appropriate pain scale   Administer analgesics based on type and severity of pain and evaluate response   Implement non-pharmacological measures as appropriate and evaluate response     Problem: ABCDS Injury Assessment  Goal: Absence of physical injury  Outcome: Progressing  Flowsheets (Taken 4/11/2024 0215)  Absence of Physical Injury: Implement safety measures based on patient assessment     Problem: Safety - Adult  Goal: Free from fall injury  Outcome: Progressing  Flowsheets (Taken 4/11/2024 0215)  Free From Fall Injury: Instruct family/caregiver on patient safety

## 2024-04-11 NOTE — CONSULTS
PULMONARY & CRITICAL CARE MEDICINE CONSULT NOTE     Patient:  Bill Rodney  MRN: [id number]  Admit date: 4/10/2024  Primary Care Physician: Kareem Pal MD  Consulting Physician: Cruz Gill MD  CODE Status: Full Code   LOS: 1    HISTORY     CHIEF COMPLAINT/REASON FOR CONSULT:    Asthma exacerbation    HISTORY OF PRESENT ILLNESS:  The patient is a 88 y.o. male he has history of severe persistent asthma, history of chlorine exposure in the past according to patient  he retired in 1992.  He is followed by Dr. Kramer recently seen in February 2024.  He had called the office because of increasing cough congestion and wheezing.  He was instructed to come to emergency room.  According patient he is having more symptoms for last 10 to 14 days he did not have any fever chills.  According patient he is bringing a lot of his sputum which is usually whitish in color.  He did not have any headache body ache or flulike symptoms.  Cough is daily he also have chronic cough but recently for last 1 to 2 weeks he has increased cough and increased sputum production.  He also complained of chest pain according to patient left-sided pain which she did not have before but attributed this to his cough there is no radiation of chest pain it is nonpleuritic in nature he denies hemoptysis.  Since admission he did not have any fever hemodynamically stable he is on room air maintaining saturation.  He was supposed to be on Symbicort and Incruse according to the last note from Dr. Kramer but patient remember taking DuoNeb aerosol not on inhaler except albuterol he was instructed to take Breztri by his primary physician but patient was not able to afford it.    In the emergency room he had a CTA chest done which was negative for pulm embolism shows chronic right upper lobe pleural-based area of atelectasis/scarring.  Coronary calcification was seen.  Chest x-ray did not show any consolidation or infiltrate  His proBNP was 
AM     CMP:    Lab Results   Component Value Date/Time     04/11/2024 06:58 AM    K 3.6 04/11/2024 06:58 AM     04/11/2024 06:58 AM    CO2 25 04/11/2024 06:58 AM    BUN 16 04/11/2024 06:58 AM    CREATININE 0.8 04/11/2024 06:58 AM    GFRAA >60 11/08/2021 12:40 PM    AGRATIO 1.7 04/11/2024 06:58 AM    LABGLOM 86 04/11/2024 06:58 AM    GLUCOSE 78 04/11/2024 06:58 AM    GLUCOSE 157 11/22/2023 10:40 AM    PROT 6.2 04/11/2024 06:58 AM    CALCIUM 9.1 04/11/2024 06:58 AM    BILITOT 0.8 04/11/2024 06:58 AM    ALKPHOS 98 04/11/2024 06:58 AM    AST 25 04/11/2024 06:58 AM    ALT 11 04/11/2024 06:58 AM       Imaging:  Chest x-ray: 4/10/2024  IMPRESSION:  No acute cardiopulmonary disease    CTA chest: 4/10/2024  IMPRESSION:  1. No evidence of pulmonary embolism or acute pulmonary abnormality.  2. Chronic area of right apical scarring.    CARDIOVASCULAR STUDIES:     EKG:   Encounter Date: 04/10/24   EKG 12 Lead   Result Value    Ventricular Rate 79    Atrial Rate 79    P-R Interval 226    QRS Duration 100    Q-T Interval 390    QTc Calculation (Bazett) 447    P Axis 64    R Axis 8    T Axis 81    Narrative    Sinus rhythm with 1st degree A-V block with occasional Premature ventricular complexes  Otherwise normal ECG  When compared with ECG of 25-JUL-2023 02:06,  Premature ventricular complexes are now Present        ECHO: 2021  Narrative    Left Ventricle: There is mild asymmetric increased wall  thickness/hypertrophy. Systolic function is low normal to mildly decreased  with an ejection fraction of 50-55%. See wall score diagram for wall  motion abnormalities. Normal diastolic function is present.    Aortic Valve: There is mild regurgitation. There is no evidence of  aortic valve stenosis.    Mitral Valve: There is mild regurgitation. There is no evidence of  mitral valve stenosis.    Tricuspid Valve: There is mild regurgitation. There is no evidence of  tricuspid valve stenosis.    Pulmonic Valve: There is trace

## 2024-04-11 NOTE — H&P
dermatitis 10/8/2020   • Backache 10/8/2020   • Bacterial pneumonia 2017   • Benign neoplasm of soft tissue 2013   • Benign prostatic hyperplasia 2013   • Candidiasis of mouth 2017   • 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 2014   • 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 2020   • Chronic obstructive lung disease (HCC) 2011   • Chronic obstructive pulmonary disease (HCC) 10/8/2020   • Chronic rhinitis 2020   • Chronic sinusitis 2020   • Deep vein thrombosis of lower extremity ([facility]) 2017   • Diarrhea 10/8/2020   • Disorder of arteries and arterioles, unspecified ([facility]) 10/8/2020   • Diverticulitis of colon 10/8/2020   • Dizziness and giddiness 2011   • Dysphagia 2017   • Enlarged prostate 10/8/2020   • Environmental allergies 2017   • Fatigue 10/8/2020   • Gabe hematuria 2013   • Frostbite with tissue necrosis of nose 10/8/2020   • Gastroesophageal reflux disease 2017   • Heartburn 2011   • 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   • Herpes zoster 2017   • Heterozygous factor V Leiden mutation ([facility]) 10/8/2020   • Hypercholesterolemia 2017   • Hypertension 2017   • Increased frequency of urination 2011   • Low back strain 10/8/2020   • Lumbar sprain 2014   • Lumbosacral spondylosis without myelopathy 2014   • 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   • Malignant tumor of prostate ([facility]) 2013   • Mild intermittent asthma without complication 10/8/2020   • Neoplasm of bone 2014   • Nocturia 2011   • Omphalitis of  10/8/2020   • Pain in left leg 10/8/2020   • Peripheral vascular disease ([facility]) 2011   • Post-nasal drip 2020   • Postoperative seroma 10/15/2019   • 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

## 2024-04-12 ENCOUNTER — APPOINTMENT (OUTPATIENT)
Age: 89
DRG: 202 | End: 2024-04-12
Payer: COMMERCIAL

## 2024-04-12 PROBLEM — R05.9 COUGH: Status: ACTIVE | Noted: 2017-08-07

## 2024-04-12 LAB
ECHO AO ROOT DIAM: 3.4 CM
ECHO AO ROOT INDEX: 1.92 CM/M2
ECHO AV MEAN GRADIENT: 3 MMHG
ECHO AV MEAN VELOCITY: 0.8 M/S
ECHO AV PEAK GRADIENT: 5 MMHG
ECHO AV PEAK VELOCITY: 1.1 M/S
ECHO AV VELOCITY RATIO: 0.73
ECHO AV VTI: 25.4 CM
ECHO BSA: 1.76 M2
ECHO EST RA PRESSURE: 5 MMHG
ECHO LA AREA 2C: 19 CM2
ECHO LA AREA 4C: 22.3 CM2
ECHO LA DIAMETER INDEX: 2.37 CM/M2
ECHO LA DIAMETER: 4.2 CM
ECHO LA MAJOR AXIS: 5.4 CM
ECHO LA MINOR AXIS: 5.3 CM
ECHO LA TO AORTIC ROOT RATIO: 1.24
ECHO LA VOL BP: 64 ML (ref 18–58)
ECHO LA VOL MOD A2C: 56 ML (ref 18–58)
ECHO LA VOL MOD A4C: 74 ML (ref 18–58)
ECHO LA VOL/BSA BIPLANE: 36 ML/M2 (ref 16–34)
ECHO LA VOLUME INDEX MOD A2C: 32 ML/M2 (ref 16–34)
ECHO LA VOLUME INDEX MOD A4C: 42 ML/M2 (ref 16–34)
ECHO LV E' LATERAL VELOCITY: 6 CM/S
ECHO LV E' SEPTAL VELOCITY: 4 CM/S
ECHO LV FRACTIONAL SHORTENING: 16 % (ref 28–44)
ECHO LV INTERNAL DIMENSION DIASTOLE INDEX: 2.49 CM/M2
ECHO LV INTERNAL DIMENSION DIASTOLIC: 4.4 CM (ref 4.2–5.9)
ECHO LV INTERNAL DIMENSION SYSTOLIC INDEX: 2.09 CM/M2
ECHO LV INTERNAL DIMENSION SYSTOLIC: 3.7 CM
ECHO LV IVSD: 1.2 CM (ref 0.6–1)
ECHO LV MASS 2D: 180 G (ref 88–224)
ECHO LV MASS INDEX 2D: 101.7 G/M2 (ref 49–115)
ECHO LV POSTERIOR WALL DIASTOLIC: 1.1 CM (ref 0.6–1)
ECHO LV RELATIVE WALL THICKNESS RATIO: 0.5
ECHO LVOT AREA: 3.5 CM2
ECHO LVOT AV VTI INDEX: 0.75
ECHO LVOT DIAM: 2.1 CM
ECHO LVOT MEAN GRADIENT: 1 MMHG
ECHO LVOT PEAK GRADIENT: 2 MMHG
ECHO LVOT PEAK VELOCITY: 0.8 M/S
ECHO LVOT STROKE VOLUME INDEX: 37.2 ML/M2
ECHO LVOT SV: 65.8 ML
ECHO LVOT VTI: 19 CM
ECHO MV E DECELERATION TIME (DT): 92 MS
ECHO MV E VELOCITY: 1.05 M/S
ECHO MV E/E' LATERAL: 17.5
ECHO MV E/E' RATIO (AVERAGED): 21.88
ECHO RIGHT VENTRICULAR SYSTOLIC PRESSURE (RVSP): 44 MMHG
ECHO RV BASAL DIMENSION: 3.4 CM
ECHO RV FREE WALL PEAK S': 13 CM/S
ECHO TV REGURGITANT MAX VELOCITY: 3.14 M/S
ECHO TV REGURGITANT PEAK GRADIENT: 39 MMHG
INR PPP: 2
PROTHROMBIN TIME: 20 SEC (ref 9.4–12.6)

## 2024-04-12 PROCEDURE — 1200000000 HC SEMI PRIVATE

## 2024-04-12 PROCEDURE — 97116 GAIT TRAINING THERAPY: CPT

## 2024-04-12 PROCEDURE — 97166 OT EVAL MOD COMPLEX 45 MIN: CPT

## 2024-04-12 PROCEDURE — 6370000000 HC RX 637 (ALT 250 FOR IP): Performed by: STUDENT IN AN ORGANIZED HEALTH CARE EDUCATION/TRAINING PROGRAM

## 2024-04-12 PROCEDURE — 99232 SBSQ HOSP IP/OBS MODERATE 35: CPT | Performed by: FAMILY MEDICINE

## 2024-04-12 PROCEDURE — 6370000000 HC RX 637 (ALT 250 FOR IP): Performed by: FAMILY MEDICINE

## 2024-04-12 PROCEDURE — 85610 PROTHROMBIN TIME: CPT

## 2024-04-12 PROCEDURE — 6370000000 HC RX 637 (ALT 250 FOR IP): Performed by: INTERNAL MEDICINE

## 2024-04-12 PROCEDURE — 99233 SBSQ HOSP IP/OBS HIGH 50: CPT | Performed by: INTERNAL MEDICINE

## 2024-04-12 PROCEDURE — 2580000003 HC RX 258: Performed by: FAMILY MEDICINE

## 2024-04-12 PROCEDURE — 93306 TTE W/DOPPLER COMPLETE: CPT | Performed by: INTERNAL MEDICINE

## 2024-04-12 PROCEDURE — 36415 COLL VENOUS BLD VENIPUNCTURE: CPT

## 2024-04-12 PROCEDURE — 6360000002 HC RX W HCPCS: Performed by: STUDENT IN AN ORGANIZED HEALTH CARE EDUCATION/TRAINING PROGRAM

## 2024-04-12 PROCEDURE — 6360000002 HC RX W HCPCS: Performed by: INTERNAL MEDICINE

## 2024-04-12 PROCEDURE — 2580000003 HC RX 258: Performed by: STUDENT IN AN ORGANIZED HEALTH CARE EDUCATION/TRAINING PROGRAM

## 2024-04-12 PROCEDURE — 93306 TTE W/DOPPLER COMPLETE: CPT

## 2024-04-12 PROCEDURE — 94640 AIRWAY INHALATION TREATMENT: CPT

## 2024-04-12 PROCEDURE — 6360000002 HC RX W HCPCS: Performed by: FAMILY MEDICINE

## 2024-04-12 PROCEDURE — 94760 N-INVAS EAR/PLS OXIMETRY 1: CPT

## 2024-04-12 PROCEDURE — 97535 SELF CARE MNGMENT TRAINING: CPT

## 2024-04-12 PROCEDURE — 97162 PT EVAL MOD COMPLEX 30 MIN: CPT

## 2024-04-12 RX ORDER — AZITHROMYCIN 250 MG/1
250 TABLET, FILM COATED ORAL DAILY
Status: DISCONTINUED | OUTPATIENT
Start: 2024-04-13 | End: 2024-04-15 | Stop reason: HOSPADM

## 2024-04-12 RX ORDER — FUROSEMIDE 10 MG/ML
20 INJECTION INTRAMUSCULAR; INTRAVENOUS 2 TIMES DAILY
Status: DISCONTINUED | OUTPATIENT
Start: 2024-04-12 | End: 2024-04-14

## 2024-04-12 RX ORDER — POTASSIUM CHLORIDE 20 MEQ/1
10 TABLET, EXTENDED RELEASE ORAL 2 TIMES DAILY
Status: DISCONTINUED | OUTPATIENT
Start: 2024-04-12 | End: 2024-04-15 | Stop reason: HOSPADM

## 2024-04-12 RX ORDER — GUAIFENESIN 600 MG/1
600 TABLET, EXTENDED RELEASE ORAL 2 TIMES DAILY
Status: DISCONTINUED | OUTPATIENT
Start: 2024-04-12 | End: 2024-04-14

## 2024-04-12 RX ORDER — AMLODIPINE BESYLATE 5 MG/1
5 TABLET ORAL DAILY
Status: DISCONTINUED | OUTPATIENT
Start: 2024-04-12 | End: 2024-04-15 | Stop reason: HOSPADM

## 2024-04-12 RX ORDER — AZITHROMYCIN 250 MG/1
500 TABLET, FILM COATED ORAL ONCE
Status: COMPLETED | OUTPATIENT
Start: 2024-04-12 | End: 2024-04-12

## 2024-04-12 RX ORDER — WARFARIN SODIUM 1 MG/1
3 TABLET ORAL
Status: COMPLETED | OUTPATIENT
Start: 2024-04-12 | End: 2024-04-12

## 2024-04-12 RX ORDER — ALBUTEROL SULFATE 2.5 MG/3ML
2.5 SOLUTION RESPIRATORY (INHALATION)
Status: DISCONTINUED | OUTPATIENT
Start: 2024-04-12 | End: 2024-04-14

## 2024-04-12 RX ADMIN — AZITHROMYCIN DIHYDRATE 500 MG: 250 TABLET, FILM COATED ORAL at 12:37

## 2024-04-12 RX ADMIN — SODIUM CHLORIDE, PRESERVATIVE FREE 10 ML: 5 INJECTION INTRAVENOUS at 20:24

## 2024-04-12 RX ADMIN — BUDESONIDE AND FORMOTEROL FUMARATE DIHYDRATE 2 PUFF: 160; 4.5 AEROSOL RESPIRATORY (INHALATION) at 07:32

## 2024-04-12 RX ADMIN — CARVEDILOL 12.5 MG: 12.5 TABLET, FILM COATED ORAL at 20:21

## 2024-04-12 RX ADMIN — FUROSEMIDE 20 MG: 10 INJECTION, SOLUTION INTRAMUSCULAR; INTRAVENOUS at 18:05

## 2024-04-12 RX ADMIN — GUAIFENESIN 600 MG: 600 TABLET, EXTENDED RELEASE ORAL at 12:37

## 2024-04-12 RX ADMIN — CALCIUM CARBONATE 600 MG (1,500 MG)-VITAMIN D3 400 UNIT TABLET 1 TABLET: at 09:01

## 2024-04-12 RX ADMIN — CEFTRIAXONE SODIUM 1000 MG: 1 INJECTION, POWDER, FOR SOLUTION INTRAMUSCULAR; INTRAVENOUS at 12:44

## 2024-04-12 RX ADMIN — SODIUM CHLORIDE, PRESERVATIVE FREE 10 ML: 5 INJECTION INTRAVENOUS at 09:01

## 2024-04-12 RX ADMIN — GUAIFENESIN 600 MG: 600 TABLET, EXTENDED RELEASE ORAL at 20:21

## 2024-04-12 RX ADMIN — ALBUTEROL SULFATE 2.5 MG: 2.5 SOLUTION RESPIRATORY (INHALATION) at 07:32

## 2024-04-12 RX ADMIN — AMLODIPINE BESYLATE 5 MG: 5 TABLET ORAL at 12:37

## 2024-04-12 RX ADMIN — ATORVASTATIN CALCIUM 10 MG: 10 TABLET, FILM COATED ORAL at 09:00

## 2024-04-12 RX ADMIN — WARFARIN SODIUM 3 MG: 1 TABLET ORAL at 18:04

## 2024-04-12 RX ADMIN — CARVEDILOL 12.5 MG: 12.5 TABLET, FILM COATED ORAL at 09:00

## 2024-04-12 RX ADMIN — ALBUTEROL SULFATE 2.5 MG: 2.5 SOLUTION RESPIRATORY (INHALATION) at 13:32

## 2024-04-12 RX ADMIN — ASPIRIN 81 MG: 81 TABLET, COATED ORAL at 09:00

## 2024-04-12 RX ADMIN — ALBUTEROL SULFATE 2.5 MG: 2.5 SOLUTION RESPIRATORY (INHALATION) at 19:51

## 2024-04-12 RX ADMIN — METHYLPREDNISOLONE SODIUM SUCCINATE 30 MG: 40 INJECTION, POWDER, FOR SOLUTION INTRAMUSCULAR; INTRAVENOUS at 20:21

## 2024-04-12 RX ADMIN — METHYLPREDNISOLONE SODIUM SUCCINATE 30 MG: 40 INJECTION, POWDER, FOR SOLUTION INTRAMUSCULAR; INTRAVENOUS at 09:00

## 2024-04-12 RX ADMIN — POTASSIUM CHLORIDE 10 MEQ: 1500 TABLET, EXTENDED RELEASE ORAL at 18:05

## 2024-04-12 RX ADMIN — POTASSIUM CHLORIDE 10 MEQ: 1500 TABLET, EXTENDED RELEASE ORAL at 20:21

## 2024-04-12 RX ADMIN — LISINOPRIL 10 MG: 10 TABLET ORAL at 12:40

## 2024-04-12 RX ADMIN — BUDESONIDE AND FORMOTEROL FUMARATE DIHYDRATE 2 PUFF: 160; 4.5 AEROSOL RESPIRATORY (INHALATION) at 19:51

## 2024-04-12 NOTE — PLAN OF CARE
BRONCHOSPASM/BRONCHOCONSTRICTION     [x]         IMPROVE AERATION/BREATH SOUNDS  [x]   ADMINISTER BRONCHODILATOR THERAPY AS APPROPRIATE  [x]   ASSESS BREATH SOUNDS  [x]   IMPLEMENT AEROSOL/MDI PROTOCOL  [x]   PATIENT EDUCATION AS NEEDED     PC thick white- exp. Wheezing noted      Problem: Respiratory - Adult  Goal: Achieves optimal ventilation and oxygenation  Outcome: Progressing

## 2024-04-12 NOTE — PLAN OF CARE
Problem: Safety - Adult  Goal: Free from fall injury  4/12/2024 1107 by Kelly Ordoñez, RN  Outcome: Progressing  Pt fall risk, fall band present, falling star, safety alarm activated and in use as needed. Hourly rounding performed. Pt encouraged to use call light. See Martinez fall risk assessment.         Problem: Respiratory - Adult  Goal: Achieves optimal ventilation and oxygenation  4/12/2024 1107 by Kelly Ordoñez, RN  Outcome: Progressing    Problem: Chronic Conditions and Co-morbidities  Goal: Patient's chronic conditions and co-morbidity symptoms are monitored and maintained or improved  4/12/2024 1107 by Kelly Ordoñez, RN  Outcome: Progressing

## 2024-04-12 NOTE — PLAN OF CARE
Problem: Discharge Planning  Goal: Discharge to home or other facility with appropriate resources  4/11/2024 2259 by Angela De Jesus RN  Outcome: Progressing  Flowsheets (Taken 4/11/2024 0215)  Discharge to home or other facility with appropriate resources:   Identify barriers to discharge with patient and caregiver   Arrange for needed discharge resources and transportation as appropriate   Identify discharge learning needs (meds, wound care, etc)  4/11/2024 0929 by Thania Vargas RN  Outcome: Progressing     Problem: Pain  Goal: Verbalizes/displays adequate comfort level or baseline comfort level  4/11/2024 2259 by Angela De Jesus RN  Outcome: Progressing  Flowsheets (Taken 4/11/2024 2259)  Verbalizes/displays adequate comfort level or baseline comfort level:   Encourage patient to monitor pain and request assistance   Administer analgesics based on type and severity of pain and evaluate response   Assess pain using appropriate pain scale   Implement non-pharmacological measures as appropriate and evaluate response  4/11/2024 0929 by Thania Vargas RN  Outcome: Progressing     Problem: ABCDS Injury Assessment  Goal: Absence of physical injury  4/11/2024 2259 by Angela De Jesus RN  Outcome: Progressing  Flowsheets (Taken 4/11/2024 0215)  Absence of Physical Injury: Implement safety measures based on patient assessment  4/11/2024 0929 by Thania Vargas RN  Outcome: Progressing     Problem: Safety - Adult  Goal: Free from fall injury  4/11/2024 2259 by Angela De Jesus RN  Outcome: Progressing  Flowsheets (Taken 4/11/2024 0215)  Free From Fall Injury: Instruct family/caregiver on patient safety  4/11/2024 0929 by Thania Vargas, RN  Outcome: Progressing     Problem: Chronic Conditions and Co-morbidities  Goal: Patient's chronic conditions and co-morbidity symptoms are monitored and maintained or improved  Outcome: Progressing  Flowsheets (Taken 4/11/2024 2259)  Care Plan - Patient's Chronic

## 2024-04-13 LAB
ANION GAP SERPL CALCULATED.3IONS-SCNC: 9 MMOL/L (ref 9–17)
BUN SERPL-MCNC: 23 MG/DL (ref 8–23)
CALCIUM SERPL-MCNC: 8.7 MG/DL (ref 8.6–10.4)
CHLORIDE SERPL-SCNC: 102 MMOL/L (ref 98–107)
CO2 SERPL-SCNC: 25 MMOL/L (ref 20–31)
CREAT SERPL-MCNC: 0.9 MG/DL (ref 0.7–1.2)
GFR SERPL CREATININE-BSD FRML MDRD: 83 ML/MIN/1.73M2
GLUCOSE SERPL-MCNC: 142 MG/DL (ref 70–99)
INR PPP: 2.2
POTASSIUM SERPL-SCNC: 3.6 MMOL/L (ref 3.7–5.3)
PROTHROMBIN TIME: 22.3 SEC (ref 9.4–12.6)
SODIUM SERPL-SCNC: 136 MMOL/L (ref 135–144)

## 2024-04-13 PROCEDURE — 6370000000 HC RX 637 (ALT 250 FOR IP): Performed by: STUDENT IN AN ORGANIZED HEALTH CARE EDUCATION/TRAINING PROGRAM

## 2024-04-13 PROCEDURE — 99233 SBSQ HOSP IP/OBS HIGH 50: CPT | Performed by: INTERNAL MEDICINE

## 2024-04-13 PROCEDURE — 6360000002 HC RX W HCPCS: Performed by: INTERNAL MEDICINE

## 2024-04-13 PROCEDURE — 6360000002 HC RX W HCPCS: Performed by: STUDENT IN AN ORGANIZED HEALTH CARE EDUCATION/TRAINING PROGRAM

## 2024-04-13 PROCEDURE — 2580000003 HC RX 258: Performed by: STUDENT IN AN ORGANIZED HEALTH CARE EDUCATION/TRAINING PROGRAM

## 2024-04-13 PROCEDURE — 6360000002 HC RX W HCPCS: Performed by: FAMILY MEDICINE

## 2024-04-13 PROCEDURE — 6370000000 HC RX 637 (ALT 250 FOR IP): Performed by: FAMILY MEDICINE

## 2024-04-13 PROCEDURE — 99231 SBSQ HOSP IP/OBS SF/LOW 25: CPT | Performed by: FAMILY MEDICINE

## 2024-04-13 PROCEDURE — 6370000000 HC RX 637 (ALT 250 FOR IP): Performed by: INTERNAL MEDICINE

## 2024-04-13 PROCEDURE — 94640 AIRWAY INHALATION TREATMENT: CPT

## 2024-04-13 PROCEDURE — 80048 BASIC METABOLIC PNL TOTAL CA: CPT

## 2024-04-13 PROCEDURE — 2580000003 HC RX 258: Performed by: FAMILY MEDICINE

## 2024-04-13 PROCEDURE — 36415 COLL VENOUS BLD VENIPUNCTURE: CPT

## 2024-04-13 PROCEDURE — 94761 N-INVAS EAR/PLS OXIMETRY MLT: CPT

## 2024-04-13 PROCEDURE — 94760 N-INVAS EAR/PLS OXIMETRY 1: CPT

## 2024-04-13 PROCEDURE — 85610 PROTHROMBIN TIME: CPT

## 2024-04-13 PROCEDURE — 1200000000 HC SEMI PRIVATE

## 2024-04-13 RX ORDER — WARFARIN SODIUM 1 MG/1
2 TABLET ORAL
Status: COMPLETED | OUTPATIENT
Start: 2024-04-13 | End: 2024-04-13

## 2024-04-13 RX ORDER — TRAZODONE HYDROCHLORIDE 50 MG/1
50 TABLET ORAL ONCE
Status: COMPLETED | OUTPATIENT
Start: 2024-04-13 | End: 2024-04-13

## 2024-04-13 RX ORDER — TRAZODONE HYDROCHLORIDE 50 MG/1
50 TABLET ORAL NIGHTLY PRN
Status: DISCONTINUED | OUTPATIENT
Start: 2024-04-13 | End: 2024-04-15 | Stop reason: HOSPADM

## 2024-04-13 RX ADMIN — AZITHROMYCIN DIHYDRATE 250 MG: 250 TABLET, FILM COATED ORAL at 09:39

## 2024-04-13 RX ADMIN — GUAIFENESIN 600 MG: 600 TABLET, EXTENDED RELEASE ORAL at 09:40

## 2024-04-13 RX ADMIN — SODIUM CHLORIDE, PRESERVATIVE FREE 10 ML: 5 INJECTION INTRAVENOUS at 20:57

## 2024-04-13 RX ADMIN — WARFARIN SODIUM 2 MG: 1 TABLET ORAL at 18:04

## 2024-04-13 RX ADMIN — EMPAGLIFLOZIN 10 MG: 10 TABLET, FILM COATED ORAL at 11:07

## 2024-04-13 RX ADMIN — CARVEDILOL 12.5 MG: 12.5 TABLET, FILM COATED ORAL at 09:40

## 2024-04-13 RX ADMIN — TRAZODONE HYDROCHLORIDE 50 MG: 50 TABLET ORAL at 22:58

## 2024-04-13 RX ADMIN — FUROSEMIDE 20 MG: 10 INJECTION, SOLUTION INTRAMUSCULAR; INTRAVENOUS at 16:55

## 2024-04-13 RX ADMIN — ALBUTEROL SULFATE 2.5 MG: 2.5 SOLUTION RESPIRATORY (INHALATION) at 20:31

## 2024-04-13 RX ADMIN — GUAIFENESIN 600 MG: 600 TABLET, EXTENDED RELEASE ORAL at 20:53

## 2024-04-13 RX ADMIN — AMLODIPINE BESYLATE 5 MG: 5 TABLET ORAL at 09:40

## 2024-04-13 RX ADMIN — CALCIUM CARBONATE 600 MG (1,500 MG)-VITAMIN D3 400 UNIT TABLET 1 TABLET: at 09:43

## 2024-04-13 RX ADMIN — TRAZODONE HYDROCHLORIDE 50 MG: 50 TABLET ORAL at 03:33

## 2024-04-13 RX ADMIN — ALBUTEROL SULFATE 2.5 MG: 2.5 SOLUTION RESPIRATORY (INHALATION) at 13:49

## 2024-04-13 RX ADMIN — CEFTRIAXONE SODIUM 1000 MG: 1 INJECTION, POWDER, FOR SOLUTION INTRAMUSCULAR; INTRAVENOUS at 11:18

## 2024-04-13 RX ADMIN — METHYLPREDNISOLONE SODIUM SUCCINATE 30 MG: 40 INJECTION, POWDER, FOR SOLUTION INTRAMUSCULAR; INTRAVENOUS at 11:07

## 2024-04-13 RX ADMIN — METHYLPREDNISOLONE SODIUM SUCCINATE 30 MG: 40 INJECTION, POWDER, FOR SOLUTION INTRAMUSCULAR; INTRAVENOUS at 22:58

## 2024-04-13 RX ADMIN — POTASSIUM CHLORIDE 10 MEQ: 1500 TABLET, EXTENDED RELEASE ORAL at 20:53

## 2024-04-13 RX ADMIN — ALBUTEROL SULFATE 2.5 MG: 2.5 SOLUTION RESPIRATORY (INHALATION) at 07:44

## 2024-04-13 RX ADMIN — POTASSIUM CHLORIDE 10 MEQ: 1500 TABLET, EXTENDED RELEASE ORAL at 09:40

## 2024-04-13 RX ADMIN — ASPIRIN 81 MG: 81 TABLET, COATED ORAL at 09:40

## 2024-04-13 RX ADMIN — BUDESONIDE AND FORMOTEROL FUMARATE DIHYDRATE 2 PUFF: 160; 4.5 AEROSOL RESPIRATORY (INHALATION) at 07:45

## 2024-04-13 RX ADMIN — ATORVASTATIN CALCIUM 10 MG: 10 TABLET, FILM COATED ORAL at 09:40

## 2024-04-13 RX ADMIN — FUROSEMIDE 20 MG: 10 INJECTION, SOLUTION INTRAMUSCULAR; INTRAVENOUS at 11:06

## 2024-04-13 RX ADMIN — SODIUM CHLORIDE, PRESERVATIVE FREE 10 ML: 5 INJECTION INTRAVENOUS at 11:24

## 2024-04-13 RX ADMIN — BUDESONIDE AND FORMOTEROL FUMARATE DIHYDRATE 2 PUFF: 160; 4.5 AEROSOL RESPIRATORY (INHALATION) at 20:41

## 2024-04-13 NOTE — PLAN OF CARE
Problem: Respiratory - Adult  Goal: Achieves optimal ventilation and oxygenation  4/12/2024 2047 by Laura Carolina RCP  Outcome: Progressing  Flowsheets (Taken 4/12/2024 2047)  Achieves optimal ventilation and oxygenation:   Assess for changes in respiratory status   Position to facilitate oxygenation and minimize respiratory effort   Respiratory therapy support as indicated   Oxygen supplementation based on oxygen saturation or arterial blood gases   Encourage broncho-pulmonary hygiene including cough, deep breathe, incentive spirometry   Assess and instruct to report shortness of breath or any respiratory difficulty  4/12/2024 1107 by Kelly Ordoñez, RN  Outcome: Progressing

## 2024-04-13 NOTE — PLAN OF CARE
Problem: Discharge Planning  Goal: Discharge to home or other facility with appropriate resources  4/13/2024 1307 by Ena Corrales RN  Outcome: Progressing  4/13/2024 0100 by Gomez Peralta RN  Outcome: Progressing  Flowsheets (Taken 4/12/2024 2000)  Discharge to home or other facility with appropriate resources: Identify barriers to discharge with patient and caregiver     Problem: Pain  Goal: Verbalizes/displays adequate comfort level or baseline comfort level  4/13/2024 1307 by Ena Corrales RN  Outcome: Progressing  4/13/2024 0100 by Gomez Peralta RN  Outcome: Progressing     Problem: ABCDS Injury Assessment  Goal: Absence of physical injury  4/13/2024 1307 by Ena Corrales RN  Outcome: Progressing  4/13/2024 0100 by Gomez Peralta RN  Outcome: Progressing     Problem: Safety - Adult  Goal: Free from fall injury  4/13/2024 1307 by Ena Corrales RN  Outcome: Progressing  4/13/2024 0100 by Gomez Peralta RN  Outcome: Progressing     Problem: Chronic Conditions and Co-morbidities  Goal: Patient's chronic conditions and co-morbidity symptoms are monitored and maintained or improved  4/13/2024 1307 by Ena Corrales RN  Outcome: Progressing  4/13/2024 0100 by Gomez Peralta RN  Outcome: Progressing  Flowsheets (Taken 4/12/2024 2000)  Care Plan - Patient's Chronic Conditions and Co-Morbidity Symptoms are Monitored and Maintained or Improved: Monitor and assess patient's chronic conditions and comorbid symptoms for stability, deterioration, or improvement     Problem: Respiratory - Adult  Goal: Achieves optimal ventilation and oxygenation  4/13/2024 1307 by Ena Corrales RN  Outcome: Progressing  4/13/2024 0750 by Keri Berrios RCP  Outcome: Progressing  4/13/2024 0100 by Gomez Peralta RN  Outcome: Progressing

## 2024-04-13 NOTE — PLAN OF CARE
Problem: Discharge Planning  Goal: Discharge to home or other facility with appropriate resources  4/13/2024 0100 by Gomez Peralta RN  Outcome: Progressing  Flowsheets (Taken 4/12/2024 2000)  Discharge to home or other facility with appropriate resources: Identify barriers to discharge with patient and caregiver  4/12/2024 1107 by Kelly Ordoñez RN  Outcome: Progressing     Problem: Pain  Goal: Verbalizes/displays adequate comfort level or baseline comfort level  4/13/2024 0100 by Gomez Peralta RN  Outcome: Progressing  4/12/2024 1107 by Kelly Ordoñez RN  Outcome: Progressing     Problem: ABCDS Injury Assessment  Goal: Absence of physical injury  4/13/2024 0100 by Gomez Peralta RN  Outcome: Progressing  4/12/2024 1107 by Kelly Ordoñez RN  Outcome: Progressing     Problem: Safety - Adult  Goal: Free from fall injury  4/13/2024 0100 by Gomze Peralta RN  Outcome: Progressing  4/12/2024 1107 by Kelly Ordoñez RN  Outcome: Progressing     Problem: Chronic Conditions and Co-morbidities  Goal: Patient's chronic conditions and co-morbidity symptoms are monitored and maintained or improved  4/13/2024 0100 by Gomez Peralta RN  Outcome: Progressing  Flowsheets (Taken 4/12/2024 2000)  Care Plan - Patient's Chronic Conditions and Co-Morbidity Symptoms are Monitored and Maintained or Improved: Monitor and assess patient's chronic conditions and comorbid symptoms for stability, deterioration, or improvement  4/12/2024 1107 by Kelly Ordoñez RN  Outcome: Progressing     Problem: Respiratory - Adult  Goal: Achieves optimal ventilation and oxygenation  4/13/2024 0100 by Gomez Peralta RN  Outcome: Progressing  4/12/2024 2047 by Laura Carolina RCP  Outcome: Progressing  Flowsheets  Taken 4/12/2024 2047 by Laura Carolina RCP  Achieves optimal ventilation and oxygenation:   Assess for changes in respiratory status   Position to facilitate oxygenation and minimize respiratory effort   Respiratory

## 2024-04-14 LAB
ANION GAP SERPL CALCULATED.3IONS-SCNC: 13 MMOL/L (ref 9–17)
BUN SERPL-MCNC: 29 MG/DL (ref 8–23)
CALCIUM SERPL-MCNC: 8.7 MG/DL (ref 8.6–10.4)
CHLORIDE SERPL-SCNC: 101 MMOL/L (ref 98–107)
CO2 SERPL-SCNC: 24 MMOL/L (ref 20–31)
CREAT SERPL-MCNC: 0.9 MG/DL (ref 0.7–1.2)
GFR SERPL CREATININE-BSD FRML MDRD: 83 ML/MIN/1.73M2
GLUCOSE BLD-MCNC: 136 MG/DL (ref 75–110)
GLUCOSE SERPL-MCNC: 134 MG/DL (ref 70–99)
INR PPP: 2.1
POTASSIUM SERPL-SCNC: 3.7 MMOL/L (ref 3.7–5.3)
PROTHROMBIN TIME: 21.3 SEC (ref 9.4–12.6)
SODIUM SERPL-SCNC: 138 MMOL/L (ref 135–144)

## 2024-04-14 PROCEDURE — 6360000002 HC RX W HCPCS: Performed by: STUDENT IN AN ORGANIZED HEALTH CARE EDUCATION/TRAINING PROGRAM

## 2024-04-14 PROCEDURE — 6370000000 HC RX 637 (ALT 250 FOR IP): Performed by: FAMILY MEDICINE

## 2024-04-14 PROCEDURE — 94640 AIRWAY INHALATION TREATMENT: CPT

## 2024-04-14 PROCEDURE — 6370000000 HC RX 637 (ALT 250 FOR IP): Performed by: INTERNAL MEDICINE

## 2024-04-14 PROCEDURE — 85610 PROTHROMBIN TIME: CPT

## 2024-04-14 PROCEDURE — 36415 COLL VENOUS BLD VENIPUNCTURE: CPT

## 2024-04-14 PROCEDURE — 94761 N-INVAS EAR/PLS OXIMETRY MLT: CPT

## 2024-04-14 PROCEDURE — 80048 BASIC METABOLIC PNL TOTAL CA: CPT

## 2024-04-14 PROCEDURE — 2580000003 HC RX 258: Performed by: STUDENT IN AN ORGANIZED HEALTH CARE EDUCATION/TRAINING PROGRAM

## 2024-04-14 PROCEDURE — 6360000002 HC RX W HCPCS: Performed by: INTERNAL MEDICINE

## 2024-04-14 PROCEDURE — 82947 ASSAY GLUCOSE BLOOD QUANT: CPT

## 2024-04-14 PROCEDURE — 99233 SBSQ HOSP IP/OBS HIGH 50: CPT | Performed by: INTERNAL MEDICINE

## 2024-04-14 PROCEDURE — 1200000000 HC SEMI PRIVATE

## 2024-04-14 PROCEDURE — 99232 SBSQ HOSP IP/OBS MODERATE 35: CPT | Performed by: FAMILY MEDICINE

## 2024-04-14 PROCEDURE — 2580000003 HC RX 258: Performed by: INTERNAL MEDICINE

## 2024-04-14 PROCEDURE — 6370000000 HC RX 637 (ALT 250 FOR IP): Performed by: STUDENT IN AN ORGANIZED HEALTH CARE EDUCATION/TRAINING PROGRAM

## 2024-04-14 RX ORDER — BENZONATATE 100 MG/1
100 CAPSULE ORAL 3 TIMES DAILY PRN
Status: DISCONTINUED | OUTPATIENT
Start: 2024-04-14 | End: 2024-04-15 | Stop reason: HOSPADM

## 2024-04-14 RX ORDER — PREDNISONE 20 MG/1
40 TABLET ORAL DAILY
Status: DISCONTINUED | OUTPATIENT
Start: 2024-04-14 | End: 2024-04-15 | Stop reason: HOSPADM

## 2024-04-14 RX ORDER — PREDNISONE 5 MG/1
10 TABLET ORAL DAILY
Status: DISCONTINUED | OUTPATIENT
Start: 2024-04-23 | End: 2024-04-15 | Stop reason: HOSPADM

## 2024-04-14 RX ORDER — FUROSEMIDE 20 MG/1
20 TABLET ORAL DAILY
Status: DISCONTINUED | OUTPATIENT
Start: 2024-04-14 | End: 2024-04-15 | Stop reason: HOSPADM

## 2024-04-14 RX ORDER — GUAIFENESIN AND DEXTROMETHORPHAN HYDROBROMIDE 100; 10 MG/5ML; MG/5ML
5 SOLUTION ORAL EVERY 6 HOURS PRN
Status: DISCONTINUED | OUTPATIENT
Start: 2024-04-14 | End: 2024-04-15 | Stop reason: HOSPADM

## 2024-04-14 RX ORDER — PREDNISONE 20 MG/1
20 TABLET ORAL DAILY
Status: DISCONTINUED | OUTPATIENT
Start: 2024-04-20 | End: 2024-04-15 | Stop reason: HOSPADM

## 2024-04-14 RX ORDER — WARFARIN SODIUM 1 MG/1
2 TABLET ORAL
Status: COMPLETED | OUTPATIENT
Start: 2024-04-14 | End: 2024-04-14

## 2024-04-14 RX ORDER — ALBUTEROL SULFATE 2.5 MG/3ML
2.5 SOLUTION RESPIRATORY (INHALATION)
Status: DISCONTINUED | OUTPATIENT
Start: 2024-04-14 | End: 2024-04-15 | Stop reason: HOSPADM

## 2024-04-14 RX ADMIN — ALBUTEROL SULFATE 2.5 MG: 2.5 SOLUTION RESPIRATORY (INHALATION) at 07:44

## 2024-04-14 RX ADMIN — CEFTRIAXONE SODIUM 1000 MG: 1 INJECTION, POWDER, FOR SOLUTION INTRAMUSCULAR; INTRAVENOUS at 12:38

## 2024-04-14 RX ADMIN — CARVEDILOL 12.5 MG: 12.5 TABLET, FILM COATED ORAL at 20:19

## 2024-04-14 RX ADMIN — METHYLPREDNISOLONE SODIUM SUCCINATE 30 MG: 40 INJECTION, POWDER, FOR SOLUTION INTRAMUSCULAR; INTRAVENOUS at 12:13

## 2024-04-14 RX ADMIN — POTASSIUM CHLORIDE 10 MEQ: 1500 TABLET, EXTENDED RELEASE ORAL at 09:40

## 2024-04-14 RX ADMIN — AMLODIPINE BESYLATE 5 MG: 5 TABLET ORAL at 09:40

## 2024-04-14 RX ADMIN — EMPAGLIFLOZIN 10 MG: 10 TABLET, FILM COATED ORAL at 09:41

## 2024-04-14 RX ADMIN — BUDESONIDE AND FORMOTEROL FUMARATE DIHYDRATE 2 PUFF: 160; 4.5 AEROSOL RESPIRATORY (INHALATION) at 07:44

## 2024-04-14 RX ADMIN — AZITHROMYCIN DIHYDRATE 250 MG: 250 TABLET, FILM COATED ORAL at 09:40

## 2024-04-14 RX ADMIN — ATORVASTATIN CALCIUM 10 MG: 10 TABLET, FILM COATED ORAL at 09:40

## 2024-04-14 RX ADMIN — CARVEDILOL 12.5 MG: 12.5 TABLET, FILM COATED ORAL at 09:40

## 2024-04-14 RX ADMIN — PREDNISONE 40 MG: 20 TABLET ORAL at 17:32

## 2024-04-14 RX ADMIN — WARFARIN SODIUM 2 MG: 1 TABLET ORAL at 17:32

## 2024-04-14 RX ADMIN — ASPIRIN 81 MG: 81 TABLET, COATED ORAL at 09:40

## 2024-04-14 RX ADMIN — BENZONATATE 100 MG: 100 CAPSULE ORAL at 17:34

## 2024-04-14 RX ADMIN — CALCIUM CARBONATE 600 MG (1,500 MG)-VITAMIN D3 400 UNIT TABLET 1 TABLET: at 09:40

## 2024-04-14 RX ADMIN — BUDESONIDE AND FORMOTEROL FUMARATE DIHYDRATE 2 PUFF: 160; 4.5 AEROSOL RESPIRATORY (INHALATION) at 20:10

## 2024-04-14 RX ADMIN — GUAIFENESIN 600 MG: 600 TABLET, EXTENDED RELEASE ORAL at 09:40

## 2024-04-14 RX ADMIN — ALBUTEROL SULFATE 2.5 MG: 2.5 SOLUTION RESPIRATORY (INHALATION) at 19:55

## 2024-04-14 RX ADMIN — SODIUM CHLORIDE, PRESERVATIVE FREE 10 ML: 5 INJECTION INTRAVENOUS at 20:19

## 2024-04-14 RX ADMIN — FUROSEMIDE 20 MG: 20 TABLET ORAL at 09:39

## 2024-04-14 RX ADMIN — POTASSIUM CHLORIDE 10 MEQ: 1500 TABLET, EXTENDED RELEASE ORAL at 20:19

## 2024-04-14 NOTE — PLAN OF CARE
Problem: Discharge Planning  Goal: Discharge to home or other facility with appropriate resources  Outcome: Progressing     Problem: Pain  Goal: Verbalizes/displays adequate comfort level or baseline comfort level  Outcome: Progressing     Problem: ABCDS Injury Assessment  Goal: Absence of physical injury  Outcome: Progressing     Problem: Safety - Adult  Goal: Free from fall injury  Outcome: Progressing     Problem: Chronic Conditions and Co-morbidities  Goal: Patient's chronic conditions and co-morbidity symptoms are monitored and maintained or improved  Outcome: Progressing     Problem: Respiratory - Adult  Goal: Achieves optimal ventilation and oxygenation  4/14/2024 1036 by Laura Ceballos RN  Outcome: Progressing  4/14/2024 0747 by Keri Berrios RCP  Outcome: Progressing  4/13/2024 2040 by Magy Dean RCP  Flowsheets  Taken 4/13/2024 2040 by Magy Dean RCP  Achieves optimal ventilation and oxygenation:   Assess for changes in respiratory status   Position to facilitate oxygenation and minimize respiratory effort   Respiratory therapy support as indicated   Encourage broncho-pulmonary hygiene including cough, deep breathe, incentive spirometry   Assess and instruct to report shortness of breath or any respiratory difficulty  Taken 4/13/2024 2000 by Gomez Peralta, RN  Achieves optimal ventilation and oxygenation:   Assess for changes in respiratory status   Assess for changes in mentation and behavior

## 2024-04-14 NOTE — PLAN OF CARE
Problem: Respiratory - Adult  Goal: Achieves optimal ventilation and oxygenation  4/13/2024 2040 by Magy Dean RCP  Flowsheets (Taken 4/13/2024 2040)  Achieves optimal ventilation and oxygenation:   Assess for changes in respiratory status   Position to facilitate oxygenation and minimize respiratory effort   Respiratory therapy support as indicated   Encourage broncho-pulmonary hygiene including cough, deep breathe, incentive spirometry   Assess and instruct to report shortness of breath or any respiratory difficulty

## 2024-04-15 VITALS
SYSTOLIC BLOOD PRESSURE: 151 MMHG | WEIGHT: 143 LBS | HEIGHT: 68 IN | RESPIRATION RATE: 18 BRPM | OXYGEN SATURATION: 94 % | HEART RATE: 64 BPM | DIASTOLIC BLOOD PRESSURE: 72 MMHG | TEMPERATURE: 97.3 F | BODY MASS INDEX: 21.67 KG/M2

## 2024-04-15 DIAGNOSIS — J45.50 SEVERE PERSISTENT ASTHMA WITHOUT COMPLICATION: ICD-10-CM

## 2024-04-15 LAB
ANION GAP SERPL CALCULATED.3IONS-SCNC: 12 MMOL/L (ref 9–17)
BUN SERPL-MCNC: 31 MG/DL (ref 8–23)
CALCIUM SERPL-MCNC: 8.4 MG/DL (ref 8.6–10.4)
CHLORIDE SERPL-SCNC: 103 MMOL/L (ref 98–107)
CO2 SERPL-SCNC: 26 MMOL/L (ref 20–31)
CREAT SERPL-MCNC: 0.8 MG/DL (ref 0.7–1.2)
GFR SERPL CREATININE-BSD FRML MDRD: 86 ML/MIN/1.73M2
GLUCOSE SERPL-MCNC: 124 MG/DL (ref 70–99)
INR PPP: 2.2
MICROORGANISM SPEC CULT: NORMAL
MICROORGANISM SPEC CULT: NORMAL
POTASSIUM SERPL-SCNC: 3.6 MMOL/L (ref 3.7–5.3)
PROTHROMBIN TIME: 21.8 SEC (ref 9.4–12.6)
SERVICE CMNT-IMP: NORMAL
SERVICE CMNT-IMP: NORMAL
SODIUM SERPL-SCNC: 141 MMOL/L (ref 135–144)
SPECIMEN DESCRIPTION: NORMAL
SPECIMEN DESCRIPTION: NORMAL

## 2024-04-15 PROCEDURE — 99239 HOSP IP/OBS DSCHRG MGMT >30: CPT | Performed by: FAMILY MEDICINE

## 2024-04-15 PROCEDURE — 80048 BASIC METABOLIC PNL TOTAL CA: CPT

## 2024-04-15 PROCEDURE — 97535 SELF CARE MNGMENT TRAINING: CPT

## 2024-04-15 PROCEDURE — 36415 COLL VENOUS BLD VENIPUNCTURE: CPT

## 2024-04-15 PROCEDURE — 94760 N-INVAS EAR/PLS OXIMETRY 1: CPT

## 2024-04-15 PROCEDURE — 6360000002 HC RX W HCPCS: Performed by: INTERNAL MEDICINE

## 2024-04-15 PROCEDURE — 2580000003 HC RX 258: Performed by: INTERNAL MEDICINE

## 2024-04-15 PROCEDURE — 97530 THERAPEUTIC ACTIVITIES: CPT

## 2024-04-15 PROCEDURE — 6370000000 HC RX 637 (ALT 250 FOR IP): Performed by: INTERNAL MEDICINE

## 2024-04-15 PROCEDURE — 2580000003 HC RX 258: Performed by: STUDENT IN AN ORGANIZED HEALTH CARE EDUCATION/TRAINING PROGRAM

## 2024-04-15 PROCEDURE — 85610 PROTHROMBIN TIME: CPT

## 2024-04-15 PROCEDURE — 94640 AIRWAY INHALATION TREATMENT: CPT

## 2024-04-15 PROCEDURE — 6370000000 HC RX 637 (ALT 250 FOR IP): Performed by: STUDENT IN AN ORGANIZED HEALTH CARE EDUCATION/TRAINING PROGRAM

## 2024-04-15 PROCEDURE — 6370000000 HC RX 637 (ALT 250 FOR IP): Performed by: FAMILY MEDICINE

## 2024-04-15 RX ORDER — AMLODIPINE BESYLATE 5 MG/1
5 TABLET ORAL DAILY
Qty: 30 TABLET | Refills: 3 | Status: SHIPPED | OUTPATIENT
Start: 2024-04-16

## 2024-04-15 RX ORDER — PREDNISONE 10 MG/1
10 TABLET ORAL DAILY
Qty: 3 TABLET | Refills: 0 | Status: SHIPPED | OUTPATIENT
Start: 2024-04-23 | End: 2024-04-26

## 2024-04-15 RX ORDER — FLUTICASONE PROPIONATE AND SALMETEROL 250; 50 UG/1; UG/1
1 POWDER RESPIRATORY (INHALATION) 2 TIMES DAILY
Qty: 1 EACH | Refills: 4 | Status: SHIPPED | OUTPATIENT
Start: 2024-04-15 | End: 2024-05-15

## 2024-04-15 RX ORDER — FUROSEMIDE 20 MG/1
20 TABLET ORAL DAILY
Qty: 60 TABLET | Refills: 3 | Status: SHIPPED | OUTPATIENT
Start: 2024-04-16

## 2024-04-15 RX ORDER — PREDNISONE 20 MG/1
40 TABLET ORAL DAILY
Qty: 2 TABLET | Refills: 0 | Status: SHIPPED | OUTPATIENT
Start: 2024-04-16 | End: 2024-04-17

## 2024-04-15 RX ORDER — PREDNISONE 20 MG/1
20 TABLET ORAL DAILY
Qty: 3 TABLET | Refills: 0 | Status: SHIPPED | OUTPATIENT
Start: 2024-04-20 | End: 2024-04-23

## 2024-04-15 RX ORDER — BENZONATATE 100 MG/1
100 CAPSULE ORAL 3 TIMES DAILY PRN
Qty: 30 CAPSULE | Refills: 1 | Status: SHIPPED | OUTPATIENT
Start: 2024-04-15 | End: 2024-05-05

## 2024-04-15 RX ORDER — BUDESONIDE AND FORMOTEROL FUMARATE DIHYDRATE 160; 4.5 UG/1; UG/1
2 AEROSOL RESPIRATORY (INHALATION) 2 TIMES DAILY
Qty: 1 EACH | Refills: 11 | Status: SHIPPED | OUTPATIENT
Start: 2024-04-15 | End: 2024-04-15

## 2024-04-15 RX ORDER — PREDNISONE 10 MG/1
30 TABLET ORAL DAILY
Qty: 9 TABLET | Refills: 0 | Status: SHIPPED | OUTPATIENT
Start: 2024-04-17 | End: 2024-04-20

## 2024-04-15 RX ORDER — CARVEDILOL 12.5 MG/1
12.5 TABLET ORAL 2 TIMES DAILY
Qty: 60 TABLET | Refills: 3 | Status: SHIPPED | OUTPATIENT
Start: 2024-04-15

## 2024-04-15 RX ORDER — WARFARIN SODIUM 1 MG/1
2 TABLET ORAL
Status: DISCONTINUED | OUTPATIENT
Start: 2024-04-15 | End: 2024-04-15 | Stop reason: HOSPADM

## 2024-04-15 RX ADMIN — SODIUM CHLORIDE, PRESERVATIVE FREE 10 ML: 5 INJECTION INTRAVENOUS at 08:46

## 2024-04-15 RX ADMIN — AMLODIPINE BESYLATE 5 MG: 5 TABLET ORAL at 08:45

## 2024-04-15 RX ADMIN — ATORVASTATIN CALCIUM 10 MG: 10 TABLET, FILM COATED ORAL at 08:45

## 2024-04-15 RX ADMIN — BUDESONIDE AND FORMOTEROL FUMARATE DIHYDRATE 2 PUFF: 160; 4.5 AEROSOL RESPIRATORY (INHALATION) at 09:00

## 2024-04-15 RX ADMIN — EMPAGLIFLOZIN 10 MG: 10 TABLET, FILM COATED ORAL at 08:44

## 2024-04-15 RX ADMIN — CEFTRIAXONE SODIUM 1000 MG: 1 INJECTION, POWDER, FOR SOLUTION INTRAMUSCULAR; INTRAVENOUS at 11:44

## 2024-04-15 RX ADMIN — FUROSEMIDE 20 MG: 20 TABLET ORAL at 08:44

## 2024-04-15 RX ADMIN — PREDNISONE 40 MG: 20 TABLET ORAL at 08:44

## 2024-04-15 RX ADMIN — AZITHROMYCIN DIHYDRATE 250 MG: 250 TABLET, FILM COATED ORAL at 08:44

## 2024-04-15 RX ADMIN — ASPIRIN 81 MG: 81 TABLET, COATED ORAL at 08:44

## 2024-04-15 RX ADMIN — POTASSIUM CHLORIDE 10 MEQ: 1500 TABLET, EXTENDED RELEASE ORAL at 08:44

## 2024-04-15 RX ADMIN — CALCIUM CARBONATE 600 MG (1,500 MG)-VITAMIN D3 400 UNIT TABLET 1 TABLET: at 08:44

## 2024-04-15 RX ADMIN — CARVEDILOL 12.5 MG: 12.5 TABLET, FILM COATED ORAL at 08:44

## 2024-04-15 RX ADMIN — ALBUTEROL SULFATE 2.5 MG: 2.5 SOLUTION RESPIRATORY (INHALATION) at 09:00

## 2024-04-15 ASSESSMENT — ENCOUNTER SYMPTOMS
NAUSEA: 0
BACK PAIN: 0
WHEEZING: 1
VOICE CHANGE: 0
SHORTNESS OF BREATH: 1
ABDOMINAL PAIN: 0
VOMITING: 0

## 2024-04-15 NOTE — PLAN OF CARE
Problem: Discharge Planning  Goal: Discharge to home or other facility with appropriate resources  4/15/2024 1237 by Hilary Araujo RN  Outcome: Adequate for Discharge  4/15/2024 1237 by Hilary Araujo RN  Outcome: Adequate for Discharge  4/15/2024 0100 by Jazz Brown RN  Outcome: Progressing     Problem: Pain  Goal: Verbalizes/displays adequate comfort level or baseline comfort level  4/15/2024 1237 by Hilary Araujo RN  Outcome: Adequate for Discharge  4/15/2024 1237 by Hilary Araujo RN  Outcome: Adequate for Discharge  4/15/2024 0100 by Jazz Brown RN  Outcome: Progressing     Problem: ABCDS Injury Assessment  Goal: Absence of physical injury  4/15/2024 1237 by Hilary Araujo RN  Outcome: Adequate for Discharge  4/15/2024 1237 by Hilary Araujo RN  Outcome: Adequate for Discharge  4/15/2024 0100 by Jazz Brown RN  Outcome: Progressing     Problem: Safety - Adult  Goal: Free from fall injury  4/15/2024 1237 by Hilary Araujo RN  Outcome: Adequate for Discharge  4/15/2024 1237 by Hilary Araujo RN  Outcome: Adequate for Discharge  4/15/2024 0100 by Jazz Brown RN  Outcome: Progressing     Problem: Chronic Conditions and Co-morbidities  Goal: Patient's chronic conditions and co-morbidity symptoms are monitored and maintained or improved  4/15/2024 1237 by Hilary Araujo RN  Outcome: Adequate for Discharge  4/15/2024 1237 by Hilary Araujo RN  Outcome: Adequate for Discharge  4/15/2024 0100 by Jazz Brown RN  Outcome: Progressing     Problem: Respiratory - Adult  Goal: Achieves optimal ventilation and oxygenation  4/15/2024 1237 by Hilary Araujo RN  Outcome: Adequate for Discharge  4/15/2024 1237 by Hilary Araujo RN  Outcome: Adequate for Discharge  4/15/2024 0905 by Madhavi Gomez RCP  Outcome: Progressing  Flowsheets (Taken 4/15/2024 0905)  Achieves optimal ventilation and oxygenation:   Assess for changes in respiratory status   Respiratory therapy support as indicated   Assess for changes in mentation and behavior

## 2024-04-15 NOTE — PROGRESS NOTES
[facility] Brant Trejo M.D., M.H.A.  Baltazar Guo M.D., M.B.A.  SUDHIR [facility] P.A.C.    [facility]  [facility]  [address] [facility]    Cardio-Oncology Service  [address], [address]  [place] [address]   [address] [place] [address]  Phone: [phone]   [place] [address]   Phone: [phone]  Fax: [phone]    [phone]   Fax: [phone]          DAILY HOSPITAL PROGRESS NOTE     # DAYS IN HOSPITAL: 2  ADMIT DATE: 4/10/2024        SUBJECTIVE:     Follow up regarding shortness of breath and cough  Continues to cough with clear sputum production  Sitting up in chair at present  No chest pain  BNP elevated so patient was given dose of diuretic  Previous cardiac cath showing occluded LCX.  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  This also involves the apex, distal septum and portions of the inferior wall. Severe multivessel coronary artery calcification     Above can did not show viability so patient advised medical therapy    BP elevated     Cough persists, no chest pain    VITALS:       Vitals:    04/11/24 2127 04/11/24 2230 04/12/24 0732 04/12/24 0829   BP: (!) 177/68 (!) 141/61  137/69   Pulse: 71 72 68 79   Resp:   18 16   Temp:    98.2 °F (36.8 °C)   TempSrc:    Oral   SpO2:   94% 93%   Weight:       Height:         No intake/output data recorded.  No intake/output data recorded.      PHYSICAL EXAM:     General: WDWN in NAD. A and O x 3  HEENT: EOMI, Sclera anicteric, Conjunctiva pink  Neck: Supple, No JVP or HJR, No carotid bruits  Lungs: diffuse wheezing  Heart: RRR, systolic murmur appreciated  Abdomen: soft non tender, good bowel sounds, No HSM  Ext: no edema,  Skin: no discoloration  Vascular: distal pulses intact      CURRENT MEDICATIONS:      methylPREDNISolone sodium (PF)  30 mg 
          [facility] Br[facility] Trejo M.D., M.H.A.  Baltazar Guo M.D., M.B.A.  SUDHIR Cornell P.A.C.    [facility]  [facility]  [address] [facility]    Cardio-Oncology Service  [address] [facility], [address]  [place] [address]   [address] [place] [address]  Phone: [phone]   [place] [id number]   Phone: [phone]  Fax: [phone]    [phone]   Fax: [phone]          DAILY HOSPITAL PROGRESS NOTE     # DAYS IN HOSPITAL: 3  ADMIT DATE: 4/10/2024        SUBJECTIVE:     Follow up regarding persistent cough and clear sputum production  Echo reviewed  He feels better today   Still with cough  No chest pain or shortness of breath    Intake and output not recorded (nurse states she is a float)    VITALS:       Vitals:    04/12/24 1309 04/12/24 1333 04/12/24 1955 04/12/24 2008   BP:    119/72   Pulse:  79  76   Resp:  16  18   Temp:    98.1 °F (36.7 °C)   TempSrc:    Oral   SpO2:  95% 97% 93%   Weight: 64.9 kg (143 lb)      Height: 1.727 m (5' 8\")        No intake/output data recorded.  No intake/output data recorded.      PHYSICAL EXAM:     General: WDWN in NAD. A and O x 3  Lungs: quite clear today with rare exp wheeze  Heart: RRR  Abdomen: soft non tender, good bowel sounds, No HSM  Ext: no edema,  Skin: no discoloration  Vascular: distal pulses intact      CURRENT MEDICATIONS:      methylPREDNISolone sodium (PF)  30 mg IntraVENous Q12H    albuterol  2.5 mg Nebulization TID RT    guaiFENesin  600 mg Oral BID    cefTRIAXone (ROCEPHIN) IV  1,000 mg IntraVENous Q24H    azithromycin  250 mg Oral Daily    amLODIPine  5 mg Oral Daily    furosemide  20 mg IntraVENous BID    potassium chloride  10 mEq Oral BID    budesonide-formoterol  2 puff Inhalation BID RT    sodium chloride  80 mL IntraVENous Once    aspirin  81 mg Oral Daily    calcium carb-cholecalciferol  1 tablet Oral 
   04/11/24 1145   Encounter Summary   Encounter Overview/Reason  Volunteer Encounter   Service Provided For: Patient   Referral/Consult From: Avelina   Last Encounter  04/11/24   Complexity of Encounter Low   Spiritual/Emotional needs   Type Spiritual Support   Assessment/Intervention/Outcome   Intervention Prayer (assurance of)/[place]       
  [facility]  IN-PATIENT SERVICE   [facility]    Progress Note    4/15/2024    10:02 AM    Name:   Bill Rodney  MRN:     [id number]     Acct:      [id number]   Room:   [address] [place] [address] Day:  5  Admit Date:  4/10/2024  3:27 PM    PCP:   Kareem Pal MD  Code Status:  Full Code    Subjective:     Patient is overall doing better.  Coughing is a little better with tessalon perles overnight.  He continues to see improvement with aerosols.m Steroids switched from IV to PO yesterday.  He feels he is ready to go home.      Medications:     Allergies:    Allergies   Allergen Reactions   • Levofloxacin Swelling   • Codeine Hives and Rash   • Pulmicort [Budesonide] Rash     Also itchy   • Cashew Nut Oil    • Levaquin  [Levofloxacin In D5w] Other (See Comments)   • Peanut-Containing Drug Products Hives       Current Meds:   Scheduled Meds:   • albuterol  2.5 mg Nebulization BID RT   • furosemide  20 mg Oral Daily   • predniSONE  40 mg Oral Daily    Followed by   • [START ON 4/17/2024] predniSONE  30 mg Oral Daily    Followed by   • [START ON 4/20/2024] predniSONE  20 mg Oral Daily    Followed by   • [START ON 4/23/2024] predniSONE  10 mg Oral Daily   • empagliflozin  10 mg Oral Daily   • cefTRIAXone (ROCEPHIN) IV  1,000 mg IntraVENous Q24H   • azithromycin  250 mg Oral Daily   • amLODIPine  5 mg Oral Daily   • potassium chloride  10 mEq Oral BID   • budesonide-formoterol  2 puff Inhalation BID RT   • sodium chloride  80 mL IntraVENous Once   • aspirin  81 mg Oral Daily   • calcium carb-cholecalciferol  1 tablet Oral Daily   • carvedilol  12.5 mg Oral BID   • atorvastatin  10 mg Oral Daily   • sodium chloride flush  5-40 mL IntraVENous 2 times per day   • warfarin placeholder: dosing by pharmacy   Other RX Placeholder     Continuous Infusions:   • sodium chloride       PRN Meds: Dextromethorphan-guaiFENesin, benzonatate, traZODone, HYDROcodone-chlorpheniramine, albuterol 
  [facility]  IN-PATIENT SERVICE   [facility] - Location: [place]    Progress Note    4/12/2024    11:09 AM    Name:   Bill Rodney  MRN:     [id number]     Acct:      [id number]   Room:   [address] [place] [address] Day:  2  Admit Date:  4/10/2024  3:27 PM    PCP:   Kareem Pal MD  Code Status:  Full Code    Subjective:   Patient had uneventful night.  He complains of his room being cold which it is -- maintenance has been called..  Patient has cardiac echo ordered for today.  Continues to have wheezing and congested cough.  He has been up with physical therapy.  Vital signs are stable afebrile.  SaO2 93% on room air    Medications:     Allergies:    Allergies   Allergen Reactions   • Levofloxacin Swelling   • Codeine Hives and Rash   • Pulmicort [Budesonide] Rash     Also itchy   • Cashew Nut Oil    • Levaquin  [Levofloxacin In D5w] Other (See Comments)   • Peanut-Containing Drug Products Hives       Current Meds:   Scheduled Meds:   • methylPREDNISolone sodium (PF)  30 mg IntraVENous Q12H   • warfarin  3 mg Oral Once   • albuterol  2.5 mg Nebulization TID RT   • budesonide-formoterol  2 puff Inhalation BID RT   • sodium chloride  80 mL IntraVENous Once   • aspirin  81 mg Oral Daily   • calcium carb-cholecalciferol  1 tablet Oral Daily   • carvedilol  12.5 mg Oral BID   • atorvastatin  10 mg Oral Daily   • sodium chloride flush  5-40 mL IntraVENous 2 times per day   • warfarin placeholder: dosing by pharmacy   Other RX Placeholder     Continuous Infusions:   • sodium chloride       PRN Meds: HYDROcodone-chlorpheniramine, albuterol sulfate HFA, lisinopril, sodium chloride flush, sodium chloride, potassium chloride **OR** potassium alternative oral replacement **OR** potassium chloride, magnesium sulfate, ondansetron **OR** ondansetron, polyethylene glycol, acetaminophen **OR** acetaminophen    Data:     Vitals: Signs are stable.  /69   Pulse 79   Temp 98.2 °F (36.8 °C) (Oral)   
Occupational Therapy  Facility/Department: [address]  Occupational Therapy Initial Assessment    Name: Bill Rodney  : 1935  MRN: [id number]  Date of Service: 2024  Chief Complaint   Patient presents with    Cough     Pt arrives with co chronic cough dt previous occupation. Pt states  [facility] can not get him in until may 29th. Pt states he has been trying a nebulizer and recently cough syrup without relief.          Discharge Recommendations:  Patient would benefit from continued therapy after discharge  OT Equipment Recommendations  Equipment Needed: Yes  Other: TBD       Patient Diagnosis(es): The primary encounter diagnosis was Cough, unspecified type. Diagnoses of Dyspnea on exertion, Elevated brain natriuretic peptide (BNP) level, Arthritis of left knee, and Acute congestive heart failure, unspecified heart failure type (HCC) were also pertinent to this visit.  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, 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, Chronic obstructive lung disease (HCC), Chronic obstructive pulmonary disease (HCC), Chronic rhinitis, Chronic sinusitis, Deep vein thrombosis of lower extremity (HCC), Diarrhea, Disorder of arteries and arterioles, unspecified (HCC), Diverticulitis of colon, Dizziness and giddiness, Dysphagia, Enlarged prostate, Environmental allergies, Fatigue, Gabe hematuria, Frostbite with tissue necrosis of nose, Gastroesophageal reflux disease, Heartburn, Hereditary coagulation factor deficiency (HCC), Herpes zoster, Heterozygous factor V Leiden mutation (HCC), Hypercholesterolemia, Hypertension, 
PULMONARY & CRITICAL CARE MEDICINE PROGRESS NOTE     Patient:  Bill Rodney  MRN: [id number]  Admit date: 4/10/2024  Primary Care Physician: Kareem Pal MD  Consulting Physician: Cruz Gill MD  CODE Status: Full Code  LOS: 2     SUBJECTIVE     CHIEF COMPLAINT/REASON FOR INITIAL CONSULT:Cough (Pt arrives with co chronic cough dt previous occupation. Pt states dr ugalde can not get him in until may 29th. Pt states he has been trying a nebulizer and recently cough syrup without relief. )    BRIEF HOSPITAL COURSE:  The patient is a 88 y.o. male with past medical history of severe persistent asthma, history of chlorine exposure in the past according to patient  he retired in 1992.  He is followed by Dr. Kramer recently seen in February 2024.  He had called the office because of increasing cough congestion and wheezing.  He was instructed to come to emergency room.  According patient he is having more symptoms for last 10 to 14 days he did not have any fever chills.  According patient he is bringing a lot of his sputum which is usually whitish in color.  He did not have any headache body ache or flulike symptoms.  Cough is daily he also have chronic cough but recently for last 1 to 2 weeks he has increased cough and increased sputum production.  He also complained of chest pain according to patient left-sided pain which she did not have before but attributed this to his cough there is no radiation of chest pain it is nonpleuritic in nature he denies hemoptysis.  Since admission he did not have any fever hemodynamically stable he is on room air maintaining saturation.  He was supposed to be on Symbicort and Incruse according to the last note from Dr. Kramer but patient remember taking DuoNeb aerosol not on inhaler except albuterol he was instructed to take Breztri by his primary physician but patient was not able to afford it.     In the emergency room he had a CTA chest done which was negative for 
PULMONARY & CRITICAL CARE MEDICINE PROGRESS NOTE     Patient:  Bill Rodney  MRN: [id number]  Admit date: 4/10/2024  Primary Care Physician: Kareem Pal MD  Consulting Physician: Cruz Gill MD  CODE Status: Full Code  LOS: 3     SUBJECTIVE     CHIEF COMPLAINT/REASON FOR INITIAL CONSULT:Cough (Pt arrives with co chronic cough dt previous occupation. Pt states dr ugalde can not get him in until may 29th. Pt states he has been trying a nebulizer and recently cough syrup without relief. )    BRIEF HOSPITAL COURSE:  The patient is a 88 y.o. male with past medical history of severe persistent asthma, history of chlorine exposure in the past according to patient  he retired in 1992.  He is followed by Dr. Kramer recently seen in February 2024.  He had called the office because of increasing cough congestion and wheezing.  He was instructed to come to emergency room.  According patient he is having more symptoms for last 10 to 14 days he did not have any fever chills.  According patient he is bringing a lot of his sputum which is usually whitish in color.  He did not have any headache body ache or flulike symptoms.  Cough is daily he also have chronic cough but recently for last 1 to 2 weeks he has increased cough and increased sputum production.  He also complained of chest pain according to patient left-sided pain which she did not have before but attributed this to his cough there is no radiation of chest pain it is nonpleuritic in nature he denies hemoptysis.  Since admission he did not have any fever hemodynamically stable he is on room air maintaining saturation.  He was supposed to be on Symbicort and Incruse according to the last note from Dr. Karmer but patient remember taking DuoNeb aerosol not on inhaler except albuterol he was instructed to take Breztri by his primary physician but patient was not able to afford it.     In the emergency room he had a CTA chest done which was negative for 
Patient alert and oriented x 4. Able to make needs known.Discharged instruction was given with no further questions. Personal belongings were sent with patient. IV out, dressings intact, clean , and dry. No pain /discomfort noted. Took patient to entrance C where his ride was.   
Pharmacy Note  Warfarin Consult    Bill Rodney is a 88 y.o. male for whom pharmacy has been consulted to manage warfarin therapy.     Consulting Physician: Cruz Marquez   Reason for Admission: acute asthma exacerbation    Warfarin dose prior to admission: 4 mg on tue and 3 mg all other days  Warfarin indication: DVT  Target INR range: 2-3     Past Medical History:   Diagnosis Date    Abdominal hernia 10/8/2020    Achilles bursitis 10/8/2020    Acute sinusitis 8/7/2017    Adult body mass index 25-29 10/8/2020    Allergic rhinitis due to pollen 10/8/2020    Anesthesia of skin 10/8/2020    Anterior subcapsular polar senile cataract 10/8/2020    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    Asthma 8/7/2017    Atopic dermatitis 10/8/2020    Backache 10/8/2020    Bacterial pneumonia 8/7/2017    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/1/2013    Benign prostatic hyperplasia 4/12/2013    Candidiasis of mouth 8/7/2017    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 9/8/2014    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 2/13/2020    Chronic obstructive lung disease (HCC) 11/8/2011    Chronic obstructive pulmonary disease (HCC) 10/8/2020    Chronic rhinitis 2/13/2020    Chronic sinusitis 2/13/2020    Deep vein thrombosis of lower extremity (HCC) 8/7/2017    Diarrhea 10/8/2020    Disorder of arteries and arterioles, unspecified (HCC) 10/8/2020    Diverticulitis of colon 10/8/2020    Dizziness and giddiness 12/19/2011    Dysphagia 8/7/2017    Enlarged prostate 10/8/2020    Environmental allergies 8/7/2017    Fatigue 10/8/2020    Gabe hematuria 4/12/2013    Frostbite with tissue necrosis of nose 10/8/2020    Gastroesophageal reflux disease 8/7/2017    Heartburn 11/8/2011    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 back strain 10/8/2020    Lumbar sprain 
Pharmacy Note  Warfarin Consult follow-up      Recent Labs     04/11/24  0658   INR 1.7     Recent Labs     04/10/24  1720 04/11/24  0658   HGB 13.0* 12.8*   HCT 38.4* 36.9*    200       Significant Drug-Drug Interactions:  Aspirin        Notes:                   INR slightly subtherapeutic at 1.7; will give 4 mg today.  Daily PT/INR while inpatient.     Estefani Jacinto, PharmD 4/11/2024 11:23 AM    
Pharmacy Note  Warfarin Consult follow-up      Recent Labs     04/12/24  0609   INR 2.0     Recent Labs     04/10/24  1720 04/11/24  0658   HGB 13.0* 12.8*   HCT 38.4* 36.9*    200       Significant Drug-Drug Interactions:  New warfarin drug-drug interactions: solu-medrol      Notes:                   INR therapeutic at 2 today, however patient did not receive any warfarin on 4/10 - considered giving higher than home dose today however patient started on IV steroids today so giving home dose 3 mg today.  Daily PT/INR while inpatient.      Argentina Romero, PharmD  [facility]  4/12/2024 7:33 AM    
Pharmacy Note  Warfarin Consult follow-up      Recent Labs     04/15/24  0638   INR 2.2     No results for input(s): \"HGB\", \"HCT\", \"PLT\" in the last 72 hours.    Significant Drug-Drug Interactions:  Azithromycin, ASA, prednisone        Notes:                     INR therapeutic at 2.2, will give reduced daily dose warfarin 2 mg today.   Daily PT/INR while inpatient.     Estefani Jacinto, PharmD 4/15/2024 11:20 AM  
Physical Therapy  Facility/Department: [address]  Physical Therapy Initial Assessment    Name: Bill Rodney  : 1935  MRN: [id number]  Date of Service: 2024    Chief Complaint   Patient presents with    Cough     Pt arrives with co chronic cough dt previous occupation. Pt states dr ugalde can not get him in until may 29th. Pt states he has been trying a nebulizer and recently cough syrup without relief.       Discharge Recommendations:  No therapy recommended at discharge   PT Equipment Recommendations  Equipment Needed: No (pt has cane at home)      Patient Diagnosis(es): The primary encounter diagnosis was Cough, unspecified type. Diagnoses of Dyspnea on exertion, Elevated brain natriuretic peptide (BNP) level, Arthritis of left knee, and Acute congestive heart failure, unspecified heart failure type (HCC) were also pertinent to this visit.  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, 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, Chronic obstructive lung disease (HCC), Chronic obstructive pulmonary disease (HCC), Chronic rhinitis, Chronic sinusitis, Deep vein thrombosis of lower extremity (HCC), Diarrhea, Disorder of arteries and arterioles, unspecified (HCC), Diverticulitis of colon, Dizziness and giddiness, Dysphagia, Enlarged prostate, Environmental allergies, Fatigue, Gabe hematuria, Frostbite with tissue necrosis of nose, Gastroesophageal reflux disease, Heartburn, Hereditary coagulation factor deficiency (HCC), Herpes zoster, Heterozygous factor V Leiden mutation (HCC), Hypercholesterolemia, Hypertension, Increased frequency of 
    Continuous Infusions:   • sodium chloride       PRN Meds: Dextromethorphan-guaiFENesin, benzonatate, traZODone, HYDROcodone-chlorpheniramine, albuterol sulfate HFA, lisinopril, sodium chloride flush, sodium chloride, potassium chloride **OR** potassium alternative oral replacement **OR** potassium chloride, magnesium sulfate, ondansetron **OR** ondansetron, polyethylene glycol, acetaminophen **OR** acetaminophen    Data:     Vitals:  BP (!) 146/70   Pulse 84   Temp 97.5 °F (36.4 °C) (Oral)   Resp 18   Ht 1.727 m (5' 8\")   Wt 64.9 kg (143 lb)   SpO2 92%   BMI 21.74 kg/m²   Temp (24hrs), Av.7 °F (36.5 °C), Min:97.5 °F (36.4 °C), Max:98.1 °F (36.7 °C)    No results for input(s): \"POCGLU\" in the last 72 hours.    I/O (24Hr):    Intake/Output Summary (Last 24 hours) at 2024 1713  Last data filed at 2024 0913  Gross per 24 hour   Intake 480 ml   Output 800 ml   Net -320 ml       Labs:  Hematology:  Recent Labs     24  0609 24  0715 24  0655   INR 2.0 2.2 2.1     Chemistry:  Recent Labs     24  0715 24  0655    138   K 3.6* 3.7    101   CO2 25 24   GLUCOSE 142* 134*   BUN 23 29*   CREATININE 0.9 0.9   ANIONGAP 9 13   LABGLOM 83 83   CALCIUM 8.7 8.7   No results for input(s): \"PROT\", \"LABALBU\", \"LABA1C\", \"G0XORBM\", \"Z9KUCJS\", \"FT4\", \"TSH\", \"AST\", \"ALT\", \"LDH\", \"GGT\", \"ALKPHOS\", \"LABGGT\", \"BILITOT\", \"BILIDIR\", \"AMMONIA\", \"AMYLASE\", \"LIPASE\", \"LACTATE\", \"CHOL\", \"HDL\", \"LDLCHOLESTEROL\", \"CHOLHDLRATIO\", \"TRIG\", \"VLDL\", \"TOG58YV\", \"PHENYTOIN\", \"PHENYF\", \"URICACID\", \"POCGLU\" in the last 72 hours.  ABG:No results found for: \"POCPH\", \"PHART\", \"PH\", \"POCPCO2\", \"FPN0EBS\", \"PCO2\", \"POCPO2\", \"PO2ART\", \"PO2\", \"POCHCO3\", \"XHO5YGZ\", \"HCO3\", \"NBEA\", \"PBEA\", \"BEART\", \"BE\", \"THGBART\", \"THB\", \"HHS7GFN\", \"JFAD6YBX\", \"D8AIVGYI\", \"O2SAT\", \"FIO2\"  Lab Results   Component Value Date/Time    SPECIAL 0.5ML LEFT ARM 04/10/2024 07:51 PM     Lab Results   Component Value Date/Time    
pulm embolism shows chronic right upper lobe pleural-based area of atelectasis/scarring.  Coronary calcification was seen.  Chest x-ray did not show any consolidation or infiltrate  His proBNP was elevated 1506 troponin was 30 LFTs chemistry was otherwise unremarkable WBC count was 8 hemoglobin was 13.  Blood culture was done so far negative his RSV flu and COVID was negative     He started on aerosol since admission currently not on steroid.     INTERVAL HISTORY:  24  On room air  Feels better  Wheezes present but better  Continues to cough with clear sputum    REVIEW OF SYSTEMS:  Review of Systems   Constitutional:  Positive for fatigue. Negative for fever.   HENT:  Negative for congestion and voice change.    Eyes:  Negative for visual disturbance.   Respiratory:  Positive for shortness of breath and wheezing.    Cardiovascular:  Negative for chest pain and leg swelling.   Gastrointestinal:  Negative for abdominal pain, nausea and vomiting.   Genitourinary:  Negative for dysuria and urgency.   Musculoskeletal:  Negative for back pain and joint swelling.   Skin:  Negative for rash.   Neurological:  Positive for weakness.   Hematological:  Does not bruise/bleed easily.   Psychiatric/Behavioral:  Negative for agitation and confusion.        OBJECTIVE     VITAL SIGNS:   LAST:  BP (!) 146/70   Pulse 84   Temp 97.5 °F (36.4 °C) (Oral)   Resp 18   Ht 1.727 m (5' 8\")   Wt 64.9 kg (143 lb)   SpO2 92%   BMI 21.74 kg/m²   8-24 HR RANGE:  TEMP Temp  Av.7 °F (36.5 °C)  Min: 97.5 °F (36.4 °C)  Max: 98.1 °F (36.7 °C)   BP Systolic (24hrs), Av , Min:111 , Max:146      Diastolic (24hrs), Av, Min:70, Max:79     PULSE Pulse  Av.4  Min: 69  Max: 84   RR Resp  Av.3  Min: 16  Max: 18   O2 SAT SpO2  Av.5 %  Min: 92 %  Max: 95 %   OXYGEN DELIVERY No data recorded         PHYSICAL EXAM:  Physical Exam  Constitutional:       General: He is awake.      Appearance: He is ill-appearing.      
Chronic area of right apical scarring.     XR CHEST (2 VW)    Result Date: 4/10/2024  No acute cardiopulmonary disease       Physical Examination:     General appearance:  alert, cooperative and no distress  Mental Status:  oriented to person, place and time and normal affect  Lungs:  clear to auscultation bilaterally, except for mild expiratory wheeze  Heart:  regular rate and rhythm, no murmur  Abdomen:  soft, nontender, nondistended, normal bowel sounds, no masses, hepatomegaly, splenomegaly  Extremities:  no edema, redness, tenderness in the calves  Skin:  no gross lesions, rashes, induration    Assessment:     Hospital Problems             Last Modified POA    * (Principal) Acute asthma exacerbation 4/10/2024 Yes    Cough 4/12/2024 Yes    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 4/11/2024 Yes    Long term current use of anticoagulant therapy 4/11/2024 Yes    Subtherapeutic international normalized ratio (INR) 4/11/2024 Yes    History of pulmonary embolism 4/11/2024 Yes    CHF (congestive heart failure) (HCC) 4/11/2024 Yes    Elevated brain natriuretic peptide (BNP) level 4/11/2024 Yes       Plan:     Acute asthma exacerbation- symptoms improving, on solumedrol 30mg BID, rocephin and azithromycin, and aerosols, management per pulm  Elevated BNP- gentle diuresis with lasix IV BID per cardiology, ECHO shows normal EF with diastolic dysfunction  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   
[place]    Subjective   General  Patient assessed for rehabilitation services?: Yes  Response to previous treatment: Patient with no complaints from previous session  Family / Caregiver Present: No  Subjective  Subjective: Patient reports no pain.  General Comment  Comments: OK to see. Patient agreeable to therapy.         Objective         Safety Devices  Type of Devices: Call light within reach;Gait belt;Left in chair;Patient at risk for falls  Restraints  Restraints Initially in Place: No    Balance  Sitting: Intact  Standing: Impaired (static-IND, dynamic-SBA)    Toilet Transfers  Toilet - Technique: Ambulating  Toilet Transfers Comments: into bathroom  Shower Transfers  Shower - Transfer To: Shower seat with back  Shower - Technique: Ambulating  Shower Transfers: Supervision  Shower Transfers Comments: sat onto shower seat to doff/don underware     ADL  Feeding: Independent  LE Dressing: Stand by assistance  LE Dressing Skilled Clinical Factors: doff underware, don underware and jeans, socks and tennis shoes  Functional Mobility: Supervision;Independent;Stand by assistance  Functional Mobility Skilled Clinical Factors: bed mob IND, sit<>stand supervision, shower seat transfer supervision, amb no device around room SBA  Skin Care: N/A        Bed mobility  Supine to Sit: Independent  Scooting: Independent    Transfers  Sit to stand: Supervision  Stand to sit: Supervision                        Education Given To: Patient  Education Provided: ADL Adaptive Strategies;Transfer Training;Fall Prevention Strategies;Precautions  Education Method: Demonstration;Verbal  Barriers to Learning: None  Education Outcome: Continued education needed                     AM-PAC - ADL  AM-PAC Daily Activity - Inpatient   How much help is needed for putting on and taking off regular lower body clothing?: A Little  How much help is needed for bathing (which includes washing, rinsing, drying)?: A Little  How much help is needed for 
participate in the care of this patient.  Please feel free to call with any further questions or concerns. We will continue to follow.     Lakia Brown MD  Pulmonary and Critical Care Medicine  4/15/2024, 11:28 AM         This note is created with the assistance of a speech recognition program.  While intending to generate a document that actually reflects the content of the visit, the document can still have some errors including those of syntax and sound-alike substitutions which may escape proof reading.  It such instances, actual meaning can be extrapolated by contextual diversion.

## 2024-04-15 NOTE — TELEPHONE ENCOUNTER
Bill Rodney is calling to request a refill on the following medication(s):    Medication Request:  Requested Prescriptions     Pending Prescriptions Disp Refills    fluticasone-salmeterol (WIXELA INHUB) 250-50 MCG/ACT AEPB diskus inhaler [Pharmacy Med Name: WIXELA 250-50 INHUB] 1 each 0       Last Visit Date (If Applicable):  Visit date not found    Next Visit Date:    Visit date not found

## 2024-04-15 NOTE — PLAN OF CARE
Problem: Respiratory - Adult  Goal: Achieves optimal ventilation and oxygenation  4/15/2024 0905 by Madhavi Gomez RCP  Outcome: Progressing  Flowsheets (Taken 4/15/2024 0905)  Achieves optimal ventilation and oxygenation:   Assess for changes in respiratory status   Respiratory therapy support as indicated   Assess for changes in mentation and behavior   Assess and instruct to report shortness of breath or any respiratory difficulty

## 2024-04-15 NOTE — PLAN OF CARE
Problem: Discharge Planning  Goal: Discharge to home or other facility with appropriate resources  4/15/2024 1237 by Hilary Araujo RN  Outcome: Adequate for Discharge  4/15/2024 0100 by Jazz Brown RN  Outcome: Progressing     Problem: Pain  Goal: Verbalizes/displays adequate comfort level or baseline comfort level  4/15/2024 1237 by Hilary Araujo RN  Outcome: Adequate for Discharge  4/15/2024 0100 by Jazz Brown RN  Outcome: Progressing     Problem: ABCDS Injury Assessment  Goal: Absence of physical injury  4/15/2024 1237 by Hilary Araujo RN  Outcome: Adequate for Discharge  4/15/2024 0100 by Jazz Brown RN  Outcome: Progressing     Problem: Safety - Adult  Goal: Free from fall injury  4/15/2024 1237 by Hilary Araujo RN  Outcome: Adequate for Discharge  4/15/2024 0100 by Jazz Brown RN  Outcome: Progressing     Problem: Chronic Conditions and Co-morbidities  Goal: Patient's chronic conditions and co-morbidity symptoms are monitored and maintained or improved  4/15/2024 1237 by Hilary Araujo RN  Outcome: Adequate for Discharge  4/15/2024 0100 by Jazz Brown RN  Outcome: Progressing     Problem: Respiratory - Adult  Goal: Achieves optimal ventilation and oxygenation  4/15/2024 1237 by Hilary Araujo RN  Outcome: Adequate for Discharge  4/15/2024 0905 by Madhavi Gomez RCP  Outcome: Progressing  Flowsheets (Taken 4/15/2024 0905)  Achieves optimal ventilation and oxygenation:   Assess for changes in respiratory status   Respiratory therapy support as indicated   Assess for changes in mentation and behavior   Assess and instruct to report shortness of breath or any respiratory difficulty  4/15/2024 0100 by Jazz Brown RN  Outcome: Progressing

## 2024-04-15 NOTE — PLAN OF CARE
Problem: Discharge Planning  Goal: Discharge to home or other facility with appropriate resources  Outcome: Progressing    Patient actively participates in ADL's and decision making regarding plan of care.     Problem: Pain  Goal: Verbalizes/displays adequate comfort level or baseline comfort level  Outcome: Progressing    No new signs/symptoms of pain noted, pain rating < 3 on scale 0-10, pain controlled with medication/repositioning.     Problem: ABCDS Injury Assessment  Goal: Absence of physical injury  Outcome: Progressing     Problem: Safety - Adult  Goal: Free from fall injury  Outcome: Progressing    No falls/injuries this shift, bed in lowest position, brakes on, bed alarm on, call light in reach, side rails up x2.     Problem: Chronic Conditions and Co-morbidities  Goal: Patient's chronic conditions and co-morbidity symptoms are monitored and maintained or improved  Outcome: Progressing     Reinforcement of disease process and treatment plan recommended for chronic conditions and co-morbidities.      Problem: Respiratory - Adult  Goal: Achieves optimal ventilation and oxygenation  Outcome: Progressing

## 2024-04-15 NOTE — DISCHARGE INSTR - COC
for {GREATER/LESS:939947166} 30 days.     Update Admission H&P: {CHP DME Changes in HandP:180980830}    PHYSICIAN SIGNATURE:  {Esignature:459129316}

## 2024-04-16 ENCOUNTER — TELEPHONE (OUTPATIENT)
Age: 89
End: 2024-04-16

## 2024-04-16 NOTE — CARE COORDINATION
4/16  2857 Rec'd call from Munira @ [employer] updated on admissioin . [phone]  
VM message left to Andree [facility] [employer].  @ [facility]  [phone] awaiting call back.  
of care/goals and shares the quality data associated with the providers was provided to:     Patient Representative Name:       The Patient and/or Patient Representative Agree with the Discharge Plan?  y    Kiara Nguyễn RN  Case Management Department  Ph:  Fax:

## 2024-04-16 NOTE — TELEPHONE ENCOUNTER
Care Transitions Initial Follow Up Call    Outreach made within 2 business days of discharge: Yes    Patient: Bill Rodney Patient : 1935   MRN: [id number]  Reason for Admission: There are no discharge diagnoses documented for the most recent discharge.  Discharge Date: 4/15/24       Spoke with: Wife    Discharge department/facility: [facility] Interactive Patient Contact:  Was patient able to fill all prescriptions: Yes  Was patient instructed to bring all medications to the follow-up visit: Yes  Is patient taking all medications as directed in the discharge summary? Yes  Does patient understand their discharge instructions: Yes  Does patient have questions or concerns that need addressed prior to 7-14 day follow up office visit: no    Scheduled appointment with PCP on .     Follow Up  Future Appointments   Date Time Provider Department Center   2024 11:30 AM Scott Kramer MD [facility] [place] [facility]   2024  2:20 PM [facility] [place] [facility]

## 2024-04-16 NOTE — DISCHARGE SUMMARY
[facility]  IN-PATIENT SERVICE   [facility]    Discharge Summary     Patient ID: Bill Rodney  :  1935   MRN: [id number]     ACCOUNT:  [id number]   Patient's PCP: Kareem Pal MD  Admit Date: 4/10/2024   Discharge Date: 4/15/2024  Length of Stay: 5  Code Status:  Prior  Admitting Physician: Cruz Gill MD  Discharge Physician: Darby Gibson MD     Active Discharge Diagnoses:     Hospital Problem Lists:  Principal Problem:    Acute asthma exacerbation  Active Problems:    Cough    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    Long term current use of anticoagulant therapy    Subtherapeutic international normalized ratio (INR)    History of pulmonary embolism    CHF (congestive heart failure) (HCC)    Elevated brain natriuretic peptide (BNP) level  Resolved Problems:    * No resolved hospital problems. *      Admission Condition:  fair     Discharged Condition: good    Hospital Stay:     Hospital Course:  Bill Rodney is a 88 y.o. male who was admitted for the management of Acute asthma exacerbation , presented to ER with Cough (Pt arrives with co chronic cough dt previous occupation. Pt states dr ugalde can not get him in until may 29th. Pt states he has been trying a nebulizer and recently cough syrup without relief. )    In the ER his workup revealed an initial elevated sodium at 147 but it was corrected to 140 on repeat labs, a BNP of 1500, troponin of 30 which is about his baseline since , LFTs within normal limits, white blood cell count at 8.0, hemoglobin at 13, negative for flu, RSV, COVID, no evidence for a pulmonary embolism or acute pulmonary abnormality and chronic areas of right apical scarring noted on the CT a of the chest.   Patient is admitted for asthma exacerbation versus CHF exacerbation.    Cardiology and pulmonology was consulted.  He was started on azithromycin and rocephin as well as solumedrol.  ECHO was unchanged.

## 2024-04-17 LAB
EKG ATRIAL RATE: 79 BPM
EKG P AXIS: 64 DEGREES
EKG P-R INTERVAL: 226 MS
EKG Q-T INTERVAL: 390 MS
EKG QRS DURATION: 100 MS
EKG QTC CALCULATION (BAZETT): 447 MS
EKG R AXIS: 8 DEGREES
EKG T AXIS: 81 DEGREES
EKG VENTRICULAR RATE: 79 BPM

## 2024-04-18 ENCOUNTER — TELEPHONE (OUTPATIENT)
Dept: PULMONOLOGY | Age: 89
End: 2024-04-18

## 2024-04-18 NOTE — TELEPHONE ENCOUNTER
Patient's wife called to discuss inhaler and nebulizer solution and instructions.  Dr. Ivan's notes and discharge order has symbicort, however that was discontinued and pharmacy must have ordered Wixela so she was confused on dosage/puffs.  Went over inhaler and duoneb instructions.  Follow up scheduled with Dr. Kramer 5/6.

## 2024-04-22 ENCOUNTER — OFFICE VISIT (OUTPATIENT)
Age: 89
End: 2024-04-22
Payer: MEDICARE

## 2024-04-22 VITALS
SYSTOLIC BLOOD PRESSURE: 114 MMHG | TEMPERATURE: 97.8 F | DIASTOLIC BLOOD PRESSURE: 70 MMHG | HEIGHT: 68 IN | WEIGHT: 142.8 LBS | HEART RATE: 64 BPM | BODY MASS INDEX: 21.64 KG/M2 | OXYGEN SATURATION: 98 %

## 2024-04-22 DIAGNOSIS — I10 PRIMARY HYPERTENSION: Primary | ICD-10-CM

## 2024-04-22 DIAGNOSIS — I50.9 CONGESTIVE HEART FAILURE, UNSPECIFIED HF CHRONICITY, UNSPECIFIED HEART FAILURE TYPE (HCC): ICD-10-CM

## 2024-04-22 DIAGNOSIS — K21.9 GASTROESOPHAGEAL REFLUX DISEASE WITHOUT ESOPHAGITIS: ICD-10-CM

## 2024-04-22 DIAGNOSIS — J41.8 MIXED SIMPLE AND MUCOPURULENT CHRONIC BRONCHITIS (HCC): ICD-10-CM

## 2024-04-22 DIAGNOSIS — Z86.711 HISTORY OF PULMONARY EMBOLISM: ICD-10-CM

## 2024-04-22 PROCEDURE — 1111F DSCHRG MED/CURRENT MED MERGE: CPT | Performed by: FAMILY MEDICINE

## 2024-04-22 PROCEDURE — G8420 CALC BMI NORM PARAMETERS: HCPCS | Performed by: FAMILY MEDICINE

## 2024-04-22 PROCEDURE — 3023F SPIROM DOC REV: CPT | Performed by: FAMILY MEDICINE

## 2024-04-22 PROCEDURE — 1123F ACP DISCUSS/DSCN MKR DOCD: CPT | Performed by: FAMILY MEDICINE

## 2024-04-22 PROCEDURE — 99214 OFFICE O/P EST MOD 30 MIN: CPT | Performed by: FAMILY MEDICINE

## 2024-04-22 PROCEDURE — G8427 DOCREV CUR MEDS BY ELIG CLIN: HCPCS | Performed by: FAMILY MEDICINE

## 2024-04-22 PROCEDURE — 1036F TOBACCO NON-USER: CPT | Performed by: FAMILY MEDICINE

## 2024-04-22 NOTE — PROGRESS NOTES
[facility]  [facility] MEDICINE  [address]  [place] [address]     Date of Visit:  2024  Patient Name: Bill Rodney   Patient :  1935     CHIEF COMPLAINT/HPI:     Chief Complaint   Patient presents with    Follow-Up from Hospital     He is here for his hospital follow up where he was admitted to [facility] on 4/10-15/2024 for Chest pain. He has a follow up with Dr. Trejo on 2024.  He has an appt with Dr. Roper on 2024. He says that he is feeling tired.         HPI      Bill Rodney, 88 y.o. presents today for transitional care management following dyspnea on exertion and CHF.     Bill Rodney denies:  chest pain  chills  constipation  diarrhea  dyspnea  dysuria  fever  headaches  heartburn  lower extremity swelling  nausea  palpitations  syncope  skin changes  vision changes  vomiting.     REVIEW OF SYSTEM      Review of Systems:   Constitutional:  Negative for chills, fatigue, fever and unexpected weight change.   Eyes:  Negative for visual disturbance.   Respiratory:  Negative for cough, chest tightness, shortness of breath and wheezing.    Cardiovascular:  Negative for chest pain, palpitations and leg swelling.   Gastrointestinal:  Negative for abdominal distention, abdominal pain, blood in stool, constipation, diarrhea, nausea and vomiting.   Genitourinary:  Negative for dysuria, hematuria and urgency.   Musculoskeletal:  Negative for back pain, neck pain and neck stiffness.   Skin:  Negative for rash and wound.   Neurological:  Negative for syncope, weakness, light-headedness and headaches.   Hematological:  Negative for adenopathy. Does not bruise/bleed easily.   Psychiatric/Behavioral:  Negative for suicidal ideas. The patient is not nervous/anxious.      REVIEWED INFORMATION      Allergies   Allergen Reactions    Levofloxacin Swelling    Codeine Hives and Rash    Pulmicort [Budesonide] Rash     Also itchy

## 2024-04-25 ENCOUNTER — TELEPHONE (OUTPATIENT)
Age: 89
End: 2024-04-25

## 2024-04-25 NOTE — TELEPHONE ENCOUNTER
PT wants to change INR lab location to the [facility] due to traffic situation in [place], needs standing order sent there. He doesn't need it for a few more weeks. [facility] Fax #[phone]

## 2024-04-30 DIAGNOSIS — Z86.711 HISTORY OF PULMONARY EMBOLISM: Primary | ICD-10-CM

## 2024-05-03 ENCOUNTER — TELEPHONE (OUTPATIENT)
Age: 89
End: 2024-05-03

## 2024-05-03 NOTE — TELEPHONE ENCOUNTER
Bert is calling states that the patient told them that you are taking over his warfin as of now because he told them that he is not going back to them [place] ([place] issues). Is this something that you would agree to do?

## 2024-05-06 ENCOUNTER — OFFICE VISIT (OUTPATIENT)
Dept: PULMONOLOGY | Age: 89
End: 2024-05-06

## 2024-05-06 VITALS
BODY MASS INDEX: 21.61 KG/M2 | RESPIRATION RATE: 18 BRPM | SYSTOLIC BLOOD PRESSURE: 98 MMHG | WEIGHT: 142.6 LBS | DIASTOLIC BLOOD PRESSURE: 64 MMHG | HEIGHT: 68 IN | OXYGEN SATURATION: 98 % | HEART RATE: 67 BPM

## 2024-05-06 DIAGNOSIS — J45.50 SEVERE PERSISTENT ASTHMA WITHOUT COMPLICATION: Primary | ICD-10-CM

## 2024-05-06 DIAGNOSIS — Z77.098 CHLORINE GAS EXPOSURE: ICD-10-CM

## 2024-05-06 DIAGNOSIS — I25.10 CAD, MULTIPLE VESSEL: ICD-10-CM

## 2024-05-06 RX ORDER — PREDNISONE 20 MG/1
20 TABLET ORAL DAILY
Qty: 20 TABLET | Refills: 4 | Status: SHIPPED | OUTPATIENT
Start: 2024-05-06 | End: 2024-08-14

## 2024-05-07 NOTE — TELEPHONE ENCOUNTER
Maria Elena from [facility] called to say that he cancelled his appointment with them for tomorrow so he is now with you for his coumadin.

## 2024-05-07 NOTE — TELEPHONE ENCOUNTER
Wife, Jenny,called    She said that [facility] is needing us to call them to let them know who will be managing patient's INR since patient is not wanting to travel to them.   Their phone # [phone]    Wife said that they will be going to [facility]    She would like to know who will be monitoring patient

## 2024-05-08 ASSESSMENT — ENCOUNTER SYMPTOMS
COUGH: 1
EYES NEGATIVE: 1
RHINORRHEA: 0
WHEEZING: 0
SHORTNESS OF BREATH: 1

## 2024-05-08 NOTE — PROGRESS NOTES
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 year old male who is coming to the office after his hospital visit.  He completed his prednisone taper and after 2 days of finishing prednisone taper he started feeling fatigued tired and he attributes this to his asthma getting worse.  He is not wheezing he has chronic cough which has not worsened.  No purulent sputum or hemoptysis.  Last visit  In November patient went to the ER at [facility] and was found to have mildly elevated troponins.  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  Pulmonary wise he continues to have on and off wheezing.  Is using his bronchodilator therapy does not complain of any acute hemoptysis or sputum production which is purulent.    Last visit   Since last visit patient did need a course of antibiotic and steroid.  That controlled his postnasal drainage and hence his acute bronchitis.  He is on nasal steroid nasal his antihistaminics but he continues to have nasal discharge and postnasal drainage.  He is tolerating DuoNeb and Symbicort well without any complications.  No oral thrush noted.  who is to follow with Dr. Hyde .  Patient would like to change his pulmonologist.  He has been having recurrent acute bronchitis episode which started as nasal congestion postnasal drainage and then he started wheezing and coughing and is given antibiotic and steroids.  Previously he had been given Levaquin which called Achilles tendon so he does not use fluoroquinolones anymore.  Patient has had multiple courses of antibiotic and steroids.  He has been on Symbicort and using DuoNeb 4 times a day presently he is got Phenergan DM to help with his cough and he is able to sleep better at

## 2024-05-12 PROBLEM — R05.9 COUGH: Status: RESOLVED | Noted: 2017-08-07 | Resolved: 2024-05-12

## 2024-05-13 ENCOUNTER — TELEPHONE (OUTPATIENT)
Age: 89
End: 2024-05-13

## 2024-05-13 NOTE — TELEPHONE ENCOUNTER
Wife asking for labs prior to his apt to May 29th apt with Dr GOSS    FAX to [facility] [place] [facility]    Patient will go about a week before apt - no need to call

## 2024-05-13 NOTE — TELEPHONE ENCOUNTER
Wife called requesting renewal of handicapped placard for lungs an heart issues wife stated. Okay to make? They want it mailed to them

## 2024-05-14 DIAGNOSIS — R53.83 FATIGUE, UNSPECIFIED TYPE: ICD-10-CM

## 2024-05-14 DIAGNOSIS — E78.2 MIXED HYPERLIPIDEMIA: Primary | ICD-10-CM

## 2024-05-15 NOTE — TELEPHONE ENCOUNTER
Last INR I find is 2.2  on 4/15/24  Is this the most recent ?  Does he have a standing lab order anywhere ?  What lab will he be using and we can send in an order??/  3. LAB ORDER before next appt put in   4.we can do handicap form at May appt

## 2024-05-24 LAB
INR BLD: 1.7
PROTHROMBIN TIME: 19.8 SECONDS (ref 11.7–14.1)

## 2024-05-29 ENCOUNTER — ANTI-COAG VISIT (OUTPATIENT)
Age: 89
End: 2024-05-29

## 2024-05-29 ENCOUNTER — OFFICE VISIT (OUTPATIENT)
Age: 89
End: 2024-05-29
Payer: MEDICARE

## 2024-05-29 VITALS
DIASTOLIC BLOOD PRESSURE: 62 MMHG | HEIGHT: 68 IN | SYSTOLIC BLOOD PRESSURE: 106 MMHG | WEIGHT: 144 LBS | BODY MASS INDEX: 21.82 KG/M2 | OXYGEN SATURATION: 95 % | HEART RATE: 67 BPM

## 2024-05-29 DIAGNOSIS — M47.817 LUMBOSACRAL SPONDYLOSIS WITHOUT MYELOPATHY: ICD-10-CM

## 2024-05-29 DIAGNOSIS — D68.2 HEREDITARY COAGULATION FACTOR DEFICIENCY (HCC): ICD-10-CM

## 2024-05-29 DIAGNOSIS — I10 PRIMARY HYPERTENSION: Primary | ICD-10-CM

## 2024-05-29 DIAGNOSIS — B35.6 FUNGAL INFECTION OF THE GROIN: ICD-10-CM

## 2024-05-29 DIAGNOSIS — I73.9 PERIPHERAL VASCULAR DISEASE (HCC): ICD-10-CM

## 2024-05-29 DIAGNOSIS — E78.2 MIXED HYPERLIPIDEMIA: ICD-10-CM

## 2024-05-29 PROCEDURE — 99214 OFFICE O/P EST MOD 30 MIN: CPT | Performed by: FAMILY MEDICINE

## 2024-05-29 PROCEDURE — G8427 DOCREV CUR MEDS BY ELIG CLIN: HCPCS | Performed by: FAMILY MEDICINE

## 2024-05-29 PROCEDURE — G8420 CALC BMI NORM PARAMETERS: HCPCS | Performed by: FAMILY MEDICINE

## 2024-05-29 PROCEDURE — 1123F ACP DISCUSS/DSCN MKR DOCD: CPT | Performed by: FAMILY MEDICINE

## 2024-05-29 PROCEDURE — 1036F TOBACCO NON-USER: CPT | Performed by: FAMILY MEDICINE

## 2024-05-29 RX ORDER — MIDODRINE HYDROCHLORIDE 2.5 MG/1
2.5 TABLET ORAL 2 TIMES DAILY
COMMUNITY
Start: 2024-05-22

## 2024-05-29 RX ORDER — FLUCONAZOLE 100 MG/1
TABLET ORAL
Qty: 11 TABLET | Refills: 2 | Status: SHIPPED | OUTPATIENT
Start: 2024-05-29

## 2024-05-29 NOTE — PROGRESS NOTES
[facility]  [facility] MEDICINE  [address]  [place] [address]     Date of Visit:  2024  Patient Name: Bill Rodney   Patient :  1935     CHIEF COMPLAINT/HPI:     Chief Complaint   Patient presents with    Routine        HPI      Bill Rodney, 88 y.o. presents today for hypertension, PVD, lumbosacral spondylosis, and coagulation deficiency.     Bill Rodney denies:  chest pain  chills  constipation  diarrhea  dyspnea  dysuria  fever  headaches  heartburn  lower extremity swelling  nausea  palpitations  syncope  skin changes  vision changes  vomiting.     REVIEW OF SYSTEM      Review of Systems:   Constitutional:  Negative for chills, fatigue, fever and unexpected weight change.   Eyes:  Negative for visual disturbance.   Respiratory:  Negative for cough, chest tightness, shortness of breath and wheezing.    Cardiovascular:  Negative for chest pain, palpitations and leg swelling.   Gastrointestinal:  Negative for abdominal distention, abdominal pain, blood in stool, constipation, diarrhea, nausea and vomiting.   Genitourinary:  Negative for dysuria, hematuria and urgency.   Musculoskeletal:  Negative for back pain, neck pain and neck stiffness.   Skin:  Negative for rash and wound.   Neurological:  Negative for syncope, weakness, light-headedness and headaches.   Hematological:  Negative for adenopathy. Does not bruise/bleed easily.   Psychiatric/Behavioral:  Negative for suicidal ideas. The patient is not nervous/anxious.      REVIEWED INFORMATION      Allergies   Allergen Reactions    Levofloxacin Swelling    Codeine Hives and Rash    Pulmicort [Budesonide] Rash     Also itchy    Cashew Nut Oil     Levaquin  [Levofloxacin In D5w] Other (See Comments)    Peanut-Containing Drug Products Hives       Current Outpatient Medications   Medication Sig Dispense Refill    midodrine (PROAMATINE) 2.5 MG tablet Take 1 tablet by mouth 2 times daily

## 2024-05-31 ENCOUNTER — TELEPHONE (OUTPATIENT)
Age: 89
End: 2024-05-31

## 2024-05-31 NOTE — TELEPHONE ENCOUNTER
Jenny called to say the [facility] didn't get the INR order.  She said he usually gets his labs drawn at [facility], however he doesn't get the INR results quick enough from them so wants [facility] only for the INR, but they  need the standing order.    Sandy Peralta MA said patient is followed by a   coumadin clinic.  Jenny said she didn't know anything about that, but [facility] needs the standing order.   Chart has PT/INR orders from Dr Pal.  Please call Jenny [phone]

## 2024-06-03 ENCOUNTER — TELEPHONE (OUTPATIENT)
Age: 89
End: 2024-06-03

## 2024-06-03 NOTE — TELEPHONE ENCOUNTER
Patient's wife Jenny said she was to call you with the name of the medication that has been discontinued from Bill's med list and the medication is Furosemide 20 mg . She said Bill was calling Dr Trejo about this as well.

## 2024-06-05 ENCOUNTER — TELEPHONE (OUTPATIENT)
Age: 89
End: 2024-06-05

## 2024-06-05 RX ORDER — CLOTRIMAZOLE AND BETAMETHASONE DIPROPIONATE 10; .64 MG/G; MG/G
CREAM TOPICAL
Qty: 45 G | Refills: 1 | Status: SHIPPED | OUTPATIENT
Start: 2024-06-05

## 2024-06-05 NOTE — TELEPHONE ENCOUNTER
Patient's wife (Jenny) states Bill has 2 pills left of the Diflucan 100 mg that Dr. Pal ordered for him for a fungal infection. He does not think it is helping and Dr Pal told them to call if it is not helping and he will prescribe something else. Please advise. Jenny asks that we please leave a detailed message if they don't answer.   MAYRA Webber

## 2024-06-06 ENCOUNTER — TELEPHONE (OUTPATIENT)
Age: 89
End: 2024-06-06

## 2024-06-06 RX ORDER — CEFUROXIME AXETIL 250 MG/1
250 TABLET ORAL 2 TIMES DAILY
Qty: 20 TABLET | Refills: 0 | Status: SHIPPED | OUTPATIENT
Start: 2024-06-06 | End: 2024-06-16

## 2024-06-06 NOTE — TELEPHONE ENCOUNTER
Patient is calling to see if he can have a medication sent to his pharmacy for a sinus infection he states that it just started but do not want it to become worse. His pharmacy in his chart is correct

## 2024-06-12 ENCOUNTER — OFFICE VISIT (OUTPATIENT)
Age: 89
End: 2024-06-12
Payer: MEDICARE

## 2024-06-12 DIAGNOSIS — M17.0 OSTEOARTHRITIS OF BOTH KNEES, UNSPECIFIED OSTEOARTHRITIS TYPE: Primary | ICD-10-CM

## 2024-06-12 PROCEDURE — 20610 DRAIN/INJ JOINT/BURSA W/O US: CPT

## 2024-06-12 PROCEDURE — 99024 POSTOP FOLLOW-UP VISIT: CPT

## 2024-06-12 NOTE — PROGRESS NOTES
(Medical): No     Lack of Transportation (Non-Medical): No   Physical Activity: Not on file   Stress: Not on file   Social Connections: Not on file   Intimate Partner Violence: Not on file   Housing Stability: Low Risk  (4/10/2024)    Housing Stability Vital Sign     Unable to Pay for Housing in the Last Year: No     Number of Places Lived in the Last Year: 1     Unstable Housing in the Last Year: No     Past Medical History:   Diagnosis Date    Abdominal hernia 10/8/2020    Achilles bursitis 10/8/2020    Acute sinusitis 8/7/2017    Adult body mass index 25-29 10/8/2020    Allergic rhinitis due to pollen 10/8/2020    Anesthesia of skin 10/8/2020    Anterior subcapsular polar senile cataract 10/8/2020    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    Asthma 8/7/2017    Atopic dermatitis 10/8/2020    Backache 10/8/2020    Bacterial pneumonia 8/7/2017    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/1/2013    Benign prostatic hyperplasia 4/12/2013    Candidiasis of mouth 8/7/2017    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 9/8/2014    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 2/13/2020    Chronic obstructive lung disease (HCC) 11/8/2011    Chronic obstructive pulmonary disease (HCC) 10/8/2020    Chronic rhinitis 2/13/2020    Chronic sinusitis 2/13/2020    Deep vein thrombosis of lower extremity (HCC) 8/7/2017    Diarrhea 10/8/2020    Disorder of arteries and arterioles, unspecified (HCC) 10/8/2020    Diverticulitis of colon 10/8/2020    Dizziness and giddiness 12/19/2011    Dysphagia 8/7/2017    Enlarged prostate 10/8/2020    Environmental allergies 8/7/2017    Fatigue 10/8/2020    Gabe hematuria 4/12/2013    Frostbite with tissue necrosis of nose 10/8/2020    Gastroesophageal reflux disease 8/7/2017    Heartburn 11/8/2011    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

## 2024-06-13 ENCOUNTER — TELEPHONE (OUTPATIENT)
Age: 89
End: 2024-06-13

## 2024-06-13 NOTE — TELEPHONE ENCOUNTER
Patient wife called states that Bill needs a renewal for his handicap placard and will like it mailed once it is completed

## 2024-06-19 ENCOUNTER — OFFICE VISIT (OUTPATIENT)
Age: 89
End: 2024-06-19

## 2024-06-19 VITALS — WEIGHT: 144 LBS | HEIGHT: 68 IN | BODY MASS INDEX: 21.82 KG/M2

## 2024-06-19 DIAGNOSIS — M17.0 OSTEOARTHRITIS OF BOTH KNEES, UNSPECIFIED OSTEOARTHRITIS TYPE: Primary | ICD-10-CM

## 2024-06-19 NOTE — PROGRESS NOTES
[facility] Orthopaedics & Sports Medicine      [facility] ORTHOPAEDICS AND SPORTS MEDICINE  [address]  [place] [address]  Dept: [phone]  Dept Fax: [phone]    Chief Compliant:  Chief Complaint   Patient presents with    Follow-up     Bilat knees, 2nd Orthovisc        History of Present Illness:  This is a pleasant 88 y.o. male who presents to the clinic today for evaluation / follow up of bilateral knee arthritis.  Patient states that he can feel his injection is starting to work.  He has not been taking any medications for the pain.  He has been doing well so far.  He presents today for second Orthovisc injection.      Physical Exam:    Right knee: Patient has tenderness to palpation along the joint lines.  He has no palpable effusion on exam today.  No warmth erythema over the joint.      Left knee: Patient has no tenderness palpation along the joint lines today.  No palpable effusion on exam today.  No warmth erythema of the joint.    Nursing note and vitals reviewed.     Labs and Imaging:     XR taken today:  No results found.      No orders of the defined types were placed in this encounter.      Assessment and Plan:  1. Osteoarthritis of both knees, unspecified osteoarthritis type          This is a pleasant 88 y.o. male with osteoarthritis of the bilateral knees.  Patient is electing to have their second Orthovisc injection.  Consent was obtained. Risks are pain, infection, and joint stiffness. The area was prepped with an alcohol swab. I then injected the Orthovisc solution into the intra-articular space of the right knee. A band-aid was placed over the injection site. The patient tolerated this well.  The same process was repeated for the left knee.  I did inform patient that they can have increased stiffness and soreness after these injections.  They can use Tylenol, ice, heat, and ibuprofen as needed for

## 2024-07-02 ENCOUNTER — OFFICE VISIT (OUTPATIENT)
Age: 89
End: 2024-07-02
Payer: MEDICARE

## 2024-07-02 VITALS — HEIGHT: 68 IN | WEIGHT: 144 LBS | BODY MASS INDEX: 21.82 KG/M2

## 2024-07-02 DIAGNOSIS — M17.0 OSTEOARTHRITIS OF BOTH KNEES, UNSPECIFIED OSTEOARTHRITIS TYPE: Primary | ICD-10-CM

## 2024-07-02 PROCEDURE — 20610 DRAIN/INJ JOINT/BURSA W/O US: CPT

## 2024-07-02 NOTE — PROGRESS NOTES
[facility] Orthopaedics & Sports Medicine      [facility]  [facility] ORTHOPAEDICS AND SPORTS MEDICINE  [address]  [place] [address]  Dept: [phone]  Dept Fax: [phone]    Chief Compliant:  Chief Complaint   Patient presents with    Follow-up     Orthovisc injection #3        History of Present Illness:  This is a pleasant 88 y.o. male who presents to the clinic today for evaluation / follow up of bilateral knee osteoarthritis.  Patient is presenting today for third Orthovisc injection of the series.  He presents today for further evaluation.      Physical Exam:    Right knee: Patient is roughly 3 degrees shy of full extension of the knee.  He is able to flex back to about 115 degrees on exam today.  He has no palpable effusion on exam today.  There is no warmth erythema over the joint.  He stable varus valgus stress at 0 and 30 degrees.  Sensation intact light touch.  Skin intact.    Left knee: Patient is roughly 3 degrees shy of full extension of the knee.  He is able to flex back to about 115 degrees on exam today.  He has no palpable effusion on exam today.  There is no warmth erythema over the joint.  He stable varus valgus stress at 0 and 30 degrees.  Sensation intact light touch.  Skin intact.    Nursing note and vitals reviewed.     Assessment and Plan:  1. Osteoarthritis of both knees, unspecified osteoarthritis type          This is a pleasant 88 y.o. male with bilateral knee osteoarthritis.  Patient presents today for gel injection.  Consent was obtained. Risks are pain, infection, and joint stiffness. The area was prepped with an alcohol swab. I then injected the Orthovisc solution into the intra-articular space of the right knee. A band-aid was placed over the injection site. The patient tolerated this well.  The same process was repeated for the left knee.  I did inform the patient that the can have increased

## 2024-07-10 RX ORDER — WARFARIN SODIUM 1 MG/1
1 TABLET ORAL DAILY
COMMUNITY
End: 2024-07-10 | Stop reason: SDUPTHER

## 2024-07-10 NOTE — TELEPHONE ENCOUNTER
Bill Rodney is calling to request a refill on the following medication(s):    Medication Request:  Requested Prescriptions     Pending Prescriptions Disp Refills    warfarin (JANTOVEN) 1 MG tablet 90 tablet 1     Sig: Take 1 tablet by mouth daily       Last Visit Date (If Applicable):  5/29/2024    Next Visit Date:    11/11/2024

## 2024-07-11 RX ORDER — WARFARIN SODIUM 1 MG/1
1 TABLET ORAL DAILY
Qty: 90 TABLET | Refills: 1 | Status: SHIPPED | OUTPATIENT
Start: 2024-07-11

## 2024-07-12 ENCOUNTER — TELEPHONE (OUTPATIENT)
Age: 89
End: 2024-07-12

## 2024-07-12 DIAGNOSIS — E78.2 MIXED HYPERLIPIDEMIA: ICD-10-CM

## 2024-07-12 NOTE — TELEPHONE ENCOUNTER
Bill Rodney is calling to request a refill on the following medication(s):    Medication Request:  Requested Prescriptions     Pending Prescriptions Disp Refills    simvastatin (ZOCOR) 40 MG tablet 90 tablet 1     Sig: Take 1 tablet by mouth daily       Last Visit Date (If Applicable):  5/29/2024    Next Visit Date:    11/11/2024

## 2024-07-14 RX ORDER — SIMVASTATIN 40 MG
40 TABLET ORAL DAILY
Qty: 90 TABLET | Refills: 1 | Status: SHIPPED | OUTPATIENT
Start: 2024-07-14

## 2024-07-22 ENCOUNTER — INITIAL CONSULT (OUTPATIENT)
Dept: PAIN MANAGEMENT | Age: 89
End: 2024-07-22
Payer: MEDICARE

## 2024-07-22 VITALS — BODY MASS INDEX: 21.82 KG/M2 | WEIGHT: 144 LBS | OXYGEN SATURATION: 95 % | HEART RATE: 67 BPM | HEIGHT: 68 IN

## 2024-07-22 DIAGNOSIS — M47.817 LUMBOSACRAL SPONDYLOSIS WITHOUT MYELOPATHY: Primary | ICD-10-CM

## 2024-07-22 DIAGNOSIS — M47.812 CERVICAL SPONDYLOSIS: ICD-10-CM

## 2024-07-22 PROCEDURE — 1036F TOBACCO NON-USER: CPT | Performed by: PAIN MEDICINE

## 2024-07-22 PROCEDURE — 1123F ACP DISCUSS/DSCN MKR DOCD: CPT | Performed by: PAIN MEDICINE

## 2024-07-22 PROCEDURE — G8428 CUR MEDS NOT DOCUMENT: HCPCS | Performed by: PAIN MEDICINE

## 2024-07-22 PROCEDURE — 99204 OFFICE O/P NEW MOD 45 MIN: CPT | Performed by: PAIN MEDICINE

## 2024-07-22 PROCEDURE — G8420 CALC BMI NORM PARAMETERS: HCPCS | Performed by: PAIN MEDICINE

## 2024-07-22 ASSESSMENT — ENCOUNTER SYMPTOMS
BACK PAIN: 1
BOWEL INCONTINENCE: 0
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
## 2024-07-23 ENCOUNTER — OFFICE VISIT (OUTPATIENT)
Dept: PULMONOLOGY | Age: 89
End: 2024-07-23

## 2024-07-23 VITALS
TEMPERATURE: 97.6 F | RESPIRATION RATE: 14 BRPM | HEIGHT: 68 IN | SYSTOLIC BLOOD PRESSURE: 148 MMHG | HEART RATE: 65 BPM | WEIGHT: 146 LBS | BODY MASS INDEX: 22.13 KG/M2 | OXYGEN SATURATION: 98 % | DIASTOLIC BLOOD PRESSURE: 82 MMHG

## 2024-07-23 DIAGNOSIS — I25.10 CAD, MULTIPLE VESSEL: ICD-10-CM

## 2024-07-23 DIAGNOSIS — J45.50 SEVERE PERSISTENT ASTHMA WITHOUT COMPLICATION: ICD-10-CM

## 2024-07-23 DIAGNOSIS — Z77.098 CHLORINE GAS EXPOSURE: Primary | ICD-10-CM

## 2024-07-23 PROBLEM — E11.9 TYPE 2 DIABETES MELLITUS (HCC): Status: ACTIVE | Noted: 2024-07-23

## 2024-07-23 ASSESSMENT — ENCOUNTER SYMPTOMS
SHORTNESS OF BREATH: 1
RHINORRHEA: 0
WHEEZING: 0
EYES NEGATIVE: 1
COUGH: 1

## 2024-07-24 ENCOUNTER — TELEPHONE (OUTPATIENT)
Age: 89
End: 2024-07-24

## 2024-07-24 NOTE — PROGRESS NOTES
twice a day to groin and perirectal rash 6/5/24  Yes Shen Bello MD   midodrine (PROAMATINE) 2.5 MG tablet Take 1 tablet by mouth 2 times daily 5/22/24  Yes Kirill Trevizo MD   fluconazole (DIFLUCAN) 100 MG tablet 2 tablets the first day and then 1 tablet daily for the next 9 days 5/29/24  Yes Kareem Pal MD   predniSONE (DELTASONE) 20 MG tablet Take 1 tablet by mouth daily 5/6/24 8/14/24 Yes Scott Kramer MD   empagliflozin (JARDIANCE) 10 MG tablet Take 1 tablet by mouth daily 4/16/24  Yes Darby Gibson MD   carvedilol (COREG) 12.5 MG tablet Take 1 tablet by mouth 2 times daily 4/15/24  Yes Darby Gibson MD   amLODIPine (NORVASC) 5 MG tablet Take 1 tablet by mouth daily 4/16/24  Yes Darby Gibson MD   furosemide (LASIX) 20 MG tablet Take 1 tablet by mouth daily 4/16/24  Yes Darby Gibson MD   JANTOVEN 2 MG tablet Jantoven (Warfarin) 4mg- 5 days, 3mg- 2 days: Thurs and Sunday  Patient taking differently: Take 1 tablet by mouth daily Patient currently taking 2mg every day at 6pm. 4/10/24 SW 2/12/24  Yes Kareem Pal MD   lisinopril (PRINIVIL;ZESTRIL) 10 MG tablet Take 1 tablet by mouth as needed (Hypertension BP>160 at noon) 7/24/23  Yes Kirill Trevizo MD   ipratropium 0.5 mg-albuterol 2.5 mg (DUONEB) 0.5-2.5 (3) MG/3ML SOLN nebulizer solution Inhale 3 mLs into the lungs every 6 hours as needed for Shortness of Breath 8/8/23  Yes Scott Kramer MD   Multiple Vitamins-Minerals (THERAPEUTIC MULTIVITAMIN-MINERALS) tablet Take 1 tablet by mouth daily   Yes Kirill Trevizo MD   calcium carb-cholecalciferol 600-20 MG-MCG TABS Take 1 tablet by mouth daily   Yes Kirill Trevizo MD   aspirin 81 MG EC tablet Take 1 tablet by mouth daily Pt reported taking 2 pills daily   Yes Provider, MD Kirill   fluticasone-salmeterol (WIXELA INHUB) 250-50 MCG/ACT AEPB diskus inhaler Inhale 1 puff into the lungs 2 times daily 4/15/24 5/15/24  Kareem Pal

## 2024-07-24 NOTE — TELEPHONE ENCOUNTER
Jenny/spouse called to request an antibiotic for Bill's complaint of yellow sinus discharge, began a few days ago.  She said he has a bit of a cough, but mostly concern is sinus discharge.  No other symptoms.   ([facility] [address])

## 2024-07-25 ENCOUNTER — TELEPHONE (OUTPATIENT)
Dept: PAIN MANAGEMENT | Age: 89
End: 2024-07-25

## 2024-07-25 RX ORDER — CEPHALEXIN 500 MG/1
500 CAPSULE ORAL 3 TIMES DAILY
Qty: 30 CAPSULE | Refills: 0 | Status: SHIPPED | OUTPATIENT
Start: 2024-07-25 | End: 2024-08-04

## 2024-07-25 RX ORDER — BENZONATATE 100 MG/1
100-200 CAPSULE ORAL 3 TIMES DAILY PRN
Qty: 60 CAPSULE | Refills: 0 | Status: SHIPPED | OUTPATIENT
Start: 2024-07-25 | End: 2024-08-01

## 2024-07-29 ENCOUNTER — OFFICE VISIT (OUTPATIENT)
Age: 89
End: 2024-07-29
Payer: MEDICARE

## 2024-07-29 VITALS
TEMPERATURE: 97.7 F | SYSTOLIC BLOOD PRESSURE: 140 MMHG | WEIGHT: 147.7 LBS | BODY MASS INDEX: 22.39 KG/M2 | OXYGEN SATURATION: 96 % | HEIGHT: 68 IN | HEART RATE: 65 BPM | DIASTOLIC BLOOD PRESSURE: 66 MMHG

## 2024-07-29 DIAGNOSIS — E78.5 DYSLIPIDEMIA: ICD-10-CM

## 2024-07-29 DIAGNOSIS — J41.8 MIXED SIMPLE AND MUCOPURULENT CHRONIC BRONCHITIS (HCC): ICD-10-CM

## 2024-07-29 DIAGNOSIS — M77.42 METATARSALGIA OF BOTH FEET: ICD-10-CM

## 2024-07-29 DIAGNOSIS — M77.41 METATARSALGIA OF BOTH FEET: ICD-10-CM

## 2024-07-29 DIAGNOSIS — D68.51 HETEROZYGOUS FACTOR V LEIDEN MUTATION (HCC): Primary | ICD-10-CM

## 2024-07-29 DIAGNOSIS — K21.9 GASTROESOPHAGEAL REFLUX DISEASE WITHOUT ESOPHAGITIS: ICD-10-CM

## 2024-07-29 PROCEDURE — G8420 CALC BMI NORM PARAMETERS: HCPCS | Performed by: FAMILY MEDICINE

## 2024-07-29 PROCEDURE — 99213 OFFICE O/P EST LOW 20 MIN: CPT | Performed by: FAMILY MEDICINE

## 2024-07-29 PROCEDURE — 1123F ACP DISCUSS/DSCN MKR DOCD: CPT | Performed by: FAMILY MEDICINE

## 2024-07-29 PROCEDURE — 1036F TOBACCO NON-USER: CPT | Performed by: FAMILY MEDICINE

## 2024-07-29 PROCEDURE — 3023F SPIROM DOC REV: CPT | Performed by: FAMILY MEDICINE

## 2024-07-29 PROCEDURE — G8427 DOCREV CUR MEDS BY ELIG CLIN: HCPCS | Performed by: FAMILY MEDICINE

## 2024-07-29 RX ORDER — AZITHROMYCIN 250 MG/1
TABLET, FILM COATED ORAL
Qty: 6 TABLET | Refills: 3 | Status: SHIPPED | OUTPATIENT
Start: 2024-07-29 | End: 2024-08-08

## 2024-07-29 NOTE — PROGRESS NOTES
[facility]  [facility] MEDICINE  [address]  [place] [address]     Date of Visit:  2024  Patient Name: Bill Rodney   Patient :  1935   Patient Age: 88 y.o.  Patient Sex: male     PCP: Kareem Pal MD    Chief Complaints:    1. Numbness and Tingling of Toes    HPI:    Numbness and Tingling:          The patient complains of numbness and tingling of toes.          The patient believes symptoms are injury related no.          The symptoms have been present for 1-2 days.          The symptoms are moderate.          Symptomatic treatment has included NSAIDs, icing site, elevating site.          Associated symptoms include swelling, redness.     ROS:     GENERAL/CONSTITUTIONAL: Lightheadedness denies.  Change in appetite denies.  Weight Change denies.      CARDIOVASCULAR: Irregular heartbeat denies.  Swelling in hands/feet denies.      RESPIRATORY: Shortness of breath denies.  Shortness of breath at rest denies.  Wheezing denies.      MUSCULOSKELETAL: Comments See HPI for details.      NEUROLOGIC: Dizziness denies.  Fainting denies.  Headache denies.        Medical History:     Allergies   Allergen Reactions    Levofloxacin Swelling    Codeine Hives and Rash    Pulmicort [Budesonide] Rash     Also itchy    Cashew Nut Oil     Levaquin  [Levofloxacin In D5w] Other (See Comments)    Peanut-Containing Drug Products Hives       Current Outpatient Medications   Medication Sig Dispense Refill    azithromycin (ZITHROMAX) 250 MG tablet 1 tablet daily 6 tablet 3    simvastatin (ZOCOR) 40 MG tablet Take 1 tablet by mouth daily 90 tablet 1    warfarin (JANTOVEN) 1 MG tablet Take 1 tablet by mouth daily 90 tablet 1    midodrine (PROAMATINE) 2.5 MG tablet Take 1 tablet by mouth 2 times daily      predniSONE (DELTASONE) 20 MG tablet Take 1 tablet by mouth daily 20 tablet 4    empagliflozin (JARDIANCE) 10 MG tablet Take 1 tablet by mouth daily 30 tablet 3    carvedilol

## 2024-07-29 NOTE — PATIENT INSTRUCTIONS
Bill    Thank you for choosing [facility].  We know you have options when it comes to your healthcare; we appreciate that you chose us. Our goal is to provide exceptional  service and world class care to every patient.  You will be receiving a survey via email or text message asking for your feedback.  Please take a few minutes to share your thoughts about your recent visit. Your comments help us understand what we do well and ways we can improve.  Thank you in advance for your valuable feedback.      Dr. SHARON Gunn

## 2024-08-05 LAB
BASOPHILS ABSOLUTE: 0.05 K/UL (ref 0–0.2)
BASOPHILS RELATIVE PERCENT: 0.7 %
EOSINOPHILS ABSOLUTE: 1.22 K/UL (ref 0–0.5)
EOSINOPHILS RELATIVE PERCENT: 17.6 %
HCT VFR BLD CALC: 40.2 % (ref 40–51)
HEMOGLOBIN: 12.9 G/DL (ref 13.5–17)
IMMATURE GRANS (ABS): 0.03
IMMATURE GRANULOCYTES %: 0.4 %
LYMPHOCYTES ABSOLUTE: 1.13 K/UL (ref 1–4)
LYMPHOCYTES RELATIVE PERCENT: 16.3 %
MCH RBC QN AUTO: 30.7 PG (ref 25–33)
MCHC RBC AUTO-ENTMCNC: 32.1 G/DL (ref 31–36)
MCV RBC AUTO: 95.7 FL (ref 80–99)
MONOCYTES ABSOLUTE: 0.82 K/UL (ref 0.2–1)
MONOCYTES RELATIVE PERCENT: 11.8 %
NEUTROPHILS ABSOLUTE: 3.68 K/UL (ref 1.5–7.5)
NEUTROPHILS RELATIVE PERCENT: 53.2 %
PDW BLD-RTO: 14.8 % (ref 11.5–15)
PLATELET # BLD: 241 K/UL (ref 130–400)
PMV BLD AUTO: 9.8 FL (ref 9.3–13)
RBC # BLD: 4.2 M/UL (ref 4.5–6.1)
WBC # BLD: 6.9 K/UL (ref 3.5–11)

## 2024-08-06 LAB
ALBUMIN: 4.1 G/DL (ref 3.5–5.2)
ALK PHOSPHATASE: 92 U/L (ref 40–125)
ALT SERPL-CCNC: 12 U/L (ref 5–50)
ANION GAP SERPL CALCULATED.3IONS-SCNC: 13 MEQ/L (ref 7–16)
AST SERPL-CCNC: 31 U/L (ref 9–50)
BILIRUB SERPL-MCNC: 0.5 MG/DL
BUN BLDV-MCNC: 14 MG/DL (ref 8–23)
CALCIUM SERPL-MCNC: 9.1 MG/DL (ref 8.5–10.5)
CHLORIDE BLD-SCNC: 105 MEQ/L (ref 95–107)
CHOLESTEROL, TOTAL: 182 MG/DL
CHOLESTEROL/HDL RATIO: 3.6 RATIO
CO2: 24 MEQ/L (ref 19–31)
CREAT SERPL-MCNC: 1.06 MG/DL (ref 0.8–1.4)
EGFR IF NONAFRICAN AMERICAN: 68 ML/MIN/1.73
GLUCOSE: 83 MG/DL (ref 70–99)
HDLC SERPL-MCNC: 51 MG/DL
LDL CHOLESTEROL: 107 MG/DL
LDL/HDL RATIO: 2.1 RATIO
POTASSIUM SERPL-SCNC: 4.8 MEQ/L (ref 3.5–5.4)
TOTAL PROTEIN: 6 G/DL (ref 6.1–8.3)
TRIGL SERPL-MCNC: 122 MG/DL
VLDLC SERPL CALC-MCNC: 24 MG/DL

## 2024-08-12 RX ORDER — EMPAGLIFLOZIN 10 MG/1
10 TABLET, FILM COATED ORAL DAILY
Qty: 30 TABLET | Refills: 3 | Status: SHIPPED | OUTPATIENT
Start: 2024-08-12

## 2024-08-12 NOTE — TELEPHONE ENCOUNTER
Bill Rodney is calling to request a refill on the following medication(s):    Medication Request:  Requested Prescriptions     Pending Prescriptions Disp Refills    JARDIANCE 10 MG tablet [Pharmacy Med Name: JARDIANCE 10 MG TABLET] 30 tablet 3     Sig: TAKE 1 TABLET BY MOUTH EVERY DAY       Last Visit Date (If Applicable):  Visit date not found    Next Visit Date:    Visit date not found

## 2024-08-14 ENCOUNTER — HOSPITAL ENCOUNTER (OUTPATIENT)
Dept: MRI IMAGING | Age: 89
Discharge: HOME | End: 2024-08-16
Attending: PAIN MEDICINE
Payer: COMMERCIAL

## 2024-08-14 DIAGNOSIS — M47.817 LUMBOSACRAL SPONDYLOSIS WITHOUT MYELOPATHY: ICD-10-CM

## 2024-08-14 DIAGNOSIS — M47.812 CERVICAL SPONDYLOSIS: ICD-10-CM

## 2024-08-14 PROCEDURE — 72141 MRI NECK SPINE W/O DYE: CPT

## 2024-08-14 PROCEDURE — 72148 MRI LUMBAR SPINE W/O DYE: CPT

## 2024-08-26 ENCOUNTER — OFFICE VISIT (OUTPATIENT)
Age: 89
End: 2024-08-26
Payer: MEDICARE

## 2024-08-26 VITALS
HEIGHT: 68 IN | OXYGEN SATURATION: 99 % | WEIGHT: 143 LBS | BODY MASS INDEX: 21.67 KG/M2 | DIASTOLIC BLOOD PRESSURE: 78 MMHG | TEMPERATURE: 98.2 F | HEART RATE: 68 BPM | SYSTOLIC BLOOD PRESSURE: 132 MMHG

## 2024-08-26 DIAGNOSIS — N30.00 ACUTE CYSTITIS WITHOUT HEMATURIA: ICD-10-CM

## 2024-08-26 DIAGNOSIS — B35.6 FUNGAL INFECTION OF THE GROIN: Primary | ICD-10-CM

## 2024-08-26 DIAGNOSIS — J32.9 CHRONIC SINUSITIS, UNSPECIFIED LOCATION: ICD-10-CM

## 2024-08-26 PROCEDURE — 1036F TOBACCO NON-USER: CPT | Performed by: FAMILY MEDICINE

## 2024-08-26 PROCEDURE — 1123F ACP DISCUSS/DSCN MKR DOCD: CPT | Performed by: FAMILY MEDICINE

## 2024-08-26 PROCEDURE — G8420 CALC BMI NORM PARAMETERS: HCPCS | Performed by: FAMILY MEDICINE

## 2024-08-26 PROCEDURE — 99213 OFFICE O/P EST LOW 20 MIN: CPT | Performed by: FAMILY MEDICINE

## 2024-08-26 PROCEDURE — G8427 DOCREV CUR MEDS BY ELIG CLIN: HCPCS | Performed by: FAMILY MEDICINE

## 2024-08-26 NOTE — PROGRESS NOTES
Peak Velocity 04/12/2024 1.1     AV Peak Gradient 04/12/2024 5     LVOT Mean Gradient 04/12/2024 1     LVOT VTI 04/12/2024 19.0     LVOT Peak Velocity 04/12/2024 0.8     LVOT Peak Gradient 04/12/2024 2     LV E' Lateral Velocity 04/12/2024 6     LV E' Septal Velocity 04/12/2024 4     MV E Wave Deceleration T* 04/12/2024 92.0     MV E Velocity 04/12/2024 1.05     Fractional Shortening 2D 04/12/2024 16     LVIDd Index 04/12/2024 2.49     LVIDs Index 04/12/2024 2.09     LV RWT Ratio 04/12/2024 0.50     LV Mass 2D 04/12/2024 180.0     LV Mass 2D Index 04/12/2024 101.7     E/E' Ratio (Averaged) 04/12/2024 21.88     E/E' Lateral 04/12/2024 17.50     LA Volume Index BP 04/12/2024 36 (A)     LVOT SV 04/12/2024 65.8     LVOT Stroke Volume Index 04/12/2024 37.2     LA Volume Index MOD A2C 04/12/2024 32     LA Volume Index MOD A4C 04/12/2024 42 (A)     LA Size Index 04/12/2024 2.37     LA/AO Root Ratio 04/12/2024 1.24     Ao Root Index 04/12/2024 1.92     AV Velocity Ratio 04/12/2024 0.73     LVOT:AV VTI Index 04/12/2024 0.75     Est. RA Pressure 04/12/2024 5     RVSP 04/12/2024 44     LA Minor Holbrook 04/12/2024 5.3     LA Major Holbrook 04/12/2024 5.4     LA Area 2C 04/12/2024 19.0     LA Area 4C 04/12/2024 22.3     LA Volume MOD A2C 04/12/2024 56     LA Volume MOD A4C 04/12/2024 74 (A)     LA Volume BP 04/12/2024 64 (A)     LA Diameter 04/12/2024 4.2     Aortic Root 04/12/2024 3.4     IVSd 04/12/2024 1.2 (A)     LVIDd 04/12/2024 4.4     LVIDs 04/12/2024 3.7     LVOT Diameter 04/12/2024 2.1     LVPWd 04/12/2024 1.1 (A)     LVOT Area 04/12/2024 3.5     RV Basal Dimension 04/12/2024 3.4     RV Free Wall Peak S' 04/12/2024 13     TR Max Velocity 04/12/2024 3.14     TR Peak Gradient 04/12/2024 39     Protime 04/12/2024 20.0 (H)     INR 04/12/2024 2.0     Protime 04/13/2024 22.3 (H)     INR 04/13/2024 2.2     Sodium 04/13/2024 136     Potassium 04/13/2024 3.6 (L)     Chloride 04/13/2024 102     CO2 04/13/2024 25     Anion Gap 04/13/2024  MD, personally performed the services described in this documentation after obtaining verbal consent from the patient, and the record is both accurate and complete.

## 2024-08-27 LAB
APPEARANCE: CLEAR
BILIRUB SERPL-MCNC: NEGATIVE MG/DL
COLOR, UA: YELLOW
GLUCOSE BLD-MCNC: ABNORMAL MG/DL
KETONES, URINE: NEGATIVE
LEUKOCYTE ESTERASE, URINE: NEGATIVE
NITRITE, URINE: NEGATIVE
OCCULT BLOOD,URINE: NEGATIVE
PH: 6.5 (ref 5–9)
PROTEIN, URINE: NEGATIVE
SP GRAVITY MISCELLANEOUS: 1.03 (ref 1–1.03)
UROBILINOGEN, URINE: 0.2

## 2024-09-03 ENCOUNTER — TELEPHONE (OUTPATIENT)
Age: 89
End: 2024-09-03

## 2024-09-06 ENCOUNTER — HOSPITAL ENCOUNTER (OUTPATIENT)
Dept: CT IMAGING | Age: 89
Discharge: HOME | End: 2024-09-08
Attending: FAMILY MEDICINE
Payer: MEDICARE

## 2024-09-06 DIAGNOSIS — J32.9 CHRONIC SINUSITIS, UNSPECIFIED LOCATION: ICD-10-CM

## 2024-09-06 DIAGNOSIS — N30.00 ACUTE CYSTITIS WITHOUT HEMATURIA: ICD-10-CM

## 2024-09-06 PROCEDURE — 70486 CT MAXILLOFACIAL W/O DYE: CPT

## 2024-09-27 ENCOUNTER — OFFICE VISIT (OUTPATIENT)
Dept: PAIN MANAGEMENT | Age: 89
End: 2024-09-27

## 2024-09-27 VITALS — WEIGHT: 143 LBS | HEIGHT: 68 IN | BODY MASS INDEX: 21.67 KG/M2

## 2024-09-27 DIAGNOSIS — M47.812 CERVICAL SPONDYLOSIS: ICD-10-CM

## 2024-09-27 DIAGNOSIS — M47.817 LUMBOSACRAL SPONDYLOSIS WITHOUT MYELOPATHY: Primary | ICD-10-CM

## 2024-09-27 ASSESSMENT — ENCOUNTER SYMPTOMS: BACK PAIN: 1

## 2024-09-30 ENCOUNTER — OFFICE VISIT (OUTPATIENT)
Age: 89
End: 2024-09-30
Payer: MEDICARE

## 2024-09-30 VITALS
BODY MASS INDEX: 22.01 KG/M2 | HEART RATE: 64 BPM | DIASTOLIC BLOOD PRESSURE: 80 MMHG | WEIGHT: 145.2 LBS | HEIGHT: 68 IN | TEMPERATURE: 98.3 F | SYSTOLIC BLOOD PRESSURE: 134 MMHG | OXYGEN SATURATION: 99 %

## 2024-09-30 DIAGNOSIS — S46.911A STRAIN OF RIGHT SHOULDER, INITIAL ENCOUNTER: Primary | ICD-10-CM

## 2024-09-30 PROCEDURE — 99213 OFFICE O/P EST LOW 20 MIN: CPT | Performed by: FAMILY MEDICINE

## 2024-09-30 PROCEDURE — G8420 CALC BMI NORM PARAMETERS: HCPCS | Performed by: FAMILY MEDICINE

## 2024-09-30 PROCEDURE — 1123F ACP DISCUSS/DSCN MKR DOCD: CPT | Performed by: FAMILY MEDICINE

## 2024-09-30 PROCEDURE — G8427 DOCREV CUR MEDS BY ELIG CLIN: HCPCS | Performed by: FAMILY MEDICINE

## 2024-09-30 PROCEDURE — 1036F TOBACCO NON-USER: CPT | Performed by: FAMILY MEDICINE

## 2024-09-30 PROCEDURE — 96372 THER/PROPH/DIAG INJ SC/IM: CPT | Performed by: FAMILY MEDICINE

## 2024-09-30 RX ORDER — METHYLPREDNISOLONE ACETATE 80 MG/ML
80 INJECTION, SUSPENSION INTRA-ARTICULAR; INTRALESIONAL; INTRAMUSCULAR; SOFT TISSUE ONCE
Status: COMPLETED | OUTPATIENT
Start: 2024-09-30 | End: 2024-09-30

## 2024-09-30 RX ORDER — WARFARIN SODIUM 2 MG/1
2 TABLET ORAL DAILY
COMMUNITY

## 2024-09-30 RX ORDER — AZITHROMYCIN 250 MG/1
250 TABLET, FILM COATED ORAL DAILY
COMMUNITY
Start: 2024-09-27

## 2024-09-30 RX ADMIN — METHYLPREDNISOLONE ACETATE 80 MG: 80 INJECTION, SUSPENSION INTRA-ARTICULAR; INTRALESIONAL; INTRAMUSCULAR; SOFT TISSUE at 17:07

## 2024-09-30 NOTE — PROGRESS NOTES
04/10/2024 73     Calcium 04/10/2024 9.2     Total Protein 04/10/2024 6.8     Albumin 04/10/2024 4.3     Albumin/Globulin Ratio 04/10/2024 1.7     Total Bilirubin 04/10/2024 0.7     Alkaline Phosphatase 04/10/2024 102     ALT 04/10/2024 13     AST 04/10/2024 27     Pro-BNP 04/10/2024 1,506 (H)     Troponin, High Sensitivi* 04/10/2024 30 (H)     Ventricular Rate 04/10/2024 79     Atrial Rate 04/10/2024 79     P-R Interval 04/10/2024 226     QRS Duration 04/10/2024 100     Q-T Interval 04/10/2024 390     QTc Calculation (Bazett) 04/10/2024 447     P Axis 04/10/2024 64     R Axis 04/10/2024 8     T Axis 04/10/2024 81     Source 04/10/2024 .NASOPHARYNGEAL SWAB     RSV by PCR 04/10/2024 NEGATIVE     Specimen Description 04/10/2024 .NASOPHARYNGEAL SWAB     SARS-CoV-2, Rapid 04/10/2024 Not Detected     Flu A Antigen 04/10/2024 NEGATIVE     Flu B Antigen 04/10/2024 NEGATIVE     Sodium 04/10/2024 140     Magnesium 04/10/2024 2.2     Specimen Description 04/10/2024 .BLOOD     Special Requests 04/10/2024 10ML RIGHT AC     Culture 04/10/2024 NO GROWTH 5 DAYS     Specimen Description 04/10/2024 .BLOOD     Special Requests 04/10/2024 0.5ML LEFT ARM     Culture 04/10/2024 NO GROWTH 5 DAYS     Lactic Acid, Sepsis 04/10/2024 1.1     Protime 04/10/2024 17.8 (H)     INR 04/10/2024 1.7     Protime 04/11/2024 17.9 (H)     INR 04/11/2024 1.7     Sodium 04/11/2024 144     Potassium 04/11/2024 3.6 (L)     Chloride 04/11/2024 106     CO2 04/11/2024 25     Anion Gap 04/11/2024 13     Glucose 04/11/2024 78     BUN 04/11/2024 16     Creatinine 04/11/2024 0.8     Est, Glom Filt Rate 04/11/2024 86     Calcium 04/11/2024 9.1     Total Protein 04/11/2024 6.2 (L)     Albumin 04/11/2024 3.9     Albumin/Globulin Ratio 04/11/2024 1.7     Total Bilirubin 04/11/2024 0.8     Alkaline Phosphatase 04/11/2024 98     ALT 04/11/2024 11     AST 04/11/2024 25     WBC 04/11/2024 6.7     RBC 04/11/2024 3.89 (L)     Hemoglobin 04/11/2024 12.8 (L)     Hematocrit

## 2024-10-01 ENCOUNTER — TELEPHONE (OUTPATIENT)
Dept: PAIN MANAGEMENT | Age: 89
End: 2024-10-01

## 2024-10-01 NOTE — TELEPHONE ENCOUNTER
Bill wife call regarding his C9 paper work not being done she would like a call back when it is sent please advise

## 2024-10-03 RX ORDER — CARVEDILOL 12.5 MG/1
12.5 TABLET ORAL 2 TIMES DAILY
Qty: 180 TABLET | Refills: 3 | Status: SHIPPED | OUTPATIENT
Start: 2024-10-03

## 2024-10-03 NOTE — TELEPHONE ENCOUNTER
Bill Rodney is calling to request a refill on the following medication(s):    Medication Request:  Requested Prescriptions     Pending Prescriptions Disp Refills    carvedilol (COREG) 12.5 MG tablet [Pharmacy Med Name: CARVEDILOL 12.5 MG TABLET] 180 tablet 1     Sig: TAKE 1 TABLET BY MOUTH TWICE A DAY       Last Visit Date (If Applicable):  Visit date not found    Next Visit Date:    Visit date not found

## 2024-10-09 ENCOUNTER — TELEPHONE (OUTPATIENT)
Age: 89
End: 2024-10-09

## 2024-10-10 ENCOUNTER — HOSPITAL ENCOUNTER (OUTPATIENT)
Dept: PHYSICAL THERAPY | Facility: CLINIC | Age: 89
Setting detail: THERAPIES SERIES
Discharge: HOME | End: 2024-10-10
Payer: COMMERCIAL

## 2024-10-10 PROCEDURE — 97110 THERAPEUTIC EXERCISES: CPT

## 2024-10-10 PROCEDURE — 97162 PT EVAL MOD COMPLEX 30 MIN: CPT

## 2024-10-10 NOTE — CONSULTS
[] [facility]  Outpatient Rehabilitation &  Therapy  [address]  [phone]  [phone] [] [facility]  Outpatient Rehabilitation &  Therapy  [address]  P: [phone]  F: [phone] [] [facility]  Outpatient Rehabilitation &  Therapy  [address]  P: [phone]  F: [phone] [x] [facility]  Outpatient Rehabilitation &  Therapy  [address]  [phone]  [phone] [] [facility]  Outpatient Rehabilitation &  Therapy  [address]   P: [phone]  F: [phone]  [] [facility]  Outpatient Rehabilitation &  Therapy  [address]  P: [phone]  F: [phone] [] [facility]  Outpatient Rehabilitation &  Therapy  [address]  P: [phone]  F: [phone] [] [facility]  Outpatient Rehabilitation &  Therapy  [address]  P: [phone]  F: [phone] [] [facility]  Outpatient Rehabilitation &  Therapy  [address]  P: [phone]  F: [phone]  [] [facility] Outpatient Rehabilitation &  Therapy  [address]  P: [phone]  F: [phone]     Physical Therapy Spine Evaluation    Date:  10/10/2024  Patient: Bill Rodney  : 1935  MRN: [id number]  Physician: Mia Stevens MD  Insurance: [employer] W/C; Received approved C-9 for 12vs of PT from 10/4-24 auth#[id number]   Medical Diagnosis:  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 Occupation: retired, was a manager of water

## 2024-10-14 ENCOUNTER — HOSPITAL ENCOUNTER (OUTPATIENT)
Dept: PHYSICAL THERAPY | Facility: CLINIC | Age: 89
Setting detail: THERAPIES SERIES
Discharge: HOME | End: 2024-10-14
Payer: COMMERCIAL

## 2024-10-14 PROCEDURE — 97110 THERAPEUTIC EXERCISES: CPT

## 2024-10-14 NOTE — FLOWSHEET NOTE
over the weekend and was in bed most of the weekend.   Pain:  [] Yes  [x] No Location: neck  Pain Rating: (0-10 scale) 8-9/10  Pain altered Tx:  [x] No  [] Yes  Action:  Comments: Patient states he has not been able to do his exercises at home due to not feeling well the past couple days.     Objective:    Today’s Treatment:  Modalities:   Precautions [x] No  [] Yes:  Exercises:  Exercise Reps/ Time Weight/ Level Comments Completed    Nustep  5' L4  x          UT str  3x30\"     x   Scap retraction  x20     x   Elbow flex x20  2#  x   TA activation  x20     x          Glute bridge  x20   x   SAQ x20 2#  x            Long arc quad X20 ea  2#  x                 Standing in // bars        Heel raises  x10   x   3 way kicks  x10   x           Treatment Charges: Mins Units Time    []  Modalities      [x]  Ther Exercise 45 3 12:00-12:45   []  Manual Therapy      []  Ther Activities      []  Neuro Re-ed      []  Vasocompression      [] Gait      []  Other      Total Billable time 45 3      Not billed for time on nustep     Assessment: [x] Progressing toward goals. Session started with patient on nustep for gentle warm up. Patient then performed exercises with review of HEP. Added 2# for bicep curls and 2# weight with LAQ.  Initiated standing exercises in // bars. Patient performed several exercises with some seated rest breaks due to mild dizziness noted.  After sitting for several mins dizziness diminished.    [] No change.     [] Other:  [x] Patient would continue to benefit from skilled physical therapy services in order to:  decrease pain, improve global strength and endurance, and progress towards improved functional mobility.     STG/LTG    STG: (to be met in 6 treatments)  ? Pain: Pt will report resting pain without cervical/back brace <5/10 for improved QOL.   ? ROM: Pt will improve cervical AROM to limits required for safe operation of car.  ? Strength: Pt will perform STS transfer with minimal use of UE support.

## 2024-10-17 ENCOUNTER — HOSPITAL ENCOUNTER (OUTPATIENT)
Dept: PHYSICAL THERAPY | Facility: CLINIC | Age: 89
Setting detail: THERAPIES SERIES
Discharge: HOME | End: 2024-10-17
Payer: COMMERCIAL

## 2024-10-17 NOTE — CONSULTS
[] [facility]  Outpatient Rehabilitation &  Therapy  [address]  [phone]  [phone] [] [facility]  Outpatient Rehabilitation &  Therapy  [address]  P: [phone]  F: [phone] [] [facility]  Outpatient Rehabilitation &  Therapy  [address]  P: [phone]  F: [phone] [x] [facility]  Outpatient Rehabilitation &  Therapy  [address]  [phone]  [phone] [] [facility]  Outpatient Rehabilitation &  Therapy  [address]   P: [phone]  F: [phone]  [] [facility]  Outpatient Rehabilitation &  Therapy  [address]  P: [phone]  F: [phone] [] [facility]  Outpatient Rehabilitation &  Therapy  [address]  P: [phone]  F: [phone] [] [facility]  Outpatient Rehabilitation &  Therapy  [address]  P: [phone]  F: [phone] [] [facility]  Outpatient Rehabilitation &  Therapy  [address]  P: [phone]  F: [phone]  [] [facility] Outpatient Rehabilitation &  Therapy  [address]  P: [phone]  F: [phone]     Therapy Cancel/No Show note    Date: 10/17/2024  Patient: Bill PRIETO Ronel  : 1935  MRN: [id number]    Cancels/No Shows to date: 0/0    For today's appointment patient:    [x] Cancelled    [] Rescheduled appointment    [] No-show     Reason given by patient:    []  Patient ill    []  Conflicting appointment    [] No transportation      [] Conflict with work    [] No reason given    [] Weather related    [] COVID-19    [x] Other:      Comments: Therapist canceled pt's appointment d/t presenting with complaints of sickness, dizziness, and new onset R lower quadrant pain. Pt's BP recorded at 127/64 in sitting. Phone call with pt's

## 2024-10-18 ENCOUNTER — OFFICE VISIT (OUTPATIENT)
Age: 89
End: 2024-10-18
Payer: MEDICARE

## 2024-10-18 VITALS
TEMPERATURE: 97.9 F | OXYGEN SATURATION: 98 % | DIASTOLIC BLOOD PRESSURE: 78 MMHG | HEART RATE: 63 BPM | WEIGHT: 144.8 LBS | HEIGHT: 68 IN | SYSTOLIC BLOOD PRESSURE: 170 MMHG | BODY MASS INDEX: 21.95 KG/M2

## 2024-10-18 DIAGNOSIS — R50.9 FEBRILE ILLNESS: ICD-10-CM

## 2024-10-18 DIAGNOSIS — J01.01 ACUTE RECURRENT MAXILLARY SINUSITIS: Primary | ICD-10-CM

## 2024-10-18 DIAGNOSIS — E03.9 HYPOTHYROIDISM, UNSPECIFIED TYPE: ICD-10-CM

## 2024-10-18 DIAGNOSIS — R53.83 OTHER FATIGUE: ICD-10-CM

## 2024-10-18 PROCEDURE — 99214 OFFICE O/P EST MOD 30 MIN: CPT | Performed by: FAMILY MEDICINE

## 2024-10-18 PROCEDURE — G8427 DOCREV CUR MEDS BY ELIG CLIN: HCPCS | Performed by: FAMILY MEDICINE

## 2024-10-18 PROCEDURE — G8484 FLU IMMUNIZE NO ADMIN: HCPCS | Performed by: FAMILY MEDICINE

## 2024-10-18 PROCEDURE — 1123F ACP DISCUSS/DSCN MKR DOCD: CPT | Performed by: FAMILY MEDICINE

## 2024-10-18 PROCEDURE — 1036F TOBACCO NON-USER: CPT | Performed by: FAMILY MEDICINE

## 2024-10-18 PROCEDURE — G8420 CALC BMI NORM PARAMETERS: HCPCS | Performed by: FAMILY MEDICINE

## 2024-10-18 RX ORDER — KETOCONAZOLE 20 MG/G
CREAM TOPICAL
Qty: 30 G | Refills: 1 | Status: SHIPPED | OUTPATIENT
Start: 2024-10-18

## 2024-10-18 RX ORDER — PREDNISONE 20 MG/1
20 TABLET ORAL 2 TIMES DAILY
Qty: 10 TABLET | Refills: 0 | Status: SHIPPED | OUTPATIENT
Start: 2024-10-18 | End: 2024-10-23

## 2024-10-18 RX ORDER — CEFDINIR 300 MG/1
300 CAPSULE ORAL 2 TIMES DAILY
Qty: 20 CAPSULE | Refills: 0 | Status: SHIPPED | OUTPATIENT
Start: 2024-10-18 | End: 2024-10-28

## 2024-10-18 SDOH — ECONOMIC STABILITY: FOOD INSECURITY: WITHIN THE PAST 12 MONTHS, THE FOOD YOU BOUGHT JUST DIDN'T LAST AND YOU DIDN'T HAVE MONEY TO GET MORE.: NEVER TRUE

## 2024-10-18 SDOH — ECONOMIC STABILITY: FOOD INSECURITY: WITHIN THE PAST 12 MONTHS, YOU WORRIED THAT YOUR FOOD WOULD RUN OUT BEFORE YOU GOT MONEY TO BUY MORE.: NEVER TRUE

## 2024-10-18 SDOH — ECONOMIC STABILITY: INCOME INSECURITY: HOW HARD IS IT FOR YOU TO PAY FOR THE VERY BASICS LIKE FOOD, HOUSING, MEDICAL CARE, AND HEATING?: NOT HARD AT ALL

## 2024-10-18 NOTE — PROGRESS NOTES
[facility]     Date of Visit:  10/21/2024  Patient Name: Bill Rodney   Patient :  1935     CHIEF COMPLAINT/HPI:     Bill Rodney is a 88 y.o. male who presents today for an general visit to be evaluated for the following condition(s):  Chief Complaint   Patient presents with    Other     He is here today because he is not feeling well.  He says he feels feverish at times but when they take his temp it is normal. He is also having some right lower quadrant pain for 2 months that seems to be getting worse.    Pain in his R side for several weeks.  Going to PT for his neck and back pain.      REVIEW OF SYSTEM      Review of Systems   Respiratory:  Negative for chest tightness and shortness of breath.    Cardiovascular:  Negative for chest pain.         REVIEWED INFORMATION      Allergies   Allergen Reactions    Levofloxacin Swelling    Codeine Hives and Rash    Pulmicort [Budesonide] Rash     Also itchy    Cashew Nut Oil     Levaquin  [Levofloxacin In D5w] Other (See Comments)    Peanut-Containing Drug Products Hives       Current Outpatient Medications   Medication Sig Dispense Refill    levothyroxine (SYNTHROID) 100 MCG tablet Take 1 tablet by mouth daily 90 tablet 1    cefdinir (OMNICEF) 300 MG capsule Take 1 capsule by mouth 2 times daily for 10 days 20 capsule 0    predniSONE (DELTASONE) 20 MG tablet Take 1 tablet by mouth 2 times daily for 5 days 10 tablet 0    ketoconazole (NIZORAL) 2 % cream Apply topically daily. 30 g 1    carvedilol (COREG) 12.5 MG tablet TAKE 1 TABLET BY MOUTH TWICE A  tablet 3    warfarin (COUMADIN) 2 MG tablet Take 1 tablet by mouth daily      azithromycin (ZITHROMAX) 250 MG tablet Take 1 tablet by mouth daily      JARDIANCE 10 MG tablet TAKE 1 TABLET BY MOUTH EVERY DAY 30 tablet 3    simvastatin (ZOCOR) 40 MG tablet Take 1 tablet by mouth daily 90 tablet 1    warfarin (JANTOVEN) 1 MG tablet Take 1 tablet by mouth daily 90 tablet 1    midodrine

## 2024-10-18 NOTE — PATIENT INSTRUCTIONS
Bill    Thank you for choosing [facility].  We know you have options when it comes to your healthcare; we appreciate that you chose us. Our goal is to provide exceptional  service and world class care to every patient.  You will be receiving a survey via email or text message asking for your feedback.  Please take a few minutes to share your thoughts about your recent visit. Your comments help us understand what we do well and ways we can improve.  Thank you in advance for your valuable feedback.      Dr. ADAMA Gunn

## 2024-10-19 LAB
ALBUMIN: 4.6 G/DL (ref 3.5–5.2)
ALK PHOSPHATASE: 103 U/L (ref 39–118)
ALT SERPL-CCNC: 17 U/L (ref 5–41)
ANION GAP SERPL CALCULATED.3IONS-SCNC: 14 MMOL/L (ref 7–16)
AST SERPL-CCNC: 25 U/L (ref 9–50)
BASOPHILS ABSOLUTE: 0.1 K/UL (ref 0–0.2)
BASOPHILS RELATIVE PERCENT: 0.9 % (ref 0–2)
BILIRUB SERPL-MCNC: 0.7 MG/DL
BUN BLDV-MCNC: 21 MG/DL (ref 8–23)
C-REACTIVE PROTEIN: 0.1 MG/DL
CALCIUM SERPL-MCNC: 9.4 MG/DL (ref 8.6–10.5)
CHLORIDE BLD-SCNC: 103 MMOL/L (ref 96–107)
CO2: 26 MMOL/L (ref 18–32)
CREAT SERPL-MCNC: 1.03 MG/DL (ref 0.67–1.3)
EGFR IF NONAFRICAN AMERICAN: 70 ML/MIN/1.73M2
EOSINOPHILS ABSOLUTE: 0.59 K/UL (ref 0–0.8)
EOSINOPHILS RELATIVE PERCENT: 5.1 % (ref 0–5)
GLUCOSE: 88 MG/DL (ref 70–100)
HCT VFR BLD CALC: 43.5 % (ref 39–52)
HEMOGLOBIN: 14.1 G/DL (ref 13–18)
IMMATURE GRANS (ABS): 0.09 K/UL (ref 0–0.06)
IMMATURE GRANULOCYTES %: 0.8 % (ref 0–2)
LYMPHOCYTES ABSOLUTE: 1.56 K/UL (ref 0.9–5.2)
LYMPHOCYTES RELATIVE PERCENT: 13.4 % (ref 20–45)
MCH RBC QN AUTO: 29.7 PG (ref 26–32)
MCHC RBC AUTO-ENTMCNC: 32.4 G/DL (ref 32–35)
MCV RBC AUTO: 92 FL (ref 75–100)
MONOCYTES ABSOLUTE: 1.45 K/UL (ref 0.1–1)
MONOCYTES RELATIVE PERCENT: 12.5 % (ref 0–13)
NEUTROPHILS ABSOLUTE: 7.81 K/UL (ref 1.9–8)
NEUTROPHILS RELATIVE PERCENT: 67.3 % (ref 45–75)
PDW BLD-RTO: 15.7 % (ref 11.2–14.8)
PLATELET # BLD: 253 THOUS/CMM (ref 140–440)
POTASSIUM SERPL-SCNC: 4.7 MMOL/L (ref 3.5–5.4)
RBC # BLD: 4.75 MILL/CMM (ref 4.4–6.1)
SED RATE, AUTOMATED: 13 MM/HR (ref 0–19)
SODIUM BLD-SCNC: 143 MMOL/L (ref 135–148)
T4 FREE: 0.83 NG/DL (ref 0.8–1.9)
TOTAL PROTEIN: 6.7 G/DL (ref 6–8.3)
TSH SERPL DL<=0.05 MIU/L-ACNC: 36.7 UIU/ML (ref 0.27–4.2)
WBC # BLD: 11.6 THDS/CMM (ref 3.6–11)

## 2024-10-21 RX ORDER — LEVOTHYROXINE SODIUM 100 UG/1
100 TABLET ORAL DAILY
Qty: 90 TABLET | Refills: 1 | Status: SHIPPED | OUTPATIENT
Start: 2024-10-21

## 2024-10-21 ASSESSMENT — ENCOUNTER SYMPTOMS
CHEST TIGHTNESS: 0
SHORTNESS OF BREATH: 0

## 2024-10-23 ENCOUNTER — HOSPITAL ENCOUNTER (OUTPATIENT)
Dept: PHYSICAL THERAPY | Facility: CLINIC | Age: 89
Setting detail: THERAPIES SERIES
Discharge: HOME | End: 2024-10-23
Payer: COMMERCIAL

## 2024-10-23 NOTE — CONSULTS
[] [facility]  Outpatient Rehabilitation &  Therapy  [address]  [phone]  [phone] [] [facility]  Outpatient Rehabilitation &  Therapy  [address]  P: [phone]  F: [phone] [] [facility]  Outpatient Rehabilitation &  Therapy  [address]  P: [phone]  F: [phone] [x] [facility]  Outpatient Rehabilitation &  Therapy  [address]  [phone]  [phone] [] [facility]  Outpatient Rehabilitation &  Therapy  [address]   P: [phone]  F: [phone]  [] [facility]  Outpatient Rehabilitation &  Therapy  [address]  P: [phone]  F: [phone] [] [facility]  Outpatient Rehabilitation &  Therapy  [address]  P: [phone]  F: [phone] [] [facility]  Outpatient Rehabilitation &  Therapy  [address]  P: [phone]  F: [phone] [] [facility]  Outpatient Rehabilitation &  Therapy  [address]  P: [phone]  F: [phone]  [] [facility] Outpatient Rehabilitation &  Therapy  [address]  P: [phone]  F: [phone]     Therapy Cancel/No Show note    Date: 10/23/2024  Patient: Bill PRIETO Ronel  : 1935  MRN: [id number]    Cancels/No Shows to date: 0/0    For today's appointment patient:    [x] Cancelled    [] Rescheduled appointment    [] No-show     Reason given by patient:    []  Patient ill    []  Conflicting appointment    [] No transportation      [] Conflict with work    [] No reason given    [] Weather related    [] COVID-19    [x] Other:      Comments: PT cancelled pt's appointment today after phone conversation ~10 am with pt stating he is still feeling \"whoozy\". Pt's wife took BP and HR while on the phone with report of 144/102 and 64

## 2024-10-25 ENCOUNTER — APPOINTMENT (OUTPATIENT)
Dept: GENERAL RADIOLOGY | Age: 89
End: 2024-10-25
Payer: MEDICARE

## 2024-10-25 ENCOUNTER — HOSPITAL ENCOUNTER (EMERGENCY)
Age: 89
Discharge: HOME | End: 2024-10-25
Attending: EMERGENCY MEDICINE
Payer: MEDICARE

## 2024-10-25 ENCOUNTER — APPOINTMENT (OUTPATIENT)
Dept: CT IMAGING | Age: 89
End: 2024-10-25
Payer: MEDICARE

## 2024-10-25 ENCOUNTER — HOSPITAL ENCOUNTER (OUTPATIENT)
Dept: PHYSICAL THERAPY | Facility: CLINIC | Age: 89
Setting detail: THERAPIES SERIES
Discharge: HOME | End: 2024-10-25
Payer: COMMERCIAL

## 2024-10-25 VITALS
HEART RATE: 58 BPM | BODY MASS INDEX: 21.52 KG/M2 | DIASTOLIC BLOOD PRESSURE: 69 MMHG | SYSTOLIC BLOOD PRESSURE: 174 MMHG | RESPIRATION RATE: 15 BRPM | TEMPERATURE: 97.5 F | HEIGHT: 68 IN | OXYGEN SATURATION: 96 % | WEIGHT: 142 LBS

## 2024-10-25 DIAGNOSIS — R03.0 ELEVATED BLOOD PRESSURE READING: Primary | ICD-10-CM

## 2024-10-25 DIAGNOSIS — R42 VERTIGO: ICD-10-CM

## 2024-10-25 DIAGNOSIS — E03.9 HYPOTHYROIDISM, UNSPECIFIED TYPE: ICD-10-CM

## 2024-10-25 LAB
ANION GAP SERPL CALCULATED.3IONS-SCNC: 9 MMOL/L (ref 9–17)
BASOPHILS # BLD: 0 K/UL (ref 0–0.2)
BASOPHILS NFR BLD: 0 % (ref 0–2)
BUN SERPL-MCNC: 27 MG/DL (ref 8–23)
CALCIUM SERPL-MCNC: 9 MG/DL (ref 8.6–10.4)
CHLORIDE SERPL-SCNC: 104 MMOL/L (ref 98–107)
CO2 SERPL-SCNC: 25 MMOL/L (ref 20–31)
CREAT SERPL-MCNC: 0.9 MG/DL (ref 0.7–1.2)
EOSINOPHIL # BLD: 0.32 K/UL (ref 0–0.4)
EOSINOPHILS RELATIVE PERCENT: 3 % (ref 1–4)
ERYTHROCYTE [DISTWIDTH] IN BLOOD BY AUTOMATED COUNT: 18.9 % (ref 12.5–15.4)
GFR, ESTIMATED: 83 ML/MIN/1.73M2
GLUCOSE SERPL-MCNC: 102 MG/DL (ref 70–99)
HCT VFR BLD AUTO: 40.4 % (ref 41–53)
HGB BLD-MCNC: 13.4 G/DL (ref 13.5–17.5)
INR PPP: 3.5
LYMPHOCYTES NFR BLD: 1.59 K/UL (ref 1–4.8)
LYMPHOCYTES RELATIVE PERCENT: 15 % (ref 24–44)
MCH RBC QN AUTO: 29.7 PG (ref 26–34)
MCHC RBC AUTO-ENTMCNC: 33.2 G/DL (ref 31–37)
MCV RBC AUTO: 89.5 FL (ref 80–100)
MONOCYTES NFR BLD: 0.85 K/UL (ref 0.1–0.8)
MONOCYTES NFR BLD: 8 % (ref 1–7)
MORPHOLOGY: NORMAL
NEUTROPHILS NFR BLD: 74 % (ref 36–66)
NEUTS SEG NFR BLD: 7.84 K/UL (ref 1.8–7.7)
PLATELET # BLD AUTO: 205 K/UL (ref 140–450)
PMV BLD AUTO: 6.7 FL (ref 6–12)
POTASSIUM SERPL-SCNC: 4.7 MMOL/L (ref 3.7–5.3)
PROTHROMBIN TIME: 33.6 SEC (ref 9.4–12.6)
RBC # BLD AUTO: 4.52 M/UL (ref 4.5–5.9)
SARS-COV-2 RDRP RESP QL NAA+PROBE: NOT DETECTED
SODIUM SERPL-SCNC: 138 MMOL/L (ref 135–144)
SPECIMEN DESCRIPTION: NORMAL
T4 FREE SERPL-MCNC: 1.3 NG/DL (ref 0.92–1.68)
TROPONIN I SERPL HS-MCNC: 29 NG/L (ref 0–22)
TSH SERPL DL<=0.05 MIU/L-ACNC: 25.78 UIU/ML (ref 0.3–5)
WBC OTHER # BLD: 10.6 K/UL (ref 3.5–11)

## 2024-10-25 PROCEDURE — 85610 PROTHROMBIN TIME: CPT

## 2024-10-25 PROCEDURE — 84484 ASSAY OF TROPONIN QUANT: CPT

## 2024-10-25 PROCEDURE — 93005 ELECTROCARDIOGRAM TRACING: CPT | Performed by: NURSE PRACTITIONER

## 2024-10-25 PROCEDURE — 71046 X-RAY EXAM CHEST 2 VIEWS: CPT

## 2024-10-25 PROCEDURE — 6370000000 HC RX 637 (ALT 250 FOR IP): Performed by: NURSE PRACTITIONER

## 2024-10-25 PROCEDURE — 85025 COMPLETE CBC W/AUTO DIFF WBC: CPT

## 2024-10-25 PROCEDURE — 80048 BASIC METABOLIC PNL TOTAL CA: CPT

## 2024-10-25 PROCEDURE — 87635 SARS-COV-2 COVID-19 AMP PRB: CPT

## 2024-10-25 PROCEDURE — 70450 CT HEAD/BRAIN W/O DYE: CPT

## 2024-10-25 PROCEDURE — 99285 EMERGENCY DEPT VISIT HI MDM: CPT

## 2024-10-25 PROCEDURE — 84439 ASSAY OF FREE THYROXINE: CPT

## 2024-10-25 PROCEDURE — 84443 ASSAY THYROID STIM HORMONE: CPT

## 2024-10-25 RX ORDER — MECLIZINE HYDROCHLORIDE 25 MG/1
25 TABLET ORAL 3 TIMES DAILY PRN
Qty: 30 TABLET | Refills: 0 | Status: SHIPPED | OUTPATIENT
Start: 2024-10-25 | End: 2024-11-04

## 2024-10-25 RX ORDER — MECLIZINE HCL 12.5 MG 12.5 MG/1
25 TABLET ORAL ONCE
Status: COMPLETED | OUTPATIENT
Start: 2024-10-25 | End: 2024-10-25

## 2024-10-25 RX ADMIN — MECLIZINE 25 MG: 12.5 TABLET ORAL at 14:58

## 2024-10-25 NOTE — ED PROVIDER NOTES
: No hematuria or dysuria   Musculoskeletal: No swelling or pain   Skin: No rash   Neurological: No focal neurologic complaints, paresthesias, weakness, or headache      Except as noted above the remainder of the review of systems was reviewed and negative.       PAST MEDICAL HISTORY     Past Medical History:   Diagnosis Date    Abdominal hernia 10/8/2020    Achilles bursitis 10/8/2020    Acute sinusitis 8/7/2017    Adult body mass index 25-29 10/8/2020    Allergic rhinitis due to pollen 10/8/2020    Anesthesia of skin 10/8/2020    Anterior subcapsular polar senile cataract 10/8/2020    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    Asthma 8/7/2017    Atopic dermatitis 10/8/2020    Backache 10/8/2020    Bacterial pneumonia 8/7/2017    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/1/2013    Benign prostatic hyperplasia 4/12/2013    Candidiasis of mouth 8/7/2017    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 9/8/2014    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 2/13/2020    Chronic obstructive lung disease (HCC) 11/8/2011    Chronic obstructive pulmonary disease (HCC) 10/8/2020    Chronic rhinitis 2/13/2020    Chronic sinusitis 2/13/2020    Deep vein thrombosis of lower extremity (HCC) 8/7/2017    Diarrhea 10/8/2020    Disorder of arteries and arterioles, unspecified ([facility]) 10/8/2020    Diverticulitis of colon 10/8/2020    Dizziness and giddiness 12/19/2011    Dysphagia 8/7/2017    Enlarged prostate 10/8/2020    Environmental allergies 8/7/2017    Fatigue 10/8/2020    Gabe hematuria 4/12/2013    Frostbite with tissue necrosis of nose 10/8/2020    Gastroesophageal reflux disease 8/7/2017    Heartburn 11/8/2011    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  She states he has not been taking it.  I encouraged patient and wife to stop taking steroid.  He is not wheezing he does not have any signs of need for steroid at this time.  It may be causing elevation in his blood pressure.  I also instructed him to begin taking the lisinopril again.  Follow-up with cardiology.  He has an upcoming appointment.  I encouraged them to keep this appointment.  Close follow-up with primary care he has an appointment on Monday. [AR]      ED Course User Index  [AR] Maya Munoz APRN - CNP         PROCEDURES:  Unless otherwise noted below, none     Procedures      FINAL IMPRESSION      1. Elevated blood pressure reading    2. Vertigo    3. Hypothyroidism, unspecified type          DISPOSITION/PLAN   DISPOSITION Decision To Discharge 10/25/2024 03:24:34 PM           PATIENT REFERRED TO:  Kareem Pal MD  [address] [place] [address] A  [facility] [address]  [phone]    Schedule an appointment as soon as possible for a visit in 3 days        DISCHARGE MEDICATIONS:  Discharge Medication List as of 10/25/2024  2:44 PM        START taking these medications    Details   meclizine (ANTIVERT) 25 MG tablet Take 1 tablet by mouth 3 times daily as needed for Dizziness, Disp-30 tablet, R-0Normal           Controlled Substances Monitoring:          No data to display                (Please note that portions of this note were completed with a voice recognition program.  Efforts were made to edit the dictations but occasionally words are mis-transcribed.)    VARGAS Henderson CNP (electronically signed)            Maya Munoz APRN - CNP  10/25/24 2111

## 2024-10-25 NOTE — CONSULTS
[] [facility]  Outpatient Rehabilitation &  Therapy  [address]  [phone]  [phone] [] [facility]  Outpatient Rehabilitation &  Therapy  [address]  P: [phone]  F: [phone] [] [facility]  Outpatient Rehabilitation &  Therapy  [address]  P: [phone]  F: [phone] [x] [facility]  Outpatient Rehabilitation &  Therapy  [address]  [phone]  [phone] [] [facility]  Outpatient Rehabilitation &  Therapy  [address]   P: [phone]  F: [phone]  [] [facility]  Outpatient Rehabilitation &  Therapy  [address]  P: [phone]  F: [phone] [] [facility]  Outpatient Rehabilitation &  Therapy  [address]  P: [phone]  F: [phone] [] [facility]  Outpatient Rehabilitation &  Therapy  [address]  P: [phone]  F: [phone] [] [facility]  Outpatient Rehabilitation &  Therapy  [address]  P: [phone]  F: [phone]  [] [facility] Outpatient Rehabilitation &  Therapy  [address]  P: [phone]  F: [phone]     Therapy Cancel/No Show note    Date: 10/25/2024  Patient: Bill PRIETO Ronel  : 1935  MRN: [id number]    Cancels/No Shows to date: 0/0    For today's appointment patient:    [x] Cancelled    [] Rescheduled appointment    [] No-show     Reason given by patient:    []  Patient ill    []  Conflicting appointment    [] No transportation      [] Conflict with work    [] No reason given    [] Weather related    [] COVID-19    [x] Other:      Comments: PT cancelled pt's appointment today after phone conversation at 9:30 am. Pt states he is still not feeling well and having trouble moving around. PT asked that pt's wife take BP with report

## 2024-10-25 NOTE — ED PROVIDER NOTES
[facility] Emergency Department      Pt Name: Bill Rodney  MRN: [id number]  Birthdate 1935  Date of evaluation: 10/25/2024    EMERGENCY DEPARTMENT ENCOUNTER      PERTINENT ATTENDING PHYSICIAN COMMENTS:      Faculty Attestation    I performed a history and physical examination of the patient and discussed management with the mid level provideer. I reviewed the mid level provider's note and agree with the documented findings and plan of care.Any areas of disagreement are noted on the chart. I was personally present for the key portions of any procedures. I have documented in the chart those procedures where I was not present during the key portions. I have reviewed the emergency nurses triage note. I agree with the chief complaint, past medical history, past surgical history, allergies, medications, social and family history as documented unless otherwise noted below. Documentation of the HPI, Physical Exam and Medical Decision Making performed by medical students or scribes is based on my personal performance of the HPI, PE and MDM. For Residents/Physician Assistant/ Nurse Practitioner cases/documentation I have personally evaluated this patient and have completed at least one if not all key elements of the E/M (history, physical exam, and MDM). Additional findings are as noted.    CHIEF COMPLAINT       Chief Complaint   Patient presents with    Hypertension     Pt states in the 180s. Doesn't feel well. Hot on face. SOB dizziness. Denies N/V or CP    Fatigue       HISTORY OF PRESENT ILLNESS    Bill Rodney is a 88 y.o. male who presents to the emerged from complaining of fatigue for the past 4 to 5 days.  Recently treated for a sinus infection on Ceftin for the past week or so.  He has been taking decongestants Coricidin.  He says his head just feels funny.  He denies any chest pain or shortness of breath.  Blood pressure has been 180 systolic.  Denies any other relevant

## 2024-10-25 NOTE — DISCHARGE INSTRUCTIONS
Continue taking previously prescribed medications    Stop taking oral steroid    Begin taking Lisinopril as previously directed    Please follow-up with your primary care doctor on Monday for reevaluation-keep your appointment    Keep your appointment with cardiology on November 4    Take meclizine as directed as needed for dizziness    Drink plenty of fluids to maintain hydration    Take Tylenol as directed as needed for any discomfort    If any symptoms worsen or any new or concerning symptoms arise please return immediately to the emergency department

## 2024-10-27 LAB
EKG ATRIAL RATE: 60 BPM
EKG P AXIS: 15 DEGREES
EKG P-R INTERVAL: 212 MS
EKG Q-T INTERVAL: 414 MS
EKG QRS DURATION: 100 MS
EKG QTC CALCULATION (BAZETT): 414 MS
EKG R AXIS: 25 DEGREES
EKG T AXIS: 106 DEGREES
EKG VENTRICULAR RATE: 60 BPM

## 2024-10-28 ENCOUNTER — OFFICE VISIT (OUTPATIENT)
Age: 89
End: 2024-10-28

## 2024-10-28 VITALS
HEART RATE: 58 BPM | WEIGHT: 147 LBS | TEMPERATURE: 98.4 F | BODY MASS INDEX: 22.28 KG/M2 | OXYGEN SATURATION: 97 % | SYSTOLIC BLOOD PRESSURE: 132 MMHG | DIASTOLIC BLOOD PRESSURE: 66 MMHG | HEIGHT: 68 IN

## 2024-10-28 DIAGNOSIS — D68.51 HETEROZYGOUS FACTOR V LEIDEN MUTATION (HCC): ICD-10-CM

## 2024-10-28 DIAGNOSIS — Z86.711 HISTORY OF PULMONARY EMBOLISM: ICD-10-CM

## 2024-10-28 DIAGNOSIS — N48.1 BALANITIS: ICD-10-CM

## 2024-10-28 DIAGNOSIS — M47.812 CERVICAL SPONDYLOSIS WITHOUT MYELOPATHY: Primary | ICD-10-CM

## 2024-10-28 PROCEDURE — 93010 ELECTROCARDIOGRAM REPORT: CPT | Performed by: INTERNAL MEDICINE

## 2024-10-28 RX ORDER — TIZANIDINE 2 MG/1
2 TABLET ORAL 3 TIMES DAILY PRN
Qty: 30 TABLET | Refills: 0 | Status: SHIPPED | OUTPATIENT
Start: 2024-10-28

## 2024-10-28 RX ORDER — FLUCONAZOLE 100 MG/1
100 TABLET ORAL EVERY OTHER DAY
Qty: 4 TABLET | Refills: 0 | Status: SHIPPED | OUTPATIENT
Start: 2024-10-28 | End: 2024-11-05

## 2024-10-28 NOTE — PROGRESS NOTES
Social History     Socioeconomic History    Marital status:    Tobacco Use    Smoking status: Never    Smokeless tobacco: Never   Vaping Use    Vaping status: Never Used   Substance and Sexual Activity    Alcohol use: Not Currently     Comment: quit alcohol at age 51    Drug use: Never     Social Determinants of Health     Financial Resource Strain: Low Risk  (10/18/2024)    Overall Financial Resource Strain (CARDIA)     Difficulty of Paying Living Expenses: Not hard at all   Food Insecurity: No Food Insecurity (10/18/2024)    Hunger Vital Sign     Worried About Running Out of Food in the Last Year: Never true     Ran Out of Food in the Last Year: Never true   Transportation Needs: No Transportation Needs (10/18/2024)    PRAPARE - Transportation     Lack of Transportation (Medical): No     Lack of Transportation (Non-Medical): No    [facility]    [place] Safety [place] Environment   Housing Stability: Unknown (10/18/2024)    Housing Stability Vital Sign     Unable to Pay for Housing in the Last Year: No     Homeless in the Last Year: No        No family history on file.     PHYSICAL EXAM     /66   Pulse 58   Temp 98.4 °F (36.9 °C)   Ht 1.727 m (5' 8\")   Wt 66.7 kg (147 lb)   SpO2 97%   BMI 22.35 kg/m²    Physical Exam  Constitutional:       Appearance: Normal appearance.   HENT:      Head: Normocephalic and atraumatic.      Right Ear: Tympanic membrane normal.   Eyes:      Extraocular Movements: Extraocular movements intact.      Pupils: Pupils are equal, round, and reactive to light.   Cardiovascular:      Rate and Rhythm: Normal rate and regular rhythm.      Pulses: Normal pulses.   Pulmonary:      Breath sounds: Normal breath sounds.   Abdominal:      General: Bowel sounds are normal.      Palpations: Abdomen is soft.   Musculoskeletal:         General: Normal range of motion.   Neurological:      Mental Status: He is alert and oriented to person,

## 2024-11-02 LAB
ALBUMIN: 4.1 G/DL (ref 3.5–5.2)
ALK PHOSPHATASE: 95 U/L (ref 39–118)
ALT SERPL-CCNC: 16 U/L (ref 5–41)
ANION GAP SERPL CALCULATED.3IONS-SCNC: 14 MMOL/L (ref 7–16)
ANISOCYTOSIS: ABNORMAL
AST SERPL-CCNC: 25 U/L (ref 9–50)
BASOPHILS ABSOLUTE: 0.09 K/UL (ref 0–0.2)
BASOPHILS RELATIVE PERCENT: 1 % (ref 0–2)
BILIRUB SERPL-MCNC: 0.6 MG/DL
BUN BLDV-MCNC: 26 MG/DL (ref 8–23)
CALCIUM SERPL-MCNC: 9.1 MG/DL (ref 8.6–10.5)
CHLORIDE BLD-SCNC: 106 MMOL/L (ref 96–107)
CHOLESTEROL, TOTAL: 179 MG/DL (ref 100–199)
CHOLESTEROL/HDL RATIO: 2.5 (ref 2–4.5)
CO2: 25 MMOL/L (ref 18–32)
CREAT SERPL-MCNC: 1.07 MG/DL (ref 0.67–1.3)
EGFR IF NONAFRICAN AMERICAN: 67 ML/MIN/1.73M2
ELLIPTOCYTES: SLIGHT
EOSINOPHILS ABSOLUTE: 0.26 K/UL (ref 0–0.8)
EOSINOPHILS RELATIVE PERCENT: 3 % (ref 0–5)
GLUCOSE: 83 MG/DL (ref 70–100)
HCT VFR BLD CALC: 42.3 % (ref 39–52)
HDLC SERPL-MCNC: 71 MG/DL
HEMOGLOBIN: 13.9 G/DL (ref 13–18)
LDL CHOLESTEROL: 91 MG/DL
LDL/HDL RATIO: 1.3
LYMPHOCYTES ABSOLUTE: 0.96 K/UL (ref 0.9–5.2)
LYMPHOCYTES RELATIVE PERCENT: 11 % (ref 20–45)
MACROCYTOSIS: SLIGHT
MCH RBC QN AUTO: 30.6 PG (ref 26–32)
MCHC RBC AUTO-ENTMCNC: 32.9 G/DL (ref 32–35)
MCV RBC AUTO: 93 FL (ref 75–100)
MONOCYTES ABSOLUTE: 1.13 K/UL (ref 0.1–1)
MONOCYTES RELATIVE PERCENT: 13 % (ref 0–13)
NEUTROPHILS ABSOLUTE: 6.26 K/UL (ref 1.9–8)
NEUTROPHILS RELATIVE PERCENT: 72 % (ref 45–75)
PDW BLD-RTO: 17 % (ref 11.2–14.8)
PLATELET # BLD: 209 THOUS/CMM (ref 140–440)
POIKILOCYTOSIS: SLIGHT
POTASSIUM SERPL-SCNC: 4.3 MMOL/L (ref 3.5–5.4)
RBC # BLD: 4.54 MILL/CMM (ref 4.4–6.1)
SODIUM BLD-SCNC: 145 MMOL/L (ref 135–148)
TEAR DROP CELLS: SLIGHT
TOTAL PROTEIN: 6 G/DL (ref 6–8.3)
TRIGL SERPL-MCNC: 87 MG/DL (ref 20–149)
VLDLC SERPL CALC-MCNC: 17 MG/DL
WBC # BLD: 8.7 THDS/CMM (ref 3.6–11)

## 2024-11-11 ENCOUNTER — OFFICE VISIT (OUTPATIENT)
Age: 89
End: 2024-11-11

## 2024-11-11 VITALS
BODY MASS INDEX: 22.28 KG/M2 | WEIGHT: 147 LBS | OXYGEN SATURATION: 96 % | RESPIRATION RATE: 12 BRPM | HEIGHT: 68 IN | HEART RATE: 85 BPM | DIASTOLIC BLOOD PRESSURE: 74 MMHG | SYSTOLIC BLOOD PRESSURE: 122 MMHG

## 2024-11-11 DIAGNOSIS — Z00.00 MEDICARE ANNUAL WELLNESS VISIT, SUBSEQUENT: ICD-10-CM

## 2024-11-11 DIAGNOSIS — R42 DIZZINESS: ICD-10-CM

## 2024-11-11 DIAGNOSIS — J41.8 MIXED SIMPLE AND MUCOPURULENT CHRONIC BRONCHITIS (HCC): ICD-10-CM

## 2024-11-11 DIAGNOSIS — I25.10 ARTERIOSCLEROSIS OF CORONARY ARTERY: ICD-10-CM

## 2024-11-11 DIAGNOSIS — D68.51 HETEROZYGOUS FACTOR V LEIDEN MUTATION (HCC): Primary | ICD-10-CM

## 2024-11-11 DIAGNOSIS — E78.5 DYSLIPIDEMIA: ICD-10-CM

## 2024-11-11 PROBLEM — R09.81 NASAL CONGESTION: Status: ACTIVE | Noted: 2024-11-11

## 2024-11-11 NOTE — PROGRESS NOTES
[facility]  [facility]  [address]  [facility] [address]  Dept: [phone]     Date of Visit:  2024  Patient Name: Bill Rodney   Patient :  1935     CHIEF COMPLAINT/HPI:     Chief Complaint   Patient presents with    Medicare AWV    Discuss Labs        HPI      Bill Rodney, 88 y.o. presents today for medicare follow up of chronic bronchitis, arteriosclerosis of coronary artery and heterozygous factor V Leiden mutation.     Bill Rodney denies headaches, dizziness, chest pain, shortness of breath, heartburn/indigestion, nausea, vomiting, diarrhea, constipation, blood in stool, claudication, edema, skin changes, vision changes, fever, or chills.       REVIEW OF SYSTEM      Review of Systems:   Constitutional:  Negative for chills, fatigue, fever and unexpected weight change.   Eyes:  Negative for visual disturbance.   Respiratory:  Negative for cough, chest tightness, shortness of breath and wheezing.    Cardiovascular:  Negative for chest pain, palpitations and leg swelling.   Gastrointestinal:  Negative for abdominal distention, abdominal pain, blood in stool, constipation, diarrhea, nausea and vomiting.   Genitourinary:  Negative for dysuria, hematuria and urgency.   Musculoskeletal:  Negative for back pain, neck pain and neck stiffness.   Skin:  Negative for rash and wound.   Neurological:  Negative for syncope, weakness, light-headedness and headaches.   Hematological:  Negative for adenopathy. Does not bruise/bleed easily.   Psychiatric/Behavioral:  Negative for suicidal ideas. The patient is not nervous/anxious.      REVIEWED INFORMATION      Allergies   Allergen Reactions    Levofloxacin Swelling    Codeine Hives and Rash    Pulmicort [Budesonide] Rash     Also itchy    Cashew Nut Oil     Levaquin  [Levofloxacin In D5w] Other (See Comments)    Peanut-Containing Drug Products Hives       Current Outpatient

## 2024-12-03 NOTE — TELEPHONE ENCOUNTER
Bill Rodney is calling to request a refill on the following medication(s):    Medication Request:  Requested Prescriptions     Pending Prescriptions Disp Refills    empagliflozin (JARDIANCE) 10 MG tablet 30 tablet 5     Sig: Take 1 tablet by mouth daily       Last Visit Date (If Applicable):  11/11/2024    Next Visit Date:    5/12/2025

## 2024-12-05 ENCOUNTER — TELEPHONE (OUTPATIENT)
Age: 89
End: 2024-12-05

## 2024-12-05 DIAGNOSIS — J45.50 SEVERE PERSISTENT ASTHMA WITHOUT COMPLICATION: ICD-10-CM

## 2024-12-05 DIAGNOSIS — J45.51 SEVERE PERSISTENT ASTHMA WITH ACUTE EXACERBATION: ICD-10-CM

## 2024-12-05 RX ORDER — METHYLPREDNISOLONE 4 MG/1
TABLET ORAL
Qty: 1 KIT | Refills: 0 | Status: SHIPPED | OUTPATIENT
Start: 2024-12-05

## 2024-12-05 RX ORDER — ALBUTEROL SULFATE 90 UG/1
2 INHALANT RESPIRATORY (INHALATION) EVERY 6 HOURS PRN
Qty: 18 G | Refills: 2 | Status: SHIPPED | OUTPATIENT
Start: 2024-12-05 | End: 2025-01-04

## 2024-12-05 RX ORDER — FLUTICASONE PROPIONATE AND SALMETEROL 250; 50 UG/1; UG/1
1 POWDER RESPIRATORY (INHALATION) 2 TIMES DAILY
Qty: 1 EACH | Refills: 4 | Status: SHIPPED | OUTPATIENT
Start: 2024-12-05 | End: 2025-01-04

## 2024-12-05 NOTE — TELEPHONE ENCOUNTER
Bill called to ask if there is something else other than Coricidin to take for his lungs.  He said he is going through a hard time right now with his breathing and it isn't enough to help him.  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   He said he is having a hard time breathing today, has shortness of breath.  He is have a worse time of it today.   Please advise

## 2024-12-06 NOTE — TELEPHONE ENCOUNTER
Spoke w/Jenny (spouse) and informed her. She said Bill is supposed to have a tooth extraction next week and asked if taking the Prednisone will affect his INR.  Please advise.   (Routing to pcp in office)

## 2024-12-06 NOTE — TELEPHONE ENCOUNTER
Per Jenny, extraction is set for Thurs 12/12 but he is supposed to go Tues 12/10 to the lab to have his INR checked (to see if he can still have extraction)

## 2024-12-18 ENCOUNTER — HOSPITAL ENCOUNTER (OUTPATIENT)
Dept: PHYSICAL THERAPY | Facility: CLINIC | Age: 89
Setting detail: THERAPIES SERIES
Discharge: HOME | End: 2024-12-18

## 2024-12-18 NOTE — FLOWSHEET NOTE
[] [facility]  Outpatient Rehabilitation &  Therapy  [address]  [phone]  [phone] [] [facility]  Outpatient Rehabilitation &  Therapy  [address]  P: [phone]  F: [phone] [] [facility]  Outpatient Rehabilitation &  Therapy  [address]  P: [phone]  F: [phone] [x] [facility]  Outpatient Rehabilitation &  Therapy  [address]  [phone]  [phone] [] [facility]  Outpatient Rehabilitation &  Therapy  [address]   P: [phone]  F: [phone]  [] [facility]  Outpatient Rehabilitation &  Therapy  [address]  P: [phone]  F: [phone] [] [facility]  Outpatient Rehabilitation &  Therapy  [address]  P: [phone]  F: [phone] [] [facility]  Outpatient Rehabilitation &  Therapy  [address]  P: [phone]  F: [phone] [] [facility]  Outpatient Rehabilitation &  Therapy  [address]  P: [phone]  F: [phone]  [] [facility] Outpatient Rehabilitation &  Therapy  [address]  P: [phone]  F: [phone]     Therapy Cancel/No Show note    Date: 2024  Patient: Bill PRIETO Ronel  : 1935  MRN: [id number]    Cancels/No Shows to date: 0/0    For today's appointment patient:    [x]  Cancelled    [] Rescheduled appointment    [] No-show     Reason given by patient:    []  Patient ill    []  Conflicting appointment    [] No transportation      [] Conflict with work    [] No reason given    [] Weather related    [] COVID-19    [x] Other:      Comments: Attempted to re-eval but pt was not feeling good. BP at start of session was 195/97 with pt denying symptoms. PT decided to cancel today's appointment d/t elevated BP. Recommended pt seek

## 2025-01-02 ENCOUNTER — TELEPHONE (OUTPATIENT)
Age: 89
End: 2025-01-02

## 2025-01-02 NOTE — TELEPHONE ENCOUNTER
Pt wants to know why Dr jc is filling his warfin medication and not not Dr garcia  please advise ?notify pt

## 2025-01-06 NOTE — TELEPHONE ENCOUNTER
We got a call that he needed a refill  Patient should call pharmacy and tell them to contact  Dr. Trejo, NOT ME , for refills

## 2025-01-10 ENCOUNTER — TELEPHONE (OUTPATIENT)
Dept: PULMONOLOGY | Age: 89
End: 2025-01-10

## 2025-01-10 DIAGNOSIS — J45.50 SEVERE PERSISTENT ASTHMA WITHOUT COMPLICATION: ICD-10-CM

## 2025-01-10 DIAGNOSIS — J45.51 SEVERE PERSISTENT ASTHMA WITH ACUTE EXACERBATION: ICD-10-CM

## 2025-01-10 RX ORDER — ALBUTEROL SULFATE 0.83 MG/ML
2.5 SOLUTION RESPIRATORY (INHALATION) EVERY 6 HOURS PRN
Qty: 120 EACH | Refills: 11 | Status: SHIPPED | OUTPATIENT
Start: 2025-01-10

## 2025-01-10 NOTE — TELEPHONE ENCOUNTER
Patients wife called and requested a refill on albuterol nebulizer solution.  Patient was last seen 7/23/24 and has a follow up 2/13/25.  I did pend the order for your approval if you agree please sign

## 2025-01-13 ENCOUNTER — TELEPHONE (OUTPATIENT)
Age: 89
End: 2025-01-13

## 2025-01-13 NOTE — TELEPHONE ENCOUNTER
Patient went to refill [place] and pharmacy told them it would be $600  Wife is a little confused because they were getting it free    Asked if it was because its the first of the year and haven't met the deductible?   She said it is for his heart and they were getting it for free.     Is there something else that he can take

## 2025-01-16 ENCOUNTER — TELEPHONE (OUTPATIENT)
Age: 89
End: 2025-01-16

## 2025-01-16 RX ORDER — CEFUROXIME AXETIL 250 MG/1
250 TABLET ORAL 2 TIMES DAILY
Qty: 20 TABLET | Refills: 0 | Status: SHIPPED | OUTPATIENT
Start: 2025-01-16 | End: 2025-01-26

## 2025-01-16 RX ORDER — PREDNISONE 10 MG/1
10 TABLET ORAL 2 TIMES DAILY
Qty: 10 TABLET | Refills: 0 | Status: SHIPPED | OUTPATIENT
Start: 2025-01-16 | End: 2025-01-21

## 2025-01-16 RX ORDER — BENZONATATE 100 MG/1
100-200 CAPSULE ORAL 3 TIMES DAILY PRN
Qty: 60 CAPSULE | Refills: 0 | Status: SHIPPED | OUTPATIENT
Start: 2025-01-16 | End: 2025-01-23

## 2025-01-16 NOTE — TELEPHONE ENCOUNTER
Jenny called to say that they think he has bronchitis. He is coughing a lot.   He is taking Robitussin and Coricidin. Is there anything else he should be taking?  They use [employer]

## 2025-01-17 NOTE — TELEPHONE ENCOUNTER
Spoke to wife, Jenny.  She will call back to schedule an appointment in the next 1-2 weeks for Bill.

## 2025-01-22 ENCOUNTER — OFFICE VISIT (OUTPATIENT)
Age: 89
End: 2025-01-22

## 2025-01-22 VITALS
HEART RATE: 75 BPM | SYSTOLIC BLOOD PRESSURE: 138 MMHG | BODY MASS INDEX: 21.82 KG/M2 | OXYGEN SATURATION: 96 % | WEIGHT: 144 LBS | DIASTOLIC BLOOD PRESSURE: 84 MMHG | HEIGHT: 68 IN

## 2025-01-22 DIAGNOSIS — J41.8 MIXED SIMPLE AND MUCOPURULENT CHRONIC BRONCHITIS (HCC): Primary | ICD-10-CM

## 2025-01-22 DIAGNOSIS — C61 MALIGNANT TUMOR OF PROSTATE (HCC): ICD-10-CM

## 2025-01-22 DIAGNOSIS — J44.9 CHRONIC OBSTRUCTIVE PULMONARY DISEASE, UNSPECIFIED COPD TYPE (HCC): ICD-10-CM

## 2025-01-22 DIAGNOSIS — I50.32 CHRONIC DIASTOLIC CONGESTIVE HEART FAILURE (HCC): ICD-10-CM

## 2025-01-22 DIAGNOSIS — C78.00 MALIGNANT NEOPLASM METASTATIC TO LUNG, UNSPECIFIED LATERALITY (HCC): ICD-10-CM

## 2025-01-22 DIAGNOSIS — I73.9 PERIPHERAL VASCULAR DISEASE (HCC): ICD-10-CM

## 2025-01-22 DIAGNOSIS — D68.51 HETEROZYGOUS FACTOR V LEIDEN MUTATION (HCC): ICD-10-CM

## 2025-01-22 DIAGNOSIS — Z71.89 ACP (ADVANCE CARE PLANNING): ICD-10-CM

## 2025-01-22 PROBLEM — K57.30 DIVERTICULAR DISEASE OF COLON: Status: ACTIVE | Noted: 2025-01-22

## 2025-01-22 PROBLEM — R93.3 ABNORMAL CT SCAN, GASTROINTESTINAL TRACT: Status: ACTIVE | Noted: 2025-01-22

## 2025-01-22 PROBLEM — D68.9 COAGULOPATHY (HCC): Status: ACTIVE | Noted: 2025-01-22

## 2025-01-22 RX ORDER — METHYLPREDNISOLONE ACETATE 40 MG/ML
40 INJECTION, SUSPENSION INTRA-ARTICULAR; INTRALESIONAL; INTRAMUSCULAR; SOFT TISSUE ONCE
Qty: 1 ML | Refills: 0
Start: 2025-01-22 | End: 2025-01-22

## 2025-01-22 RX ORDER — WARFARIN SODIUM 1 MG/1
TABLET ORAL
Qty: 100 TABLET | Refills: 3 | Status: SHIPPED | OUTPATIENT
Start: 2025-01-22

## 2025-01-22 RX ORDER — GENTAMICIN SULFATE 3 MG/ML
SOLUTION/ DROPS OPHTHALMIC
COMMUNITY
Start: 2024-12-26

## 2025-01-22 RX ORDER — CARVEDILOL 6.25 MG/1
6.25 TABLET ORAL 2 TIMES DAILY WITH MEALS
COMMUNITY
Start: 2025-01-11

## 2025-01-22 RX ORDER — METHYLPREDNISOLONE ACETATE 40 MG/ML
40 INJECTION, SUSPENSION INTRA-ARTICULAR; INTRALESIONAL; INTRAMUSCULAR; SOFT TISSUE ONCE
Status: COMPLETED | OUTPATIENT
Start: 2025-01-22 | End: 2025-01-22

## 2025-01-22 RX ORDER — ONDANSETRON 4 MG/1
TABLET, ORALLY DISINTEGRATING ORAL
COMMUNITY
Start: 2024-12-26

## 2025-01-22 RX ORDER — MECLIZINE HYDROCHLORIDE 25 MG/1
TABLET ORAL
COMMUNITY

## 2025-01-22 RX ORDER — WARFARIN SODIUM 1 MG/1
TABLET ORAL
Qty: 100 TABLET | Refills: 3 | Status: SHIPPED | OUTPATIENT
Start: 2025-01-22 | End: 2025-01-22 | Stop reason: SDUPTHER

## 2025-01-22 RX ORDER — OFLOXACIN 3 MG/ML
SOLUTION/ DROPS OPHTHALMIC
COMMUNITY
Start: 2024-12-31

## 2025-01-22 RX ADMIN — METHYLPREDNISOLONE ACETATE 40 MG: 40 INJECTION, SUSPENSION INTRA-ARTICULAR; INTRALESIONAL; INTRAMUSCULAR; SOFT TISSUE at 15:29

## 2025-01-22 SDOH — ECONOMIC STABILITY: FOOD INSECURITY: WITHIN THE PAST 12 MONTHS, THE FOOD YOU BOUGHT JUST DIDN'T LAST AND YOU DIDN'T HAVE MONEY TO GET MORE.: NEVER TRUE

## 2025-01-22 SDOH — ECONOMIC STABILITY: FOOD INSECURITY: WITHIN THE PAST 12 MONTHS, YOU WORRIED THAT YOUR FOOD WOULD RUN OUT BEFORE YOU GOT MONEY TO BUY MORE.: NEVER TRUE

## 2025-01-22 ASSESSMENT — PATIENT HEALTH QUESTIONNAIRE - PHQ9
SUM OF ALL RESPONSES TO PHQ QUESTIONS 1-9: 0
2. FEELING DOWN, DEPRESSED OR HOPELESS: NOT AT ALL
SUM OF ALL RESPONSES TO PHQ9 QUESTIONS 1 & 2: 0
1. LITTLE INTEREST OR PLEASURE IN DOING THINGS: NOT AT ALL

## 2025-01-22 NOTE — PATIENT INSTRUCTIONS
Bill    Thank you for choosing [facility].  We know you have options when it comes to your healthcare; we appreciate that you chose us. Our goal is to provide exceptional  service and world class care to every patient.  You will be receiving a survey via email or text message asking for your feedback.  Please take a few minutes to share your thoughts about your recent visit. Your comments help us understand what we do well and ways we can improve.  Thank you in advance for your valuable feedback.      Dr. Kae Pal MD   Advance Care Planning     Advance Care Planning opens a door to talk about and write down your wishes before a sudden accident or illness.  Make your goals, values, and preferences known.     This puts you in the ’s seat and helps others know what matters most to you so they won’t have to guess.      Where can you learn more?    Go to [email or web address]   to learn how to:    Name someone you trust to make healthcare decisions for you, only if you can’t. (Healthcare Power of )    Document your wishes for care if you were seriously ill and not expected to recover or are approaching end of life. (Advance Directive or Living Will)    The same page can be found using the QR code below.

## 2025-01-22 NOTE — PROGRESS NOTES
After obtaining consent, and per orders of Dr. Pal, injection of depo 40 given in Ventrogluteal Right  by TRAN PENALOZA MA. ABN was signed prior to injection, copy was given to the patient. Patient tolerated the injection well.  
   Chronic sinusitis    Chronic obstructive pulmonary disease (HCC)    Mild intermittent asthma without complication    Abdominal hernia    Achilles bursitis    Adult body mass index 25-29    Allergic rhinitis due to pollen    Anesthesia of skin    Anterior subcapsular polar senile cataract    Atopic dermatitis    Backache    Diarrhea    Disorder of arteries and arterioles, unspecified (HCC)    Diverticulitis of colon    Enlarged prostate    Fatigue    Frostbite with tissue necrosis of nose    Heterozygous factor V Leiden mutation (HCC)    Low back strain    Omphalitis of     Pain in left leg    Right lower quadrant pain    Transitional cell carcinoma of urethra (HCC)    Hypotension    Arteriosclerosis of coronary artery    Dyspnea on exertion    Long term current use of anticoagulant therapy    Neck sprain    Acute bronchitis    Arthritis    Acute asthma exacerbation    Subtherapeutic international normalized ratio (INR)    History of pulmonary embolism    CHF (congestive heart failure) ([facility])    Elevated brain natriuretic peptide (BNP) level    Type 2 diabetes mellitus    Nasal congestion    Abnormal CT scan, gastrointestinal tract    Coagulopathy ([facility])    Diverticular disease of colon       Past Medical History:   Diagnosis Date    Abdominal hernia 10/8/2020    Achilles bursitis 10/8/2020    Acute sinusitis 2017    Adult body mass index 25-29 10/8/2020    Allergic rhinitis due to pollen 10/8/2020    Anesthesia of skin 10/8/2020    Anterior subcapsular polar senile cataract 10/8/2020    Arthropathy 2011    Asthma 2017    Atopic dermatitis 10/8/2020    Backache 10/8/2020    Bacterial pneumonia 2017    Benign neoplasm of soft tissue 2013    Benign prostatic hyperplasia 2013    Candidiasis of mouth 2017    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 2014    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 2020    Chronic obstructive lung disease (HCC) 2011    Chronic obstructive pulmonary disease

## 2025-01-28 ENCOUNTER — TELEPHONE (OUTPATIENT)
Age: 89
End: 2025-01-28

## 2025-01-28 NOTE — TELEPHONE ENCOUNTER
Patient and wife wondering if you spoke with patients oral surgeon at [facility] yet regarding his tooth extraction??

## 2025-02-05 NOTE — TELEPHONE ENCOUNTER
Dr. Pal called [facility]   They just want patient to get a Protime /INR 2 days before procedure and make sure lab sends result to [facility] on that day

## 2025-02-06 ENCOUNTER — TELEPHONE (OUTPATIENT)
Age: 89
End: 2025-02-06

## 2025-02-06 NOTE — TELEPHONE ENCOUNTER
Jenny called to let you know that Bill has a consultation appointment on February 19 th with [facility] so after that they will know when he needs to stop taking his blood thinner.

## 2025-02-24 ENCOUNTER — HOSPITAL ENCOUNTER (INPATIENT)
Age: 89
LOS: 4 days | Discharge: HOME | End: 2025-02-28
Attending: EMERGENCY MEDICINE | Admitting: FAMILY MEDICINE
Payer: COMMERCIAL

## 2025-02-24 ENCOUNTER — APPOINTMENT (OUTPATIENT)
Dept: GENERAL RADIOLOGY | Age: 89
End: 2025-02-24
Payer: COMMERCIAL

## 2025-02-24 DIAGNOSIS — J18.9 COMMUNITY ACQUIRED BILATERAL LOWER LOBE PNEUMONIA: Primary | ICD-10-CM

## 2025-02-24 DIAGNOSIS — I48.0 PAROXYSMAL ATRIAL FIBRILLATION (HCC): ICD-10-CM

## 2025-02-24 LAB
ANION GAP SERPL CALCULATED.3IONS-SCNC: 10 MMOL/L (ref 9–17)
BASOPHILS # BLD: 0.1 K/UL (ref 0–0.2)
BASOPHILS NFR BLD: 1 % (ref 0–2)
BUN SERPL-MCNC: 20 MG/DL (ref 8–23)
CALCIUM SERPL-MCNC: 8.9 MG/DL (ref 8.6–10.4)
CHLORIDE SERPL-SCNC: 106 MMOL/L (ref 98–107)
CO2 SERPL-SCNC: 24 MMOL/L (ref 20–31)
CREAT SERPL-MCNC: 0.9 MG/DL (ref 0.7–1.2)
EOSINOPHIL # BLD: 0.3 K/UL (ref 0–0.4)
EOSINOPHILS RELATIVE PERCENT: 5 % (ref 1–4)
ERYTHROCYTE [DISTWIDTH] IN BLOOD BY AUTOMATED COUNT: 18.2 % (ref 12.5–15.4)
FLUAV AG SPEC QL: NEGATIVE
FLUBV AG SPEC QL: NEGATIVE
GFR, ESTIMATED: 82 ML/MIN/1.73M2
GLUCOSE SERPL-MCNC: 122 MG/DL (ref 70–99)
HCT VFR BLD AUTO: 37.7 % (ref 41–53)
HGB BLD-MCNC: 12.4 G/DL (ref 13.5–17.5)
LYMPHOCYTES NFR BLD: 0.8 K/UL (ref 1–4.8)
LYMPHOCYTES RELATIVE PERCENT: 12 % (ref 24–44)
MAGNESIUM SERPL-MCNC: 1.9 MG/DL (ref 1.6–2.6)
MCH RBC QN AUTO: 30.3 PG (ref 26–34)
MCHC RBC AUTO-ENTMCNC: 32.9 G/DL (ref 31–37)
MCV RBC AUTO: 92.1 FL (ref 80–100)
MONOCYTES NFR BLD: 0.8 K/UL (ref 0.1–1.2)
MONOCYTES NFR BLD: 12 % (ref 2–11)
NEUTROPHILS NFR BLD: 70 % (ref 36–66)
NEUTS SEG NFR BLD: 4.6 K/UL (ref 1.8–7.7)
PLATELET # BLD AUTO: 217 K/UL (ref 140–450)
PMV BLD AUTO: 7.1 FL (ref 6–12)
POTASSIUM SERPL-SCNC: 4.2 MMOL/L (ref 3.7–5.3)
RBC # BLD AUTO: 4.09 M/UL (ref 4.5–5.9)
SARS-COV-2 RDRP RESP QL NAA+PROBE: NOT DETECTED
SODIUM SERPL-SCNC: 140 MMOL/L (ref 135–144)
SPECIMEN DESCRIPTION: NORMAL
WBC OTHER # BLD: 6.6 K/UL (ref 3.5–11)

## 2025-02-24 PROCEDURE — 93005 ELECTROCARDIOGRAM TRACING: CPT | Performed by: PHYSICIAN ASSISTANT

## 2025-02-24 PROCEDURE — 6370000000 HC RX 637 (ALT 250 FOR IP)

## 2025-02-24 PROCEDURE — 83735 ASSAY OF MAGNESIUM: CPT

## 2025-02-24 PROCEDURE — 85025 COMPLETE CBC W/AUTO DIFF WBC: CPT

## 2025-02-24 PROCEDURE — 2700000000 HC OXYGEN THERAPY PER DAY

## 2025-02-24 PROCEDURE — 87635 SARS-COV-2 COVID-19 AMP PRB: CPT

## 2025-02-24 PROCEDURE — 2060000000 HC ICU INTERMEDIATE R&B

## 2025-02-24 PROCEDURE — 80048 BASIC METABOLIC PNL TOTAL CA: CPT

## 2025-02-24 PROCEDURE — 99285 EMERGENCY DEPT VISIT HI MDM: CPT

## 2025-02-24 PROCEDURE — 87804 INFLUENZA ASSAY W/OPTIC: CPT

## 2025-02-24 PROCEDURE — 2580000003 HC RX 258: Performed by: FAMILY MEDICINE

## 2025-02-24 PROCEDURE — 71045 X-RAY EXAM CHEST 1 VIEW: CPT

## 2025-02-24 PROCEDURE — 2500000003 HC RX 250 WO HCPCS: Performed by: FAMILY MEDICINE

## 2025-02-24 PROCEDURE — 6360000002 HC RX W HCPCS: Performed by: FAMILY MEDICINE

## 2025-02-24 RX ORDER — ACETAMINOPHEN 650 MG/1
650 SUPPOSITORY RECTAL EVERY 6 HOURS PRN
Status: DISCONTINUED | OUTPATIENT
Start: 2025-02-24 | End: 2025-02-28 | Stop reason: HOSPADM

## 2025-02-24 RX ORDER — IPRATROPIUM BROMIDE AND ALBUTEROL SULFATE 2.5; .5 MG/3ML; MG/3ML
1 SOLUTION RESPIRATORY (INHALATION)
Status: DISCONTINUED | OUTPATIENT
Start: 2025-02-24 | End: 2025-02-25

## 2025-02-24 RX ORDER — SODIUM CHLORIDE 0.9 % (FLUSH) 0.9 %
5-40 SYRINGE (ML) INJECTION PRN
Status: DISCONTINUED | OUTPATIENT
Start: 2025-02-24 | End: 2025-02-28 | Stop reason: HOSPADM

## 2025-02-24 RX ORDER — SODIUM CHLORIDE 0.9 % (FLUSH) 0.9 %
5-40 SYRINGE (ML) INJECTION EVERY 12 HOURS SCHEDULED
Status: DISCONTINUED | OUTPATIENT
Start: 2025-02-24 | End: 2025-02-28 | Stop reason: HOSPADM

## 2025-02-24 RX ORDER — MECLIZINE HCL 12.5 MG 12.5 MG/1
25 TABLET ORAL 3 TIMES DAILY PRN
Status: DISCONTINUED | OUTPATIENT
Start: 2025-02-24 | End: 2025-02-28 | Stop reason: HOSPADM

## 2025-02-24 RX ORDER — WARFARIN SODIUM 1 MG/1
3 TABLET ORAL DAILY
Status: DISCONTINUED | OUTPATIENT
Start: 2025-02-25 | End: 2025-02-25

## 2025-02-24 RX ORDER — LEVOTHYROXINE SODIUM 50 UG/1
100 TABLET ORAL DAILY
Status: DISCONTINUED | OUTPATIENT
Start: 2025-02-25 | End: 2025-02-28

## 2025-02-24 RX ORDER — ONDANSETRON 2 MG/ML
4 INJECTION INTRAMUSCULAR; INTRAVENOUS EVERY 6 HOURS PRN
Status: DISCONTINUED | OUTPATIENT
Start: 2025-02-24 | End: 2025-02-28 | Stop reason: HOSPADM

## 2025-02-24 RX ORDER — POTASSIUM CHLORIDE 1500 MG/1
40 TABLET, EXTENDED RELEASE ORAL PRN
Status: DISCONTINUED | OUTPATIENT
Start: 2025-02-24 | End: 2025-02-28 | Stop reason: HOSPADM

## 2025-02-24 RX ORDER — ENOXAPARIN SODIUM 100 MG/ML
40 INJECTION SUBCUTANEOUS DAILY
Status: DISCONTINUED | OUTPATIENT
Start: 2025-02-25 | End: 2025-02-24

## 2025-02-24 RX ORDER — POTASSIUM CHLORIDE 7.45 MG/ML
10 INJECTION INTRAVENOUS PRN
Status: DISCONTINUED | OUTPATIENT
Start: 2025-02-24 | End: 2025-02-28 | Stop reason: HOSPADM

## 2025-02-24 RX ORDER — CARVEDILOL 12.5 MG/1
12.5 TABLET ORAL 2 TIMES DAILY
Status: DISCONTINUED | OUTPATIENT
Start: 2025-02-24 | End: 2025-02-28 | Stop reason: HOSPADM

## 2025-02-24 RX ORDER — BUDESONIDE AND FORMOTEROL FUMARATE DIHYDRATE 160; 4.5 UG/1; UG/1
2 AEROSOL RESPIRATORY (INHALATION)
Status: DISCONTINUED | OUTPATIENT
Start: 2025-02-24 | End: 2025-02-28 | Stop reason: HOSPADM

## 2025-02-24 RX ORDER — ALBUTEROL SULFATE 90 UG/1
2 INHALANT RESPIRATORY (INHALATION) EVERY 6 HOURS PRN
Status: DISCONTINUED | OUTPATIENT
Start: 2025-02-24 | End: 2025-02-25

## 2025-02-24 RX ORDER — SODIUM CHLORIDE 9 MG/ML
INJECTION, SOLUTION INTRAVENOUS PRN
Status: DISCONTINUED | OUTPATIENT
Start: 2025-02-24 | End: 2025-02-28 | Stop reason: HOSPADM

## 2025-02-24 RX ORDER — ALBUTEROL SULFATE 0.83 MG/ML
2.5 SOLUTION RESPIRATORY (INHALATION) EVERY 6 HOURS PRN
Status: DISCONTINUED | OUTPATIENT
Start: 2025-02-24 | End: 2025-02-25

## 2025-02-24 RX ORDER — POLYETHYLENE GLYCOL 3350 17 G/17G
17 POWDER, FOR SOLUTION ORAL DAILY PRN
Status: DISCONTINUED | OUTPATIENT
Start: 2025-02-24 | End: 2025-02-28 | Stop reason: HOSPADM

## 2025-02-24 RX ORDER — ATORVASTATIN CALCIUM 40 MG/1
40 TABLET, FILM COATED ORAL DAILY
Status: DISCONTINUED | OUTPATIENT
Start: 2025-02-25 | End: 2025-02-28 | Stop reason: HOSPADM

## 2025-02-24 RX ORDER — MAGNESIUM SULFATE IN WATER 40 MG/ML
2000 INJECTION, SOLUTION INTRAVENOUS PRN
Status: DISCONTINUED | OUTPATIENT
Start: 2025-02-24 | End: 2025-02-28 | Stop reason: HOSPADM

## 2025-02-24 RX ORDER — LISINOPRIL 10 MG/1
10 TABLET ORAL DAILY
Status: DISCONTINUED | OUTPATIENT
Start: 2025-02-25 | End: 2025-02-25

## 2025-02-24 RX ORDER — HYDRALAZINE HYDROCHLORIDE 20 MG/ML
10 INJECTION INTRAMUSCULAR; INTRAVENOUS EVERY 6 HOURS PRN
Status: DISCONTINUED | OUTPATIENT
Start: 2025-02-24 | End: 2025-02-28 | Stop reason: HOSPADM

## 2025-02-24 RX ORDER — ONDANSETRON 4 MG/1
4 TABLET, ORALLY DISINTEGRATING ORAL EVERY 8 HOURS PRN
Status: DISCONTINUED | OUTPATIENT
Start: 2025-02-24 | End: 2025-02-28 | Stop reason: HOSPADM

## 2025-02-24 RX ORDER — SODIUM CHLORIDE 9 MG/ML
INJECTION, SOLUTION INTRAVENOUS CONTINUOUS
Status: DISCONTINUED | OUTPATIENT
Start: 2025-02-24 | End: 2025-02-25

## 2025-02-24 RX ORDER — ASPIRIN 81 MG/1
81 TABLET ORAL DAILY
Status: DISCONTINUED | OUTPATIENT
Start: 2025-02-25 | End: 2025-02-28 | Stop reason: HOSPADM

## 2025-02-24 RX ORDER — ACETAMINOPHEN 325 MG/1
650 TABLET ORAL EVERY 6 HOURS PRN
Status: DISCONTINUED | OUTPATIENT
Start: 2025-02-24 | End: 2025-02-28 | Stop reason: HOSPADM

## 2025-02-24 RX ADMIN — WATER 1000 MG: 1 INJECTION INTRAMUSCULAR; INTRAVENOUS; SUBCUTANEOUS at 22:19

## 2025-02-24 RX ADMIN — IPRATROPIUM BROMIDE AND ALBUTEROL SULFATE 1 DOSE: .5; 2.5 SOLUTION RESPIRATORY (INHALATION) at 21:30

## 2025-02-24 RX ADMIN — AZITHROMYCIN MONOHYDRATE 500 MG: 500 INJECTION, POWDER, LYOPHILIZED, FOR SOLUTION INTRAVENOUS at 22:34

## 2025-02-24 RX ADMIN — SODIUM CHLORIDE: 0.9 INJECTION, SOLUTION INTRAVENOUS at 23:52

## 2025-02-24 RX ADMIN — SODIUM CHLORIDE: 0.9 INJECTION, SOLUTION INTRAVENOUS at 21:51

## 2025-02-24 ASSESSMENT — PAIN - FUNCTIONAL ASSESSMENT: PAIN_FUNCTIONAL_ASSESSMENT: 0-10

## 2025-02-24 ASSESSMENT — PAIN SCALES - GENERAL: PAINLEVEL_OUTOF10: 0

## 2025-02-25 LAB
ALBUMIN SERPL-MCNC: 3.4 G/DL (ref 3.5–5.2)
ALBUMIN/GLOB SERPL: 1.5 {RATIO} (ref 1–2.5)
ALP SERPL-CCNC: 86 U/L (ref 40–129)
ALT SERPL-CCNC: 12 U/L (ref 5–41)
ANION GAP SERPL CALCULATED.3IONS-SCNC: 12 MMOL/L (ref 9–17)
AST SERPL-CCNC: 19 U/L
BASOPHILS # BLD: 0.1 K/UL (ref 0–0.2)
BASOPHILS NFR BLD: 1 % (ref 0–2)
BILIRUB SERPL-MCNC: 0.6 MG/DL (ref 0.3–1.2)
BUN SERPL-MCNC: 17 MG/DL (ref 8–23)
CALCIUM SERPL-MCNC: 8.5 MG/DL (ref 8.6–10.4)
CHLORIDE SERPL-SCNC: 109 MMOL/L (ref 98–107)
CO2 SERPL-SCNC: 23 MMOL/L (ref 20–31)
CREAT SERPL-MCNC: 0.8 MG/DL (ref 0.7–1.2)
EKG ATRIAL RATE: 76 BPM
EKG Q-T INTERVAL: 418 MS
EKG Q-T INTERVAL: 460 MS
EKG QRS DURATION: 106 MS
EKG QRS DURATION: 108 MS
EKG QTC CALCULATION (BAZETT): 420 MS
EKG QTC CALCULATION (BAZETT): 474 MS
EKG R AXIS: -171 DEGREES
EKG R AXIS: 11 DEGREES
EKG T AXIS: 69 DEGREES
EKG T AXIS: 99 DEGREES
EKG VENTRICULAR RATE: 61 BPM
EKG VENTRICULAR RATE: 64 BPM
EOSINOPHIL # BLD: 0.3 K/UL (ref 0–0.4)
EOSINOPHILS RELATIVE PERCENT: 5 % (ref 1–4)
ERYTHROCYTE [DISTWIDTH] IN BLOOD BY AUTOMATED COUNT: 17.9 % (ref 12.5–15.4)
GFR, ESTIMATED: 85 ML/MIN/1.73M2
GLUCOSE SERPL-MCNC: 75 MG/DL (ref 70–99)
HCT VFR BLD AUTO: 33.4 % (ref 41–53)
HGB BLD-MCNC: 11.3 G/DL (ref 13.5–17.5)
INR PPP: 1.6 (ref 0.9–1.2)
LYMPHOCYTES NFR BLD: 0.8 K/UL (ref 1–4.8)
LYMPHOCYTES RELATIVE PERCENT: 12 % (ref 24–44)
MCH RBC QN AUTO: 31 PG (ref 26–34)
MCHC RBC AUTO-ENTMCNC: 33.7 G/DL (ref 31–37)
MCV RBC AUTO: 91.9 FL (ref 80–100)
MONOCYTES NFR BLD: 0.9 K/UL (ref 0.1–1.2)
MONOCYTES NFR BLD: 14 % (ref 2–11)
NEUTROPHILS NFR BLD: 68 % (ref 36–66)
NEUTS SEG NFR BLD: 4.4 K/UL (ref 1.8–7.7)
PLATELET # BLD AUTO: 187 K/UL (ref 140–450)
PMV BLD AUTO: 7.2 FL (ref 6–12)
POTASSIUM SERPL-SCNC: 4 MMOL/L (ref 3.7–5.3)
PROT SERPL-MCNC: 5.6 G/DL (ref 6.4–8.3)
PROTHROMBIN TIME: 18.7 SEC (ref 11.8–14.6)
RBC # BLD AUTO: 3.63 M/UL (ref 4.5–5.9)
SODIUM SERPL-SCNC: 144 MMOL/L (ref 135–144)
WBC OTHER # BLD: 6.4 K/UL (ref 3.5–11)

## 2025-02-25 PROCEDURE — 94761 N-INVAS EAR/PLS OXIMETRY MLT: CPT

## 2025-02-25 PROCEDURE — 97162 PT EVAL MOD COMPLEX 30 MIN: CPT

## 2025-02-25 PROCEDURE — 2500000003 HC RX 250 WO HCPCS: Performed by: FAMILY MEDICINE

## 2025-02-25 PROCEDURE — 6370000000 HC RX 637 (ALT 250 FOR IP): Performed by: FAMILY MEDICINE

## 2025-02-25 PROCEDURE — 6360000002 HC RX W HCPCS: Performed by: FAMILY MEDICINE

## 2025-02-25 PROCEDURE — 97116 GAIT TRAINING THERAPY: CPT

## 2025-02-25 PROCEDURE — 99223 1ST HOSP IP/OBS HIGH 75: CPT | Performed by: FAMILY MEDICINE

## 2025-02-25 PROCEDURE — 36415 COLL VENOUS BLD VENIPUNCTURE: CPT

## 2025-02-25 PROCEDURE — 97530 THERAPEUTIC ACTIVITIES: CPT

## 2025-02-25 PROCEDURE — 93010 ELECTROCARDIOGRAM REPORT: CPT | Performed by: INTERNAL MEDICINE

## 2025-02-25 PROCEDURE — 97165 OT EVAL LOW COMPLEX 30 MIN: CPT

## 2025-02-25 PROCEDURE — 94640 AIRWAY INHALATION TREATMENT: CPT

## 2025-02-25 PROCEDURE — 2060000000 HC ICU INTERMEDIATE R&B

## 2025-02-25 PROCEDURE — 2580000003 HC RX 258: Performed by: FAMILY MEDICINE

## 2025-02-25 PROCEDURE — 85025 COMPLETE CBC W/AUTO DIFF WBC: CPT

## 2025-02-25 PROCEDURE — 85610 PROTHROMBIN TIME: CPT

## 2025-02-25 PROCEDURE — 99223 1ST HOSP IP/OBS HIGH 75: CPT | Performed by: INTERNAL MEDICINE

## 2025-02-25 PROCEDURE — 80053 COMPREHEN METABOLIC PANEL: CPT

## 2025-02-25 RX ORDER — LISINOPRIL 20 MG/1
20 TABLET ORAL DAILY
Status: DISCONTINUED | OUTPATIENT
Start: 2025-02-26 | End: 2025-02-28 | Stop reason: HOSPADM

## 2025-02-25 RX ORDER — IPRATROPIUM BROMIDE AND ALBUTEROL SULFATE 2.5; .5 MG/3ML; MG/3ML
1 SOLUTION RESPIRATORY (INHALATION)
Status: DISCONTINUED | OUTPATIENT
Start: 2025-02-25 | End: 2025-02-28 | Stop reason: HOSPADM

## 2025-02-25 RX ORDER — WARFARIN SODIUM 1 MG/1
4 TABLET ORAL
Status: COMPLETED | OUTPATIENT
Start: 2025-02-25 | End: 2025-02-25

## 2025-02-25 RX ORDER — ALBUTEROL SULFATE 0.83 MG/ML
2.5 SOLUTION RESPIRATORY (INHALATION) EVERY 4 HOURS PRN
Status: DISCONTINUED | OUTPATIENT
Start: 2025-02-25 | End: 2025-02-28 | Stop reason: HOSPADM

## 2025-02-25 RX ADMIN — BUDESONIDE AND FORMOTEROL FUMARATE DIHYDRATE 2 PUFF: 160; 4.5 AEROSOL RESPIRATORY (INHALATION) at 09:34

## 2025-02-25 RX ADMIN — IPRATROPIUM BROMIDE AND ALBUTEROL SULFATE 1 DOSE: .5; 2.5 SOLUTION RESPIRATORY (INHALATION) at 07:54

## 2025-02-25 RX ADMIN — WARFARIN SODIUM 4 MG: 1 TABLET ORAL at 18:13

## 2025-02-25 RX ADMIN — SODIUM CHLORIDE, PRESERVATIVE FREE 10 ML: 5 INJECTION INTRAVENOUS at 02:55

## 2025-02-25 RX ADMIN — LISINOPRIL 10 MG: 10 TABLET ORAL at 08:39

## 2025-02-25 RX ADMIN — BUDESONIDE AND FORMOTEROL FUMARATE DIHYDRATE 2 PUFF: 160; 4.5 AEROSOL RESPIRATORY (INHALATION) at 20:03

## 2025-02-25 RX ADMIN — CARVEDILOL 12.5 MG: 12.5 TABLET, FILM COATED ORAL at 20:25

## 2025-02-25 RX ADMIN — WATER 1000 MG: 1 INJECTION INTRAMUSCULAR; INTRAVENOUS; SUBCUTANEOUS at 20:25

## 2025-02-25 RX ADMIN — IPRATROPIUM BROMIDE AND ALBUTEROL SULFATE 1 DOSE: .5; 2.5 SOLUTION RESPIRATORY (INHALATION) at 11:54

## 2025-02-25 RX ADMIN — CARVEDILOL 12.5 MG: 12.5 TABLET, FILM COATED ORAL at 08:39

## 2025-02-25 RX ADMIN — METHYLPREDNISOLONE SODIUM SUCCINATE 60 MG: 125 INJECTION, POWDER, FOR SOLUTION INTRAMUSCULAR; INTRAVENOUS at 18:13

## 2025-02-25 RX ADMIN — ATORVASTATIN CALCIUM 40 MG: 40 TABLET, FILM COATED ORAL at 08:39

## 2025-02-25 RX ADMIN — AZITHROMYCIN MONOHYDRATE 500 MG: 500 INJECTION, POWDER, LYOPHILIZED, FOR SOLUTION INTRAVENOUS at 20:27

## 2025-02-25 RX ADMIN — IPRATROPIUM BROMIDE AND ALBUTEROL SULFATE 1 DOSE: .5; 2.5 SOLUTION RESPIRATORY (INHALATION) at 20:04

## 2025-02-25 RX ADMIN — ALBUTEROL SULFATE 2.5 MG: 2.5 SOLUTION RESPIRATORY (INHALATION) at 03:09

## 2025-02-25 RX ADMIN — ASPIRIN 81 MG: 81 TABLET, COATED ORAL at 08:39

## 2025-02-25 RX ADMIN — METHYLPREDNISOLONE SODIUM SUCCINATE 60 MG: 125 INJECTION, POWDER, FOR SOLUTION INTRAMUSCULAR; INTRAVENOUS at 06:22

## 2025-02-25 RX ADMIN — LEVOTHYROXINE SODIUM 100 MCG: 0.05 TABLET ORAL at 08:39

## 2025-02-25 ASSESSMENT — ENCOUNTER SYMPTOMS
NAUSEA: 0
ABDOMINAL PAIN: 0
SHORTNESS OF BREATH: 1
VOICE CHANGE: 0
VOMITING: 0
BACK PAIN: 0

## 2025-02-25 NOTE — PROGRESS NOTES
Pharmacy Note  Warfarin Consult follow-up      Recent Labs     02/25/25  0551   INR 1.6*     Recent Labs     02/24/25 1954 02/25/25  0551   HGB 12.4* 11.3*   HCT 37.7* 33.4*    187       Current warfarin drug-drug interactions: azithromycin, ASA, levothyroxine      Date INR Dose   2/25 1.6 4 mg                 Notes:                   -INR subtherapeutic at 1.6  -Will plan to give 4 mg today    Daily PT/INR while inpatient.     Estefani Jacinto, PharmD 2/25/2025 8:34 AM

## 2025-02-25 NOTE — ED PROVIDER NOTES
mouth daily Pt reported taking 2 pills dailyHistorical Med             ALLERGIES       Levofloxacin, Codeine, Pulmicort [budesonide], Cashew nut oil, Levaquin  [levofloxacin in d5w], and Peanut-containing drug products    FAMILY HISTORY       No family history on file.       SOCIAL HISTORY       Social History     Tobacco Use    Smoking status: Never    Smokeless tobacco: Never   Vaping Use    Vaping status: Never Used   Substance Use Topics    Alcohol use: Not Currently     Comment: quit alcohol at age 51    Drug use: Never         PHYSICAL EXAM       ED Triage Vitals [02/24/25 1857]   BP Systolic BP Percentile Diastolic BP Percentile Temp Temp src Pulse Respirations SpO2   (!) 207/88 -- -- 97.3 °F (36.3 °C) -- 58 22 95 %      Height Weight - Scale         1.727 m (5' 8\") 65.8 kg (145 lb)             Physical Exam   Nursing note and vitals reviewed.  Constitutional: Oriented to person, place, and time and well-developed, well-nourished.   Head: Normocephalic and atraumatic.   Ear: External ears normal.   Nose: Nose normal and midline.   Eyes: Conjunctivae and EOM are normal. Pupils are equal, round, and reactive to light.   Neck: Normal range of motion. Neck supple.   Cardiovascular: Normal rate, regular rhythm, normal heart sounds and intact distal pulses.    Pulmonary/Chest: Effort normal, breath sounds diminished in all lung fields, no wheezing or rhonchi noted.  Musculoskeletal: Normal range of motion.   Neurological: Alert and oriented to person, place, and time. GCS score is 15.   Skin: Skin is warm and dry. No rash noted. No erythema. No pallor.   Psychiatric: Mood, memory, affect and judgment normal.       All other labs were within normal range or not returned as of this dictation.    EMERGENCY DEPARTMENT COURSE and DIFFERENTIAL DIAGNOSIS/MDM:       1)  Number and Complexity of Problems  Problem List This Visit:   Chief Complaint   Patient presents with    Respiratory Distress     Pt reports difficulty  DIAGNOSIS/MDM:       1)  Number and Complexity of Problems  Problem List This Visit:   Chief Complaint   Patient presents with    Respiratory Distress     Pt reports difficulty breathing for about a week, but today got worse, pt has history of copd and asthma          2)  Data Reviewed      Ekg:     LABS:  Labs Reviewed   CBC WITH AUTO DIFFERENTIAL - Abnormal; Notable for the following components:       Result Value    RBC 4.09 (*)     Hemoglobin 12.4 (*)     Hematocrit 37.7 (*)     RDW 18.2 (*)     Neutrophils % 70 (*)     Lymphocytes % 12 (*)     Monocytes % 12 (*)     Eosinophils % 5 (*)     Lymphocytes Absolute 0.80 (*)     All other components within normal limits   COVID-19, RAPID   RAPID INFLUENZA A/B ANTIGENS   BASIC METABOLIC PANEL   MAGNESIUM       RADIOLOGY:   All plain film, CT, MRI, and formal ultrasound images (except ED bedside ultrasound) are read by the radiologist  XR CHEST PORTABLE    (Results Pending)       No results found.      3)  Treatment and Disposition    Patient repeat assessment:  stable    ***      PROCEDURES:  Unless otherwise noted below, none     Procedures    Disposition discussion with patient/family:  Patient instructed to return to the emergency room if symptoms worsen, return, or any other concern right away which is agreed by the patient. Discussed close follow up with pcp  The patient understands that at this time there is no evidence for a more malignant underlying process, but also understands that early in the process of an illness or injury, an emergency department work-up can be falsely reassuring. Routine discharge counseling was given, and the patient understands that worsening, changing or persistent symptoms should prompt a immediate call or follow-up with their primary care physician or return to the emergency department. The importance of appropriate follow-up was also discussed. I have reviewed the disposition diagnosis with the patient. I have answered their  components:    Chloride 108 (*)     Glucose 168 (*)     Total Protein 6.0 (*)     All other components within normal limits   TROPONIN - Abnormal; Notable for the following components:    Troponin, High Sensitivity 38 (*)     All other components within normal limits   TROPONIN - Abnormal; Notable for the following components:    Troponin, High Sensitivity 38 (*)     All other components within normal limits   PROTIME-INR - Abnormal; Notable for the following components:    Protime 27.2 (*)     INR 2.6 (*)     All other components within normal limits   COMPREHENSIVE METABOLIC PANEL - Abnormal; Notable for the following components:    Glucose 152 (*)     BUN 24 (*)     Total Protein 5.8 (*)     All other components within normal limits   CBC WITH AUTO DIFFERENTIAL - Abnormal; Notable for the following components:    RBC 3.51 (*)     Hemoglobin 10.8 (*)     Hematocrit 32.1 (*)     RDW 18.5 (*)     Neutrophils % 88 (*)     Lymphocytes % 6 (*)     Eosinophils % 0 (*)     Neutrophils Absolute 8.00 (*)     Lymphocytes Absolute 0.50 (*)     All other components within normal limits   TSH REFLEX TO FT4 - Abnormal; Notable for the following components:    TSH 0.08 (*)     All other components within normal limits   T4, FREE - Abnormal; Notable for the following components:    T4 Free 2.2 (*)     All other components within normal limits   COMPREHENSIVE METABOLIC PANEL - Abnormal; Notable for the following components:    Glucose 151 (*)     BUN 30 (*)     Calcium 8.4 (*)     Total Protein 5.7 (*)     AST 43 (*)     All other components within normal limits   CBC WITH AUTO DIFFERENTIAL - Abnormal; Notable for the following components:    RBC 3.68 (*)     Hemoglobin 11.3 (*)     Hematocrit 33.6 (*)     RDW 18.6 (*)     Neutrophils % 83 (*)     Lymphocytes % 9 (*)     Eosinophils % 0 (*)     Lymphocytes Absolute 0.70 (*)     All other components within normal limits   PROTIME-INR - Abnormal; Notable for the following

## 2025-02-25 NOTE — PLAN OF CARE
Problem: Respiratory - Adult  Goal: Achieves optimal ventilation and oxygenation  Outcome: Progressing  Flowsheets (Taken 2/25/2025 1201)  Achieves optimal ventilation and oxygenation:   Assess for changes in respiratory status   Respiratory therapy support as indicated   Assess for changes in mentation and behavior   Assess and instruct to report shortness of breath or any respiratory difficulty

## 2025-02-25 NOTE — ED PROVIDER NOTES
Emergency Department     Faculty Attestation    I performed a history and physical examination of the patient and discussed management with the mid level provider. I reviewed the mid level provider's note and agree with the documented findings and plan of care. Any areas of disagreement are noted on the chart. I was personally present for the key portions of any procedures. I have documented in the chart those procedures where I was not present during the key portions. I have reviewed the emergency nurses triage note. I agree with the chief complaint, past medical history, past surgical history, allergies, medications, social and family history as documented unless otherwise noted below. Documentation of the HPI, Physical Exam and Medical Decision Making performed by medical students or scribes is based on my personal performance of the HPI, PE and MDM. For Physician Assistant/ Nurse Practitioner cases/documentation I have personally evaluated this patient and have completed at least one if not all key elements of the E/M (history, physical exam, and MDM). Additional findings are as noted.      Primary Care Physician:  Kareem Pal MD    CHIEF COMPLAINT       Chief Complaint   Patient presents with    Respiratory Distress     Pt reports difficulty breathing for about a week, but today got worse, pt has history of copd and asthma       RECENT VITALS:   Temp: 97.3 °F (36.3 °C),  Pulse: 67, Respirations: 18, BP: (!) 151/65    LABS:  Labs Reviewed   CBC WITH AUTO DIFFERENTIAL - Abnormal; Notable for the following components:       Result Value    RBC 4.09 (*)     Hemoglobin 12.4 (*)     Hematocrit 37.7 (*)     RDW 18.2 (*)     Neutrophils % 70 (*)     Lymphocytes % 12 (*)     Monocytes % 12 (*)     Eosinophils % 5 (*)     Lymphocytes Absolute 0.80 (*)     All other components within normal limits   BASIC METABOLIC PANEL - Abnormal; Notable for the following components:    Glucose 122 (*)     All other  exam, inhaled bronchodilators, steroids, antibiotics.  With hypoxia, will plan for admission to the hospital.         Baltazar Costa MD  02/26/25 6958

## 2025-02-25 NOTE — PROGRESS NOTES
Physical Therapy  Facility/Department: [address]   Physical Therapy Initial Evaluation    Patient Name: Bill Rodney        MRN: [id number]    : 1935    Date of Service: 2025    Chief Complaint   Patient presents with    Respiratory Distress     Pt reports difficulty breathing for about a week, but today got worse, pt has history of copd and asthma     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, 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, Chronic obstructive lung disease (HCC), Chronic obstructive pulmonary disease (HCC), Chronic rhinitis, Chronic sinusitis, Deep vein thrombosis of lower extremity (HCC), Diarrhea, Disorder of arteries and arterioles, unspecified, Diverticulitis of colon, Dizziness and giddiness, Dysphagia, Enlarged prostate, Environmental allergies, Fatigue, Gabe hematuria, Frostbite with tissue necrosis of nose, Gastroesophageal reflux disease, Heartburn, Hereditary coagulation factor deficiency (HCC), Herpes zoster, Heterozygous factor V Leiden mutation, Hypercholesterolemia, Hypertension, Increased frequency of urination, Low back strain, Lumbar sprain, Lumbosacral spondylosis without myelopathy, 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

## 2025-02-25 NOTE — CARE COORDINATION
Case Management Assessment  Initial Evaluation    Date/Time of Evaluation: 2/25/2025 12:01 PM  Assessment Completed by: Siomara Herrera    If patient is discharged prior to next notation, then this note serves as note for discharge by case management.    Patient Name: Bill Rodney                   YOB: 1935  Diagnosis: Community acquired pneumonia, unspecified laterality [J18.9]  Community acquired bilateral lower lobe pneumonia [J18.9]                   Date / Time: 2/24/2025  6:56 PM    Patient Admission Status: Inpatient   Readmission Risk (Low < 19, Mod (19-27), High > 27): Readmission Risk Score: 15.6    Current PCP: Kareem Pal MD  PCP verified by CM? Yes    Chart Reviewed: Yes      History Provided by: Patient  Patient Orientation: Alert and Oriented    Patient Cognition: Alert    Hospitalization in the last 30 days (Readmission):  No    If yes, Readmission Assessment in CM Navigator will be completed.    Advance Directives:      Code Status: Full Code   Patient's Primary Decision Maker is: Legal Next of Kin (wife)    Primary Decision Maker: Jenny Rodney - Spouse - [phone]    Discharge Planning:    Patient lives with: Spouse/Significant Other Type of Home: House  Primary Care Giver: Self  Patient Support Systems include: Spouse/Significant Other   Current Financial resources: Other (Comment) (Workers Comp)  Current community resources: None  Current services prior to admission: Durable Medical Equipment            Current DME: Cane (has a cane but does not use at this time)            Type of Home Care services:  None    ADLS  Prior functional level: Independent in ADLs/IADLs  Current functional level: Independent in ADLs/IADLs    PT AM-PAC:   /24  OT AM-PAC:   /24    Family can provide assistance at DC: Yes  Would you like Case Management to discuss the discharge plan with any other family members/significant others, and if so, who? Yes  Plans to Return to Present

## 2025-02-25 NOTE — RT PROTOCOL NOTE
RT Inhaler-Nebulizer Bronchodilator Protocol Note    There is a bronchodilator order in the chart from a provider indicating to follow the RT Bronchodilator Protocol and there is an “Initiate RT Inhaler-Nebulizer Bronchodilator Protocol” order as well (see protocol at bottom of note).    CXR Findings:  XR CHEST PORTABLE    Result Date: 2/24/2025  Bibasilar airspace disease.       The findings from the last RT Protocol Assessment were as follows:   History Pulmonary Disease: Chronic pulmonary disease  Respiratory Pattern: Mild dyspnea at rest, irregular pattern, or RR 21-25 bpm  Breath Sounds: Slightly diminished and/or crackles  Cough: Strong, spontaneous, non-productive  Indication for Bronchodilator Therapy:    Bronchodilator Assessment Score: 8    Aerosolized bronchodilator medication orders have been revised according to the RT Inhaler-Nebulizer Bronchodilator Protocol below.    Respiratory Therapist to perform RT Therapy Protocol Assessment initially then follow the protocol.  Repeat RT Therapy Protocol Assessment PRN for score 0-3 or on second treatment, BID, and PRN for scores above 3.    No Indications - adjust the frequency to every 6 hours PRN wheezing or bronchospasm, if no treatments needed after 48 hours then discontinue using Per Protocol order mode.     If indication present, adjust the RT bronchodilator orders based on the Bronchodilator Assessment Score as indicated below.  Use Inhaler orders unless patient has one or more of the following: on home nebulizer, not able to hold breath for 10 seconds, is not alert and oriented, cannot activate and use MDI correctly, or respiratory rate 25 breaths per minute or more, then use the equivalent nebulizer order(s) with same Frequency and PRN reasons based on the score.  If a patient is on this medication at home then do not decrease Frequency below that used at home.    0-3 - enter or revise RT bronchodilator order(s) to equivalent RT Bronchodilator order  with Frequency of every 4 hours PRN for wheezing or increased work of breathing using Per Protocol order mode.        4-6 - enter or revise RT Bronchodilator order(s) to two equivalent RT bronchodilator orders with one order with BID Frequency and one order with Frequency of every 4 hours PRN wheezing or increased work of breathing using Per Protocol order mode.        7-10 - enter or revise RT Bronchodilator order(s) to two equivalent RT bronchodilator orders with one order with TID Frequency and one order with Frequency of every 4 hours PRN wheezing or increased work of breathing using Per Protocol order mode.       11-13 - enter or revise RT Bronchodilator order(s) to one equivalent RT bronchodilator order with QID Frequency and an Albuterol order with Frequency of every 4 hours PRN wheezing or increased work of breathing using Per Protocol order mode.      Greater than 13 - enter or revise RT Bronchodilator order(s) to one equivalent RT bronchodilator order with every 4 hours Frequency and an Albuterol order with Frequency of every 2 hours PRN wheezing or increased work of breathing using Per Protocol order mode.     RT to enter RT Home Evaluation for COPD & MDI Assessment order using Per Protocol order mode.    Electronically signed by Madhavi Gomez RCP on 2/25/2025 at 12:00 PM

## 2025-02-25 NOTE — CARE COORDINATION
Patient's new  is Marzena at [phone]. I called Marzena and she asked me to call the [facility] at [phone].  I called the [facility] and they transferred me to Su. I left her a message about patient being admitted.    1335-Spoke with Lucia at [facility], her number is [phone].  Patient's nurse manager is Munira at [facility] is at [phone], we are to submit a C9 and other paper, if patient needs any DME.  Call Munira to instruct on exactly what they need and where to fax it.

## 2025-02-25 NOTE — ED PROVIDER NOTES
[facility] EMERGENCY DEPARTMENT  Emergency Department Encounter  Mid Level Provider     Pt Name: Bill Rodney  MRN: [id number]  Birthdate 1935  Date of evaluation: 2/24/25  PCP:  Kareem Pal MD    CHIEF COMPLAINT       Chief Complaint   Patient presents with    Respiratory Distress     Pt reports difficulty breathing for about a week, but today got worse, pt has history of copd and asthma       HISTORY OF PRESENT ILLNESS  (Location/Symptom, Timing/Onset,Context/Setting, Quality, Duration, Modifying Factors, Severity.)      Bill Rodney is a 89 y.o. male who presents with complaints of 2 days of shortness of breath, history of COPD.  Initially seen by the [facility] physician assistant Fadi.  Please refer to his HPI for initial evaluation.  On my evaluation patient had just ambulated from the restroom, acutely hypoxic and dyspneic.  IV steroids, DuoNeb, antibiotics ordered.  At this point patient does not typically wear require supplemental oxygen will need admission for bilateral lower lobe pneumonia, COPD exacerbation.    PAST MEDICAL /SURGICAL / SOCIAL / FAMILY HISTORY      has a past medical history of Abdominal hernia, Achilles bursitis, Acute sinusitis, Adult body mass index 25-29, Allergic rhinitis due to pollen, Anesthesia of skin, Anterior subcapsular polar senile cataract, Arthropathy, Asthma, Atopic dermatitis, Backache, Bacterial pneumonia, Benign neoplasm of soft tissue, Benign prostatic hyperplasia, Candidiasis of mouth, 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, 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, Chronic obstructive lung disease (HCC), Chronic obstructive pulmonary disease (HCC), Chronic rhinitis, Chronic sinusitis, Deep vein thrombosis of lower extremity (HCC), Diarrhea, Disorder of arteries and arterioles, unspecified, Diverticulitis of colon, Dizziness and giddiness, Dysphagia, Enlarged prostate, Environmental allergies, Fatigue, Gabe hematuria, Frostbite with tissue necrosis  specified.    Discharge Medications:  New Prescriptions    No medications on file       VARGAS Bye CNP   Emergency Medicine Nurse Practitioner    (Please note that portions of this note were completed with a voice recognitionprogram.  Efforts were made to edit the dictations but occasionally words are mis-transcribed.)       Champ Tabares APRN - CNP  02/24/25 7772

## 2025-02-25 NOTE — ED NOTES
02/25/25 0003 (!) 107/40 -- -- 60 27 96 % -- --   02/24/25 2355 102/74 -- -- (!) 47 -- -- -- --   02/24/25 2350 102/74 -- -- (!) 48 20 97 % -- --   02/24/25 2207 -- -- -- 62 21 98 % -- --   02/24/25 2152 -- -- -- 68 19 97 % -- --   02/24/25 2137 -- -- -- 61 17 100 % -- --   02/24/25 2131 -- -- -- 66 29 97 % -- --   02/24/25 2000 (!) 192/78 98 °F (36.7 °C) Oral 62 24 94 % -- --   02/24/25 1939 (!) 193/91 -- -- -- -- -- -- --   02/24/25 1920 -- -- -- 58 -- -- -- --   02/24/25 1857 (!) 207/88 97.3 °F (36.3 °C) -- 58 22 95 % 1.727 m (5' 8\") 65.8 kg (145 lb)      Visit Vitals  BP (!) 157/67   Pulse 55   Temp 98 °F (36.7 °C) (Oral)   Resp 19   Ht 1.727 m (5' 8\")   Wt 65.8 kg (145 lb)   SpO2 96%   BMI 22.05 kg/m²        LDAs:   Peripheral IV 02/24/25 Distal;Left Forearm (Active)   Site Assessment Clean, dry & intact 02/24/25 1955   Line Status Blood return noted;Brisk blood return 02/24/25 1955   Phlebitis Assessment No symptoms 02/24/25 1955   Infiltration Assessment 0 02/24/25 1955   Dressing Status New dressing applied;Clean, dry & intact 02/24/25 1955   Dressing Type Transparent 02/24/25 1955   Dressing Intervention New 02/24/25 1955       Meds:  Medications   ipratropium 0.5 mg-albuterol 2.5 mg (DUONEB) nebulizer solution 1 Dose (1 Dose Inhalation Given 2/24/25 2130)   sodium chloride flush 0.9 % injection 5-40 mL (10 mLs IntraVENous Given 2/25/25 0255)   sodium chloride flush 0.9 % injection 5-40 mL (has no administration in time range)   0.9 % sodium chloride infusion (has no administration in time range)   potassium chloride (KLOR-CON M) extended release tablet 40 mEq (has no administration in time range)     Or   potassium bicarb-citric acid (EFFER-K) effervescent tablet 40 mEq (has no administration in time range)     Or   potassium chloride 10 mEq/100 mL IVPB (Peripheral Line) (has no administration in time range)   magnesium sulfate 2000 mg in 50 mL IVPB premix (has no administration in time range)    ondansetron (ZOFRAN-ODT) disintegrating tablet 4 mg (has no administration in time range)     Or   ondansetron (ZOFRAN) injection 4 mg (has no administration in time range)   polyethylene glycol (GLYCOLAX) packet 17 g (has no administration in time range)   acetaminophen (TYLENOL) tablet 650 mg (has no administration in time range)     Or   acetaminophen (TYLENOL) suppository 650 mg (has no administration in time range)   0.9 % sodium chloride infusion ( IntraVENous New Bag 2/24/25 2352)   methylPREDNISolone sodium (PF) (SOLU-MEDROL PF) injection 60 mg (60 mg IntraVENous Given 2/25/25 0622)   azithromycin (ZITHROMAX) 500 mg in sodium chloride 0.9 % 250 mL IVPB ([id number]) (0 mg IntraVENous Stopped 2/25/25 0020)   cefTRIAXone (ROCEPHIN) 1,000 mg in sterile water 10 mL IV syringe (1,000 mg IntraVENous Given 2/24/25 2219)   albuterol (PROVENTIL) (2.5 MG/3ML) 0.083% nebulizer solution 2.5 mg (2.5 mg Nebulization Given 2/25/25 0309)   albuterol sulfate HFA (PROVENTIL;VENTOLIN;PROAIR) 108 (90 Base) MCG/ACT inhaler 2 puff (has no administration in time range)   aspirin EC tablet 81 mg (has no administration in time range)   carvedilol (COREG) tablet 12.5 mg (0 mg Oral Held 2/24/25 2355)   budesonide-formoterol (SYMBICORT) 160-4.5 MCG/ACT inhaler 2 puff ( Inhalation Canceled Entry 2/25/25 0247)   levothyroxine (SYNTHROID) tablet 100 mcg (has no administration in time range)   lisinopril (PRINIVIL;ZESTRIL) tablet 10 mg (has no administration in time range)   meclizine (ANTIVERT) tablet 25 mg (has no administration in time range)   atorvastatin (LIPITOR) tablet 40 mg (has no administration in time range)   tiZANidine (ZANAFLEX) tablet 2 mg (has no administration in time range)   warfarin (COUMADIN) split-tablet 3 mg (has no administration in time range)   hydrALAZINE (APRESOLINE) injection 10 mg (has no administration in time range)   warfarin placeholder: dosing by pharmacy (has no administration in time range)           Ambulatory Status:  No data recorded    Diagnosis:  DISPOSITION Admitted 02/24/2025 09:42:34 PM   Final diagnoses:   Community acquired bilateral lower lobe pneumonia            Assessment Abnormalities : (List)  Neuro: Confused   Yes[x] No[x] Pt is alert and oriented to self, place, and situation but has episodes of repeating self and confusion.     Respiratory : Labored  Yes[x] No[]  Lung Sounds Lung sounds diminished at bases bilaterally, clear in middle, with audible expiratory wheezing.     Cardiac:   Regular  Yes[x] No[]  Irregular Yes[] No[x]    Rhythm sinus doreen with runs of atrial fibrillation     :  Incontinent  Yes[] No[x] Able to call and ambulate to bathroom. Stand by.     Assessment Abnormalities SOB, diminished lung sounds, and expiratory wheezing      Skin Assessment: WDL        Pain Score:  Pain Assessment  Pain Assessment: 0-10  Pain Level: 0    ISOLATION Status  No active isolations    Labs:  Labs Reviewed   CBC WITH AUTO DIFFERENTIAL - Abnormal; Notable for the following components:       Result Value    RBC 4.09 (*)     Hemoglobin 12.4 (*)     Hematocrit 37.7 (*)     RDW 18.2 (*)     Neutrophils % 70 (*)     Lymphocytes % 12 (*)     Monocytes % 12 (*)     Eosinophils % 5 (*)     Lymphocytes Absolute 0.80 (*)     All other components within normal limits   BASIC METABOLIC PANEL - Abnormal; Notable for the following components:    Glucose 122 (*)     All other components within normal limits   COMPREHENSIVE METABOLIC PANEL W/ REFLEX TO MG FOR LOW K - Abnormal; Notable for the following components:    Calcium 8.5 (*)     Total Protein 5.6 (*)     Albumin 3.4 (*)     All other components within normal limits   CBC WITH AUTO DIFFERENTIAL - Abnormal; Notable for the following components:    RBC 3.63 (*)     Hemoglobin 11.3 (*)     Hematocrit 33.4 (*)     RDW 17.9 (*)     Neutrophils % 68 (*)     Lymphocytes % 12 (*)     Monocytes % 14 (*)     Eosinophils % 5 (*)     Lymphocytes Absolute 0.80

## 2025-02-25 NOTE — CONSULTS
PULMONARY & CRITICAL CARE MEDICINE CONSULT NOTE     Patient:  Bill Rodney  MRN: [id number]  Admit date: 2/24/2025  Primary Care Physician: Kareem Pal MD  CODE Status: Full Code  LOS: 1  Inpatient consult to Pulmonology  Consult performed by: Brien Sun MD  Consult ordered by: Jan Bello MD         SUBJECTIVE     CHIEF COMPLAINT/REASON FOR CONSULT: Respiratory Distress (Pt reports difficulty breathing for about a week, but today got worse, pt has history of copd and asthma)    HISTORY OF PRESENT ILLNESS:  The patient is a 89 y.o. male with multiple comorbidities with admitted with worsening shortness of breath.  Patient has underlying COPD and history of occupational exposure to chlorine.  He follows up with Dr. Kramer in pulmonary clinic.  Evaluation in the ED showed elevated blood pressure 192/78.  Lab workup showed normal WBC count of 6.6, BMP was unremarkable.  Rapid COVID and flu test are negative.  Chest x-ray reported to show bibasilar infiltrate.  He has been started on Rocephin/azithromycin and IV Solu-Medrol.  He is currently on room air.    PAST MEDICAL HISTORY:        Diagnosis Date    Abdominal hernia 10/8/2020    Achilles bursitis 10/8/2020    Acute sinusitis 8/7/2017    Adult body mass index 25-29 10/8/2020    Allergic rhinitis due to pollen 10/8/2020    Anesthesia of skin 10/8/2020    Anterior subcapsular polar senile cataract 10/8/2020    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    Asthma 8/7/2017    Atopic dermatitis 10/8/2020    Backache 10/8/2020    Bacterial pneumonia 8/7/2017    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/1/2013    Benign prostatic hyperplasia 4/12/2013    Candidiasis of mouth 8/7/2017    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 9/8/2014    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 2/13/2020    Chronic obstructive lung disease (HCC) 11/8/2011    Chronic obstructive pulmonary disease (HCC) 10/8/2020    Chronic rhinitis 2/13/2020    Chronic sinusitis 2/13/2020    Deep vein thrombosis of lower extremity

## 2025-02-25 NOTE — PROGRESS NOTES
Occupational Therapy  Occupational Therapy Initial Evaluation  Facility/Department: [address]   Patient Name: Bill Rodney        MRN: [id number]    : 1935    Date of Service: 2025    Chief Complaint   Patient presents with    Respiratory Distress     Pt reports difficulty breathing for about a week, but today got worse, pt has history of copd and asthma     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, 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, Chronic obstructive lung disease (HCC), Chronic obstructive pulmonary disease (HCC), Chronic rhinitis, Chronic sinusitis, Deep vein thrombosis of lower extremity (HCC), Diarrhea, Disorder of arteries and arterioles, unspecified, Diverticulitis of colon, Dizziness and giddiness, Dysphagia, Enlarged prostate, Environmental allergies, Fatigue, Gabe hematuria, Frostbite with tissue necrosis of nose, Gastroesophageal reflux disease, Heartburn, Hereditary coagulation factor deficiency (HCC), Herpes zoster, Heterozygous factor V Leiden mutation, Hypercholesterolemia, Hypertension, Increased frequency of urination, Low back strain, Lumbar sprain, Lumbosacral spondylosis without myelopathy, 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), Malignant tumor of prostate (HCC), Mild intermittent asthma without complication, Neoplasm of bone, Nocturia, Omphalitis of , Pain in left leg, Peripheral vascular disease, Post-nasal drip, Postoperative seroma, 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), Productive cough, Radiation burn, Retention of urine, Right lower quadrant pain,    Objective  Orientation  Overall Orientation Status: Within Functional Limits  Orientation Level: Oriented X4  Cognition  Overall Cognitive Status: Exceptions  Arousal/Alertness: Appears intact  Following Commands: Appears intact  Attention Span: Appears intact  Memory: Decreased short term memory  Safety Judgement: Appears intact  Problem Solving: Appears intact  Insights: Decreased awareness of deficits  Initiation: Appears intact  Sequencing: Appears intact  Cognition Comment: Pt repeats self throughout session and tangential at times    Activities of Daily Living  Feeding: Independent;Based on clinical judgement  Grooming: Contact guard assistance;Based on clinical judgement  UE Bathing: Supervision;Based on clinical judgement  LE Bathing: Stand by assistance;Based on clinical judgement  UE Dressing: Supervision;Based on clinical judgement  LE Dressing: Stand by assistance;Based on clinical judgement  Putting On/Taking Off Footwear: Stand by assistance;Based on clinical judgement  Toileting: Stand by assistance;Based on clinical judgement    Balance  Balance  Sitting:  (Static: SUP)  Standing:  (Static/Dynamic: CGA)    Transfers/Mobility  Bed mobility  Supine to Sit: Supervision  Scooting: Supervision  Bed Mobility Comments: Pt supine in bed at start of session and retired to recliner at end, HOB elevated with no railing use    Transfers  Sit to stand: Stand by assistance  Stand to sit: Stand by assistance  Transfer Comments: no device steady throughout    Functional Mobility: Contact guard assistance  Functional Mobility Skilled Clinical Factors: no device from EOB>window at end of hallway>recliner with x1 LOB with Pt able to self correct.     Patient Education  Patient Education  Education Given To: Patient  Education Provided: Role of Therapy;Plan of Care;Transfer Training  Education Provided Comments: OT role/POC, Activity promotion  Education Method: Verbal  Barriers to Learning: None  Education Outcome:

## 2025-02-25 NOTE — PROGRESS NOTES
Pharmacy Note  Warfarin Consult    Bill Rodney is a 89 y.o. male for whom pharmacy has been consulted to manage warfarin therapy.     Consulting Physician: Jan Bello   Reason for Admission: pneumonia    Warfarin dose prior to admission: 3 mg daily  Warfarin indication: Hx of DVT  Target INR range: 2-3     Past Medical History:   Diagnosis Date    Abdominal hernia 10/8/2020    Achilles bursitis 10/8/2020    Acute sinusitis 8/7/2017    Adult body mass index 25-29 10/8/2020    Allergic rhinitis due to pollen 10/8/2020    Anesthesia of skin 10/8/2020    Anterior subcapsular polar senile cataract 10/8/2020    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    Asthma 8/7/2017    Atopic dermatitis 10/8/2020    Backache 10/8/2020    Bacterial pneumonia 8/7/2017    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/1/2013    Benign prostatic hyperplasia 4/12/2013    Candidiasis of mouth 8/7/2017    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 9/8/2014    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 2/13/2020    Chronic obstructive lung disease (HCC) 11/8/2011    Chronic obstructive pulmonary disease (HCC) 10/8/2020    Chronic rhinitis 2/13/2020    Chronic sinusitis 2/13/2020    Deep vein thrombosis of lower extremity (HCC) 8/7/2017    Diarrhea 10/8/2020    Disorder of arteries and arterioles, unspecified 10/8/2020    Diverticulitis of colon 10/8/2020    Dizziness and giddiness 12/19/2011    Dysphagia 8/7/2017    Enlarged prostate 10/8/2020    Environmental allergies 8/7/2017    Fatigue 10/8/2020    Gabe hematuria 4/12/2013    Frostbite with tissue necrosis of nose 10/8/2020    Gastroesophageal reflux disease 8/7/2017    Heartburn 11/8/2011    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 Leiden mutation 10/8/2020    Hypercholesterolemia 8/7/2017    Hypertension 8/7/2017    Increased frequency of urination 11/8/2011    Low back strain 10/8/2020    Lumbar sprain 9/8/2014    Lumbosacral spondylosis without myelopathy  2014    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) 2017    Malignant tumor of prostate (HCC) 2013    Mild intermittent asthma without complication 10/8/2020    Neoplasm of bone 2014    Nocturia 2011    Omphalitis of  10/8/2020    Pain in left leg 10/8/2020    Peripheral vascular disease 2011    Post-nasal drip 2020    Postoperative seroma 10/15/2019    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  Recent Labs     25   HGB 12.4*   HCT 37.7*          Current warfarin drug-drug interactions: Azithromycin, aspirin,acetaminophen, methylprednisolone and levothyroxine.      Date             INR        Dose   2025        pending           Daily PT/INR while inpatient. PT/INR ordered to start 2025.    Thank you for the consult.  Will continue to follow.    Baltazar Gomez [facility]  2025 11:12 PM

## 2025-02-25 NOTE — RT PROTOCOL NOTE
RT Inhaler-Nebulizer Bronchodilator Protocol Note    There is a bronchodilator order in the chart from a provider indicating to follow the RT Bronchodilator Protocol and there is an “Initiate RT Inhaler-Nebulizer Bronchodilator Protocol” order as well (see protocol at bottom of note).    CXR Findings:  XR CHEST PORTABLE    Result Date: 2/24/2025  Bibasilar airspace disease.       The findings from the last RT Protocol Assessment were as follows:   History Pulmonary Disease:    Respiratory Pattern:    Breath Sounds:    Cough:    Indication for Bronchodilator Therapy:    Bronchodilator Assessment Score:      Aerosolized bronchodilator medication orders have been revised according to the RT Inhaler-Nebulizer Bronchodilator Protocol below.    Respiratory Therapist to perform RT Therapy Protocol Assessment initially then follow the protocol.  Repeat RT Therapy Protocol Assessment PRN for score 0-3 or on second treatment, BID, and PRN for scores above 3.    No Indications - adjust the frequency to every 6 hours PRN wheezing or bronchospasm, if no treatments needed after 48 hours then discontinue using Per Protocol order mode.     If indication present, adjust the RT bronchodilator orders based on the Bronchodilator Assessment Score as indicated below.  Use Inhaler orders unless patient has one or more of the following: on home nebulizer, not able to hold breath for 10 seconds, is not alert and oriented, cannot activate and use MDI correctly, or respiratory rate 25 breaths per minute or more, then use the equivalent nebulizer order(s) with same Frequency and PRN reasons based on the score.  If a patient is on this medication at home then do not decrease Frequency below that used at home.    0-3 - enter or revise RT bronchodilator order(s) to equivalent RT Bronchodilator order with Frequency of every 4 hours PRN for wheezing or increased work of breathing using Per Protocol order mode.        4-6 - enter or revise RT

## 2025-02-25 NOTE — H&P
[facility] Medicine   IN-PATIENT [facility] - Location: [place]    HISTORY AND PHYSICAL EXAMINATION            Date:   2/25/2025  Patient name:  Bill Rodney  Date of admission:  2/24/2025  6:56 PM  MRN:   [id number]  Account:  [id number]  YOB: 1935  PCP:    Kareem Pal MD  Room:   [address] [place] [address]  Code Status:    Full Code      History Obtained From:     patient    History of Present Illness:     Bill Rodney is a 89 y.o.  /  male who presents with Respiratory Distress (Pt reports difficulty breathing for about a week, but today got worse, pt has history of copd and asthma)   and is admitted to the hospital for the management of Community acquired pneumonia, unspecified laterality.    Patient was brought in by family yesterday for increased shortness of breath and cough.  According to him he feels his symptoms are at his usual baseline chronic cough and shortness of breath related to underlying COPD from long-term environmental occupational exposure to chlorine.  He denies any fevers or chills or sinus symptoms, no nausea or vomiting or diarrhea, his appetite has been good.  No lightness or dizziness, palpitations or chest pain.  Significant findings in the ER included bibasilar airspace disease noted on chest x-ray, his white count was normal, blood pressure was elevated around 200 systolic, upon ambulation to the bathroom in the ER he dropped to 88% room air.  He was given Rocephin at this azithromycin and Solu-Medrol.  He has been on 2 L nasal cannula oxygen overnight, he has been getting DuoNeb aerosol treatments.  He does follow with pulmonologist Dr. Kramer, and is on Wixela.  He has been using his rescue inhaler more often recently.  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  He has history of chronic diastolic CHF, CAD and also follows with cardiology.  EKG on admission looks suspicious for A-fib which he  Kareem VALIENTE MD   midodrine (PROAMATINE) 2.5 MG tablet Take 1 tablet by mouth 2 times daily 24   Kirill Trevizo MD   lisinopril (PRINIVIL;ZESTRIL) 10 MG tablet Take 1 tablet by mouth as needed (Hypertension BP>160 at noon) 23   Kirill Trevizo MD   Multiple Vitamins-Minerals (THERAPEUTIC MULTIVITAMIN-MINERALS) tablet Take 1 tablet by mouth daily    Kirill Trevizo MD   calcium carb-cholecalciferol 600-20 MG-MCG TABS Take 1 tablet by mouth daily    Kirill Trevizo MD   aspirin 81 MG EC tablet Take 1 tablet by mouth daily Pt reported taking 2 pills daily    Kirill Trevizo MD        Allergies:     Levofloxacin, Codeine, Pulmicort [budesonide], Cashew nut oil, Levaquin  [levofloxacin in d5w], and Peanut-containing drug products    Physical Exam:   BP (!) 171/67   Pulse 60   Temp 98.7 °F (37.1 °C) (Temporal)   Resp 27   Ht 1.727 m (5' 8\")   Wt 65.8 kg (145 lb)   SpO2 93%   BMI 22.05 kg/m²   Temp (24hrs), Av °F (36.7 °C), Min:97.3 °F (36.3 °C), Max:98.7 °F (37.1 °C)    No results for input(s): \"POCGLU\" in the last 72 hours.    Intake/Output Summary (Last 24 hours) at 2025 1000  Last data filed at 2025 0020  Gross per 24 hour   Intake 250 ml   Output --   Net 250 ml       General Appearance:  alert, well appearing, and in no acute distress  Mental status: oriented to person, place, and time  Head:  normocephalic, atraumatic  Eye: no icterus, redness, pupils equal and reactive, extraocular eye movements intact, conjunctiva clear  Ear: normal external ear, no discharge, hearing intact  Nose:  no drainage noted  Mouth: mucous membranes moist  Neck: supple, no carotid bruits, thyroid not palpable  Lungs: Poor air movement, expiratory wheezes appreciated, no rales or rhonchi appreciated  Cardiovascular: Irregular irregular  Abdomen: Soft, nontender, nondistended, normal bowel sounds, no hepatomegaly or splenomegaly  Neurologic: There are no new focal motor or sensory

## 2025-02-26 PROBLEM — R07.89 OTHER CHEST PAIN: Status: ACTIVE | Noted: 2025-02-26

## 2025-02-26 PROBLEM — I48.0 PAROXYSMAL ATRIAL FIBRILLATION (HCC): Status: ACTIVE | Noted: 2025-02-26

## 2025-02-26 PROBLEM — K21.9 GASTROESOPHAGEAL REFLUX DISEASE WITHOUT ESOPHAGITIS: Status: ACTIVE | Noted: 2025-02-26

## 2025-02-26 LAB
ALBUMIN SERPL-MCNC: 3.8 G/DL (ref 3.5–5.2)
ALBUMIN/GLOB SERPL: 1.7 {RATIO} (ref 1–2.5)
ALP SERPL-CCNC: 93 U/L (ref 40–129)
ALT SERPL-CCNC: 13 U/L (ref 5–41)
ANION GAP SERPL CALCULATED.3IONS-SCNC: 13 MMOL/L (ref 9–17)
AST SERPL-CCNC: 20 U/L
BASOPHILS # BLD: 0 K/UL (ref 0–0.2)
BASOPHILS NFR BLD: 0 % (ref 0–2)
BILIRUB DIRECT SERPL-MCNC: 0.1 MG/DL
BILIRUB INDIRECT SERPL-MCNC: 0.3 MG/DL (ref 0–1)
BILIRUB SERPL-MCNC: 0.4 MG/DL (ref 0.3–1.2)
BUN SERPL-MCNC: 19 MG/DL (ref 8–23)
CALCIUM SERPL-MCNC: 8.6 MG/DL (ref 8.6–10.4)
CHLORIDE SERPL-SCNC: 108 MMOL/L (ref 98–107)
CO2 SERPL-SCNC: 20 MMOL/L (ref 20–31)
CREAT SERPL-MCNC: 0.8 MG/DL (ref 0.7–1.2)
EOSINOPHIL # BLD: 0 K/UL (ref 0–0.4)
EOSINOPHILS RELATIVE PERCENT: 0 % (ref 1–4)
ERYTHROCYTE [DISTWIDTH] IN BLOOD BY AUTOMATED COUNT: 18.2 % (ref 12.5–15.4)
GFR, ESTIMATED: 85 ML/MIN/1.73M2
GLUCOSE SERPL-MCNC: 168 MG/DL (ref 70–99)
HCT VFR BLD AUTO: 33.7 % (ref 41–53)
HGB BLD-MCNC: 11.2 G/DL (ref 13.5–17.5)
INR PPP: 1.8 (ref 0.9–1.2)
LYMPHOCYTES NFR BLD: 0.38 K/UL (ref 1–4.8)
LYMPHOCYTES RELATIVE PERCENT: 7 % (ref 24–44)
MCH RBC QN AUTO: 30.2 PG (ref 26–34)
MCHC RBC AUTO-ENTMCNC: 33.2 G/DL (ref 31–37)
MCV RBC AUTO: 91.1 FL (ref 80–100)
MONOCYTES NFR BLD: 0.05 K/UL (ref 0.1–0.8)
MONOCYTES NFR BLD: 1 % (ref 1–7)
MORPHOLOGY: NORMAL
NEUTROPHILS NFR BLD: 92 % (ref 36–66)
NEUTS SEG NFR BLD: 4.97 K/UL (ref 1.8–7.7)
PLATELET # BLD AUTO: 203 K/UL (ref 140–450)
PMV BLD AUTO: 7.6 FL (ref 6–12)
POTASSIUM SERPL-SCNC: 4 MMOL/L (ref 3.7–5.3)
PROT SERPL-MCNC: 6 G/DL (ref 6.4–8.3)
PROTHROMBIN TIME: 20.5 SEC (ref 11.8–14.6)
RBC # BLD AUTO: 3.7 M/UL (ref 4.5–5.9)
SODIUM SERPL-SCNC: 141 MMOL/L (ref 135–144)
TROPONIN I SERPL HS-MCNC: 38 NG/L (ref 0–22)
TROPONIN I SERPL HS-MCNC: 38 NG/L (ref 0–22)
WBC OTHER # BLD: 5.4 K/UL (ref 3.5–11)

## 2025-02-26 PROCEDURE — 6370000000 HC RX 637 (ALT 250 FOR IP): Performed by: FAMILY MEDICINE

## 2025-02-26 PROCEDURE — 99233 SBSQ HOSP IP/OBS HIGH 50: CPT | Performed by: INTERNAL MEDICINE

## 2025-02-26 PROCEDURE — 82248 BILIRUBIN DIRECT: CPT

## 2025-02-26 PROCEDURE — 99233 SBSQ HOSP IP/OBS HIGH 50: CPT | Performed by: FAMILY MEDICINE

## 2025-02-26 PROCEDURE — 2580000003 HC RX 258: Performed by: FAMILY MEDICINE

## 2025-02-26 PROCEDURE — 85025 COMPLETE CBC W/AUTO DIFF WBC: CPT

## 2025-02-26 PROCEDURE — 80053 COMPREHEN METABOLIC PANEL: CPT

## 2025-02-26 PROCEDURE — 94640 AIRWAY INHALATION TREATMENT: CPT

## 2025-02-26 PROCEDURE — 6360000002 HC RX W HCPCS: Performed by: FAMILY MEDICINE

## 2025-02-26 PROCEDURE — 36415 COLL VENOUS BLD VENIPUNCTURE: CPT

## 2025-02-26 PROCEDURE — 94761 N-INVAS EAR/PLS OXIMETRY MLT: CPT

## 2025-02-26 PROCEDURE — 93005 ELECTROCARDIOGRAM TRACING: CPT | Performed by: EMERGENCY MEDICINE

## 2025-02-26 PROCEDURE — 85610 PROTHROMBIN TIME: CPT

## 2025-02-26 PROCEDURE — 2500000003 HC RX 250 WO HCPCS: Performed by: FAMILY MEDICINE

## 2025-02-26 PROCEDURE — 84484 ASSAY OF TROPONIN QUANT: CPT

## 2025-02-26 PROCEDURE — 2700000000 HC OXYGEN THERAPY PER DAY

## 2025-02-26 PROCEDURE — 2060000000 HC ICU INTERMEDIATE R&B

## 2025-02-26 RX ORDER — FAMOTIDINE 20 MG/1
20 TABLET, FILM COATED ORAL DAILY
Status: DISCONTINUED | OUTPATIENT
Start: 2025-02-26 | End: 2025-02-28 | Stop reason: HOSPADM

## 2025-02-26 RX ORDER — ALBUTEROL SULFATE 0.83 MG/ML
2.5 SOLUTION RESPIRATORY (INHALATION) EVERY 4 HOURS PRN
Status: DISCONTINUED | OUTPATIENT
Start: 2025-02-26 | End: 2025-02-28 | Stop reason: HOSPADM

## 2025-02-26 RX ORDER — CALCIUM CARBONATE 500 MG/1
1000 TABLET, CHEWABLE ORAL ONCE
Status: COMPLETED | OUTPATIENT
Start: 2025-02-26 | End: 2025-02-26

## 2025-02-26 RX ORDER — GUAIFENESIN/DEXTROMETHORPHAN 100-10MG/5
5 SYRUP ORAL EVERY 4 HOURS PRN
Status: DISCONTINUED | OUTPATIENT
Start: 2025-02-26 | End: 2025-02-28 | Stop reason: HOSPADM

## 2025-02-26 RX ORDER — HYDROCODONE POLISTIREX AND CHLORPHENIRAMINE POLISTIREX 10; 8 MG/5ML; MG/5ML
5 SUSPENSION, EXTENDED RELEASE ORAL EVERY 12 HOURS PRN
Status: DISCONTINUED | OUTPATIENT
Start: 2025-02-26 | End: 2025-02-26

## 2025-02-26 RX ORDER — WARFARIN SODIUM 1 MG/1
4 TABLET ORAL
Status: COMPLETED | OUTPATIENT
Start: 2025-02-26 | End: 2025-02-26

## 2025-02-26 RX ADMIN — GUAIFENESIN AND DEXTROMETHORPHAN 5 ML: 100; 10 SYRUP ORAL at 16:56

## 2025-02-26 RX ADMIN — GUAIFENESIN AND DEXTROMETHORPHAN 5 ML: 100; 10 SYRUP ORAL at 21:15

## 2025-02-26 RX ADMIN — IPRATROPIUM BROMIDE AND ALBUTEROL SULFATE 1 DOSE: .5; 2.5 SOLUTION RESPIRATORY (INHALATION) at 09:20

## 2025-02-26 RX ADMIN — IPRATROPIUM BROMIDE AND ALBUTEROL SULFATE 1 DOSE: .5; 2.5 SOLUTION RESPIRATORY (INHALATION) at 15:05

## 2025-02-26 RX ADMIN — CALCIUM CARBONATE (ANTACID) CHEW TAB 500 MG 1000 MG: 500 CHEW TAB at 10:39

## 2025-02-26 RX ADMIN — CARVEDILOL 12.5 MG: 12.5 TABLET, FILM COATED ORAL at 21:15

## 2025-02-26 RX ADMIN — LEVOTHYROXINE SODIUM 100 MCG: 0.05 TABLET ORAL at 09:04

## 2025-02-26 RX ADMIN — BENZOCAINE AND MENTHOL 1 LOZENGE: 15; 3.6 LOZENGE ORAL at 16:56

## 2025-02-26 RX ADMIN — LISINOPRIL 20 MG: 20 TABLET ORAL at 09:04

## 2025-02-26 RX ADMIN — CARVEDILOL 12.5 MG: 12.5 TABLET, FILM COATED ORAL at 09:04

## 2025-02-26 RX ADMIN — BUDESONIDE AND FORMOTEROL FUMARATE DIHYDRATE 2 PUFF: 160; 4.5 AEROSOL RESPIRATORY (INHALATION) at 09:20

## 2025-02-26 RX ADMIN — SODIUM CHLORIDE, PRESERVATIVE FREE 10 ML: 5 INJECTION INTRAVENOUS at 10:44

## 2025-02-26 RX ADMIN — FAMOTIDINE 20 MG: 20 TABLET, FILM COATED ORAL at 17:09

## 2025-02-26 RX ADMIN — WARFARIN SODIUM 4 MG: 1 TABLET ORAL at 16:56

## 2025-02-26 RX ADMIN — METHYLPREDNISOLONE SODIUM SUCCINATE 60 MG: 125 INJECTION, POWDER, FOR SOLUTION INTRAMUSCULAR; INTRAVENOUS at 16:57

## 2025-02-26 RX ADMIN — AZITHROMYCIN MONOHYDRATE 500 MG: 500 INJECTION, POWDER, LYOPHILIZED, FOR SOLUTION INTRAVENOUS at 21:14

## 2025-02-26 RX ADMIN — METHYLPREDNISOLONE SODIUM SUCCINATE 60 MG: 125 INJECTION, POWDER, FOR SOLUTION INTRAMUSCULAR; INTRAVENOUS at 06:10

## 2025-02-26 RX ADMIN — WATER 1000 MG: 1 INJECTION INTRAMUSCULAR; INTRAVENOUS; SUBCUTANEOUS at 21:07

## 2025-02-26 RX ADMIN — ASPIRIN 81 MG: 81 TABLET, COATED ORAL at 09:04

## 2025-02-26 RX ADMIN — GUAIFENESIN AND DEXTROMETHORPHAN 5 ML: 100; 10 SYRUP ORAL at 10:39

## 2025-02-26 RX ADMIN — IPRATROPIUM BROMIDE AND ALBUTEROL SULFATE 1 DOSE: .5; 2.5 SOLUTION RESPIRATORY (INHALATION) at 21:08

## 2025-02-26 RX ADMIN — BUDESONIDE AND FORMOTEROL FUMARATE DIHYDRATE 2 PUFF: 160; 4.5 AEROSOL RESPIRATORY (INHALATION) at 21:08

## 2025-02-26 RX ADMIN — ATORVASTATIN CALCIUM 40 MG: 40 TABLET, FILM COATED ORAL at 09:04

## 2025-02-26 ASSESSMENT — ENCOUNTER SYMPTOMS
VOMITING: 0
ABDOMINAL PAIN: 0
VOICE CHANGE: 0
BACK PAIN: 0
NAUSEA: 0
SHORTNESS OF BREATH: 1

## 2025-02-26 NOTE — PLAN OF CARE
Problem: Safety - Adult  Goal: Free from fall injury  Outcome: Progressing     Problem: Chronic Conditions and Co-morbidities  Goal: Patient's chronic conditions and co-morbidity symptoms are monitored and maintained or improved  2/26/2025 0610 by Mariann Chung RN  Outcome: Progressing  2/25/2025 1858 by Britni Rod, RN  Flowsheets (Taken 2/25/2025 1858)  Care Plan - Patient's Chronic Conditions and Co-Morbidity Symptoms are Monitored and Maintained or Improved:   Monitor and assess patient's chronic conditions and comorbid symptoms for stability, deterioration, or improvement   Collaborate with multidisciplinary team to address chronic and comorbid conditions and prevent exacerbation or deterioration   Update acute care plan with appropriate goals if chronic or comorbid symptoms are exacerbated and prevent overall improvement and discharge  Note: Weaning oxygen, weaning steriods.  Possible dc tomorrow     Problem: Discharge Planning  Goal: Discharge to home or other facility with appropriate resources  Outcome: Progressing     Problem: Pain  Goal: Verbalizes/displays adequate comfort level or baseline comfort level  Outcome: Progressing     Problem: Respiratory - Adult  Goal: Achieves optimal ventilation and oxygenation  2/26/2025 0610 by Mariann Chung, RN  Outcome: Progressing  2/25/2025 2018 by Lucila Garza RCP  Outcome: Progressing

## 2025-02-26 NOTE — PLAN OF CARE
Problem: Chronic Conditions and Co-morbidities  Goal: Patient's chronic conditions and co-morbidity symptoms are monitored and maintained or improved  Flowsheets (Taken 2/25/2025 3037)  Care Plan - Patient's Chronic Conditions and Co-Morbidity Symptoms are Monitored and Maintained or Improved:   Monitor and assess patient's chronic conditions and comorbid symptoms for stability, deterioration, or improvement   Collaborate with multidisciplinary team to address chronic and comorbid conditions and prevent exacerbation or deterioration   Update acute care plan with appropriate goals if chronic or comorbid symptoms are exacerbated and prevent overall improvement and discharge  Note: Weaning oxygen, weaning steriods.  Possible dc tomorrow

## 2025-02-26 NOTE — PROGRESS NOTES
PULMONARY & CRITICAL CARE MEDICINE PROGRESS NOTE     Patient:  Bill Rodney  MRN: [id number]  Admit date: 2/24/2025  Primary Care Physician: Kareem Pla MD  CODE Status: Full Code  LOS: 2    SUBJECTIVE     CHIEF COMPLAINT/REASON FOR CONSULT: Respiratory Distress (Pt reports difficulty breathing for about a week, but today got worse, pt has history of copd and asthma)    HISTORY OF PRESENT ILLNESS:  The patient is a 89 y.o. male with multiple comorbidities with admitted with worsening shortness of breath.  Patient has underlying COPD and history of occupational exposure to chlorine.  He follows up with Dr. Kramer in pulmonary clinic.  Evaluation in the ED showed elevated blood pressure 192/78.  Lab workup showed normal WBC count of 6.6, BMP was unremarkable.  Rapid COVID and flu test are negative.  Chest x-ray reported to show bibasilar infiltrate.  He has been started on Rocephin/azithromycin and IV Solu-Medrol.  He is currently on room air.    INTERVAL HISTORY:    2/26/2025   Patient on room air  Continues to complain of shortness of breath  Remains on Rocephin/azithromycin along with IV Solu-Medrol    REVIEW OF SYSTEMS:  Review of Systems   Constitutional:  Positive for fatigue. Negative for fever.   HENT:  Negative for congestion and voice change.    Eyes:  Negative for visual disturbance.   Respiratory:  Positive for shortness of breath.    Cardiovascular:  Negative for chest pain and leg swelling.   Gastrointestinal:  Negative for abdominal pain, nausea and vomiting.   Genitourinary:  Negative for dysuria and urgency.   Musculoskeletal:  Negative for back pain and joint swelling.   Skin:  Negative for rash.   Neurological:  Positive for weakness.   Hematological:  Does not bruise/bleed easily.   Psychiatric/Behavioral:  Negative for agitation and confusion.        OBJECTIVE     VITAL SIGNS:   LAST:  BP (!) 157/57   Pulse 67   Temp 97.5 °F (36.4 °C) (Oral)   Resp 16   Ht 1.727 m (5' 8\")   Wt  personally interviewed/examined the patient; reviewed interval history, interpreted all available radiographic and laboratory data at the time of service.    Hemodynamically stable, on room air  Keep oxygen saturation >90%  Anticoagulation; continue Coumadin  Encourage incentive spirometry/Acapella  Maintain bronchopulmonary hygiene  Continue aspiration precautions  Continue bronchodilators; Symbicort/DuoNebs  Antimicrobials reviewed; continue Rocephin for 5 days, azithromycin for 3 days  Follow-up culture results  Remains on IV Solu-Medrol, okay to switch to prednisone from tomorrow  Monitor intake/output  Physical/occupational therapy  Patient was recommended follow-up with Dr. Kramer in pulmonary clinic after discharge    It was my pleasure to evaluate Bill Rodney today. I would like to thank you for allowing me to participate in the care of this patient.  Please feel free to call with any further questions or concerns. We will continue to follow.     Brien Sun MD  Pulmonary and Critical Care Medicine           2/26/2025, 4:36 PM           This note is created with the assistance of a speech recognition program.  While intending to generate a document that actually reflects the content of the visit, the document can still have some errors including those of syntax and sound-alike substitutions which may escape proof reading.  It such instances, actual meaning can be extrapolated by contextual diversion.

## 2025-02-26 NOTE — PLAN OF CARE
BRONCHOSPASM/BRONCHOCONSTRICTION    IMPROVE  AERATION/BREATHSOUNDS  ADMINISTER BRONCHODILATOR THERAPY AS APPROPRIATE  ASSESS BREATH SOUNDS  PATIENT EDUCATION AS NEEDEDInhaler / Aerosol Education        [x] Served spacer    [] Provided and reviewed booklet   [x] Good return demonstration per patient   [x] Aerosolized Medications:     Verbal education has been provided in the use, benefits and possible adverse reactions of aerosolized medications used in the treatment of this patient.    [x] Other:        Problem: Respiratory - Adult  Goal: Achieves optimal ventilation and oxygenation  2/25/2025 2018 by Lucila Garza RCP  Outcome: Progressing

## 2025-02-26 NOTE — PROGRESS NOTES
02/26/25 0912   Care Plan - Respiratory Goals   Achieves optimal ventilation and oxygenation Assess for changes in respiratory status;Oxygen supplementation based on oxygen saturation or arterial blood gases;Encourage broncho-pulmonary hygiene including cough, deep breathe, incentive spirometry;Assess and instruct to report shortness of breath or any respiratory difficulty;Respiratory therapy support as indicated

## 2025-02-26 NOTE — PROGRESS NOTES
[facility] Medicine  IN-PATIENT SERVICE   [facility] - Location: [place]    Progress Note    2/26/2025    9:05 AM    Name:   Bill Rodney  MRN:     [id number]     Acct:      [id number]   Room:   [address] [place] [address] Day:  2  Admit Date:  2/24/2025  6:56 PM    PCP:   Kareem Pal MD  Code Status:  Full Code    Subjective:     Patient c/o increasing cough this AM.  Also having some acid reflux/chest pain.  EKG redemonstrates Afib with slow ventricular response.  Chronic ST depression/T wave inversion lateral leads- not new finding.  Cardiac enzymes just ordered and pending.    Medications:     Allergies:    Allergies   Allergen Reactions    Levofloxacin Swelling    Codeine Hives and Rash    Pulmicort [Budesonide] Rash     Also itchy    Cashew Nut Oil     Levaquin  [Levofloxacin In D5w] Other (See Comments)    Peanut-Containing Drug Products Hives       Current Meds:   Scheduled Meds:    warfarin  4 mg Oral Once    calcium carbonate  1,000 mg Oral Once    lisinopril  20 mg Oral Daily    methylPREDNISolone  60 mg IntraVENous Q12H    ipratropium 0.5 mg-albuterol 2.5 mg  1 Dose Inhalation TID RT    sodium chloride flush  5-40 mL IntraVENous 2 times per day    azithromycin  500 mg IntraVENous Q24H    cefTRIAXone (ROCEPHIN) IV  1,000 mg IntraVENous Q24H    aspirin  81 mg Oral Daily    carvedilol  12.5 mg Oral BID    budesonide-formoterol  2 puff Inhalation BID RT    levothyroxine  100 mcg Oral Daily    atorvastatin  40 mg Oral Daily    warfarin placeholder: dosing by pharmacy   Oral RX Placeholder     Continuous Infusions:    sodium chloride       PRN Meds: HYDROcodone-chlorpheniramine, albuterol, albuterol, sodium chloride flush, sodium chloride, potassium chloride **OR** potassium alternative oral replacement **OR** potassium chloride, magnesium sulfate, ondansetron **OR** ondansetron, polyethylene glycol, acetaminophen **OR** acetaminophen, meclizine, tiZANidine, hydrALAZINE    Data:

## 2025-02-26 NOTE — PROGRESS NOTES
Physical Therapy        Physical Therapy Cancel Note      DATE: 2025    NAME: Bill Rodney  MRN: [id number]   : 1935      Patient not seen this date for Physical Therapy due to:    Other: BP R arm 175/94 (L arm ) and pt c/o heart burn. Pt then indicate symptoms worse this morning and has burning at stomach and chest pain. RN plan EKG & OK defer therapy this morning. PLAN: continue to pursue therapy treatment as able and tolerable.       Electronically signed by Karen Coello PT on 2025 at 8:35 AM

## 2025-02-26 NOTE — PLAN OF CARE
Problem: Safety - Adult  Goal: Free from fall injury  2/26/2025 1636 by Laly Major RN  Outcome: Progressing     Problem: Chronic Conditions and Co-morbidities  Goal: Patient's chronic conditions and co-morbidity symptoms are monitored and maintained or improved  2/26/2025 1636 by Laly Major RN  Outcome: Progressing  Flowsheets (Taken 2/26/2025 0900)  Care Plan - Patient's Chronic Conditions and Co-Morbidity Symptoms are Monitored and Maintained or Improved:   Collaborate with multidisciplinary team to address chronic and comorbid conditions and prevent exacerbation or deterioration   Monitor and assess patient's chronic conditions and comorbid symptoms for stability, deterioration, or improvement     Problem: Discharge Planning  Goal: Discharge to home or other facility with appropriate resources  2/26/2025 1636 by Laly Major RN  Outcome: Progressing  Flowsheets (Taken 2/26/2025 0900)  Discharge to home or other facility with appropriate resources:   Identify barriers to discharge with patient and caregiver   Arrange for needed discharge resources and transportation as appropriate   Identify discharge learning needs (meds, wound care, etc)     Problem: Pain  Goal: Verbalizes/displays adequate comfort level or baseline comfort level  2/26/2025 1636 by Laly Major RN  Outcome: Progressing     Problem: Respiratory - Adult  Goal: Achieves optimal ventilation and oxygenation  2/26/2025 1636 by Laly Major RN  Outcome: Progressing  Flowsheets  Taken 2/26/2025 0920 by Coleen Frye RCP  Achieves optimal ventilation and oxygenation:   Assess for changes in respiratory status   Oxygen supplementation based on oxygen saturation or arterial blood gases   Encourage broncho-pulmonary hygiene including cough, deep breathe, incentive spirometry   Assess and instruct to report shortness of breath or any respiratory difficulty   Respiratory therapy support as indicated  Taken 2/26/2025 0900 by

## 2025-02-26 NOTE — RT PROTOCOL NOTE
RT Nebulizer Bronchodilator Protocol Note    There is a bronchodilator order in the chart from a provider indicating to follow the RT Bronchodilator Protocol and there is an “Initiate RT Bronchodilator Protocol” order as well (see protocol at bottom of note).    CXR Findings:  XR CHEST PORTABLE    Result Date: 2/24/2025  Bibasilar airspace disease.       The findings from the last RT Protocol Assessment were as follows:  Smoking: Chronic pulmonary disease  Respiratory Pattern: Dyspnea on exertion or RR 21-25 bpm  Breath Sounds: Slightly diminished and/or crackles  Cough: Strong, productive  Indication for Bronchodilator Therapy:    Bronchodilator Assessment Score: 7    Aerosolized bronchodilator medication orders have been revised according to the RT Nebulizer Bronchodilator Protocol below.    Respiratory Therapist to perform RT Therapy Protocol Assessment initially then follow the protocol.  Repeat RT Therapy Protocol Assessment PRN for score 0-3 or on second treatment, BID, and PRN for scores above 3.    No Indications - adjust the frequency to every 6 hours PRN wheezing or bronchospasm, if no treatments needed after 48 hours then discontinue using Per Protocol order mode.     If indication present, adjust the RT bronchodilator orders based on the Bronchodilator Assessment Score as indicated below.  If a patient is on this medication at home then do not decrease Frequency below that used at home.    0-3 - enter or revise RT bronchodilator order(s) to equivalent RT Bronchodilator order with Frequency of every 4 hours PRN for wheezing or increased work of breathing using Per Protocol order mode.       4-6 - enter or revise RT Bronchodilator order(s) to two equivalent RT bronchodilator orders with one order with BID Frequency and one order with Frequency of every 4 hours PRN wheezing or increased work of breathing using Per Protocol order mode.         7-10 - enter or revise RT Bronchodilator order(s) to two

## 2025-02-27 ENCOUNTER — APPOINTMENT (OUTPATIENT)
Dept: CT IMAGING | Age: 89
End: 2025-02-27
Payer: COMMERCIAL

## 2025-02-27 PROBLEM — R41.0 DELIRIUM: Status: ACTIVE | Noted: 2025-02-27

## 2025-02-27 PROBLEM — F22 PARANOIA (HCC): Status: ACTIVE | Noted: 2025-02-27

## 2025-02-27 LAB
ALBUMIN SERPL-MCNC: 3.7 G/DL (ref 3.5–5.2)
ALBUMIN/GLOB SERPL: 1.8 {RATIO} (ref 1–2.5)
ALP SERPL-CCNC: 80 U/L (ref 40–129)
ALT SERPL-CCNC: 16 U/L (ref 5–41)
AMMONIA PLAS-SCNC: 19 UMOL/L (ref 16–60)
ANION GAP SERPL CALCULATED.3IONS-SCNC: 12 MMOL/L (ref 9–17)
AST SERPL-CCNC: 24 U/L
BASOPHILS # BLD: 0 K/UL (ref 0–0.2)
BASOPHILS NFR BLD: 0 % (ref 0–2)
BILIRUB SERPL-MCNC: 0.4 MG/DL (ref 0.3–1.2)
BUN SERPL-MCNC: 24 MG/DL (ref 8–23)
CALCIUM SERPL-MCNC: 8.6 MG/DL (ref 8.6–10.4)
CHLORIDE SERPL-SCNC: 106 MMOL/L (ref 98–107)
CO2 SERPL-SCNC: 21 MMOL/L (ref 20–31)
CREAT SERPL-MCNC: 0.8 MG/DL (ref 0.7–1.2)
EKG ATRIAL RATE: 34 BPM
EKG Q-T INTERVAL: 452 MS
EKG QRS DURATION: 100 MS
EKG QTC CALCULATION (BAZETT): 447 MS
EKG R AXIS: 0 DEGREES
EKG T AXIS: -26 DEGREES
EKG VENTRICULAR RATE: 59 BPM
EOSINOPHIL # BLD: 0 K/UL (ref 0–0.4)
EOSINOPHILS RELATIVE PERCENT: 0 % (ref 1–4)
ERYTHROCYTE [DISTWIDTH] IN BLOOD BY AUTOMATED COUNT: 18.5 % (ref 12.5–15.4)
GFR, ESTIMATED: 85 ML/MIN/1.73M2
GLUCOSE SERPL-MCNC: 152 MG/DL (ref 70–99)
HCT VFR BLD AUTO: 32.1 % (ref 41–53)
HGB BLD-MCNC: 10.8 G/DL (ref 13.5–17.5)
INR PPP: 2.6 (ref 0.9–1.2)
LACTATE BLDV-SCNC: 1.6 MMOL/L (ref 0.5–2.2)
LYMPHOCYTES NFR BLD: 0.5 K/UL (ref 1–4.8)
LYMPHOCYTES RELATIVE PERCENT: 6 % (ref 24–44)
MCH RBC QN AUTO: 30.7 PG (ref 26–34)
MCHC RBC AUTO-ENTMCNC: 33.6 G/DL (ref 31–37)
MCV RBC AUTO: 91.4 FL (ref 80–100)
MONOCYTES NFR BLD: 0.5 K/UL (ref 0.1–1.2)
MONOCYTES NFR BLD: 6 % (ref 2–11)
NEUTROPHILS NFR BLD: 88 % (ref 36–66)
NEUTS SEG NFR BLD: 8 K/UL (ref 1.8–7.7)
PLATELET # BLD AUTO: 205 K/UL (ref 140–450)
PMV BLD AUTO: 7.6 FL (ref 6–12)
POTASSIUM SERPL-SCNC: 4.3 MMOL/L (ref 3.7–5.3)
PROT SERPL-MCNC: 5.8 G/DL (ref 6.4–8.3)
PROTHROMBIN TIME: 27.2 SEC (ref 11.8–14.6)
RBC # BLD AUTO: 3.51 M/UL (ref 4.5–5.9)
SODIUM SERPL-SCNC: 139 MMOL/L (ref 135–144)
T4 FREE SERPL-MCNC: 2.2 NG/DL (ref 0.92–1.68)
TSH SERPL DL<=0.05 MIU/L-ACNC: 0.08 UIU/ML (ref 0.3–5)
WBC OTHER # BLD: 9.1 K/UL (ref 3.5–11)

## 2025-02-27 PROCEDURE — 70450 CT HEAD/BRAIN W/O DYE: CPT

## 2025-02-27 PROCEDURE — 6370000000 HC RX 637 (ALT 250 FOR IP): Performed by: FAMILY MEDICINE

## 2025-02-27 PROCEDURE — 84439 ASSAY OF FREE THYROXINE: CPT

## 2025-02-27 PROCEDURE — 99233 SBSQ HOSP IP/OBS HIGH 50: CPT | Performed by: STUDENT IN AN ORGANIZED HEALTH CARE EDUCATION/TRAINING PROGRAM

## 2025-02-27 PROCEDURE — 93010 ELECTROCARDIOGRAM REPORT: CPT | Performed by: INTERNAL MEDICINE

## 2025-02-27 PROCEDURE — 94640 AIRWAY INHALATION TREATMENT: CPT

## 2025-02-27 PROCEDURE — 85025 COMPLETE CBC W/AUTO DIFF WBC: CPT

## 2025-02-27 PROCEDURE — 2580000003 HC RX 258: Performed by: FAMILY MEDICINE

## 2025-02-27 PROCEDURE — 82140 ASSAY OF AMMONIA: CPT

## 2025-02-27 PROCEDURE — 6360000002 HC RX W HCPCS: Performed by: FAMILY MEDICINE

## 2025-02-27 PROCEDURE — 85610 PROTHROMBIN TIME: CPT

## 2025-02-27 PROCEDURE — 95819 EEG AWAKE AND ASLEEP: CPT | Performed by: PSYCHIATRY & NEUROLOGY

## 2025-02-27 PROCEDURE — 83605 ASSAY OF LACTIC ACID: CPT

## 2025-02-27 PROCEDURE — 36415 COLL VENOUS BLD VENIPUNCTURE: CPT

## 2025-02-27 PROCEDURE — 80053 COMPREHEN METABOLIC PANEL: CPT

## 2025-02-27 PROCEDURE — 94761 N-INVAS EAR/PLS OXIMETRY MLT: CPT

## 2025-02-27 PROCEDURE — 84443 ASSAY THYROID STIM HORMONE: CPT

## 2025-02-27 PROCEDURE — 6370000000 HC RX 637 (ALT 250 FOR IP): Performed by: PSYCHIATRY & NEUROLOGY

## 2025-02-27 PROCEDURE — 2060000000 HC ICU INTERMEDIATE R&B

## 2025-02-27 PROCEDURE — 99222 1ST HOSP IP/OBS MODERATE 55: CPT | Performed by: PSYCHIATRY & NEUROLOGY

## 2025-02-27 PROCEDURE — 97116 GAIT TRAINING THERAPY: CPT

## 2025-02-27 PROCEDURE — 97530 THERAPEUTIC ACTIVITIES: CPT

## 2025-02-27 PROCEDURE — 95816 EEG AWAKE AND DROWSY: CPT

## 2025-02-27 PROCEDURE — 99232 SBSQ HOSP IP/OBS MODERATE 35: CPT | Performed by: INTERNAL MEDICINE

## 2025-02-27 PROCEDURE — 2500000003 HC RX 250 WO HCPCS: Performed by: FAMILY MEDICINE

## 2025-02-27 RX ORDER — QUETIAPINE FUMARATE 25 MG/1
12.5 TABLET, FILM COATED ORAL NIGHTLY
Status: DISCONTINUED | OUTPATIENT
Start: 2025-02-27 | End: 2025-02-28 | Stop reason: HOSPADM

## 2025-02-27 RX ORDER — WARFARIN SODIUM 1 MG/1
2 TABLET ORAL
Status: COMPLETED | OUTPATIENT
Start: 2025-02-27 | End: 2025-02-27

## 2025-02-27 RX ADMIN — METHYLPREDNISOLONE SODIUM SUCCINATE 60 MG: 125 INJECTION, POWDER, FOR SOLUTION INTRAMUSCULAR; INTRAVENOUS at 21:01

## 2025-02-27 RX ADMIN — CARVEDILOL 12.5 MG: 12.5 TABLET, FILM COATED ORAL at 20:56

## 2025-02-27 RX ADMIN — CARVEDILOL 12.5 MG: 12.5 TABLET, FILM COATED ORAL at 10:03

## 2025-02-27 RX ADMIN — WATER 1000 MG: 1 INJECTION INTRAMUSCULAR; INTRAVENOUS; SUBCUTANEOUS at 21:02

## 2025-02-27 RX ADMIN — HYDRALAZINE HYDROCHLORIDE 10 MG: 20 INJECTION INTRAMUSCULAR; INTRAVENOUS at 12:45

## 2025-02-27 RX ADMIN — ATORVASTATIN CALCIUM 40 MG: 40 TABLET, FILM COATED ORAL at 10:03

## 2025-02-27 RX ADMIN — SODIUM CHLORIDE, PRESERVATIVE FREE 10 ML: 5 INJECTION INTRAVENOUS at 10:08

## 2025-02-27 RX ADMIN — LISINOPRIL 20 MG: 20 TABLET ORAL at 10:03

## 2025-02-27 RX ADMIN — WARFARIN SODIUM 2 MG: 1 TABLET ORAL at 18:25

## 2025-02-27 RX ADMIN — BUDESONIDE AND FORMOTEROL FUMARATE DIHYDRATE 2 PUFF: 160; 4.5 AEROSOL RESPIRATORY (INHALATION) at 21:02

## 2025-02-27 RX ADMIN — AZITHROMYCIN MONOHYDRATE 500 MG: 500 INJECTION, POWDER, LYOPHILIZED, FOR SOLUTION INTRAVENOUS at 21:07

## 2025-02-27 RX ADMIN — IPRATROPIUM BROMIDE AND ALBUTEROL SULFATE 1 DOSE: .5; 2.5 SOLUTION RESPIRATORY (INHALATION) at 13:25

## 2025-02-27 RX ADMIN — METHYLPREDNISOLONE SODIUM SUCCINATE 60 MG: 125 INJECTION, POWDER, FOR SOLUTION INTRAMUSCULAR; INTRAVENOUS at 10:07

## 2025-02-27 RX ADMIN — FAMOTIDINE 20 MG: 20 TABLET, FILM COATED ORAL at 10:03

## 2025-02-27 RX ADMIN — IPRATROPIUM BROMIDE AND ALBUTEROL SULFATE 1 DOSE: .5; 2.5 SOLUTION RESPIRATORY (INHALATION) at 21:02

## 2025-02-27 RX ADMIN — LEVOTHYROXINE SODIUM 100 MCG: 0.05 TABLET ORAL at 10:03

## 2025-02-27 RX ADMIN — SODIUM CHLORIDE, PRESERVATIVE FREE 10 ML: 5 INJECTION INTRAVENOUS at 21:07

## 2025-02-27 RX ADMIN — ASPIRIN 81 MG: 81 TABLET, COATED ORAL at 10:03

## 2025-02-27 RX ADMIN — BUDESONIDE AND FORMOTEROL FUMARATE DIHYDRATE 2 PUFF: 160; 4.5 AEROSOL RESPIRATORY (INHALATION) at 09:51

## 2025-02-27 RX ADMIN — QUETIAPINE FUMARATE 12.5 MG: 25 TABLET ORAL at 20:56

## 2025-02-27 RX ADMIN — IPRATROPIUM BROMIDE AND ALBUTEROL SULFATE 1 DOSE: .5; 2.5 SOLUTION RESPIRATORY (INHALATION) at 09:51

## 2025-02-27 ASSESSMENT — ENCOUNTER SYMPTOMS
VOMITING: 0
VOICE CHANGE: 0
SHORTNESS OF BREATH: 1
BACK PAIN: 0
RHINORRHEA: 0
DIARRHEA: 0
CHEST TIGHTNESS: 0
SHORTNESS OF BREATH: 0
ABDOMINAL PAIN: 0
CONSTIPATION: 0
NAUSEA: 0
SORE THROAT: 0

## 2025-02-27 NOTE — RT PROTOCOL NOTE
RT Nebulizer Bronchodilator Protocol Note    There is a bronchodilator order in the chart from a provider indicating to follow the RT Bronchodilator Protocol and there is an “Initiate RT Bronchodilator Protocol” order as well (see protocol at bottom of note).    CXR Findings:  No results found.    The findings from the last RT Protocol Assessment were as follows:  Smoking: Chronic pulmonary disease  Respiratory Pattern: Dyspnea on exertion or RR 21-25 bpm  Breath Sounds: Slightly diminished and/or crackles  Cough: Strong, productive  Indication for Bronchodilator Therapy: On home bronchodilators  Bronchodilator Assessment Score: 7    Aerosolized bronchodilator medication orders have been revised according to the RT Nebulizer Bronchodilator Protocol below.    Respiratory Therapist to perform RT Therapy Protocol Assessment initially then follow the protocol.  Repeat RT Therapy Protocol Assessment PRN for score 0-3 or on second treatment, BID, and PRN for scores above 3.    No Indications - adjust the frequency to every 6 hours PRN wheezing or bronchospasm, if no treatments needed after 48 hours then discontinue using Per Protocol order mode.     If indication present, adjust the RT bronchodilator orders based on the Bronchodilator Assessment Score as indicated below.  If a patient is on this medication at home then do not decrease Frequency below that used at home.    0-3 - enter or revise RT bronchodilator order(s) to equivalent RT Bronchodilator order with Frequency of every 4 hours PRN for wheezing or increased work of breathing using Per Protocol order mode.       4-6 - enter or revise RT Bronchodilator order(s) to two equivalent RT bronchodilator orders with one order with BID Frequency and one order with Frequency of every 4 hours PRN wheezing or increased work of breathing using Per Protocol order mode.         7-10 - enter or revise RT Bronchodilator order(s) to two equivalent RT bronchodilator orders with one

## 2025-02-27 NOTE — CARE COORDINATION
Patient requesting C for PT/OT, list provided. States his wife will be in later this afternoon and they will review list. CM to follow for choices.

## 2025-02-27 NOTE — PROGRESS NOTES
Physical Therapy  Facility/Department: [address]   Physical Therapy Daily Treatment Note    Patient Name: Bill Rodney        MRN: [id number]    : 1935    Date of Service: 2025    Chief Complaint   Patient presents with    Respiratory Distress     Pt reports difficulty breathing for about a week, but today got worse, pt has history of copd and asthma     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, 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, Chronic obstructive lung disease (HCC), Chronic obstructive pulmonary disease (HCC), Chronic rhinitis, Chronic sinusitis, Deep vein thrombosis of lower extremity (HCC), Diarrhea, Disorder of arteries and arterioles, unspecified, Diverticulitis of colon, Dizziness and giddiness, Dysphagia, Enlarged prostate, Environmental allergies, Fatigue, Gabe hematuria, Frostbite with tissue necrosis of nose, Gastroesophageal reflux disease, Heartburn, Hereditary coagulation factor deficiency (HCC), Herpes zoster, Heterozygous factor V Leiden mutation, Hypercholesterolemia, Hypertension, Increased frequency of urination, Low back strain, Lumbar sprain, Lumbosacral spondylosis without myelopathy, 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  Past Surgical History:  has a past surgical history that includes shoulder surgery (Right); hernia repair; Prostate surgery; Shoulder arthroscopy (Right); and Nose surgery.    Discharge Recommendations  Discharge Recommendations: Patient would benefit from continued therapy after discharge  PT Equipment Recommendations  Equipment Needed: No    Assessment  Body Structures, Functions, Activity Limitations Requiring Skilled Therapeutic Intervention: Decreased functional mobility ;Decreased balance;Decreased safe awareness;Decreased endurance  Assessment: Pt presents with decreased activity tolerance due to admission for community acquired pneumonia. Pt lives with spouse and is Independent with mobility with occasional use of cane at baseline. Currently, pt independent bed mobility and supervision for transfers and SBA for ambulation 300ft without device with mild unsteadiness final half of walk w/ fatigue likely due to pneumonia. Pt was able to go up/down 7steps w/ rail and SBA.  Pt should be able to return to previous living arrangement with wife and  ssistance as needed. Pt will benefit from acute care therapy during admission and at discharge to increase functional activity tolerance and safety in home enviroment.  Therapy Prognosis: Good  Requires PT Follow-Up: Yes  Activity Tolerance  Activity Tolerance: Patient limited by endurance;Patient limited by fatigue  Activity Tolerance Comments: Mild unsteady return to room gait from therapy room w/out seated rest break.  Safety Devices  Type of Devices: All fall risk precautions in place;Call light within reach;Chair alarm in place;Gait belt;Patient at risk for falls;Left in chair;Nurse notified (wife in room)  Restraints  Restraints Initially in Place:  2-3 times per week  Current Treatment Recommendations: Strengthening, Functional mobility training, Transfer training, Endurance training, Gait training, Patient/Caregiver education & training, Safety education & training, Stair training, Balance training    Goals  Patient Goals   Patient Goals : Return home  Short Term Goals  Time Frame for Short Term Goals: 14  Short Term Goal 1: Pt will be independent bed mobility.  Short Term Goal 2: Pt will be independent transfers  Short Term Goal 3: Pt will be independent gait 300ft  Short Term Goal 4: Pt will be independent up/down platform step  Short Term Goal 5: Pt will be independent up/down 4 steps with rail.    Minutes  PT Individual Minutes  Time In: 1050  Time Out: 1118  Minutes: 28  Time Code Minutes  Timed Code Treatment Minutes: 28 Minutes    Electronically signed by Karen Coello PT on 2/27/25 at 12:18 PM EST       Anesthesia Volume In Cc: 6

## 2025-02-27 NOTE — PLAN OF CARE
BRONCHOSPASM/BRONCHOCONSTRICTION    IMPROVE  AERATION/BREATHSOUNDS  ADMINISTER BRONCHODILATOR THERAPY AS APPROPRIATE  ASSESS BREATH SOUNDS  PATIENT EDUCATION AS NEEDEDInhaler / Aerosol Education        [x] Served spacer    [] Provided and reviewed booklet   [x] Good return demonstration per patient   [x] Aerosolized Medications:     Verbal education has been provided in the use, benefits and possible adverse reactions of aerosolized medications used in the treatment of this patient.    [] Other:      Problem: Respiratory - Adult  Goal: Achieves optimal ventilation and oxygenation  2/26/2025 2117 by Lucila Garza RCP  Outcome: Progressing

## 2025-02-27 NOTE — RT PROTOCOL NOTE
RT Inhaler-Nebulizer Bronchodilator Protocol Note    There is a bronchodilator order in the chart from a provider indicating to follow the RT Bronchodilator Protocol and there is an “Initiate RT Inhaler-Nebulizer Bronchodilator Protocol” order as well (see protocol at bottom of note).    CXR Findings:  No results found.    The findings from the last RT Protocol Assessment were as follows:   History Pulmonary Disease: Chronic pulmonary disease  Respiratory Pattern: Dyspnea on exertion or RR 21-25 bpm  Breath Sounds: Intermittent or unilateral wheezes  Cough: Strong, productive  Indication for Bronchodilator Therapy: On home bronchodilators  Bronchodilator Assessment Score: 9    Aerosolized bronchodilator medication orders have been revised according to the RT Inhaler-Nebulizer Bronchodilator Protocol below.    Respiratory Therapist to perform RT Therapy Protocol Assessment initially then follow the protocol.  Repeat RT Therapy Protocol Assessment PRN for score 0-3 or on second treatment, BID, and PRN for scores above 3.    No Indications - adjust the frequency to every 6 hours PRN wheezing or bronchospasm, if no treatments needed after 48 hours then discontinue using Per Protocol order mode.     If indication present, adjust the RT bronchodilator orders based on the Bronchodilator Assessment Score as indicated below.  Use Inhaler orders unless patient has one or more of the following: on home nebulizer, not able to hold breath for 10 seconds, is not alert and oriented, cannot activate and use MDI correctly, or respiratory rate 25 breaths per minute or more, then use the equivalent nebulizer order(s) with same Frequency and PRN reasons based on the score.  If a patient is on this medication at home then do not decrease Frequency below that used at home.    0-3 - enter or revise RT bronchodilator order(s) to equivalent RT Bronchodilator order with Frequency of every 4 hours PRN for wheezing or increased work of

## 2025-02-27 NOTE — PROGRESS NOTES
Occupational Therapy    [facility]  Occupational Therapy Not Seen Note    DATE: 2025    NAME: Bill Rodney  MRN: [id number]   : 1935      Patient not seen this date for Occupational Therapy due to:    Other: Pt currently receiving EEG. Will continue to follow for OT treatment    Next Scheduled Treatment: 25    Electronically signed by ISABEL GARZA on 2025 at 2:52 PM

## 2025-02-27 NOTE — CONSULTS
88 yo male with mental status changes . He is admitted on February 26 with increasing dyspnea with trouble breathing found to have COPD exacerbation with bibasilar pneumonia placed on IV zithromax and rocephin . He has asthma , COPD , GERD , DVT and Factor V Leiden on coumadin , CHF , HTN . He has been found to have new onset of atrial fibrillation . INR 1.8 . In hospital he has developed sundowning with confusion and paranoid ideation . He denies headache or focal motor ,sensory , bulbar or visual complaint  Significant medications coumadin      Past Medical History:   Diagnosis Date    Abdominal hernia 10/8/2020    Achilles bursitis 10/8/2020    Acute sinusitis 8/7/2017    Adult body mass index 25-29 10/8/2020    Allergic rhinitis due to pollen 10/8/2020    Anesthesia of skin 10/8/2020    Anterior subcapsular polar senile cataract 10/8/2020    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    Asthma 8/7/2017    Atopic dermatitis 10/8/2020    Backache 10/8/2020    Bacterial pneumonia 8/7/2017    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/1/2013    Benign prostatic hyperplasia 4/12/2013    Candidiasis of mouth 8/7/2017    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 9/8/2014    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 2/13/2020    Chronic obstructive lung disease (HCC) 11/8/2011    Chronic obstructive pulmonary disease (HCC) 10/8/2020    Chronic rhinitis 2/13/2020    Chronic sinusitis 2/13/2020    Deep vein thrombosis of lower extremity (HCC) 8/7/2017    Diarrhea 10/8/2020    Disorder of arteries and arterioles, unspecified 10/8/2020    Diverticulitis of colon 10/8/2020    Dizziness and giddiness 12/19/2011    Dysphagia 8/7/2017    Enlarged prostate 10/8/2020    Environmental allergies 8/7/2017    Fatigue 10/8/2020    Gabe hematuria 4/12/2013    Frostbite with tissue necrosis of nose 10/8/2020    Gastroesophageal reflux disease 8/7/2017    Heartburn 11/8/2011    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

## 2025-02-27 NOTE — PROGRESS NOTES
Pharmacy Note  Warfarin Consult follow-up      Recent Labs     02/27/25  0441   INR 2.6*     Recent Labs     02/25/25  0551 02/26/25  0526 02/27/25  0441   HGB 11.3* 11.2* 10.8*   HCT 33.4* 33.7* 32.1*    203 205       Current warfarin drug-drug interactions: azithromycin, ceftriaxone, solu-medrol, synthroid(home med)      Date INR Dose   2/25 1.6 4 mg   2/26 1.8 4 mg   2/27 2.6 2 mg       Notes:                   -INR therapeutic at 2.6 large increase from 1.8  -Will plan to give a smaller dose of 2 mg today, due to the large INR increase  -Daily PT/INR while inpatient.     KENNY SAMANIEGO, [facility]

## 2025-02-27 NOTE — PROGRESS NOTES
PULMONARY & CRITICAL CARE MEDICINE PROGRESS NOTE     Patient:  Bill Rodney  MRN: [id number]  Admit date: 2/24/2025  Primary Care Physician: Kareem Pal MD  CODE Status: Full Code  LOS: 3    SUBJECTIVE     CHIEF COMPLAINT/REASON FOR CONSULT: Respiratory Distress (Pt reports difficulty breathing for about a week, but today got worse, pt has history of copd and asthma)    HISTORY OF PRESENT ILLNESS:  The patient is a 89 y.o. male with multiple comorbidities with admitted with worsening shortness of breath.  Patient has underlying COPD and history of occupational exposure to chlorine.  He follows up with Dr. Kramer in pulmonary clinic.  Evaluation in the ED showed elevated blood pressure 192/78.  Lab workup showed normal WBC count of 6.6, BMP was unremarkable.  Rapid COVID and flu test are negative.  Chest x-ray reported to show bibasilar infiltrate.  He has been started on Rocephin/azithromycin and IV Solu-Medrol.  He is currently on room air.    INTERVAL HISTORY:    2/27/2025   Patient on room air  Reports subjective improvement in symptoms  Remains on Rocephin/azithromycin along with IV Solu-Medrol    REVIEW OF SYSTEMS:  Review of Systems   Constitutional:  Positive for fatigue. Negative for fever.   HENT:  Negative for congestion and voice change.    Eyes:  Negative for visual disturbance.   Respiratory:  Positive for shortness of breath.    Cardiovascular:  Negative for chest pain and leg swelling.   Gastrointestinal:  Negative for abdominal pain, nausea and vomiting.   Genitourinary:  Negative for dysuria and urgency.   Musculoskeletal:  Negative for back pain and joint swelling.   Skin:  Negative for rash.   Neurological:  Positive for weakness.   Hematological:  Does not bruise/bleed easily.   Psychiatric/Behavioral:  Negative for agitation and confusion.        OBJECTIVE     VITAL SIGNS:   LAST:  BP (!) 184/71   Pulse 58   Temp 97.9 °F (36.6 °C) (Oral)   Resp 18   Ht 1.727 m (5' 8\")   Wt

## 2025-02-27 NOTE — PLAN OF CARE
Problem: Safety - Adult  Goal: Free from fall injury  2/27/2025 1758 by Laly Major RN  Outcome: Progressing     Problem: Chronic Conditions and Co-morbidities  Goal: Patient's chronic conditions and co-morbidity symptoms are monitored and maintained or improved  2/27/2025 1758 by Laly Major RN  Outcome: Progressing  Flowsheets (Taken 2/27/2025 0950)  Care Plan - Patient's Chronic Conditions and Co-Morbidity Symptoms are Monitored and Maintained or Improved:   Monitor and assess patient's chronic conditions and comorbid symptoms for stability, deterioration, or improvement   Collaborate with multidisciplinary team to address chronic and comorbid conditions and prevent exacerbation or deterioration     Problem: Discharge Planning  Goal: Discharge to home or other facility with appropriate resources  2/27/2025 1758 by Laly Major RN  Outcome: Progressing  Flowsheets (Taken 2/27/2025 0950)  Discharge to home or other facility with appropriate resources: Identify barriers to discharge with patient and caregiver     Problem: Pain  Goal: Verbalizes/displays adequate comfort level or baseline comfort level  2/27/2025 1758 by Laly Major RN  Outcome: Progressing     Problem: Respiratory - Adult  Goal: Achieves optimal ventilation and oxygenation  2/27/2025 1758 by Laly Major RN  Outcome: Progressing     Problem: ABCDS Injury Assessment  Goal: Absence of physical injury  2/27/2025 1758 by Laly Major RN  Outcome: Progressing     Problem: Skin/Tissue Integrity  Goal: Skin integrity remains intact  Description: 1.  Monitor for areas of redness and/or skin breakdown  2.  Assess vascular access sites hourly  3.  Every 4-6 hours minimum:  Change oxygen saturation probe site  4.  Every 4-6 hours:  If on nasal continuous positive airway pressure, respiratory therapy assess nares and determine need for appliance change or resting period  Outcome: Progressing

## 2025-02-27 NOTE — PROCEDURES
EEG REPORT       Patient: Bill Rodney Age: 89 y.o.  MRN: [id number]      Referring Provider: No ref. provider found    History: This routine 30 minute scalp EEG was recorded with video- monitoring for a 89 y.o.. male  who presented with encephalopathy. This EEG was performed to evaluate for focal and epileptiform abnormalities.     Bill Rodney   Current Facility-Administered Medications   Medication Dose Route Frequency Provider Last Rate Last Admin    QUEtiapine (SEROQUEL) tablet 12.5 mg  12.5 mg Oral Nightly Evangelista Araujo MD        warfarin (COUMADIN) tablet 2 mg  2 mg Oral Once Jan Bello MD        albuterol (PROVENTIL) (2.5 MG/3ML) 0.083% nebulizer solution 2.5 mg  2.5 mg Nebulization Q4H PRN Gareth Ocampo MD        guaiFENesin-dextromethorphan (ROBITUSSIN DM) 100-10 MG/5ML syrup 5 mL  5 mL Oral Q4H PRN Gareth Ocampo MD   5 mL at 02/26/25 2115    famotidine (PEPCID) tablet 20 mg  20 mg Oral Daily Gareth Ocampo MD   20 mg at 02/27/25 1003    benzocaine-menthol (CEPACOL SORE THROAT) lozenge 1 lozenge  1 lozenge Oral Q2H PRN Gareth Ocampo MD   1 lozenge at 02/26/25 1656    lisinopril (PRINIVIL;ZESTRIL) tablet 20 mg  20 mg Oral Daily Jan Bello MD   20 mg at 02/27/25 1003    methylPREDNISolone sodium (PF) (SOLU-MEDROL PF) injection 60 mg  60 mg IntraVENous Q12H Jan Bello MD   60 mg at 02/27/25 1007    albuterol (PROVENTIL) (2.5 MG/3ML) 0.083% nebulizer solution 2.5 mg  2.5 mg Nebulization Q4H PRN Jan Bello MD        ipratropium 0.5 mg-albuterol 2.5 mg (DUONEB) nebulizer solution 1 Dose  1 Dose Inhalation TID RT Jan Bello MD   1 Dose at 02/27/25 1325    sodium chloride flush 0.9 % injection 5-40 mL  5-40 mL IntraVENous 2 times per day Jan Bello MD   10 mL at 02/27/25 1008    sodium chloride flush 0.9 % injection 5-40 mL  5-40 mL IntraVENous PRN Jan Bello MD        0.9 % sodium chloride infusion   IntraVENous PRN Jan Bello,  injection 10 mg  10 mg IntraVENous Q6H PRN Jan Bello MD   10 mg at 02/27/25 1245    warfarin placeholder: dosing by pharmacy   Oral RX Placeholder Jan Bello MD            Technical Description: This is a 21 channel digital EEG recording with time-locked video. Electrodes were placed in accordance with the 10-20 International System of Electrode Placement. Single lead EKG monitoring as well as temporal electrodes were included.    The patient was not sleep deprived. This recording was obtained during wakefulness.  EEG Description: The dominant background activity during maximal recorded wakefulness consisted of bioccipitally dominant 10-11 Hz, 15-20 uV symmetric, regular activity that was reactive to eye opening. During drowsiness, the background rhythm waxed and waned and there were periods of slowing. Deeper sleep was not seen.    Photic stimulation did not produce driving response.   Hyperventilation - was not preformed.     No abnormalities were activated by photic stimulation     The EKG channel demonstrated a normal sinus rhythm.    Interpretation  This EEG was normal in wakefulness and drowsiness.     Clinical correlation  This EEG was normal. No focal or epileptiform abnormalities were seen.    Jada Keen MD     I have personally reviewed the EEG of Bill Rodney. I agree with the documentation by the resident with changes made to the note as needed.         Sol Leonardo MD  Neurology    This note is created with the assistance of a speech-recognition program. While intending to generate a document that actually reflects the content of the visit, the document can still have some errors including those of syntax and sound a- like substitutions which may escape proofreading.  In such instances, actual meaning can be extrapolated by contextual derivation.

## 2025-02-27 NOTE — PROGRESS NOTES
[facility]  IN-PATIENT SERVICE   [facility]    Progress Note    2/27/2025    10:38 AM    Name:   Bill Rodney  MRN:     [id number]     Acct:      [id number]   Room:   [address] [place] [address] Day:  3  Admit Date:  2/24/2025  6:56 PM    PCP:   Kareem Pal MD  Code Status:  Full Code    Subjective:     Overnight patient had some sundowning events but no issues reported from nursing staff to be overnight.  This morning patient is having some altered mental status is still AOx3 but has some paranoia thinks that some of the nurses are good guys and some are bad guys.  He does seems to be slightly off.  No signs of a stroke.  His breathing seems to be okay he is on room air.    Review of Systems   Constitutional:  Negative for chills and fever.   HENT:  Negative for hearing loss, postnasal drip, rhinorrhea and sore throat.    Eyes:  Negative for visual disturbance.   Respiratory:  Negative for chest tightness and shortness of breath.    Cardiovascular:  Negative for chest pain and palpitations.   Gastrointestinal:  Negative for constipation, diarrhea, nausea and vomiting.   Genitourinary:  Negative for dysuria.   Musculoskeletal:  Negative for arthralgias.   Skin:  Negative for rash.   Neurological:  Negative for dizziness, weakness, light-headedness, numbness and headaches.   Psychiatric/Behavioral:          See HPI.  Difficult to obtain full ROS due to altered mental status wife also helped with ROS.         Medications:     Allergies:    Allergies   Allergen Reactions   • Levofloxacin Swelling   • Codeine Hives and Rash   • Pulmicort [Budesonide] Rash     Also itchy   • Cashew Nut Oil    • Levaquin  [Levofloxacin In D5w] Other (See Comments)   • Peanut-Containing Drug Products Hives       Current Meds:   Scheduled Meds:   • famotidine  20 mg Oral Daily   • lisinopril  20 mg Oral Daily   • methylPREDNISolone  60 mg IntraVENous Q12H   • ipratropium 0.5 mg-albuterol 2.5 mg  1 Dose Inhalation  82 85 85  --   --  85   CALCIUM 8.9 8.5* 8.6  --   --  8.6   TROPHS  --   --   --  38* 38*  --      Recent Labs     02/25/25  0551 02/26/25  0526 02/27/25  0441   AST 19 20 24   ALT 12 13 16   ALKPHOS 86 93 80   BILITOT 0.6 0.4 0.4   BILIDIR  --  0.1  --      ABG:  Lab Results   Component Value Date/Time    PH 6.5 08/26/2024 04:30 PM     Lab Results   Component Value Date/Time    SPECIAL 0.5ML LEFT ARM 04/10/2024 07:51 PM     Lab Results   Component Value Date/Time    CULTURE NO GROWTH 5 DAYS 04/10/2024 07:51 PM       Radiology:  XR CHEST PORTABLE    Result Date: 2/24/2025  Bibasilar airspace disease.       [unfilled]    Physical Examination:     General appearance:  alert, cooperative and no distress  Mental Status:  oriented to person, place and time and normal affect  Lungs:  clear to auscultation bilaterally, normal effort  Heart:  regular rate and rhythm, no murmur  Abdomen:  soft, nontender, nondistended, normal bowel sounds, no masses, hepatomegaly, splenomegaly  Extremities:  no edema, redness, tenderness in the calves  Skin:  no gross lesions, rashes, induration  Neuro: AOx3 with no focal neurologic deficits.  Cranial nerves II through XII intact bilaterally.  Has some altered mental status at times and disordered thinking.    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  Seems to be doing well.  HTN-seems to be well-controlled  Chest pain/GERD- TUMS  ordered- EKG reviewed showing Afib and chronic inversion T waves in lateral leads-troponins were negative x 2.  No chest pain this morning.  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  Patient is having some episodes where he thinks some people are good guys a singular bad guys.  Has some sundowning behaviors as well please alert and oriented x 3 but still having some altered mental status at times.  Will consult neurology for further evaluation and workup and for any medication management if needed.    Medical Decision Making: High Cruz Gill MD  2/27/2025  10:38 AM

## 2025-02-27 NOTE — PLAN OF CARE
Problem: Safety - Adult  Goal: Free from fall injury  2/27/2025 0633 by Becky Cardozo RN  Outcome: Progressing  2/26/2025 1636 by Laly Major RN  Outcome: Progressing     Problem: Chronic Conditions and Co-morbidities  Goal: Patient's chronic conditions and co-morbidity symptoms are monitored and maintained or improved  2/27/2025 0633 by Becky Cardozo RN  Outcome: Progressing  Flowsheets (Taken 2/26/2025 2000)  Care Plan - Patient's Chronic Conditions and Co-Morbidity Symptoms are Monitored and Maintained or Improved: Monitor and assess patient's chronic conditions and comorbid symptoms for stability, deterioration, or improvement  2/26/2025 1636 by Laly Major RN  Outcome: Progressing  Flowsheets (Taken 2/26/2025 0900)  Care Plan - Patient's Chronic Conditions and Co-Morbidity Symptoms are Monitored and Maintained or Improved:   Collaborate with multidisciplinary team to address chronic and comorbid conditions and prevent exacerbation or deterioration   Monitor and assess patient's chronic conditions and comorbid symptoms for stability, deterioration, or improvement     Problem: Discharge Planning  Goal: Discharge to home or other facility with appropriate resources  2/27/2025 0633 by Becky Cardozo RN  Outcome: Progressing  Flowsheets (Taken 2/26/2025 2000)  Discharge to home or other facility with appropriate resources: Identify barriers to discharge with patient and caregiver  2/26/2025 1636 by Laly Major RN  Outcome: Progressing  Flowsheets (Taken 2/26/2025 0900)  Discharge to home or other facility with appropriate resources:   Identify barriers to discharge with patient and caregiver   Arrange for needed discharge resources and transportation as appropriate   Identify discharge learning needs (meds, wound care, etc)     Problem: Pain  Goal: Verbalizes/displays adequate comfort level or baseline comfort level  2/27/2025 0633 by Becky Cardozo RN  Outcome: Progressing  2/26/2025 1636 by  Portland, Laly, RN  Outcome: Progressing     Problem: Respiratory - Adult  Goal: Achieves optimal ventilation and oxygenation  2/27/2025 0633 by Becky Cardozo RN  Outcome: Progressing  2/26/2025 2117 by Lucila Garza RCP  Outcome: Progressing  Flowsheets (Taken 2/26/2025 2000 by Becky Cardozo RN)  Achieves optimal ventilation and oxygenation: Assess for changes in respiratory status  2/26/2025 1636 by Laly Major RN  Outcome: Progressing  Flowsheets  Taken 2/26/2025 0920 by Coleen Frye RCP  Achieves optimal ventilation and oxygenation:   Assess for changes in respiratory status   Oxygen supplementation based on oxygen saturation or arterial blood gases   Encourage broncho-pulmonary hygiene including cough, deep breathe, incentive spirometry   Assess and instruct to report shortness of breath or any respiratory difficulty   Respiratory therapy support as indicated  Taken 2/26/2025 0900 by Laly Major RN  Achieves optimal ventilation and oxygenation:   Assess for changes in respiratory status   Assess for changes in mentation and behavior   Position to facilitate oxygenation and minimize respiratory effort     Problem: ABCDS Injury Assessment  Goal: Absence of physical injury  2/27/2025 0633 by Becky Cardozo RN  Outcome: Progressing  2/26/2025 1636 by Laly Major RN  Outcome: Progressing

## 2025-02-28 VITALS
BODY MASS INDEX: 21.98 KG/M2 | WEIGHT: 145 LBS | SYSTOLIC BLOOD PRESSURE: 160 MMHG | HEIGHT: 68 IN | OXYGEN SATURATION: 97 % | TEMPERATURE: 97.5 F | RESPIRATION RATE: 18 BRPM | HEART RATE: 68 BPM | DIASTOLIC BLOOD PRESSURE: 67 MMHG

## 2025-02-28 LAB
ALBUMIN SERPL-MCNC: 3.7 G/DL (ref 3.5–5.2)
ALBUMIN/GLOB SERPL: 1.9 {RATIO} (ref 1–2.5)
ALP SERPL-CCNC: 77 U/L (ref 40–129)
ALT SERPL-CCNC: 31 U/L (ref 5–41)
ANION GAP SERPL CALCULATED.3IONS-SCNC: 11 MMOL/L (ref 9–17)
AST SERPL-CCNC: 43 U/L
BASOPHILS # BLD: 0 K/UL (ref 0–0.2)
BASOPHILS NFR BLD: 0 % (ref 0–2)
BILIRUB SERPL-MCNC: 0.4 MG/DL (ref 0.3–1.2)
BUN SERPL-MCNC: 30 MG/DL (ref 8–23)
CALCIUM SERPL-MCNC: 8.4 MG/DL (ref 8.6–10.4)
CHLORIDE SERPL-SCNC: 105 MMOL/L (ref 98–107)
CO2 SERPL-SCNC: 20 MMOL/L (ref 20–31)
CREAT SERPL-MCNC: 0.9 MG/DL (ref 0.7–1.2)
EOSINOPHIL # BLD: 0 K/UL (ref 0–0.4)
EOSINOPHILS RELATIVE PERCENT: 0 % (ref 1–4)
ERYTHROCYTE [DISTWIDTH] IN BLOOD BY AUTOMATED COUNT: 18.6 % (ref 12.5–15.4)
GFR, ESTIMATED: 82 ML/MIN/1.73M2
GLUCOSE SERPL-MCNC: 151 MG/DL (ref 70–99)
HCT VFR BLD AUTO: 33.6 % (ref 41–53)
HGB BLD-MCNC: 11.3 G/DL (ref 13.5–17.5)
INR PPP: 1.5 (ref 0.9–1.2)
LYMPHOCYTES NFR BLD: 0.7 K/UL (ref 1–4.8)
LYMPHOCYTES RELATIVE PERCENT: 9 % (ref 24–44)
MCH RBC QN AUTO: 30.7 PG (ref 26–34)
MCHC RBC AUTO-ENTMCNC: 33.6 G/DL (ref 31–37)
MCV RBC AUTO: 91.3 FL (ref 80–100)
MONOCYTES NFR BLD: 0.6 K/UL (ref 0.1–1.2)
MONOCYTES NFR BLD: 8 % (ref 2–11)
NEUTROPHILS NFR BLD: 83 % (ref 36–66)
NEUTS SEG NFR BLD: 6.3 K/UL (ref 1.8–7.7)
PLATELET # BLD AUTO: 207 K/UL (ref 140–450)
PMV BLD AUTO: 7.5 FL (ref 6–12)
POTASSIUM SERPL-SCNC: 4.4 MMOL/L (ref 3.7–5.3)
PROT SERPL-MCNC: 5.7 G/DL (ref 6.4–8.3)
PROTHROMBIN TIME: 18 SEC (ref 11.8–14.6)
RBC # BLD AUTO: 3.68 M/UL (ref 4.5–5.9)
SODIUM SERPL-SCNC: 136 MMOL/L (ref 135–144)
WBC OTHER # BLD: 7.5 K/UL (ref 3.5–11)

## 2025-02-28 PROCEDURE — 6370000000 HC RX 637 (ALT 250 FOR IP): Performed by: FAMILY MEDICINE

## 2025-02-28 PROCEDURE — 99239 HOSP IP/OBS DSCHRG MGMT >30: CPT | Performed by: FAMILY MEDICINE

## 2025-02-28 PROCEDURE — 94640 AIRWAY INHALATION TREATMENT: CPT

## 2025-02-28 PROCEDURE — 85025 COMPLETE CBC W/AUTO DIFF WBC: CPT

## 2025-02-28 PROCEDURE — 80053 COMPREHEN METABOLIC PANEL: CPT

## 2025-02-28 PROCEDURE — 85610 PROTHROMBIN TIME: CPT

## 2025-02-28 PROCEDURE — 36415 COLL VENOUS BLD VENIPUNCTURE: CPT

## 2025-02-28 PROCEDURE — 99232 SBSQ HOSP IP/OBS MODERATE 35: CPT | Performed by: PSYCHIATRY & NEUROLOGY

## 2025-02-28 PROCEDURE — 94761 N-INVAS EAR/PLS OXIMETRY MLT: CPT

## 2025-02-28 RX ORDER — PREDNISONE 10 MG/1
TABLET ORAL
Qty: 30 TABLET | Refills: 0 | Status: ON HOLD | OUTPATIENT
Start: 2025-02-28

## 2025-02-28 RX ORDER — QUETIAPINE FUMARATE 25 MG/1
12.5 TABLET, FILM COATED ORAL NIGHTLY PRN
Qty: 10 TABLET | Refills: 0 | Status: ON HOLD | OUTPATIENT
Start: 2025-02-28

## 2025-02-28 RX ORDER — LEVOTHYROXINE SODIUM 75 UG/1
75 TABLET ORAL DAILY
Qty: 30 TABLET | Refills: 3 | Status: ON HOLD | OUTPATIENT
Start: 2025-03-01

## 2025-02-28 RX ORDER — LEVOTHYROXINE SODIUM 75 UG/1
75 TABLET ORAL DAILY
Status: DISCONTINUED | OUTPATIENT
Start: 2025-02-28 | End: 2025-02-28 | Stop reason: HOSPADM

## 2025-02-28 RX ORDER — CEFUROXIME AXETIL 500 MG/1
500 TABLET ORAL 2 TIMES DAILY
Qty: 14 TABLET | Refills: 0 | Status: ON HOLD | OUTPATIENT
Start: 2025-02-28 | End: 2025-03-07

## 2025-02-28 RX ORDER — AZITHROMYCIN 250 MG/1
250 TABLET, FILM COATED ORAL DAILY
Qty: 3 TABLET | Refills: 0 | Status: ON HOLD | OUTPATIENT
Start: 2025-02-28 | End: 2025-03-03

## 2025-02-28 RX ORDER — LISINOPRIL 20 MG/1
20 TABLET ORAL DAILY
Qty: 30 TABLET | Refills: 3 | Status: ON HOLD | OUTPATIENT
Start: 2025-03-01

## 2025-02-28 RX ORDER — AZITHROMYCIN 250 MG/1
250 TABLET, FILM COATED ORAL DAILY
Qty: 3 TABLET | Refills: 0
Start: 2025-02-28 | End: 2025-02-28

## 2025-02-28 RX ADMIN — PREDNISONE 50 MG: 5 TABLET ORAL at 10:46

## 2025-02-28 RX ADMIN — ATORVASTATIN CALCIUM 40 MG: 40 TABLET, FILM COATED ORAL at 10:44

## 2025-02-28 RX ADMIN — FAMOTIDINE 20 MG: 20 TABLET, FILM COATED ORAL at 10:47

## 2025-02-28 RX ADMIN — IPRATROPIUM BROMIDE AND ALBUTEROL SULFATE 1 DOSE: .5; 2.5 SOLUTION RESPIRATORY (INHALATION) at 14:35

## 2025-02-28 RX ADMIN — ASPIRIN 81 MG: 81 TABLET, COATED ORAL at 10:47

## 2025-02-28 RX ADMIN — LEVOTHYROXINE SODIUM 75 MCG: 75 TABLET ORAL at 10:45

## 2025-02-28 RX ADMIN — IPRATROPIUM BROMIDE AND ALBUTEROL SULFATE 1 DOSE: .5; 2.5 SOLUTION RESPIRATORY (INHALATION) at 08:57

## 2025-02-28 RX ADMIN — LISINOPRIL 20 MG: 20 TABLET ORAL at 10:44

## 2025-02-28 RX ADMIN — CARVEDILOL 12.5 MG: 12.5 TABLET, FILM COATED ORAL at 10:47

## 2025-02-28 RX ADMIN — BUDESONIDE AND FORMOTEROL FUMARATE DIHYDRATE 2 PUFF: 160; 4.5 AEROSOL RESPIRATORY (INHALATION) at 08:57

## 2025-02-28 NOTE — CASE COMMUNICATION
Followed up with patient regarding HHC choices. He requested referral to [address], referral sent. Accepted to [address].

## 2025-02-28 NOTE — DISCHARGE SUMMARY
[facility]  IN-PATIENT SERVICE   [facility]    Discharge Summary     Patient ID: Bill Rodney  :  1935   MRN: [id number]     ACCOUNT:  [id number]   Patient's PCP: Kareem Pal MD  Admit Date: 2025   Discharge Date: 2025  Length of Stay: 4  Code Status:  Full Code  Admitting Physician: Jan Bello MD  Discharge Physician: Darby Gibson MD     Active Discharge Diagnoses:     Hospital Problem Lists:  Principal Problem:    Community acquired bilateral lower lobe pneumonia  Active Problems:    Hereditary coagulation factor deficiency (HCC)    Chronic obstructive pulmonary disease (HCC)    Paroxysmal atrial fibrillation (HCC)    Other chest pain    Gastroesophageal reflux disease without esophagitis    Delirium    Paranoia (HCC)  Resolved Problems:    * No resolved hospital problems. *      Admission Condition:  fair     Discharged Condition: good    Hospital Stay:     Hospital Course:  Bill Rodney is a 89 y.o. male who was admitted for the management of Community acquired bilateral lower lobe pneumonia , presented to ER with Respiratory Distress (Pt reports difficulty breathing for about a week, but today got worse, pt has history of copd and asthma)    Patient was started on azithromycin and rocephin as well as solumedrol.  Pulmonology consulted.  Did not require oxygen.  Symptoms improved.  Was able to be discharged on PO abx of ceftin and azithromycin as well as high dose prednisone taper.      He had new onset a.fib without RVR.  He is already on warfarin due to hypercoagulable state.  He is also on coreg.  He will follow up with [facility] cardiology as outpatient.    He developed altered mental status with paranoia regarding staff members.  Neurology was consulted.  CT brain and EEG were normal.  He was started on low dose seroquel.  Most likely hospital-induced delirium.  Wife advised that seroquel could be used as

## 2025-02-28 NOTE — DISCHARGE INSTR - COC
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    Hypotension I95.9    Arteriosclerosis of coronary artery I25.10    Dyspnea on exertion R06.09    Long term current use of anticoagulant therapy Z79.01    Neck sprain S13.9XXA    Acute bronchitis J20.9    Arthritis M19.90    Acute asthma exacerbation J45.901    Subtherapeutic international normalized ratio (INR) R79.1    History of pulmonary embolism Z86.711    CHF (congestive heart failure) ([facility]) I50.9    Elevated brain natriuretic peptide (BNP) level R79.89    Type 2 diabetes mellitus E11.9    Nasal congestion R09.81    Abnormal CT scan, gastrointestinal tract R93.3    Coagulopathy D68.9    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    Community acquired bilateral lower lobe pneumonia J18.9    Paroxysmal atrial fibrillation ([facility]) I48.0    Other chest pain R07.89    Gastroesophageal reflux disease without esophagitis K21.9    Delirium R41.0    Paranoia ([facility]) F22       Isolation/Infection:   Isolation            No Isolation          Patient Infection Status       None to display                     Nurse Assessment:  Last Vital Signs: BP (!) 160/64   Pulse 66   Temp 97.7 °F (36.5 °C) (Oral)   Resp 18   Ht 1.727 m (5' 8\")   Wt 65.8 kg (145 lb)   SpO2 91%   BMI 22.05 kg/m²     Last documented pain score (0-10 scale): Pain Level: 0  Last Weight:   Wt Readings from Last 1 Encounters:   25 65.8 kg (145 lb)     Mental Status:  oriented, alert, coherent, able to concentrate and follow conversation, and disoriented at times worse at night and in mornings    IV Access:  - None    Nursing Mobility/ADLs:  Walking   Independent  Transfer  Independent  Bathing  Independent  Dressing  Independent  Toileting  Independent  Feeding  Independent  Med Admin  Independent  Med Delivery   Whole    Wound Care Documentation and  Care for greater 30 days.     Update Admission H&P: No change in H&P    PHYSICIAN SIGNATURE:  Electronically signed by Darby Gibson MD on 2/28/25 at 10:01 AM EST

## 2025-02-28 NOTE — PLAN OF CARE
Problem: Respiratory - Adult  Goal: Achieves optimal ventilation and oxygenation  2/28/2025 0902 by Madhavi Gomez RCP  Outcome: Progressing  Flowsheets (Taken 2/28/2025 0902)  Achieves optimal ventilation and oxygenation:   Assess for changes in respiratory status   Respiratory therapy support as indicated   Assess for changes in mentation and behavior   Assess and instruct to report shortness of breath or any respiratory difficulty

## 2025-02-28 NOTE — PROGRESS NOTES
Physical Therapy        Physical Therapy Cancel Note      DATE: 2025    NAME: Bill Rodney  MRN: [id number]   : 1935      Patient not seen this date for Physical Therapy due to:    Other: RN defer PT treatment as pt pending discharge this afternoon. PLAN: continue to pursue PT treatment if pt remains in hospital.       Electronically signed by Karen Coello PT on 2025 at 2:12 PM

## 2025-02-28 NOTE — PROGRESS NOTES
Spiritual Health History and Assessment/Progress Note  [facility]    (P) Spiritual/Emotional Needs, Crisis, Initial Encounter, (P) Emotional distress, (P) Life Adjustments, Adjustment to illness,      Name: Bill Rodney MRN: [id number]    Age: 89 y.o.     Sex: male   Language: English   Restorationist: Jehovah's witness   Community acquired bilateral lower lobe pneumonia     Date: 2/27/2025            Total Time Calculated: (P) 20 min              Spiritual Assessment began in [facility]        Referral/Consult From: (P) Rounding   Encounter Overview/Reason: (P) Spiritual/Emotional Needs, Crisis, Initial Encounter  Service Provided For: (P) Patient      Pt. Was awake and alert during visit. Spouse was present during visit. Pt. Was welcoming and open o open to talking about  service , family and marriage commitment.  provided support and pastoral care. Prayer was provided as requested for comfort , encouragement and strength. Good visit.    Dinah, Belief, Meaning:   Patient has beliefs or practices that help with coping during difficult times  Family/Friends have beliefs or practices that help with coping during difficult times      Importance and Influence:  Patient has spiritual/personal beliefs that influence decisions regarding their health  Family/Friends have spiritual/personal beliefs that influence decisions regarding the patient's health    Community:  Patient feels well-supported. Support system includes: Spouse/Partner  Family/Friends feel well-supported. Support system includes: Spouse/Partner    Assessment and Plan of Care:     Patient Interventions include: Facilitated expression of thoughts and feelings and Explored spiritual coping/struggle/distress  Family/Friends Interventions include: Facilitated expression of thoughts and feelings and Explored spiritual coping/struggle/distress    Patient Plan of Care: Spiritual Care available upon further referral  Family/Friends Plan  of Care: Spiritual Care available upon further referral    Electronically signed by Chaplain ROGELIO on 2/27/2025 at 8:17 PM    02/27/25 2012   Encounter Summary   Encounter Overview/Reason Spiritual/Emotional Needs;Crisis;Initial Encounter   Service Provided For Patient   Referral/Consult From [facility] [place]   Support System Spouse   Last Encounter  02/27/25   Complexity of Encounter Moderate   Begin Time 1755   End Time  1815   Total Time Calculated 20 min   Crisis   Type Emotional distress   Spiritual/Emotional needs   Type Spiritual Support;Emotional Distress   Grief, Loss, and Adjustments   Type Life Adjustments;Adjustment to illness   Assessment/Intervention/Outcome   Assessment Anxious;Coping   Intervention Prayer (assurance of)/[place];Sustaining Presence/Ministry of presence;Nurtured Hope;Explored/Affirmed feelings, thoughts, concerns;Discussed belief system/Sikh practices/remigio;Active listening   Outcome Comfort;Coping;Encouraged;Engaged in conversation;Expressed feelings, needs, and concerns;Expressed Gratitude   Plan and Referrals   Plan/Referrals Continue to visit, (comment)

## 2025-02-28 NOTE — PLAN OF CARE
Problem: Safety - Adult  Goal: Free from fall injury  2/28/2025 0018 by Becky Cardozo RN  Outcome: Progressing  2/27/2025 1758 by Laly Major RN  Outcome: Progressing     Problem: Chronic Conditions and Co-morbidities  Goal: Patient's chronic conditions and co-morbidity symptoms are monitored and maintained or improved  2/28/2025 0018 by Becky Cardozo RN  Outcome: Progressing  Flowsheets (Taken 2/27/2025 2000)  Care Plan - Patient's Chronic Conditions and Co-Morbidity Symptoms are Monitored and Maintained or Improved: Monitor and assess patient's chronic conditions and comorbid symptoms for stability, deterioration, or improvement  2/27/2025 1758 by Laly Major RN  Outcome: Progressing  Flowsheets (Taken 2/27/2025 0950)  Care Plan - Patient's Chronic Conditions and Co-Morbidity Symptoms are Monitored and Maintained or Improved:   Monitor and assess patient's chronic conditions and comorbid symptoms for stability, deterioration, or improvement   Collaborate with multidisciplinary team to address chronic and comorbid conditions and prevent exacerbation or deterioration     Problem: Discharge Planning  Goal: Discharge to home or other facility with appropriate resources  2/28/2025 0018 by Becky Cardozo RN  Outcome: Progressing  Flowsheets (Taken 2/27/2025 2000)  Discharge to home or other facility with appropriate resources: Identify barriers to discharge with patient and caregiver  2/27/2025 1758 by Laly Major RN  Outcome: Progressing  Flowsheets (Taken 2/27/2025 0950)  Discharge to home or other facility with appropriate resources: Identify barriers to discharge with patient and caregiver     Problem: Pain  Goal: Verbalizes/displays adequate comfort level or baseline comfort level  2/28/2025 0018 by Becky Cardozo RN  Outcome: Progressing  Flowsheets (Taken 2/27/2025 1937)  Verbalizes/displays adequate comfort level or baseline comfort level: Encourage patient to monitor pain and request

## 2025-02-28 NOTE — PROGRESS NOTES
[facility] Medicine  IN-PATIENT SERVICE   [facility]    Progress Note    2/28/2025    9:27 AM    Name:   Bill Rodney  MRN:     [id number]     Acct:      [id number]   Room:   [address] [place] [address] Day:  4  Admit Date:  2/24/2025  6:56 PM    PCP:   Kareem Pal MD  Code Status:  Full Code    Subjective:     Patient states that he is doing ok this morning but is very tired.  Later in the morning, he was able to get up to chair and is feeling better.  He states he ate all of his breakfast.  He did have some coughing after getting out of bed and walking around.    Medications:     Allergies:    Allergies   Allergen Reactions    Levofloxacin Swelling    Codeine Hives and Rash    Pulmicort [Budesonide] Rash     Also itchy    Cashew Nut Oil     Levaquin  [Levofloxacin In D5w] Other (See Comments)    Peanut-Containing Drug Products Hives       Current Meds:   Scheduled Meds:    levothyroxine  75 mcg Oral Daily    QUEtiapine  12.5 mg Oral Nightly    famotidine  20 mg Oral Daily    lisinopril  20 mg Oral Daily    methylPREDNISolone  60 mg IntraVENous Q12H    ipratropium 0.5 mg-albuterol 2.5 mg  1 Dose Inhalation TID RT    sodium chloride flush  5-40 mL IntraVENous 2 times per day    azithromycin  500 mg IntraVENous Q24H    cefTRIAXone (ROCEPHIN) IV  1,000 mg IntraVENous Q24H    aspirin  81 mg Oral Daily    carvedilol  12.5 mg Oral BID    budesonide-formoterol  2 puff Inhalation BID RT    atorvastatin  40 mg Oral Daily    warfarin placeholder: dosing by pharmacy   Oral RX Placeholder     Continuous Infusions:    sodium chloride       PRN Meds: albuterol, guaiFENesin-dextromethorphan, benzocaine-menthol, albuterol, sodium chloride flush, sodium chloride, potassium chloride **OR** potassium alternative oral replacement **OR** potassium chloride, magnesium sulfate, ondansetron **OR** ondansetron, polyethylene glycol, acetaminophen **OR** acetaminophen, meclizine, tiZANidine,

## 2025-02-28 NOTE — RT PROTOCOL NOTE
RT Inhaler-Nebulizer Bronchodilator Protocol Note    There is a bronchodilator order in the chart from a provider indicating to follow the RT Bronchodilator Protocol and there is an “Initiate RT Inhaler-Nebulizer Bronchodilator Protocol” order as well (see protocol at bottom of note).    CXR Findings:  No results found.    The findings from the last RT Protocol Assessment were as follows:   History Pulmonary Disease: Chronic pulmonary disease  Respiratory Pattern: Dyspnea on exertion or RR 21-25 bpm  Breath Sounds: Intermittent or unilateral wheezes  Cough: Strong, productive  Indication for Bronchodilator Therapy: On home bronchodilators  Bronchodilator Assessment Score: 9    Aerosolized bronchodilator medication orders have been revised according to the RT Inhaler-Nebulizer Bronchodilator Protocol below.    Respiratory Therapist to perform RT Therapy Protocol Assessment initially then follow the protocol.  Repeat RT Therapy Protocol Assessment PRN for score 0-3 or on second treatment, BID, and PRN for scores above 3.    No Indications - adjust the frequency to every 6 hours PRN wheezing or bronchospasm, if no treatments needed after 48 hours then discontinue using Per Protocol order mode.     If indication present, adjust the RT bronchodilator orders based on the Bronchodilator Assessment Score as indicated below.  Use Inhaler orders unless patient has one or more of the following: on home nebulizer, not able to hold breath for 10 seconds, is not alert and oriented, cannot activate and use MDI correctly, or respiratory rate 25 breaths per minute or more, then use the equivalent nebulizer order(s) with same Frequency and PRN reasons based on the score.  If a patient is on this medication at home then do not decrease Frequency below that used at home.    0-3 - enter or revise RT bronchodilator order(s) to equivalent RT Bronchodilator order with Frequency of every 4 hours PRN for wheezing or increased work of  breathing using Per Protocol order mode.        4-6 - enter or revise RT Bronchodilator order(s) to two equivalent RT bronchodilator orders with one order with BID Frequency and one order with Frequency of every 4 hours PRN wheezing or increased work of breathing using Per Protocol order mode.        7-10 - enter or revise RT Bronchodilator order(s) to two equivalent RT bronchodilator orders with one order with TID Frequency and one order with Frequency of every 4 hours PRN wheezing or increased work of breathing using Per Protocol order mode.       11-13 - enter or revise RT Bronchodilator order(s) to one equivalent RT bronchodilator order with QID Frequency and an Albuterol order with Frequency of every 4 hours PRN wheezing or increased work of breathing using Per Protocol order mode.      Greater than 13 - enter or revise RT Bronchodilator order(s) to one equivalent RT bronchodilator order with every 4 hours Frequency and an Albuterol order with Frequency of every 2 hours PRN wheezing or increased work of breathing using Per Protocol order mode.     RT to enter RT Home Evaluation for COPD & MDI Assessment order using Per Protocol order mode.    Electronically signed by Madhavi Gomez RCP on 2/28/2025 at 9:02 AM

## 2025-02-28 NOTE — PLAN OF CARE
Problem: Safety - Adult  Goal: Free from fall injury  2/28/2025 1248 by Laly Major RN  Outcome: Adequate for Discharge     Problem: Chronic Conditions and Co-morbidities  Goal: Patient's chronic conditions and co-morbidity symptoms are monitored and maintained or improved  2/28/2025 1248 by Laly Major RN  Outcome: Adequate for Discharge  Flowsheets (Taken 2/28/2025 1030)  Care Plan - Patient's Chronic Conditions and Co-Morbidity Symptoms are Monitored and Maintained or Improved:   Monitor and assess patient's chronic conditions and comorbid symptoms for stability, deterioration, or improvement   Collaborate with multidisciplinary team to address chronic and comorbid conditions and prevent exacerbation or deterioration     Problem: Discharge Planning  Goal: Discharge to home or other facility with appropriate resources  2/28/2025 1248 by Laly Major RN  Outcome: Adequate for Discharge  Flowsheets (Taken 2/28/2025 1030)  Discharge to home or other facility with appropriate resources:   Identify barriers to discharge with patient and caregiver   Arrange for needed discharge resources and transportation as appropriate   Identify discharge learning needs (meds, wound care, etc)     Problem: Pain  Goal: Verbalizes/displays adequate comfort level or baseline comfort level  2/28/2025 1248 by Laly Major RN  Outcome: Adequate for Discharge     Problem: Respiratory - Adult  Goal: Achieves optimal ventilation and oxygenation  2/28/2025 1248 by Laly Major RN  Outcome: Adequate for Discharge  Flowsheets (Taken 2/28/2025 1030)  Achieves optimal ventilation and oxygenation:   Assess for changes in respiratory status   Assess for changes in mentation and behavior   Oxygen supplementation based on oxygen saturation or arterial blood gases     Problem: ABCDS Injury Assessment  Goal: Absence of physical injury  2/28/2025 1248 by Laly Major RN  Outcome: Adequate for Discharge     Problem: Skin/Tissue  Integrity  Goal: Skin integrity remains intact  Description: 1.  Monitor for areas of redness and/or skin breakdown  2.  Assess vascular access sites hourly  3.  Every 4-6 hours minimum:  Change oxygen saturation probe site  4.  Every 4-6 hours:  If on nasal continuous positive airway pressure, respiratory therapy assess nares and determine need for appliance change or resting period  2/28/2025 1248 by Laly Major RN  Outcome: Adequate for Discharge  Flowsheets (Taken 2/28/2025 1030)  Skin Integrity Remains Intact:   Monitor for areas of redness and/or skin breakdown   Assess vascular access sites hourly

## 2025-03-02 ENCOUNTER — APPOINTMENT (OUTPATIENT)
Dept: GENERAL RADIOLOGY | Age: 89
DRG: 191 | End: 2025-03-02
Payer: MEDICARE

## 2025-03-02 ENCOUNTER — HOSPITAL ENCOUNTER (INPATIENT)
Age: 89
LOS: 3 days | Discharge: NURSING, REHAB OR LONG-TERM CARE | DRG: 191 | End: 2025-03-05
Attending: EMERGENCY MEDICINE | Admitting: FAMILY MEDICINE
Payer: MEDICARE

## 2025-03-02 DIAGNOSIS — J43.9 PULMONARY EMPHYSEMA, UNSPECIFIED EMPHYSEMA TYPE (HCC): Primary | ICD-10-CM

## 2025-03-02 DIAGNOSIS — I50.32 CHRONIC DIASTOLIC CONGESTIVE HEART FAILURE (HCC): ICD-10-CM

## 2025-03-02 PROBLEM — J44.1 CHRONIC OBSTRUCTIVE PULMONARY DISEASE WITH ACUTE EXACERBATION (HCC): Status: ACTIVE | Noted: 2025-03-02

## 2025-03-02 LAB
ALBUMIN SERPL-MCNC: 3.8 G/DL (ref 3.5–5.2)
ALBUMIN/GLOB SERPL: 1.9 {RATIO} (ref 1–2.5)
ALP SERPL-CCNC: 96 U/L (ref 40–129)
ALT SERPL-CCNC: 145 U/L (ref 5–41)
ANION GAP SERPL CALCULATED.3IONS-SCNC: 12 MMOL/L (ref 9–17)
AST SERPL-CCNC: 99 U/L
BASOPHILS # BLD: 0 K/UL (ref 0–0.2)
BASOPHILS NFR BLD: 0 % (ref 0–2)
BILIRUB SERPL-MCNC: 0.7 MG/DL (ref 0.3–1.2)
BUN SERPL-MCNC: 26 MG/DL (ref 8–23)
CALCIUM SERPL-MCNC: 8.7 MG/DL (ref 8.6–10.4)
CHLORIDE SERPL-SCNC: 106 MMOL/L (ref 98–107)
CO2 SERPL-SCNC: 21 MMOL/L (ref 20–31)
CREAT SERPL-MCNC: 0.7 MG/DL (ref 0.7–1.2)
EOSINOPHIL # BLD: 0 K/UL (ref 0–0.4)
EOSINOPHILS RELATIVE PERCENT: 0 % (ref 1–4)
ERYTHROCYTE [DISTWIDTH] IN BLOOD BY AUTOMATED COUNT: 16.8 % (ref 12.5–15.4)
GFR, ESTIMATED: 89 ML/MIN/1.73M2
GLUCOSE SERPL-MCNC: 136 MG/DL (ref 70–99)
HCT VFR BLD AUTO: 34.9 % (ref 41–53)
HGB BLD-MCNC: 11.7 G/DL (ref 13.5–17.5)
LYMPHOCYTES NFR BLD: 0.75 K/UL (ref 1–4.8)
LYMPHOCYTES RELATIVE PERCENT: 9 % (ref 24–44)
MCH RBC QN AUTO: 30.3 PG (ref 26–34)
MCHC RBC AUTO-ENTMCNC: 33.5 G/DL (ref 31–37)
MCV RBC AUTO: 90.4 FL (ref 80–100)
MONOCYTES NFR BLD: 0.83 K/UL (ref 0.1–0.8)
MONOCYTES NFR BLD: 10 % (ref 1–7)
MORPHOLOGY: NORMAL
NEUTROPHILS NFR BLD: 81 % (ref 36–66)
NEUTS SEG NFR BLD: 6.72 K/UL (ref 1.8–7.7)
PLATELET # BLD AUTO: 195 K/UL (ref 140–450)
PMV BLD AUTO: 9.7 FL (ref 8–14)
POTASSIUM SERPL-SCNC: 4.7 MMOL/L (ref 3.7–5.3)
PROT SERPL-MCNC: 5.8 G/DL (ref 6.4–8.3)
RBC # BLD AUTO: 3.86 M/UL (ref 4.5–5.9)
SODIUM SERPL-SCNC: 139 MMOL/L (ref 135–144)
WBC OTHER # BLD: 8.3 K/UL (ref 3.5–11)

## 2025-03-02 PROCEDURE — 99285 EMERGENCY DEPT VISIT HI MDM: CPT

## 2025-03-02 PROCEDURE — 80053 COMPREHEN METABOLIC PANEL: CPT

## 2025-03-02 PROCEDURE — 94640 AIRWAY INHALATION TREATMENT: CPT

## 2025-03-02 PROCEDURE — 6370000000 HC RX 637 (ALT 250 FOR IP): Performed by: EMERGENCY MEDICINE

## 2025-03-02 PROCEDURE — 96374 THER/PROPH/DIAG INJ IV PUSH: CPT

## 2025-03-02 PROCEDURE — 6360000002 HC RX W HCPCS: Performed by: EMERGENCY MEDICINE

## 2025-03-02 PROCEDURE — 2500000003 HC RX 250 WO HCPCS: Performed by: FAMILY MEDICINE

## 2025-03-02 PROCEDURE — 71045 X-RAY EXAM CHEST 1 VIEW: CPT

## 2025-03-02 PROCEDURE — 94761 N-INVAS EAR/PLS OXIMETRY MLT: CPT

## 2025-03-02 PROCEDURE — 1200000000 HC SEMI PRIVATE

## 2025-03-02 PROCEDURE — 36415 COLL VENOUS BLD VENIPUNCTURE: CPT

## 2025-03-02 PROCEDURE — 2580000003 HC RX 258: Performed by: FAMILY MEDICINE

## 2025-03-02 PROCEDURE — 6370000000 HC RX 637 (ALT 250 FOR IP): Performed by: FAMILY MEDICINE

## 2025-03-02 PROCEDURE — 6360000002 HC RX W HCPCS: Performed by: FAMILY MEDICINE

## 2025-03-02 PROCEDURE — 2700000000 HC OXYGEN THERAPY PER DAY

## 2025-03-02 PROCEDURE — 85025 COMPLETE CBC W/AUTO DIFF WBC: CPT

## 2025-03-02 PROCEDURE — 2580000003 HC RX 258: Performed by: EMERGENCY MEDICINE

## 2025-03-02 RX ORDER — PREDNISONE 20 MG/1
10 TABLET ORAL 2 TIMES DAILY
Status: DISCONTINUED | OUTPATIENT
Start: 2025-03-02 | End: 2025-03-05 | Stop reason: HOSPADM

## 2025-03-02 RX ORDER — POTASSIUM CHLORIDE 7.45 MG/ML
10 INJECTION INTRAVENOUS PRN
Status: DISCONTINUED | OUTPATIENT
Start: 2025-03-02 | End: 2025-03-05 | Stop reason: HOSPADM

## 2025-03-02 RX ORDER — IPRATROPIUM BROMIDE AND ALBUTEROL SULFATE 2.5; .5 MG/3ML; MG/3ML
1 SOLUTION RESPIRATORY (INHALATION) ONCE
Status: COMPLETED | OUTPATIENT
Start: 2025-03-02 | End: 2025-03-02

## 2025-03-02 RX ORDER — MECLIZINE HCL 12.5 MG 12.5 MG/1
12.5 TABLET ORAL 3 TIMES DAILY PRN
Status: DISCONTINUED | OUTPATIENT
Start: 2025-03-02 | End: 2025-03-05 | Stop reason: HOSPADM

## 2025-03-02 RX ORDER — DEXAMETHASONE SODIUM PHOSPHATE 10 MG/ML
10 INJECTION, SOLUTION INTRAMUSCULAR; INTRAVENOUS ONCE
Status: COMPLETED | OUTPATIENT
Start: 2025-03-02 | End: 2025-03-02

## 2025-03-02 RX ORDER — ACETAMINOPHEN 650 MG/1
650 SUPPOSITORY RECTAL EVERY 6 HOURS PRN
Status: DISCONTINUED | OUTPATIENT
Start: 2025-03-02 | End: 2025-03-05 | Stop reason: HOSPADM

## 2025-03-02 RX ORDER — ONDANSETRON 2 MG/ML
4 INJECTION INTRAMUSCULAR; INTRAVENOUS EVERY 6 HOURS PRN
Status: DISCONTINUED | OUTPATIENT
Start: 2025-03-02 | End: 2025-03-05 | Stop reason: HOSPADM

## 2025-03-02 RX ORDER — IPRATROPIUM BROMIDE AND ALBUTEROL SULFATE 2.5; .5 MG/3ML; MG/3ML
SOLUTION RESPIRATORY (INHALATION)
Status: DISPENSED
Start: 2025-03-02 | End: 2025-03-03

## 2025-03-02 RX ORDER — CALCIUM CARBONATE/VITAMIN D3 600 MG-10
1 TABLET ORAL DAILY
Status: DISCONTINUED | OUTPATIENT
Start: 2025-03-03 | End: 2025-03-05 | Stop reason: HOSPADM

## 2025-03-02 RX ORDER — ONDANSETRON 4 MG/1
4 TABLET, ORALLY DISINTEGRATING ORAL EVERY 8 HOURS PRN
Status: DISCONTINUED | OUTPATIENT
Start: 2025-03-02 | End: 2025-03-02

## 2025-03-02 RX ORDER — M-VIT,TX,IRON,MINS/CALC/FOLIC 27MG-0.4MG
1 TABLET ORAL DAILY
Status: DISCONTINUED | OUTPATIENT
Start: 2025-03-02 | End: 2025-03-05 | Stop reason: HOSPADM

## 2025-03-02 RX ORDER — 0.9 % SODIUM CHLORIDE 0.9 %
1000 INTRAVENOUS SOLUTION INTRAVENOUS ONCE
Status: COMPLETED | OUTPATIENT
Start: 2025-03-02 | End: 2025-03-02

## 2025-03-02 RX ORDER — LEVOTHYROXINE SODIUM 75 UG/1
75 TABLET ORAL DAILY
Status: DISCONTINUED | OUTPATIENT
Start: 2025-03-03 | End: 2025-03-03

## 2025-03-02 RX ORDER — LISINOPRIL 10 MG/1
20 TABLET ORAL DAILY
Status: DISCONTINUED | OUTPATIENT
Start: 2025-03-02 | End: 2025-03-05 | Stop reason: HOSPADM

## 2025-03-02 RX ORDER — ASPIRIN 81 MG/1
81 TABLET ORAL DAILY
Status: DISCONTINUED | OUTPATIENT
Start: 2025-03-02 | End: 2025-03-05 | Stop reason: HOSPADM

## 2025-03-02 RX ORDER — SODIUM CHLORIDE 0.9 % (FLUSH) 0.9 %
5-40 SYRINGE (ML) INJECTION PRN
Status: DISCONTINUED | OUTPATIENT
Start: 2025-03-02 | End: 2025-03-05

## 2025-03-02 RX ORDER — SODIUM CHLORIDE 9 MG/ML
INJECTION, SOLUTION INTRAVENOUS PRN
Status: DISCONTINUED | OUTPATIENT
Start: 2025-03-02 | End: 2025-03-05 | Stop reason: HOSPADM

## 2025-03-02 RX ORDER — BUDESONIDE AND FORMOTEROL FUMARATE DIHYDRATE 80; 4.5 UG/1; UG/1
2 AEROSOL RESPIRATORY (INHALATION)
Status: DISCONTINUED | OUTPATIENT
Start: 2025-03-02 | End: 2025-03-05 | Stop reason: HOSPADM

## 2025-03-02 RX ORDER — ACETAMINOPHEN 325 MG/1
650 TABLET ORAL EVERY 6 HOURS PRN
Status: DISCONTINUED | OUTPATIENT
Start: 2025-03-02 | End: 2025-03-05 | Stop reason: HOSPADM

## 2025-03-02 RX ORDER — MAGNESIUM SULFATE IN WATER 40 MG/ML
2000 INJECTION, SOLUTION INTRAVENOUS PRN
Status: DISCONTINUED | OUTPATIENT
Start: 2025-03-02 | End: 2025-03-05 | Stop reason: HOSPADM

## 2025-03-02 RX ORDER — WARFARIN SODIUM 5 MG/1
5 TABLET ORAL DAILY
Status: DISCONTINUED | OUTPATIENT
Start: 2025-03-02 | End: 2025-03-02

## 2025-03-02 RX ORDER — POLYETHYLENE GLYCOL 3350 17 G/17G
17 POWDER, FOR SOLUTION ORAL DAILY PRN
Status: DISCONTINUED | OUTPATIENT
Start: 2025-03-02 | End: 2025-03-05 | Stop reason: HOSPADM

## 2025-03-02 RX ORDER — QUETIAPINE FUMARATE 25 MG/1
12.5 TABLET, FILM COATED ORAL NIGHTLY PRN
Status: DISCONTINUED | OUTPATIENT
Start: 2025-03-02 | End: 2025-03-05 | Stop reason: HOSPADM

## 2025-03-02 RX ORDER — MIDODRINE HYDROCHLORIDE 5 MG/1
2.5 TABLET ORAL 2 TIMES DAILY
Status: DISCONTINUED | OUTPATIENT
Start: 2025-03-02 | End: 2025-03-03

## 2025-03-02 RX ORDER — ATORVASTATIN CALCIUM 10 MG/1
10 TABLET, FILM COATED ORAL DAILY
Status: DISCONTINUED | OUTPATIENT
Start: 2025-03-02 | End: 2025-03-05 | Stop reason: HOSPADM

## 2025-03-02 RX ORDER — ONDANSETRON 4 MG/1
4 TABLET, ORALLY DISINTEGRATING ORAL EVERY 8 HOURS PRN
Status: DISCONTINUED | OUTPATIENT
Start: 2025-03-02 | End: 2025-03-05 | Stop reason: HOSPADM

## 2025-03-02 RX ORDER — IPRATROPIUM BROMIDE AND ALBUTEROL SULFATE 2.5; .5 MG/3ML; MG/3ML
1 SOLUTION RESPIRATORY (INHALATION)
Status: DISCONTINUED | OUTPATIENT
Start: 2025-03-02 | End: 2025-03-05 | Stop reason: HOSPADM

## 2025-03-02 RX ORDER — POTASSIUM CHLORIDE 1500 MG/1
40 TABLET, EXTENDED RELEASE ORAL PRN
Status: DISCONTINUED | OUTPATIENT
Start: 2025-03-02 | End: 2025-03-05 | Stop reason: HOSPADM

## 2025-03-02 RX ORDER — CARVEDILOL 3.12 MG/1
6.25 TABLET ORAL 2 TIMES DAILY WITH MEALS
Status: DISCONTINUED | OUTPATIENT
Start: 2025-03-02 | End: 2025-03-03

## 2025-03-02 RX ORDER — SODIUM CHLORIDE 9 MG/ML
INJECTION, SOLUTION INTRAVENOUS CONTINUOUS
Status: ACTIVE | OUTPATIENT
Start: 2025-03-02 | End: 2025-03-03

## 2025-03-02 RX ORDER — SODIUM CHLORIDE 0.9 % (FLUSH) 0.9 %
5-40 SYRINGE (ML) INJECTION EVERY 12 HOURS SCHEDULED
Status: DISCONTINUED | OUTPATIENT
Start: 2025-03-02 | End: 2025-03-05

## 2025-03-02 RX ADMIN — SODIUM CHLORIDE, PRESERVATIVE FREE 10 ML: 5 INJECTION INTRAVENOUS at 20:30

## 2025-03-02 RX ADMIN — IPRATROPIUM BROMIDE AND ALBUTEROL SULFATE 1 DOSE: .5; 2.5 SOLUTION RESPIRATORY (INHALATION) at 14:05

## 2025-03-02 RX ADMIN — SODIUM CHLORIDE 1000 ML: 0.9 INJECTION, SOLUTION INTRAVENOUS at 13:53

## 2025-03-02 RX ADMIN — LISINOPRIL 20 MG: 10 TABLET ORAL at 18:27

## 2025-03-02 RX ADMIN — SODIUM CHLORIDE: 0.9 INJECTION, SOLUTION INTRAVENOUS at 23:57

## 2025-03-02 RX ADMIN — WARFARIN SODIUM 3.5 MG: 2.5 TABLET ORAL at 18:28

## 2025-03-02 RX ADMIN — QUETIAPINE FUMARATE 12.5 MG: 25 TABLET ORAL at 20:27

## 2025-03-02 RX ADMIN — DEXAMETHASONE SODIUM PHOSPHATE 10 MG: 10 INJECTION INTRAMUSCULAR; INTRAVENOUS at 13:51

## 2025-03-02 RX ADMIN — PREDNISONE 10 MG: 20 TABLET ORAL at 20:27

## 2025-03-02 RX ADMIN — WATER 1000 MG: 1 INJECTION INTRAMUSCULAR; INTRAVENOUS; SUBCUTANEOUS at 18:29

## 2025-03-02 RX ADMIN — CARVEDILOL 6.25 MG: 3.12 TABLET, FILM COATED ORAL at 20:27

## 2025-03-02 ASSESSMENT — PAIN SCALES - GENERAL: PAINLEVEL_OUTOF10: 0

## 2025-03-02 NOTE — ED PROVIDER NOTES
Efforts were made to edit the dictations but occasionally words are mis-transcribed.)    Medardo Arauz DO,(electronically signed)  Board Certified Emergency Physician          Medardo Arauz DO  03/02/25 0956

## 2025-03-02 NOTE — ED TRIAGE NOTES
Mode of arrival (squad #, walk in, police, etc) : wheelchair        Chief complaint(s): shortness of breath        Arrival Note (brief scenario, treatment PTA, etc).: patient states he was discharge Friday from inpatient for pneumonia and he is still sick. Wife states the visiting nurse this am came for the evaluation and told her to bring him back to the hospital to be transferred to a rehab facility.

## 2025-03-03 ENCOUNTER — TELEPHONE (OUTPATIENT)
Age: 89
End: 2025-03-03

## 2025-03-03 LAB
ALBUMIN SERPL-MCNC: 3.4 G/DL (ref 3.5–5.2)
ALBUMIN/GLOB SERPL: 2 {RATIO} (ref 1–2.5)
ALP SERPL-CCNC: 81 U/L (ref 40–129)
ALT SERPL-CCNC: 99 U/L (ref 5–41)
ANION GAP SERPL CALCULATED.3IONS-SCNC: 9 MMOL/L (ref 9–17)
AST SERPL-CCNC: 45 U/L
BASOPHILS # BLD: 0 K/UL (ref 0–0.2)
BASOPHILS NFR BLD: 0 % (ref 0–2)
BILIRUB SERPL-MCNC: 0.6 MG/DL (ref 0.3–1.2)
BUN SERPL-MCNC: 20 MG/DL (ref 8–23)
CALCIUM SERPL-MCNC: 8.1 MG/DL (ref 8.6–10.4)
CHLORIDE SERPL-SCNC: 108 MMOL/L (ref 98–107)
CO2 SERPL-SCNC: 24 MMOL/L (ref 20–31)
CREAT SERPL-MCNC: 0.6 MG/DL (ref 0.7–1.2)
EOSINOPHIL # BLD: 0 K/UL (ref 0–0.4)
EOSINOPHILS RELATIVE PERCENT: 0 % (ref 1–4)
ERYTHROCYTE [DISTWIDTH] IN BLOOD BY AUTOMATED COUNT: 18.1 % (ref 12.5–15.4)
GFR, ESTIMATED: >90 ML/MIN/1.73M2
GLUCOSE BLD-MCNC: 109 MG/DL (ref 75–110)
GLUCOSE SERPL-MCNC: 134 MG/DL (ref 70–99)
HCT VFR BLD AUTO: 34 % (ref 41–53)
HGB BLD-MCNC: 11.3 G/DL (ref 13.5–17.5)
INR PPP: 3.3 (ref 0.9–1.2)
LYMPHOCYTES NFR BLD: 0.61 K/UL (ref 1–4.8)
LYMPHOCYTES RELATIVE PERCENT: 11 % (ref 24–44)
MCH RBC QN AUTO: 29.9 PG (ref 26–34)
MCHC RBC AUTO-ENTMCNC: 33.2 G/DL (ref 31–37)
MCV RBC AUTO: 89.9 FL (ref 80–100)
MONOCYTES NFR BLD: 0.22 K/UL (ref 0.1–0.8)
MONOCYTES NFR BLD: 4 % (ref 1–7)
MORPHOLOGY: NORMAL
MYELOCYTES ABSOLUTE COUNT: 0.06 K/UL
MYELOCYTES: 1 %
NEUTROPHILS NFR BLD: 84 % (ref 36–66)
NEUTS SEG NFR BLD: 4.61 K/UL (ref 1.8–7.7)
PLATELET # BLD AUTO: 177 K/UL (ref 140–450)
PMV BLD AUTO: 7.3 FL (ref 6–12)
POTASSIUM SERPL-SCNC: 4.1 MMOL/L (ref 3.7–5.3)
PROT SERPL-MCNC: 5.1 G/DL (ref 6.4–8.3)
PROTHROMBIN TIME: 32.9 SEC (ref 11.8–14.6)
RBC # BLD AUTO: 3.78 M/UL (ref 4.5–5.9)
SODIUM SERPL-SCNC: 141 MMOL/L (ref 135–144)
WBC OTHER # BLD: 5.5 K/UL (ref 3.5–11)

## 2025-03-03 PROCEDURE — 97535 SELF CARE MNGMENT TRAINING: CPT

## 2025-03-03 PROCEDURE — 82947 ASSAY GLUCOSE BLOOD QUANT: CPT

## 2025-03-03 PROCEDURE — 97161 PT EVAL LOW COMPLEX 20 MIN: CPT

## 2025-03-03 PROCEDURE — 85025 COMPLETE CBC W/AUTO DIFF WBC: CPT

## 2025-03-03 PROCEDURE — 99223 1ST HOSP IP/OBS HIGH 75: CPT | Performed by: FAMILY MEDICINE

## 2025-03-03 PROCEDURE — 97165 OT EVAL LOW COMPLEX 30 MIN: CPT

## 2025-03-03 PROCEDURE — 6370000000 HC RX 637 (ALT 250 FOR IP): Performed by: FAMILY MEDICINE

## 2025-03-03 PROCEDURE — 97116 GAIT TRAINING THERAPY: CPT

## 2025-03-03 PROCEDURE — 2500000003 HC RX 250 WO HCPCS: Performed by: FAMILY MEDICINE

## 2025-03-03 PROCEDURE — 80053 COMPREHEN METABOLIC PANEL: CPT

## 2025-03-03 PROCEDURE — 36415 COLL VENOUS BLD VENIPUNCTURE: CPT

## 2025-03-03 PROCEDURE — 94760 N-INVAS EAR/PLS OXIMETRY 1: CPT

## 2025-03-03 PROCEDURE — 1200000000 HC SEMI PRIVATE

## 2025-03-03 PROCEDURE — 85610 PROTHROMBIN TIME: CPT

## 2025-03-03 PROCEDURE — 94640 AIRWAY INHALATION TREATMENT: CPT

## 2025-03-03 RX ORDER — WARFARIN SODIUM 2.5 MG/1
2.5 TABLET ORAL
Status: ACTIVE | OUTPATIENT
Start: 2025-03-03 | End: 2025-03-04

## 2025-03-03 RX ORDER — LEVOTHYROXINE SODIUM 50 UG/1
50 TABLET ORAL DAILY
Status: DISCONTINUED | OUTPATIENT
Start: 2025-03-04 | End: 2025-03-05 | Stop reason: HOSPADM

## 2025-03-03 RX ORDER — CARVEDILOL 12.5 MG/1
12.5 TABLET ORAL 2 TIMES DAILY WITH MEALS
Status: DISCONTINUED | OUTPATIENT
Start: 2025-03-03 | End: 2025-03-04

## 2025-03-03 RX ORDER — GUAIFENESIN 600 MG/1
600 TABLET, EXTENDED RELEASE ORAL 2 TIMES DAILY
Status: DISCONTINUED | OUTPATIENT
Start: 2025-03-03 | End: 2025-03-05 | Stop reason: HOSPADM

## 2025-03-03 RX ADMIN — BUDESONIDE AND FORMOTEROL FUMARATE DIHYDRATE 2 PUFF: 80; 4.5 AEROSOL RESPIRATORY (INHALATION) at 07:49

## 2025-03-03 RX ADMIN — CARVEDILOL 6.25 MG: 3.12 TABLET, FILM COATED ORAL at 07:54

## 2025-03-03 RX ADMIN — IPRATROPIUM BROMIDE AND ALBUTEROL SULFATE 1 DOSE: .5; 2.5 SOLUTION RESPIRATORY (INHALATION) at 19:42

## 2025-03-03 RX ADMIN — PREDNISONE 10 MG: 20 TABLET ORAL at 20:31

## 2025-03-03 RX ADMIN — PREDNISONE 10 MG: 20 TABLET ORAL at 07:57

## 2025-03-03 RX ADMIN — LISINOPRIL 20 MG: 10 TABLET ORAL at 07:54

## 2025-03-03 RX ADMIN — GUAIFENESIN 600 MG: 600 TABLET, EXTENDED RELEASE ORAL at 20:31

## 2025-03-03 RX ADMIN — Medication 1 TABLET: at 07:57

## 2025-03-03 RX ADMIN — IPRATROPIUM BROMIDE AND ALBUTEROL SULFATE 1 DOSE: .5; 2.5 SOLUTION RESPIRATORY (INHALATION) at 07:48

## 2025-03-03 RX ADMIN — GUAIFENESIN 600 MG: 600 TABLET, EXTENDED RELEASE ORAL at 11:22

## 2025-03-03 RX ADMIN — IPRATROPIUM BROMIDE AND ALBUTEROL SULFATE 1 DOSE: .5; 2.5 SOLUTION RESPIRATORY (INHALATION) at 15:36

## 2025-03-03 RX ADMIN — SODIUM CHLORIDE, PRESERVATIVE FREE 10 ML: 5 INJECTION INTRAVENOUS at 20:33

## 2025-03-03 RX ADMIN — LEVOTHYROXINE SODIUM 75 MCG: 75 TABLET ORAL at 07:54

## 2025-03-03 RX ADMIN — IPRATROPIUM BROMIDE AND ALBUTEROL SULFATE 1 DOSE: .5; 2.5 SOLUTION RESPIRATORY (INHALATION) at 11:20

## 2025-03-03 RX ADMIN — ATORVASTATIN CALCIUM 10 MG: 10 TABLET, FILM COATED ORAL at 07:59

## 2025-03-03 RX ADMIN — BUDESONIDE AND FORMOTEROL FUMARATE DIHYDRATE 2 PUFF: 80; 4.5 AEROSOL RESPIRATORY (INHALATION) at 19:44

## 2025-03-03 ASSESSMENT — PAIN DESCRIPTION - LOCATION: LOCATION: RIB CAGE

## 2025-03-03 NOTE — PLAN OF CARE
Problem: Respiratory - Adult  Goal: Achieves optimal ventilation and oxygenation  Outcome: Progressing  Flowsheets (Taken 3/3/2025 6594)  Achieves optimal ventilation and oxygenation:   Assess for changes in respiratory status   Assess for changes in mentation and behavior   Respiratory therapy support as indicated

## 2025-03-03 NOTE — ED NOTES
ED to inpatient nurses report      Chief Complaint:  Chief Complaint   Patient presents with    Illness     Family states his home nurse evaluated him this am and told them to bring him back for rehab placement.      Present to ED from: Home    MOA:     LOC: alert and orientated to name, place, date  Mobility: Independent  Oxygen Baseline: RA    Current needs required: When presented to ED, pt needed 3L NC, now on RA (baseline)  Pending ED orders: IV fluids  Present condition: stable    Why did the patient come to the ED? SOB  What is the plan? IV atb  Any procedures or intervention occur? none  Any safety concerns??fall risk  CODE STATUS Full Code  Diet ADULT DIET; Easy to Chew    Mental Status:  Level of Consciousness: Alert (0)    Psych Assessment:   Psychosocial  Psychosocial (WDL): Within Defined Limits  Vital signs   Vitals:    03/02/25 1703 03/02/25 1704 03/02/25 1734 03/02/25 1814   BP:  (!) 215/101     Pulse: 67 65 66 68   Resp: 20 17 24 26   Temp:       TempSrc:       SpO2: 100% 100% 100%    Weight:       Height:            Vitals:  Patient Vitals for the past 24 hrs:   BP Temp Temp src Pulse Resp SpO2 Height Weight   03/02/25 1814 -- -- -- 68 26 -- -- --   03/02/25 1734 -- -- -- 66 24 100 % -- --   03/02/25 1704 (!) 215/101 -- -- 65 17 100 % -- --   03/02/25 1703 -- -- -- 67 20 100 % -- --   03/02/25 1643 -- -- -- 68 14 100 % -- --   03/02/25 1608 -- -- -- 64 19 100 % -- --   03/02/25 1548 -- -- -- 73 20 100 % -- --   03/02/25 1514 -- -- -- 67 19 100 % -- --   03/02/25 1424 -- -- -- 63 28 100 % -- --   03/02/25 1320 -- -- -- 66 -- -- -- --   03/02/25 1318 (!) 167/89 -- -- -- -- 97 % -- --   03/02/25 1201 (!) 205/90 97.9 °F (36.6 °C) Oral 58 22 98 % 1.727 m (5' 8\") 65.8 kg (145 lb)      Visit Vitals  BP (!) 215/101   Pulse 68   Temp 97.9 °F (36.6 °C) (Oral)   Resp 26   Ht 1.727 m (5' 8\")   Wt 65.8 kg (145 lb)   SpO2 100%   BMI 22.05 kg/m²        LDAs:   Peripheral IV 03/02/25 Left Antecubital (Active)

## 2025-03-03 NOTE — RT PROTOCOL NOTE
that used at home.    0-3 - enter or revise RT bronchodilator order(s) to equivalent RT Bronchodilator order with Frequency of every 4 hours PRN for wheezing or increased work of breathing using Per Protocol order mode.        4-6 - enter or revise RT Bronchodilator order(s) to two equivalent RT bronchodilator orders with one order with BID Frequency and one order with Frequency of every 4 hours PRN wheezing or increased work of breathing using Per Protocol order mode.        7-10 - enter or revise RT Bronchodilator order(s) to two equivalent RT bronchodilator orders with one order with TID Frequency and one order with Frequency of every 4 hours PRN wheezing or increased work of breathing using Per Protocol order mode.       11-13 - enter or revise RT Bronchodilator order(s) to one equivalent RT bronchodilator order with QID Frequency and an Albuterol order with Frequency of every 4 hours PRN wheezing or increased work of breathing using Per Protocol order mode.      Greater than 13 - enter or revise RT Bronchodilator order(s) to one equivalent RT bronchodilator order with every 4 hours Frequency and an Albuterol order with Frequency of every 2 hours PRN wheezing or increased work of breathing using Per Protocol order mode.     RT to enter RT Home Evaluation for COPD & MDI Assessment order using Per Protocol order mode.    Electronically signed by LEONELA FRANK RCP on 3/3/2025 at 7:52 AM

## 2025-03-03 NOTE — H&P
Resp 20   Ht 1.727 m (5' 8\")   Wt 70.5 kg (155 lb 6.8 oz)   SpO2 95%   BMI 23.63 kg/m²   Temp (24hrs), Av.5 °F (36.4 °C), Min:97.3 °F (36.3 °C), Max:97.9 °F (36.6 °C)    No results for input(s): \"POCGLU\" in the last 72 hours.  No intake or output data in the 24 hours ending 25 1008    General Appearance:  alert  and in no acute distress.  Patient is quite talkative and cooperative today.  Mood is excellent and he is accepting the idea of placement.  Mental status: oriented to person, place, and time  Head:  normocephalic, atraumatic patient has a great full beard  Eye: no icterus, redness, pupils equal and reactive, extraocular eye movements intact, conjunctiva clear.  Intraocular lenses were noted bilaterally  Ear: normal external ear, no discharge, hearing intact  Nose:  no drainage noted  Mouth: mucous membranes moist dental caries noted  Neck: supple,  thyroid not palpable.  No obvious masses  Lungs: Bilateral equal air entry, fibrotic type rales were heard at both bases scattered rhonchi and few end expiratory wheezes were noted  Cardiovascular: normal rate, regular rhythm, no murmur, gallop, rub  Abdomen: Soft, nontender, nondistended, normal bowel sounds, no hepatomegaly or splenomegaly  Neurologic: There are no new focal motor or sensory deficits, normal muscle tone and bulk, no abnormal sensation, normal speech, cranial nerves II through XII grossly intact  Skin: No gross lesions, rashes, bruising or bleeding on exposed skin area  Extremities:  no pedal edema or calf pain with palpation  Psych: normal affect on today's exam    Investigations:      Laboratory Testing:  Recent Results (from the past 24 hour(s))   CBC with Auto Differential    Collection Time: 25  1:55 PM   Result Value Ref Range    WBC 8.3 3.5 - 11.0 k/uL    RBC 3.86 (L) 4.5 - 5.9 m/uL    Hemoglobin 11.7 (L) 13.5 - 17.5 g/dL    Hematocrit 34.9 (L) 41 - 53 %    MCV 90.4 80 - 100 fL    MCH 30.3 26 - 34 pg    MCHC 33.5 31 -

## 2025-03-03 NOTE — TELEPHONE ENCOUNTER
Care Transitions Initial Follow Up Call    Outreach made within 2 business days of discharge: Yes    Patient: Bill Rodney Patient : 1935   MRN: [id number]  Reason for Admission: pneumonia   Discharge Date: 25       Spoke with: l/m for pt      Additional needs identified to be addressed with provider               Scheduled appointment with PCP within 7-14 days    Follow Up  Future Appointments   Date Time Provider Department Center   2025  9:30 AM Scott Kramer MD Resp Spec [id number]   2025  1:40 PM Kareem Pal MD [place] [facility]       TRAN PENALOZA MA

## 2025-03-03 NOTE — TELEPHONE ENCOUNTER
Care Transitions Initial Follow Up Call    Outreach made within 2 business days of discharge: Yes    Patient: Bill Rodney Patient : 1935   MRN: [id number]  Reason for Admission: pneumonia   Discharge Date: 25       Spoke with: Left message     Mindy Jordan MA

## 2025-03-04 PROBLEM — I16.0 HYPERTENSIVE URGENCY: Status: ACTIVE | Noted: 2025-03-04

## 2025-03-04 LAB
INR PPP: 3.6 (ref 0.9–1.2)
PROTHROMBIN TIME: 34.6 SEC (ref 11.8–14.6)

## 2025-03-04 PROCEDURE — 99232 SBSQ HOSP IP/OBS MODERATE 35: CPT | Performed by: FAMILY MEDICINE

## 2025-03-04 PROCEDURE — 36415 COLL VENOUS BLD VENIPUNCTURE: CPT

## 2025-03-04 PROCEDURE — 94640 AIRWAY INHALATION TREATMENT: CPT

## 2025-03-04 PROCEDURE — 1200000000 HC SEMI PRIVATE

## 2025-03-04 PROCEDURE — 97116 GAIT TRAINING THERAPY: CPT

## 2025-03-04 PROCEDURE — 6370000000 HC RX 637 (ALT 250 FOR IP): Performed by: FAMILY MEDICINE

## 2025-03-04 PROCEDURE — 6360000002 HC RX W HCPCS: Performed by: FAMILY MEDICINE

## 2025-03-04 PROCEDURE — 85610 PROTHROMBIN TIME: CPT

## 2025-03-04 PROCEDURE — 97530 THERAPEUTIC ACTIVITIES: CPT

## 2025-03-04 PROCEDURE — 2500000003 HC RX 250 WO HCPCS: Performed by: FAMILY MEDICINE

## 2025-03-04 PROCEDURE — 94760 N-INVAS EAR/PLS OXIMETRY 1: CPT

## 2025-03-04 RX ORDER — CARVEDILOL 12.5 MG/1
25 TABLET ORAL 2 TIMES DAILY WITH MEALS
Status: DISCONTINUED | OUTPATIENT
Start: 2025-03-04 | End: 2025-03-05 | Stop reason: HOSPADM

## 2025-03-04 RX ORDER — AMLODIPINE BESYLATE 5 MG/1
5 TABLET ORAL DAILY
Status: DISCONTINUED | OUTPATIENT
Start: 2025-03-04 | End: 2025-03-05 | Stop reason: HOSPADM

## 2025-03-04 RX ADMIN — CALCIUM CARBONATE 600 MG (1,500 MG)-VITAMIN D3 400 UNIT TABLET 1 TABLET: at 10:25

## 2025-03-04 RX ADMIN — Medication 1 TABLET: at 07:48

## 2025-03-04 RX ADMIN — ASPIRIN 81 MG: 81 TABLET, COATED ORAL at 07:48

## 2025-03-04 RX ADMIN — SODIUM CHLORIDE, PRESERVATIVE FREE 10 ML: 5 INJECTION INTRAVENOUS at 07:49

## 2025-03-04 RX ADMIN — LISINOPRIL 20 MG: 10 TABLET ORAL at 07:47

## 2025-03-04 RX ADMIN — WATER 1000 MG: 1 INJECTION INTRAMUSCULAR; INTRAVENOUS; SUBCUTANEOUS at 18:11

## 2025-03-04 RX ADMIN — PREDNISONE 10 MG: 20 TABLET ORAL at 07:46

## 2025-03-04 RX ADMIN — CARVEDILOL 25 MG: 12.5 TABLET, FILM COATED ORAL at 17:57

## 2025-03-04 RX ADMIN — PREDNISONE 10 MG: 20 TABLET ORAL at 20:19

## 2025-03-04 RX ADMIN — BUDESONIDE AND FORMOTEROL FUMARATE DIHYDRATE 2 PUFF: 80; 4.5 AEROSOL RESPIRATORY (INHALATION) at 19:34

## 2025-03-04 RX ADMIN — GUAIFENESIN 600 MG: 600 TABLET, EXTENDED RELEASE ORAL at 20:19

## 2025-03-04 RX ADMIN — CARVEDILOL 12.5 MG: 12.5 TABLET, FILM COATED ORAL at 07:47

## 2025-03-04 RX ADMIN — LEVOTHYROXINE SODIUM 50 MCG: 0.05 TABLET ORAL at 06:11

## 2025-03-04 RX ADMIN — GUAIFENESIN 600 MG: 600 TABLET, EXTENDED RELEASE ORAL at 07:47

## 2025-03-04 RX ADMIN — AMLODIPINE BESYLATE 5 MG: 5 TABLET ORAL at 10:25

## 2025-03-04 RX ADMIN — ATORVASTATIN CALCIUM 10 MG: 10 TABLET, FILM COATED ORAL at 07:48

## 2025-03-04 RX ADMIN — IPRATROPIUM BROMIDE AND ALBUTEROL SULFATE 1 DOSE: .5; 2.5 SOLUTION RESPIRATORY (INHALATION) at 19:34

## 2025-03-04 RX ADMIN — IPRATROPIUM BROMIDE AND ALBUTEROL SULFATE 1 DOSE: .5; 2.5 SOLUTION RESPIRATORY (INHALATION) at 08:09

## 2025-03-04 RX ADMIN — BUDESONIDE AND FORMOTEROL FUMARATE DIHYDRATE 2 PUFF: 80; 4.5 AEROSOL RESPIRATORY (INHALATION) at 08:10

## 2025-03-04 RX ADMIN — IPRATROPIUM BROMIDE AND ALBUTEROL SULFATE 1 DOSE: .5; 2.5 SOLUTION RESPIRATORY (INHALATION) at 15:47

## 2025-03-04 ASSESSMENT — PAIN SCALES - GENERAL: PAINLEVEL_OUTOF10: 0

## 2025-03-04 NOTE — ACP (ADVANCE CARE PLANNING)
Advance Care Planning     Advance Care Planning Activator (Inpatient)  Conversation Note      Date of ACP Conversation: 3/4/2025     Conversation Conducted with: Patient with Decision Making Capacity    ACP Activator: Susy Rowell        Health Care Decision Maker:     Current Designated Health Care Decision Maker:     Primary Decision Maker: CurtJenny delatorre - Spouse - [phone]    Today we documented Decision Maker(s) consistent with Legal Next of Kin hierarchy.    Care Preferences    Ventilation:  \"If you were in your present state of health and suddenly became very ill and were unable to breathe on your own, what would your preference be about the use of a ventilator (breathing machine) if it were available to you?\"      Would the patient desire the use of ventilator (breathing machine)?: yes    \"If your health worsens and it becomes clear that your chance of recovery is unlikely, what would your preference be about the use of a ventilator (breathing machine) if it were available to you?\"     Would the patient desire the use of ventilator (breathing machine)?: Yes      Resuscitation  \"CPR works best to restart the heart when there is a sudden event, like a heart attack, in someone who is otherwise healthy. Unfortunately, CPR does not typically restart the heart for people who have serious health conditions or who are very sick.\"    \"In the event your heart stopped as a result of an underlying serious health condition, would you want attempts to be made to restart your heart (answer \"yes\" for attempt to resuscitate) or would you prefer a natural death (answer \"no\" for do not attempt to resuscitate)?\" yes       [] Yes   [] No   Educated Patient / Decision Maker regarding differences between Advance Directives and portable DNR orders.    Length of ACP Conversation in minutes:      Conversation Outcomes:  ACP discussion completed and Existing advance directive reviewed with patient; no changes to patient's

## 2025-03-04 NOTE — PLAN OF CARE
Problem: Respiratory - Adult  Goal: Achieves optimal ventilation and oxygenation  3/3/2025 1948 by Laura Carolina RCP  Flowsheets (Taken 3/3/2025 1948)  Achieves optimal ventilation and oxygenation:   Assess for changes in respiratory status   Position to facilitate oxygenation and minimize respiratory effort   Respiratory therapy support as indicated   Assess and instruct to report shortness of breath or any respiratory difficulty

## 2025-03-04 NOTE — PLAN OF CARE
Problem: Respiratory - Adult  Goal: Achieves optimal ventilation and oxygenation  3/4/2025 0821 by Cathy Valdes RCP  Outcome: Progressing  Flowsheets (Taken 3/3/2025 2000 by Oppenheim, Evan, RN)  Achieves optimal ventilation and oxygenation: Assess for changes in respiratory status

## 2025-03-04 NOTE — PLAN OF CARE
Problem: Respiratory - Adult  Goal: Achieves optimal ventilation and oxygenation  3/4/2025 1550 by Kim Capone RCP  Outcome: Progressing  Flowsheets (Taken 3/4/2025 1548)  Achieves optimal ventilation and oxygenation: Assess for changes in respiratory status  3/4/2025 0821 by Cathy Valdes RCROCCO  Outcome: Progressing  Flowsheets (Taken 3/3/2025 2000 by Oppenheim, Evan, RN)  Achieves optimal ventilation and oxygenation: Assess for changes in respiratory status

## 2025-03-05 VITALS
SYSTOLIC BLOOD PRESSURE: 174 MMHG | TEMPERATURE: 97.3 F | HEIGHT: 68 IN | BODY MASS INDEX: 23.56 KG/M2 | OXYGEN SATURATION: 91 % | HEART RATE: 62 BPM | RESPIRATION RATE: 17 BRPM | DIASTOLIC BLOOD PRESSURE: 79 MMHG | WEIGHT: 155.42 LBS

## 2025-03-05 LAB
ALBUMIN SERPL-MCNC: 3.4 G/DL (ref 3.5–5.2)
ALBUMIN/GLOB SERPL: 2.1 {RATIO} (ref 1–2.5)
ALP SERPL-CCNC: 81 U/L (ref 40–129)
ALT SERPL-CCNC: 66 U/L (ref 5–41)
ANION GAP SERPL CALCULATED.3IONS-SCNC: 9 MMOL/L (ref 9–17)
AST SERPL-CCNC: 30 U/L
BASOPHILS # BLD: 0 K/UL (ref 0–0.2)
BASOPHILS NFR BLD: 0 % (ref 0–2)
BILIRUB SERPL-MCNC: 1 MG/DL (ref 0.3–1.2)
BUN SERPL-MCNC: 19 MG/DL (ref 8–23)
CALCIUM SERPL-MCNC: 8.3 MG/DL (ref 8.6–10.4)
CHLORIDE SERPL-SCNC: 104 MMOL/L (ref 98–107)
CO2 SERPL-SCNC: 28 MMOL/L (ref 20–31)
CREAT SERPL-MCNC: 0.6 MG/DL (ref 0.7–1.2)
EOSINOPHIL # BLD: 0 K/UL (ref 0–0.4)
EOSINOPHILS RELATIVE PERCENT: 0 % (ref 1–4)
ERYTHROCYTE [DISTWIDTH] IN BLOOD BY AUTOMATED COUNT: 18.4 % (ref 12.5–15.4)
GFR, ESTIMATED: >90 ML/MIN/1.73M2
GLUCOSE SERPL-MCNC: 95 MG/DL (ref 70–99)
HCT VFR BLD AUTO: 35.4 % (ref 41–53)
HGB BLD-MCNC: 11.8 G/DL (ref 13.5–17.5)
INR PPP: 2.5 (ref 0.9–1.2)
LYMPHOCYTES NFR BLD: 1.03 K/UL (ref 1–4.8)
LYMPHOCYTES RELATIVE PERCENT: 10 % (ref 24–44)
MCH RBC QN AUTO: 30 PG (ref 26–34)
MCHC RBC AUTO-ENTMCNC: 33.3 G/DL (ref 31–37)
MCV RBC AUTO: 90 FL (ref 80–100)
METAMYELOCYTES ABSOLUTE COUNT: 0.1 K/UL
METAMYELOCYTES: 1 %
MONOCYTES NFR BLD: 0.72 K/UL (ref 0.1–0.8)
MONOCYTES NFR BLD: 7 % (ref 1–7)
MORPHOLOGY: NORMAL
NEUTROPHILS NFR BLD: 82 % (ref 36–66)
NEUTS SEG NFR BLD: 8.45 K/UL (ref 1.8–7.7)
PLATELET # BLD AUTO: 186 K/UL (ref 140–450)
PMV BLD AUTO: 7.2 FL (ref 6–12)
POTASSIUM SERPL-SCNC: 3.8 MMOL/L (ref 3.7–5.3)
PROT SERPL-MCNC: 5 G/DL (ref 6.4–8.3)
PROTHROMBIN TIME: 26.7 SEC (ref 11.8–14.6)
RBC # BLD AUTO: 3.94 M/UL (ref 4.5–5.9)
SODIUM SERPL-SCNC: 141 MMOL/L (ref 135–144)
WBC OTHER # BLD: 10.3 K/UL (ref 3.5–11)

## 2025-03-05 PROCEDURE — 94760 N-INVAS EAR/PLS OXIMETRY 1: CPT

## 2025-03-05 PROCEDURE — 99239 HOSP IP/OBS DSCHRG MGMT >30: CPT | Performed by: FAMILY MEDICINE

## 2025-03-05 PROCEDURE — 80053 COMPREHEN METABOLIC PANEL: CPT

## 2025-03-05 PROCEDURE — 36415 COLL VENOUS BLD VENIPUNCTURE: CPT

## 2025-03-05 PROCEDURE — 85025 COMPLETE CBC W/AUTO DIFF WBC: CPT

## 2025-03-05 PROCEDURE — 6370000000 HC RX 637 (ALT 250 FOR IP): Performed by: FAMILY MEDICINE

## 2025-03-05 PROCEDURE — 94640 AIRWAY INHALATION TREATMENT: CPT

## 2025-03-05 PROCEDURE — 85610 PROTHROMBIN TIME: CPT

## 2025-03-05 PROCEDURE — 94761 N-INVAS EAR/PLS OXIMETRY MLT: CPT

## 2025-03-05 RX ORDER — WARFARIN SODIUM 1 MG/1
3 TABLET ORAL
Status: DISCONTINUED | OUTPATIENT
Start: 2025-03-05 | End: 2025-03-05 | Stop reason: HOSPADM

## 2025-03-05 RX ORDER — IPRATROPIUM BROMIDE AND ALBUTEROL SULFATE 2.5; .5 MG/3ML; MG/3ML
3 SOLUTION RESPIRATORY (INHALATION)
Qty: 360 ML | Refills: 1 | Status: SHIPPED | OUTPATIENT
Start: 2025-03-05

## 2025-03-05 RX ORDER — POLYETHYLENE GLYCOL 3350 17 G/17G
17 POWDER, FOR SOLUTION ORAL DAILY PRN
Qty: 527 G | Refills: 1 | Status: SHIPPED | OUTPATIENT
Start: 2025-03-05 | End: 2025-04-04

## 2025-03-05 RX ORDER — AMLODIPINE BESYLATE 5 MG/1
5 TABLET ORAL DAILY
Qty: 30 TABLET | Refills: 3 | Status: SHIPPED | OUTPATIENT
Start: 2025-03-06

## 2025-03-05 RX ORDER — GUAIFENESIN 600 MG/1
600 TABLET, EXTENDED RELEASE ORAL 2 TIMES DAILY
Qty: 60 TABLET | Refills: 1 | Status: SHIPPED | OUTPATIENT
Start: 2025-03-05

## 2025-03-05 RX ORDER — CEFUROXIME AXETIL 250 MG/1
500 TABLET ORAL EVERY 12 HOURS SCHEDULED
Status: DISCONTINUED | OUTPATIENT
Start: 2025-03-05 | End: 2025-03-05 | Stop reason: HOSPADM

## 2025-03-05 RX ADMIN — CALCIUM CARBONATE 600 MG (1,500 MG)-VITAMIN D3 400 UNIT TABLET 1 TABLET: at 08:23

## 2025-03-05 RX ADMIN — BUDESONIDE AND FORMOTEROL FUMARATE DIHYDRATE 2 PUFF: 80; 4.5 AEROSOL RESPIRATORY (INHALATION) at 07:51

## 2025-03-05 RX ADMIN — IPRATROPIUM BROMIDE AND ALBUTEROL SULFATE 1 DOSE: .5; 2.5 SOLUTION RESPIRATORY (INHALATION) at 12:37

## 2025-03-05 RX ADMIN — CARVEDILOL 25 MG: 12.5 TABLET, FILM COATED ORAL at 08:23

## 2025-03-05 RX ADMIN — LEVOTHYROXINE SODIUM 50 MCG: 0.05 TABLET ORAL at 06:11

## 2025-03-05 RX ADMIN — PREDNISONE 10 MG: 20 TABLET ORAL at 08:25

## 2025-03-05 RX ADMIN — AMLODIPINE BESYLATE 5 MG: 5 TABLET ORAL at 08:24

## 2025-03-05 RX ADMIN — Medication 1 TABLET: at 08:25

## 2025-03-05 RX ADMIN — IPRATROPIUM BROMIDE AND ALBUTEROL SULFATE 1 DOSE: .5; 2.5 SOLUTION RESPIRATORY (INHALATION) at 07:51

## 2025-03-05 RX ADMIN — LISINOPRIL 20 MG: 10 TABLET ORAL at 08:25

## 2025-03-05 RX ADMIN — ASPIRIN 81 MG: 81 TABLET, COATED ORAL at 08:25

## 2025-03-05 RX ADMIN — ATORVASTATIN CALCIUM 10 MG: 10 TABLET, FILM COATED ORAL at 08:23

## 2025-03-05 RX ADMIN — GUAIFENESIN 600 MG: 600 TABLET, EXTENDED RELEASE ORAL at 08:24

## 2025-03-05 NOTE — PLAN OF CARE
Problem: Respiratory - Adult  Goal: Achieves optimal ventilation and oxygenation  3/4/2025 1940 by Laura Carolina RCP  Flowsheets (Taken 3/4/2025 1940)  Achieves optimal ventilation and oxygenation:   Assess for changes in respiratory status   Respiratory therapy support as indicated   Encourage broncho-pulmonary hygiene including cough, deep breathe, incentive spirometry   Assess and instruct to report shortness of breath or any respiratory difficulty

## 2025-03-05 NOTE — CARE COORDINATION
Discharge Report    [facility]  Clinical Case Management Department  Written by: Susy Rowell    Patient Name: Bill Rodney  Attending Provider: Kareem Pal MD  Admit Date: 3/2/2025 11:59 AM  MRN: [id number]  Account: [id number]                     : 1935  Discharge Date: 3/5/25      Disposition: SNF    Patient discharging to [facility] today, wife to transport. Laura at [facility] notified of discharge plans. Patient currently visiting twin sister, whom is another patient in the hospital. He will discharge after his visit. No further needs at this time from .    Nursing to call report to [phone]    Susy Rowell     
Message received from Edmundo at [facility]. After reviewing the case, they do not feel this admission is related to patients workers comp claim, therefore it will not be covered under them, his alternate insurance will cover. Patient interested in SNF. A list was provided to him on admission. CM will follow up for SNF choices and proceed with placement.     1105: Patient and wife requested referrals sent to #1 [facility] and #2 [facility].     1305: Call from Josseline from [facility], they can accept patient after tomorrow. [facility] is patients second choice, awaiting call from [facility].    1341: [facility] can accept patient, however unsure if they will have a bed available by tomorrow. Teresa from [facility] will call back.     1510: Accepted to [facility]. They can take tomorrow if discharge ready.   
Facility/Agency List     Provided spouse with the following list, the list includes the overall star ratings obtained from CMS per the Medicare Web site (www.Medicare.gov):     [] Long Term Acute Care Facilities  [] Acute Inpatient Rehabilitation Facilities  [x] Skilled Nursing Facilities  [] Hospice Facilities  [] Home Care    Provided verbal instructions on how to utilize the QR Code to obtain additional detailed star ratings from www.Medicare.gov     offered to print and provide the detailed list:    []Accepted   [x]Declined

## 2025-03-05 NOTE — DISCHARGE SUMMARY
--          30            ALT          145*         99*           --          66*           ALKPHOS      96           81            --          81            BILITOT      0.7          0.6           --          1.0           POCGLU        --           --          109           --           ABG:Lab Results       Component                Value               Date/Time                  PH                       6.5                 08/26/2024 04:30 PM   Lab Results       Component                Value               Date/Time                  SPECIAL                  0.5ML LEFT ARM      04/10/2024 07:51 PM   Lab Results       Component                Value               Date/Time                  CULTURE                                      04/10/2024 07:51 PM    NO GROWTH 5 DAYS    Radiology:  XR CHEST PORTABLE    Result Date: 3/2/2025  Unchanged mild cardiomegaly.  Small right pleural effusion. Patchy opacities overlying the bilateral lower lung zones likely represents atelectasis versus less likely infection.     CT HEAD WO CONTRAST    Result Date: 2/28/2025  No acute intracranial abnormality. Age-appropriate atrophy and small-vessel disease ischemic changes.       Consultations:    Consults:     Final Specialist Recommendations/Findings:  IP CONSULT TO SOCIAL WORK  PHARMACY TO DOSE WARFARIN     The patient was seen and examined on day of discharge and this discharge summary is in conjunction with any daily progress note from day of discharge.    Discharge plan:    Disposition: To a non-[facility] facility    Physician Follow Up: Contact Dr. Pal for follow-up when he is discharged from extended-care facility      Requiring Further Evaluation/Follow Up POST HOSPITALIZATION/Incidental Findings: Get pro time in 2 days, house physician to write further orders.  If not done, do weekly protimes.    Diet: regular diet    Activity: As tolerated    Instructions to Patient: As above    Discharge Medications:      Medication List

## 2025-03-05 NOTE — PLAN OF CARE
Problem: Respiratory - Adult  Goal: Achieves optimal ventilation and oxygenation  3/5/2025 0755 by Lyubov Weber RCP  Outcome: Progressing  Flowsheets (Taken 3/5/2025 0755)  Achieves optimal ventilation and oxygenation:   Assess for changes in respiratory status   Position to facilitate oxygenation and minimize respiratory effort   Assess the need for suctioning and aspirate as needed   Respiratory therapy support as indicated   Assess and instruct to report shortness of breath or any respiratory difficulty   Encourage broncho-pulmonary hygiene including cough, deep breathe, incentive spirometry   Oxygen supplementation based on oxygen saturation or arterial blood gases   Assess for changes in mentation and behavior

## 2025-03-05 NOTE — DISCHARGE INSTR - COC
is necessary for the continuing treatment of the diagnosis listed and that he requires Skilled Nursing Facility for greater 30 days.     Update Admission H&P: No change in H&P    PHYSICIAN SIGNATURE:  Electronically signed by Kareem Pal MD on 3/5/25 at 9:42 AM EST

## 2025-03-05 NOTE — PROGRESS NOTES
[facility] Medicine  IN-PATIENT SERVICE   [facility] - Location: [place]    Progress Note    3/4/2025    9:33 AM    Name:   Bill Rodney  MRN:     [id number]     Acct:      [id number]   Room:   [address] [place] [address] Day:  2  Admit Date:  3/2/2025 11:59 AM    PCP:   Kareem Pal MD  Code Status:  Full Code    Subjective:     Patient OK.  Continues to have problems with wheezing/SOB/coughing.  Otherwise having problems with uncontrolled HTN.  He denies CP/SOB.  LFTs are trending down.  He denies headache.    Medications:     Allergies:    Allergies   Allergen Reactions    Levofloxacin Swelling    Codeine Hives and Rash    Pulmicort [Budesonide] Rash     Also itchy    Cashew Nut Oil     Levaquin  [Levofloxacin In D5w] Other (See Comments)    Peanut-Containing Drug Products Hives       Current Meds:   Scheduled Meds:    carvedilol  25 mg Oral BID WC    amLODIPine  5 mg Oral Daily    levothyroxine  50 mcg Oral Daily    guaiFENesin  600 mg Oral BID    sodium chloride flush  5-40 mL IntraVENous 2 times per day    ipratropium 0.5 mg-albuterol 2.5 mg  1 Dose Inhalation 4x Daily RT    aspirin  81 mg Oral Daily    calcium carb-cholecalciferol  1 tablet Oral Daily    budesonide-formoterol  2 puff Inhalation BID RT    lisinopril  20 mg Oral Daily    therapeutic multivitamin-minerals  1 tablet Oral Daily    atorvastatin  10 mg Oral Daily    predniSONE  10 mg Oral BID    cefTRIAXone (ROCEPHIN) IV  1,000 mg IntraVENous Q24H    warfarin placeholder: dosing by pharmacy   Oral RX Placeholder     Continuous Infusions:    sodium chloride       PRN Meds: sodium chloride flush, sodium chloride, potassium chloride **OR** potassium alternative oral replacement **OR** potassium chloride, magnesium sulfate, [DISCONTINUED] ondansetron **OR** ondansetron, polyethylene glycol, acetaminophen **OR** acetaminophen, meclizine, ondansetron, QUEtiapine    Data:     Vitals:  BP (!) 211/84   Pulse 64   Temp 97.7 °F (36.5 °C) 
Occupational Therapy  Occupational Therapy Initial Evaluation  Facility/Department: [address]   Patient Name: Bill Rodney        MRN: [id number]    : 1935    Date of Service: 3/3/2025    Chief Complaint   Patient presents with    Illness     Family states his home nurse evaluated him this am and told them to bring him back for rehab placement.      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, 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, Chronic obstructive lung disease (HCC), Chronic obstructive pulmonary disease (HCC), Chronic rhinitis, Chronic sinusitis, Deep vein thrombosis of lower extremity (HCC), Diarrhea, Disorder of arteries and arterioles, unspecified, Diverticulitis of colon, Dizziness and giddiness, Dysphagia, Enlarged prostate, Environmental allergies, Fatigue, Gabe hematuria, Frostbite with tissue necrosis of nose, Gastroesophageal reflux disease, Heartburn, Hereditary coagulation factor deficiency (HCC), Herpes zoster, Heterozygous factor V Leiden mutation, Hypercholesterolemia, Hypertension, Increased frequency of urination, Low back strain, Lumbar sprain, Lumbosacral spondylosis without myelopathy, 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 
Pharmacy Note  Warfarin Consult follow-up      Recent Labs     02/28/25  1110   INR 1.5*     Recent Labs     02/28/25  1127 03/02/25  1355   HGB 11.3* 11.7*   HCT 33.6* 34.9*    195       Current warfarin drug-drug interactions: prednisone      Date INR Dose   2/26 1.8 4mg   2/27 2.6 2mg   2/28 1.5 -----   3/1 ---- ------       Notes:   Patient previously in house with us and INR last therapeutic on 2/27 at 2.6. Will give one time dose of warfarin 3.5mg this evening, 3/2/25      Obtain daily INR/PT while inpatient      Johnny Gregg,   PharmD  3/2/2025 4:23 PM                      
Pharmacy Note  Warfarin Consult follow-up      Recent Labs     03/03/25  0636   INR 3.3*     Recent Labs     03/02/25  1355 03/03/25  0636   HGB 11.7* 11.3*   HCT 34.9* 34.0*    177       Current warfarin drug-drug interactions: prednisone, levothyroxine, ASA      Date INR Dose   2/28 1.5 ---   3/1 --- ---   3/2  3.5 mg   3/3 3.3 2.5 mg       Notes:                   INR slightly supra-therapeutic at 3.3; will give one time dose of 2.5 mg this evening.   Daily PT/INR while inpatient.     Estefani Jacinto, PharmD 3/3/2025 12:46 PM      
Pharmacy Note  Warfarin Consult follow-up      Recent Labs     03/03/25  0636   INR 3.3*     Recent Labs     03/02/25  1355 03/03/25  0636   HGB 11.7* 11.3*   HCT 34.9* 34.0*    177       Current warfarin drug-drug interactions: prednisone, levothyroxine, ASA      Date INR Dose   2/28 1.5 ---   3/1 --- ---   3/2  3.5 mg   3/3 3.3 2.5 mg   3/4 3.6 hold       Notes:                   INR supra-therapeutic at 3.6; will hold dose for now.   Daily PT/INR while inpatient.     Estefani Jacinto, PharmD 3/4/2025 7:18 AM    
Pharmacy Note  Warfarin Consult follow-up      Recent Labs     03/05/25  0634   INR 2.5*     Recent Labs     03/02/25  1355 03/03/25  0636 03/05/25  0634   HGB 11.7* 11.3* 11.8*   HCT 34.9* 34.0* 35.4*    177 186       Current warfarin drug-drug interactions: prednisone, levothyroxine, ASA      Date INR Dose   2/28 1.5 ---   3/1 --- ---   3/2  3.5 mg   3/3 3.3 2.5 mg   3/4 3.6 hold   3/5 2.5 3 mg       Notes:                   INR therapeutic at 2.5, trended down from 3.6  Hgb stable  Will resume home dose of warfarin 3 mg x1 today  Daily PT/INR while inpatient.     Thank you,    KENNY SAMANIEGO, [facility]      
Physical Therapy  Facility/Department: [address]   Physical Therapy Daily Treatment Note    Patient Name: Bill Rodney        MRN: [id number]    : 1935    Date of Service: 3/4/2025    Chief Complaint   Patient presents with    Illness     Family states his home nurse evaluated him this am and told them to bring him back for rehab placement.      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, 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, Chronic obstructive lung disease (HCC), Chronic obstructive pulmonary disease (HCC), Chronic rhinitis, Chronic sinusitis, Deep vein thrombosis of lower extremity (HCC), Diarrhea, Disorder of arteries and arterioles, unspecified, Diverticulitis of colon, Dizziness and giddiness, Dysphagia, Enlarged prostate, Environmental allergies, Fatigue, Gabe hematuria, Frostbite with tissue necrosis of nose, Gastroesophageal reflux disease, Heartburn, Hereditary coagulation factor deficiency (HCC), Herpes zoster, Heterozygous factor V Leiden mutation, Hypercholesterolemia, Hypertension, Increased frequency of urination, Low back strain, Lumbar sprain, Lumbosacral spondylosis without myelopathy, 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 
Physical Therapy  Facility/Department: [address]   Physical Therapy Initial Evaluation    Patient Name: Bill Rodney        MRN: [id number]    : 1935    Date of Service: 3/3/2025    Chief Complaint   Patient presents with    Illness     Family states his home nurse evaluated him this am and told them to bring him back for rehab placement.      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, 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, Chronic obstructive lung disease (HCC), Chronic obstructive pulmonary disease (HCC), Chronic rhinitis, Chronic sinusitis, Deep vein thrombosis of lower extremity (HCC), Diarrhea, Disorder of arteries and arterioles, unspecified, Diverticulitis of colon, Dizziness and giddiness, Dysphagia, Enlarged prostate, Environmental allergies, Fatigue, Gabe hematuria, Frostbite with tissue necrosis of nose, Gastroesophageal reflux disease, Heartburn, Hereditary coagulation factor deficiency (HCC), Herpes zoster, Heterozygous factor V Leiden mutation, Hypercholesterolemia, Hypertension, Increased frequency of urination, Low back strain, Lumbar sprain, Lumbosacral spondylosis without myelopathy, 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 
Spiritual Health History and Assessment/Progress Note  [facility]    (P) Spiritual/Emotional Needs, Crisis, Initial Encounter, (P) Emotional distress, (P) Life Adjustments, Adjustment to illness,      Name: Bill Rodney MRN: [id number]    Age: 89 y.o.     Sex: male   Language: English   Congregation: Spiritism   <principal problem not specified>     Date: 3/2/2025            Total Time Calculated: (P) 15 min              Spiritual Assessment began in [facility] EMERGENCY DEPARTMENT        Referral/Consult From: (P) Rounding   Encounter Overview/Reason: (P) Spiritual/Emotional Needs, Crisis, Initial Encounter  Service Provided For: (P) Patient, Significant other        introduced self and role for Pt. Pt. Was welcoming and open to conversation with . Pt. Discussed family, dinah and Yazidi affiliation. Pt . Talked about career and steps that have lead to illness.  provided support and pastoral support and care. Prayer was offerd for comfort, strength and calmness. Pt. Shared story.    Dinah, Belief, Meaning:   Patient has beliefs or practices that help with coping during difficult times  Family/Friends have beliefs or practices that help with coping during difficult times      Importance and Influence:  Patient has spiritual/personal beliefs that influence decisions regarding their health  Family/Friends have spiritual/personal beliefs that influence decisions regarding the patient's health    Community:  Patient feels well-supported. Support system includes: Spouse/Partner  Family/Friends feel well-supported. Support system includes: Spouse/Partner    Assessment and Plan of Care:     Patient Interventions include: Facilitated expression of thoughts and feelings and Explored spiritual coping/struggle/distress  Family/Friends Interventions include: Facilitated expression of thoughts and feelings    Patient Plan of Care: Spiritual Care available upon further 
03/03/25 2032   Encounter Summary   Encounter Overview/Reason Follow-up;Spiritual/Emotional Needs   Service Provided For Patient;Significant other   Referral/Consult From [facility]   Support System Spouse   Last Encounter  03/03/25   Complexity of Encounter Moderate   Begin Time 1730   End Time  1745   Total Time Calculated 15 min   Spiritual/Emotional needs   Type Spiritual Support   Grief, Loss, and Adjustments   Type Life Adjustments;Adjustment to illness   Assessment/Intervention/Outcome   Assessment Anxious;Coping   Intervention Sustaining Presence/Ministry of presence;Life review/Legacy;Explored/Affirmed feelings, thoughts, concerns;Discussed meaning/purpose;Active listening   Outcome Coping;Encouraged;Comfort;Receptive   Plan and Referrals   Plan/Referrals Continue to visit, (comment)

## 2025-03-06 ENCOUNTER — TELEPHONE (OUTPATIENT)
Age: 89
End: 2025-03-06

## 2025-03-06 NOTE — TELEPHONE ENCOUNTER
Care Transitions Initial Follow Up Call    Outreach made within 2 business days of discharge: Yes    Patient: Bill Rodney Patient : 1935   MRN: [id number]  Reason for Admission: Pneumonia  Discharge Date: 3/5/25       Spoke with: Left message for patient to call office back to schedule.    Discharge department/facility: [facility]        Additional needs identified to be addressed with provider  No needs identified             Scheduled appointment with PCP within 7-14 days    Follow Up  Future Appointments   Date Time Provider Department Center   2025  9:30 AM Scott Kramer MD Resp Spec [id number]   2025  1:40 PM Kareem Pal MD [facility] ECC DEP       Loretta Paz [facility]

## 2025-03-07 NOTE — TELEPHONE ENCOUNTER
Patient's wife called back to say that the patient is at [facility] since 3/5/25. Says he is feeling better but very weak they are going to work on getting his strength up, and taking care of him and his meds there. Can call wife's cell phone # [phone] for responses or patient's home phone fine too.

## 2025-03-12 ENCOUNTER — TELEPHONE (OUTPATIENT)
Age: 89
End: 2025-03-12

## 2025-03-12 NOTE — TELEPHONE ENCOUNTER
Spoke to wife, Jenny.  Patient being discharged on Saturday.  Requested appointment be scheduled today, for Monday as they will need to set up transportation.  Appointment scheduled for 03/17/25 at 2:20 pm.  Please call on Monday morning for TCM.

## 2025-03-12 NOTE — TELEPHONE ENCOUNTER
Valencia from [facility] states that patient is getting d/c on Saturday and they need to schedule an appt for patient.  Please advise

## 2025-03-17 ENCOUNTER — TELEPHONE (OUTPATIENT)
Age: 89
End: 2025-03-17

## 2025-03-17 ENCOUNTER — OFFICE VISIT (OUTPATIENT)
Age: 89
End: 2025-03-17

## 2025-03-17 VITALS
SYSTOLIC BLOOD PRESSURE: 128 MMHG | HEIGHT: 68 IN | WEIGHT: 155 LBS | BODY MASS INDEX: 23.49 KG/M2 | DIASTOLIC BLOOD PRESSURE: 60 MMHG | OXYGEN SATURATION: 98 % | HEART RATE: 48 BPM

## 2025-03-17 DIAGNOSIS — I50.32 CHRONIC DIASTOLIC CONGESTIVE HEART FAILURE (HCC): Primary | ICD-10-CM

## 2025-03-17 DIAGNOSIS — L30.9 PERIANAL DERMATITIS: ICD-10-CM

## 2025-03-17 DIAGNOSIS — C61 MALIGNANT TUMOR OF PROSTATE (HCC): ICD-10-CM

## 2025-03-17 DIAGNOSIS — F22 PARANOIA (HCC): ICD-10-CM

## 2025-03-17 DIAGNOSIS — I48.0 PAROXYSMAL ATRIAL FIBRILLATION (HCC): ICD-10-CM

## 2025-03-17 DIAGNOSIS — J41.8 MIXED SIMPLE AND MUCOPURULENT CHRONIC BRONCHITIS (HCC): ICD-10-CM

## 2025-03-17 DIAGNOSIS — E78.5 DYSLIPIDEMIA: ICD-10-CM

## 2025-03-17 DIAGNOSIS — I82.5Y9 CHRONIC DEEP VEIN THROMBOSIS (DVT) OF PROXIMAL VEIN OF LOWER EXTREMITY, UNSPECIFIED LATERALITY: ICD-10-CM

## 2025-03-17 DIAGNOSIS — C78.00 MALIGNANT NEOPLASM METASTATIC TO LUNG, UNSPECIFIED LATERALITY (HCC): ICD-10-CM

## 2025-03-17 DIAGNOSIS — I10 ESSENTIAL HYPERTENSION: ICD-10-CM

## 2025-03-17 DIAGNOSIS — E03.9 HYPOTHYROIDISM, UNSPECIFIED TYPE: ICD-10-CM

## 2025-03-17 DIAGNOSIS — Z09 HOSPITAL DISCHARGE FOLLOW-UP: ICD-10-CM

## 2025-03-17 RX ORDER — FAMOTIDINE 20 MG/1
TABLET, FILM COATED ORAL
COMMUNITY

## 2025-03-17 RX ORDER — SILVER SULFADIAZINE 10 MG/G
CREAM TOPICAL
Qty: 25 G | Refills: 1 | Status: SHIPPED | OUTPATIENT
Start: 2025-03-17

## 2025-03-17 NOTE — PROGRESS NOTES
prostatic hyperplasia 2013    Candidiasis of mouth 2017    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 2014    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 2020    Chronic obstructive lung disease (HCC) 2011    Chronic obstructive pulmonary disease ([facility]) 10/8/2020    Chronic rhinitis 2020    Chronic sinusitis 2020    Deep vein thrombosis of lower extremity ([facility]) 2017    Diarrhea 10/8/2020    Disorder of arteries and arterioles, unspecified 10/8/2020    Diverticulitis of colon 10/8/2020    Dizziness and giddiness 2011    Dysphagia 2017    Enlarged prostate 10/8/2020    Environmental allergies 2017    Fatigue 10/8/2020    Gabe hematuria 2013    Frostbite with tissue necrosis of nose 10/8/2020    Gastroesophageal reflux disease 2017    Heartburn 2011    Hereditary coagulation factor deficiency ([facility]) 2011    Herpes zoster 2017    Heterozygous factor V Leiden mutation 10/8/2020    Hypercholesterolemia 2017    Hypertension 2017    Increased frequency of urination 2011    Low back strain 10/8/2020    Lumbar sprain 2014    Lumbosacral spondylosis without myelopathy 2014    Malignant neoplasm metastatic to lung ([facility]) 2017    Malignant tumor of prostate (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) 2011    Productive cough 2017    Radiation burn 2017    Retention of urine 2013    Right lower quadrant pain 10/8/2020    Sensorineural hearing loss, bilateral 2016    Shoulder joint pain 2012    Simple chronic serous otitis media 2017    Sprain of shoulder and upper arm 2014    Tinnitus 2011

## 2025-03-17 NOTE — TELEPHONE ENCOUNTER
Care Transitions Initial Follow Up Call    Outreach made within 2 business days of discharge: Yes    Patient: Bill Rodney Patient : 1935   MRN: [id number]  Reason for Admission:   Discharge Date: 3/5/25       Spoke with: wife    Discharge department/facility: [facility] Interactive Patient Contact:  Was patient able to fill all prescriptions: Yes  Was patient instructed to bring all medications to the follow-up visit: Yes  Is patient taking all medications as directed in the discharge summary? Yes  Does patient understand their discharge instructions: Yes  Does patient have questions or concerns that need addressed prior to 7-14 day follow up office visit: no    Additional needs identified to be addressed with provider  No needs identified             Scheduled appointment with PCP within 7-14 days    Follow Up  Future Appointments   Date Time Provider Department Center   2025  9:30 AM Scott Kramer MD Resp Spec [facility]   2025  1:40 PM Kareem Pal MD [facility] ECC DEP       TRAN [facility]LO[facility]

## 2025-03-27 ENCOUNTER — HOSPITAL ENCOUNTER (OUTPATIENT)
Age: 89
Setting detail: SPECIMEN
Discharge: HOME | End: 2025-03-27

## 2025-03-27 LAB
BILIRUB UR QL STRIP: NEGATIVE
CLARITY UR: CLEAR
COLOR UR: YELLOW
COMMENT: ABNORMAL
GLUCOSE UR STRIP-MCNC: NEGATIVE MG/DL
HGB UR QL STRIP.AUTO: NEGATIVE
KETONES UR STRIP-MCNC: ABNORMAL MG/DL
LEUKOCYTE ESTERASE UR QL STRIP: NEGATIVE
NITRITE UR QL STRIP: NEGATIVE
PH UR STRIP: 5.5 [PH] (ref 5–8)
PROT UR STRIP-MCNC: NEGATIVE MG/DL
SP GR UR STRIP: 1.02 (ref 1–1.03)
UROBILINOGEN UR STRIP-ACNC: NORMAL EU/DL (ref 0–1)

## 2025-04-02 RX ORDER — IPRATROPIUM BROMIDE AND ALBUTEROL SULFATE 2.5; .5 MG/3ML; MG/3ML
3 SOLUTION RESPIRATORY (INHALATION)
Qty: 360 ML | Refills: 5 | Status: ON HOLD | OUTPATIENT
Start: 2025-04-02

## 2025-04-02 NOTE — TELEPHONE ENCOUNTER
[id number] OT was there for home visit.  Wife said they suggested she ask for some labs as patient has been tired and really doesn't want to do anything.  Also has a cough - reported Nurse thinks it is more from his sinuses and not from lungs.  Wife is wondering if he might need an allergy medication ?  Patient is coughing a lot but not bringing anything up.      Bill Rodney is calling to request a refill on the following medication(s):    Medication Request:  Requested Prescriptions     Pending Prescriptions Disp Refills    ipratropium 0.5 mg-albuterol 2.5 mg (DUONEB) 0.5-2.5 (3) MG/3ML SOLN nebulizer solution 360 mL 1     Sig: Inhale 3 mLs into the lungs 4 times daily       Last Visit Date (If Applicable):  3/17/2025    Next Visit Date:    5/12/2025

## 2025-04-03 ENCOUNTER — HOSPITAL ENCOUNTER (INPATIENT)
Age: 89
LOS: 4 days | Discharge: NURSING, REHAB OR LONG-TERM CARE | DRG: 189 | End: 2025-04-07
Attending: EMERGENCY MEDICINE | Admitting: FAMILY MEDICINE
Payer: COMMERCIAL

## 2025-04-03 ENCOUNTER — APPOINTMENT (OUTPATIENT)
Dept: GENERAL RADIOLOGY | Age: 89
DRG: 189 | End: 2025-04-03
Payer: COMMERCIAL

## 2025-04-03 ENCOUNTER — TELEPHONE (OUTPATIENT)
Age: 89
End: 2025-04-03

## 2025-04-03 DIAGNOSIS — E87.1 HYPONATREMIA: ICD-10-CM

## 2025-04-03 DIAGNOSIS — J44.1 CHRONIC OBSTRUCTIVE PULMONARY DISEASE WITH ACUTE EXACERBATION (HCC): ICD-10-CM

## 2025-04-03 DIAGNOSIS — J44.1 COPD EXACERBATION (HCC): Primary | ICD-10-CM

## 2025-04-03 DIAGNOSIS — R06.02 SHORTNESS OF BREATH: ICD-10-CM

## 2025-04-03 PROBLEM — J96.01 ACUTE HYPOXIC RESPIRATORY FAILURE: Status: ACTIVE | Noted: 2025-04-03

## 2025-04-03 LAB
ANION GAP SERPL CALCULATED.3IONS-SCNC: 11 MMOL/L (ref 9–17)
BASOPHILS # BLD: 0.1 K/UL (ref 0–0.2)
BASOPHILS NFR BLD: 1 % (ref 0–2)
BILIRUB UR QL STRIP: NEGATIVE
BNP SERPL-MCNC: 6842 PG/ML
BUN SERPL-MCNC: 6 MG/DL (ref 8–23)
CALCIUM SERPL-MCNC: 8.7 MG/DL (ref 8.6–10.4)
CHARACTER UR: ABNORMAL
CHLORIDE SERPL-SCNC: 90 MMOL/L (ref 98–107)
CLARITY UR: CLEAR
CO2 SERPL-SCNC: 25 MMOL/L (ref 20–31)
COLOR UR: YELLOW
CREAT SERPL-MCNC: 0.6 MG/DL (ref 0.7–1.2)
EOSINOPHIL # BLD: 0.4 K/UL (ref 0–0.4)
EOSINOPHILS RELATIVE PERCENT: 6 % (ref 1–4)
EPI CELLS #/AREA URNS HPF: ABNORMAL /HPF (ref 0–5)
ERYTHROCYTE [DISTWIDTH] IN BLOOD BY AUTOMATED COUNT: 19.2 % (ref 12.5–15.4)
FLUAV AG SPEC QL: NEGATIVE
FLUBV AG SPEC QL: NEGATIVE
GFR, ESTIMATED: >90 ML/MIN/1.73M2
GLUCOSE SERPL-MCNC: 89 MG/DL (ref 70–99)
GLUCOSE UR STRIP-MCNC: NEGATIVE MG/DL
HCT VFR BLD AUTO: 35.7 % (ref 41–53)
HGB BLD-MCNC: 11.8 G/DL (ref 13.5–17.5)
HGB UR QL STRIP.AUTO: ABNORMAL
INR PPP: 1.7 (ref 0.9–1.2)
KETONES UR STRIP-MCNC: ABNORMAL MG/DL
LEUKOCYTE ESTERASE UR QL STRIP: NEGATIVE
LYMPHOCYTES NFR BLD: 0.6 K/UL (ref 1–4.8)
LYMPHOCYTES RELATIVE PERCENT: 9 % (ref 24–44)
MCH RBC QN AUTO: 29.6 PG (ref 26–34)
MCHC RBC AUTO-ENTMCNC: 33.2 G/DL (ref 31–37)
MCV RBC AUTO: 89.1 FL (ref 80–100)
MONOCYTES NFR BLD: 1 K/UL (ref 0.1–1.2)
MONOCYTES NFR BLD: 14 % (ref 2–11)
NEUTROPHILS NFR BLD: 70 % (ref 36–66)
NEUTS SEG NFR BLD: 4.8 K/UL (ref 1.8–7.7)
NITRITE UR QL STRIP: NEGATIVE
OSMOLALITY UR: 310 MOSM/KG (ref 80–1300)
PH UR STRIP: 6 [PH] (ref 5–8)
PLATELET # BLD AUTO: 316 K/UL (ref 140–450)
PMV BLD AUTO: 6.2 FL (ref 6–12)
POTASSIUM SERPL-SCNC: 4.1 MMOL/L (ref 3.7–5.3)
PROT UR STRIP-MCNC: NEGATIVE MG/DL
PROTHROMBIN TIME: 19.4 SEC (ref 11.8–14.6)
RBC # BLD AUTO: 4 M/UL (ref 4.5–5.9)
RBC #/AREA URNS HPF: ABNORMAL /HPF (ref 0–2)
SARS-COV-2 RDRP RESP QL NAA+PROBE: NOT DETECTED
SODIUM SERPL-SCNC: 126 MMOL/L (ref 135–144)
SODIUM UR-SCNC: 47 MMOL/L
SP GR UR STRIP: 1.01 (ref 1–1.03)
SPECIMEN DESCRIPTION: NORMAL
TROPONIN I SERPL HS-MCNC: 29 NG/L (ref 0–22)
TROPONIN I SERPL HS-MCNC: 35 NG/L (ref 0–22)
UROBILINOGEN UR STRIP-ACNC: NORMAL EU/DL (ref 0–1)
WBC #/AREA URNS HPF: ABNORMAL /HPF (ref 0–5)
WBC OTHER # BLD: 6.8 K/UL (ref 3.5–11)

## 2025-04-03 PROCEDURE — 6370000000 HC RX 637 (ALT 250 FOR IP): Performed by: NURSE PRACTITIONER

## 2025-04-03 PROCEDURE — 87635 SARS-COV-2 COVID-19 AMP PRB: CPT

## 2025-04-03 PROCEDURE — 36415 COLL VENOUS BLD VENIPUNCTURE: CPT

## 2025-04-03 PROCEDURE — 6370000000 HC RX 637 (ALT 250 FOR IP): Performed by: FAMILY MEDICINE

## 2025-04-03 PROCEDURE — 94640 AIRWAY INHALATION TREATMENT: CPT

## 2025-04-03 PROCEDURE — 51798 US URINE CAPACITY MEASURE: CPT

## 2025-04-03 PROCEDURE — 6360000002 HC RX W HCPCS: Performed by: FAMILY MEDICINE

## 2025-04-03 PROCEDURE — 99285 EMERGENCY DEPT VISIT HI MDM: CPT

## 2025-04-03 PROCEDURE — 2700000000 HC OXYGEN THERAPY PER DAY

## 2025-04-03 PROCEDURE — 87804 INFLUENZA ASSAY W/OPTIC: CPT

## 2025-04-03 PROCEDURE — 71045 X-RAY EXAM CHEST 1 VIEW: CPT

## 2025-04-03 PROCEDURE — 84484 ASSAY OF TROPONIN QUANT: CPT

## 2025-04-03 PROCEDURE — 80048 BASIC METABOLIC PNL TOTAL CA: CPT

## 2025-04-03 PROCEDURE — 85025 COMPLETE CBC W/AUTO DIFF WBC: CPT

## 2025-04-03 PROCEDURE — 6360000002 HC RX W HCPCS: Performed by: NURSE PRACTITIONER

## 2025-04-03 PROCEDURE — 96374 THER/PROPH/DIAG INJ IV PUSH: CPT

## 2025-04-03 PROCEDURE — 2500000003 HC RX 250 WO HCPCS: Performed by: FAMILY MEDICINE

## 2025-04-03 PROCEDURE — 83880 ASSAY OF NATRIURETIC PEPTIDE: CPT

## 2025-04-03 PROCEDURE — 93005 ELECTROCARDIOGRAM TRACING: CPT | Performed by: NURSE PRACTITIONER

## 2025-04-03 PROCEDURE — 94761 N-INVAS EAR/PLS OXIMETRY MLT: CPT

## 2025-04-03 PROCEDURE — 99223 1ST HOSP IP/OBS HIGH 75: CPT | Performed by: INTERNAL MEDICINE

## 2025-04-03 PROCEDURE — 83935 ASSAY OF URINE OSMOLALITY: CPT

## 2025-04-03 PROCEDURE — 0202U NFCT DS 22 TRGT SARS-COV-2: CPT

## 2025-04-03 PROCEDURE — 2060000000 HC ICU INTERMEDIATE R&B

## 2025-04-03 PROCEDURE — 85610 PROTHROMBIN TIME: CPT

## 2025-04-03 PROCEDURE — 81001 URINALYSIS AUTO W/SCOPE: CPT

## 2025-04-03 PROCEDURE — 84300 ASSAY OF URINE SODIUM: CPT

## 2025-04-03 RX ORDER — AMLODIPINE BESYLATE 5 MG/1
5 TABLET ORAL DAILY
Status: DISCONTINUED | OUTPATIENT
Start: 2025-04-03 | End: 2025-04-07 | Stop reason: HOSPADM

## 2025-04-03 RX ORDER — POLYETHYLENE GLYCOL 3350 17 G/17G
17 POWDER, FOR SOLUTION ORAL DAILY PRN
Status: DISCONTINUED | OUTPATIENT
Start: 2025-04-03 | End: 2025-04-07 | Stop reason: HOSPADM

## 2025-04-03 RX ORDER — WARFARIN SODIUM 5 MG/1
5 TABLET ORAL ONCE
Status: COMPLETED | OUTPATIENT
Start: 2025-04-03 | End: 2025-04-03

## 2025-04-03 RX ORDER — ATORVASTATIN CALCIUM 20 MG/1
20 TABLET, FILM COATED ORAL DAILY
Status: DISCONTINUED | OUTPATIENT
Start: 2025-04-03 | End: 2025-04-07 | Stop reason: HOSPADM

## 2025-04-03 RX ORDER — ACETAMINOPHEN 325 MG/1
650 TABLET ORAL EVERY 6 HOURS PRN
Status: DISCONTINUED | OUTPATIENT
Start: 2025-04-03 | End: 2025-04-07 | Stop reason: HOSPADM

## 2025-04-03 RX ORDER — SODIUM CHLORIDE 0.9 % (FLUSH) 0.9 %
5-40 SYRINGE (ML) INJECTION EVERY 12 HOURS SCHEDULED
Status: DISCONTINUED | OUTPATIENT
Start: 2025-04-03 | End: 2025-04-07 | Stop reason: HOSPADM

## 2025-04-03 RX ORDER — ASPIRIN 81 MG/1
81 TABLET ORAL DAILY
Status: DISCONTINUED | OUTPATIENT
Start: 2025-04-03 | End: 2025-04-07 | Stop reason: HOSPADM

## 2025-04-03 RX ORDER — BUDESONIDE AND FORMOTEROL FUMARATE DIHYDRATE 160; 4.5 UG/1; UG/1
2 AEROSOL RESPIRATORY (INHALATION)
Status: DISCONTINUED | OUTPATIENT
Start: 2025-04-03 | End: 2025-04-07 | Stop reason: HOSPADM

## 2025-04-03 RX ORDER — MAGNESIUM SULFATE IN WATER 40 MG/ML
2000 INJECTION, SOLUTION INTRAVENOUS PRN
Status: DISCONTINUED | OUTPATIENT
Start: 2025-04-03 | End: 2025-04-07 | Stop reason: HOSPADM

## 2025-04-03 RX ORDER — ALBUTEROL SULFATE 90 UG/1
2 INHALANT RESPIRATORY (INHALATION) EVERY 6 HOURS PRN
Status: DISCONTINUED | OUTPATIENT
Start: 2025-04-03 | End: 2025-04-03

## 2025-04-03 RX ORDER — LISINOPRIL 20 MG/1
20 TABLET ORAL DAILY
Status: DISCONTINUED | OUTPATIENT
Start: 2025-04-03 | End: 2025-04-07 | Stop reason: HOSPADM

## 2025-04-03 RX ORDER — ONDANSETRON 4 MG/1
4 TABLET, ORALLY DISINTEGRATING ORAL EVERY 8 HOURS PRN
Status: DISCONTINUED | OUTPATIENT
Start: 2025-04-03 | End: 2025-04-07 | Stop reason: HOSPADM

## 2025-04-03 RX ORDER — POTASSIUM CHLORIDE 1500 MG/1
40 TABLET, EXTENDED RELEASE ORAL PRN
Status: DISCONTINUED | OUTPATIENT
Start: 2025-04-03 | End: 2025-04-07 | Stop reason: HOSPADM

## 2025-04-03 RX ORDER — FUROSEMIDE 10 MG/ML
40 INJECTION INTRAMUSCULAR; INTRAVENOUS ONCE
Status: COMPLETED | OUTPATIENT
Start: 2025-04-03 | End: 2025-04-03

## 2025-04-03 RX ORDER — ALBUTEROL SULFATE 90 UG/1
2 INHALANT RESPIRATORY (INHALATION) EVERY 4 HOURS PRN
Status: DISCONTINUED | OUTPATIENT
Start: 2025-04-03 | End: 2025-04-07 | Stop reason: HOSPADM

## 2025-04-03 RX ORDER — LEVOTHYROXINE SODIUM 75 UG/1
75 TABLET ORAL DAILY
Status: DISCONTINUED | OUTPATIENT
Start: 2025-04-03 | End: 2025-04-07 | Stop reason: HOSPADM

## 2025-04-03 RX ORDER — SODIUM CHLORIDE 9 MG/ML
INJECTION, SOLUTION INTRAVENOUS CONTINUOUS
Status: DISCONTINUED | OUTPATIENT
Start: 2025-04-03 | End: 2025-04-03

## 2025-04-03 RX ORDER — IPRATROPIUM BROMIDE AND ALBUTEROL SULFATE 2.5; .5 MG/3ML; MG/3ML
1 SOLUTION RESPIRATORY (INHALATION)
Status: DISCONTINUED | OUTPATIENT
Start: 2025-04-03 | End: 2025-04-03

## 2025-04-03 RX ORDER — SODIUM CHLORIDE 9 MG/ML
INJECTION, SOLUTION INTRAVENOUS PRN
Status: DISCONTINUED | OUTPATIENT
Start: 2025-04-03 | End: 2025-04-07 | Stop reason: HOSPADM

## 2025-04-03 RX ORDER — SODIUM CHLORIDE 0.9 % (FLUSH) 0.9 %
5-40 SYRINGE (ML) INJECTION PRN
Status: DISCONTINUED | OUTPATIENT
Start: 2025-04-03 | End: 2025-04-07 | Stop reason: HOSPADM

## 2025-04-03 RX ORDER — POTASSIUM CHLORIDE 7.45 MG/ML
10 INJECTION INTRAVENOUS PRN
Status: DISCONTINUED | OUTPATIENT
Start: 2025-04-03 | End: 2025-04-07 | Stop reason: HOSPADM

## 2025-04-03 RX ORDER — IPRATROPIUM BROMIDE AND ALBUTEROL SULFATE 2.5; .5 MG/3ML; MG/3ML
1 SOLUTION RESPIRATORY (INHALATION) 3 TIMES DAILY
Status: DISCONTINUED | OUTPATIENT
Start: 2025-04-03 | End: 2025-04-05

## 2025-04-03 RX ORDER — GUAIFENESIN 600 MG/1
600 TABLET, EXTENDED RELEASE ORAL 2 TIMES DAILY
Status: DISCONTINUED | OUTPATIENT
Start: 2025-04-03 | End: 2025-04-07 | Stop reason: HOSPADM

## 2025-04-03 RX ORDER — IPRATROPIUM BROMIDE AND ALBUTEROL SULFATE 2.5; .5 MG/3ML; MG/3ML
1 SOLUTION RESPIRATORY (INHALATION) ONCE
Status: COMPLETED | OUTPATIENT
Start: 2025-04-03 | End: 2025-04-03

## 2025-04-03 RX ORDER — ALBUTEROL SULFATE 0.83 MG/ML
2.5 SOLUTION RESPIRATORY (INHALATION) EVERY 6 HOURS PRN
Status: DISCONTINUED | OUTPATIENT
Start: 2025-04-03 | End: 2025-04-07 | Stop reason: HOSPADM

## 2025-04-03 RX ORDER — CARVEDILOL 3.12 MG/1
6.25 TABLET ORAL 2 TIMES DAILY WITH MEALS
Status: DISCONTINUED | OUTPATIENT
Start: 2025-04-03 | End: 2025-04-07 | Stop reason: HOSPADM

## 2025-04-03 RX ORDER — ONDANSETRON 2 MG/ML
4 INJECTION INTRAMUSCULAR; INTRAVENOUS EVERY 6 HOURS PRN
Status: DISCONTINUED | OUTPATIENT
Start: 2025-04-03 | End: 2025-04-07 | Stop reason: HOSPADM

## 2025-04-03 RX ORDER — ACETAMINOPHEN 650 MG/1
650 SUPPOSITORY RECTAL EVERY 6 HOURS PRN
Status: DISCONTINUED | OUTPATIENT
Start: 2025-04-03 | End: 2025-04-07 | Stop reason: HOSPADM

## 2025-04-03 RX ADMIN — FUROSEMIDE 40 MG: 10 INJECTION, SOLUTION INTRAMUSCULAR; INTRAVENOUS at 17:51

## 2025-04-03 RX ADMIN — ATORVASTATIN CALCIUM 20 MG: 20 TABLET, FILM COATED ORAL at 20:54

## 2025-04-03 RX ADMIN — WARFARIN SODIUM 5 MG: 5 TABLET ORAL at 19:41

## 2025-04-03 RX ADMIN — GUAIFENESIN 600 MG: 600 TABLET, EXTENDED RELEASE ORAL at 20:54

## 2025-04-03 RX ADMIN — CARVEDILOL 6.25 MG: 3.12 TABLET, FILM COATED ORAL at 20:54

## 2025-04-03 RX ADMIN — IPRATROPIUM BROMIDE AND ALBUTEROL SULFATE 1 DOSE: .5; 2.5 SOLUTION RESPIRATORY (INHALATION) at 16:57

## 2025-04-03 RX ADMIN — METHYLPREDNISOLONE SODIUM SUCCINATE 125 MG: 125 INJECTION, POWDER, FOR SOLUTION INTRAMUSCULAR; INTRAVENOUS at 13:52

## 2025-04-03 RX ADMIN — IPRATROPIUM BROMIDE AND ALBUTEROL SULFATE 1 DOSE: 2.5; .5 SOLUTION RESPIRATORY (INHALATION) at 20:25

## 2025-04-03 RX ADMIN — LISINOPRIL 20 MG: 10 TABLET ORAL at 17:53

## 2025-04-03 RX ADMIN — IPRATROPIUM BROMIDE AND ALBUTEROL SULFATE 1 DOSE: .5; 2.5 SOLUTION RESPIRATORY (INHALATION) at 13:58

## 2025-04-03 RX ADMIN — SODIUM CHLORIDE, PRESERVATIVE FREE 10 ML: 5 INJECTION INTRAVENOUS at 20:55

## 2025-04-03 RX ADMIN — BUDESONIDE AND FORMOTEROL FUMARATE DIHYDRATE 2 PUFF: 160; 4.5 AEROSOL RESPIRATORY (INHALATION) at 20:26

## 2025-04-03 ASSESSMENT — PAIN - FUNCTIONAL ASSESSMENT: PAIN_FUNCTIONAL_ASSESSMENT: 0-10

## 2025-04-03 ASSESSMENT — PAIN SCALES - GENERAL: PAINLEVEL_OUTOF10: 10

## 2025-04-03 NOTE — ED PROVIDER NOTES
Emergency Department     Faculty Attestation    I performed a history and physical examination of the patient and discussed management with the mid level provider. I reviewed the mid level provider's note and agree with the documented findings and plan of care. Any areas of disagreement are noted on the chart. I was personally present for the key portions of any procedures. I have documented in the chart those procedures where I was not present during the key portions. I have reviewed the emergency nurses triage note. I agree with the chief complaint, past medical history, past surgical history, allergies, medications, social and family history as documented unless otherwise noted below. Documentation of the HPI, Physical Exam and Medical Decision Making performed by medical students or scribes is based on my personal performance of the HPI, PE and MDM. For Physician Assistant/ Nurse Practitioner cases/documentation I have personally evaluated this patient and have completed at least one if not all key elements of the E/M (history, physical exam, and MDM). Additional findings are as noted.      Primary Care Physician:  Kareem Pal MD    CHIEF COMPLAINT       Chief Complaint   Patient presents with    Shortness of Breath     On going-pt has had an on going issue with pneumonia.       RECENT VITALS:   Temp: 97.5 °F (36.4 °C),  Pulse: 55, Respirations: 18, BP: (!) 178/65    LABS:  Labs Reviewed   CBC WITH AUTO DIFFERENTIAL - Abnormal; Notable for the following components:       Result Value    RBC 4.00 (*)     Hemoglobin 11.8 (*)     Hematocrit 35.7 (*)     RDW 19.2 (*)     Neutrophils % 70 (*)     Lymphocytes % 9 (*)     Monocytes % 14 (*)     Eosinophils % 6 (*)     Lymphocytes Absolute 0.60 (*)     All other components within normal limits   BASIC METABOLIC PANEL - Abnormal; Notable for the following components:    Sodium 126 (*)     Chloride 90 (*)     BUN 6 (*)     Creatinine 0.6 (*)     All other 
CULTURE - Abnormal; Notable for the following components:    Ketones, Urine TRACE (*)     Urine Hgb TRACE (*)     All other components within normal limits   MICROSCOPIC URINALYSIS - Abnormal; Notable for the following components:    Other Observations UA   (*)     Value: Utilizing a urinalysis as the only screening method to exclude a potential uropathogen can be unreliable in many patient populations.  Rapid screening tests are less sensitive than culture and if UTI is a clinical possibility, culture should be considered despite a negative urinalysis.      All other components within normal limits   PROTIME-INR - Abnormal; Notable for the following components:    Protime 25.4 (*)     INR 2.4 (*)     All other components within normal limits   COMPREHENSIVE METABOLIC PANEL - Abnormal; Notable for the following components:    Sodium 127 (*)     Chloride 92 (*)     Glucose 130 (*)     Creatinine 0.6 (*)     Calcium 8.4 (*)     Total Protein 5.7 (*)     All other components within normal limits   CBC WITH AUTO DIFFERENTIAL - Abnormal; Notable for the following components:    RBC 3.51 (*)     Hemoglobin 10.8 (*)     Hematocrit 30.9 (*)     RDW 19.3 (*)     Neutrophils % 82 (*)     Lymphocytes % 6 (*)     Monocytes % 12 (*)     Eosinophils % 0 (*)     Neutrophils Absolute 8.80 (*)     Lymphocytes Absolute 0.70 (*)     All other components within normal limits   POC GLUCOSE FINGERSTICK - Abnormal; Notable for the following components:    POC Glucose 212 (*)     All other components within normal limits   POC GLUCOSE FINGERSTICK - Abnormal; Notable for the following components:    POC Glucose 153 (*)     All other components within normal limits   POC GLUCOSE FINGERSTICK - Abnormal; Notable for the following components:    POC Glucose 182 (*)     All other components within normal limits   POC GLUCOSE FINGERSTICK - Abnormal; Notable for the following components:    POC Glucose 121 (*)     All other components within

## 2025-04-03 NOTE — ED NOTES
mEq/100 mL IVPB (Peripheral Line)    magnesium sulfate 2000 mg in 50 mL IVPB premix    OR Linked Order Group     ondansetron (ZOFRAN-ODT) disintegrating tablet 4 mg     ondansetron (ZOFRAN) injection 4 mg    polyethylene glycol (GLYCOLAX) packet 17 g    OR Linked Order Group     acetaminophen (TYLENOL) tablet 650 mg     acetaminophen (TYLENOL) suppository 650 mg    DISCONTD: 0.9 % sodium chloride infusion    DISCONTD: ipratropium 0.5 mg-albuterol 2.5 mg (DUONEB) nebulizer solution 1 Dose     Initiate RT Bronchodilator Protocol:   Yes - Inpatient Protocol    albuterol (PROVENTIL) (2.5 MG/3ML) 0.083% nebulizer solution 2.5 mg     Initiate RT Bronchodilator Protocol:   Yes - Inpatient Protocol    DISCONTD: albuterol sulfate HFA (PROVENTIL;VENTOLIN;PROAIR) 108 (90 Base) MCG/ACT inhaler 2 puff     Initiate RT Bronchodilator Protocol:   Yes - Inpatient Protocol    amLODIPine (NORVASC) tablet 5 mg    aspirin EC tablet 81 mg    carvedilol (COREG) tablet 6.25 mg    budesonide-formoterol (SYMBICORT) 160-4.5 MCG/ACT inhaler 2 puff    guaiFENesin (MUCINEX) extended release tablet 600 mg    levothyroxine (SYNTHROID) tablet 75 mcg    lisinopril (PRINIVIL;ZESTRIL) tablet 20 mg    atorvastatin (LIPITOR) tablet 20 mg    tiZANidine (ZANAFLEX) tablet 2 mg    tiotropium (SPIRIVA RESPIMAT) 2.5 MCG/ACT inhaler 2 puff    warfarin (COUMADIN) tablet 1 mg     Consulting pharmacy to manage     Indication of Use:   A Fib/A Flutter     What is the patient's goal INR?:   2.0 - 3.0    warfarin placeholder: dosing by pharmacy     What is the patient's goal INR?:   2.0 - 3.0    ipratropium 0.5 mg-albuterol 2.5 mg (DUONEB) nebulizer solution 1 Dose     Initiate RT Bronchodilator Protocol:   Yes - Inpatient Protocol    albuterol sulfate HFA (PROVENTIL;VENTOLIN;PROAIR) 108 (90 Base) MCG/ACT inhaler 2 puff     Initiate RT Bronchodilator Protocol:   Yes - Inpatient Protocol    furosemide (LASIX) injection 40 mg       SURGICAL HISTORY       Past Surgical

## 2025-04-03 NOTE — TELEPHONE ENCOUNTER
Patient's wife called back appt not available anymore sadly was advised to go to [facility] lab on [address] and gave that office information.

## 2025-04-03 NOTE — TELEPHONE ENCOUNTER
Dariana from [address] is the patient's home health nurse for about 2 1/2 or 3 weeks calling because the patient has been very fatigued after being released from the hospital. Reports the patient always has a spinney feeling very dizzy. Woke up a few times last night with some trouble breathing. Advised her we only have one acute opening in afternoon because of the trouble breathing would recommend an UC or walk in ER to be able to be seen faster. Dariana said that patient wanted to check with PCP first so she will call patient to see what they want to do.

## 2025-04-04 ENCOUNTER — APPOINTMENT (OUTPATIENT)
Age: 89
DRG: 189 | End: 2025-04-04
Attending: FAMILY MEDICINE
Payer: COMMERCIAL

## 2025-04-04 PROBLEM — E87.1 HYPONATREMIA: Status: ACTIVE | Noted: 2025-04-04

## 2025-04-04 PROBLEM — R73.9 HYPERGLYCEMIA: Status: ACTIVE | Noted: 2025-04-04

## 2025-04-04 LAB
ALBUMIN SERPL-MCNC: 3.5 G/DL (ref 3.5–5.2)
ALBUMIN/GLOB SERPL: 1.5 {RATIO} (ref 1–2.5)
ALP SERPL-CCNC: 112 U/L (ref 40–129)
ALT SERPL-CCNC: 11 U/L (ref 5–41)
ANION GAP SERPL CALCULATED.3IONS-SCNC: 14 MMOL/L (ref 9–17)
AST SERPL-CCNC: 18 U/L
B PARAP IS1001 DNA NPH QL NAA+NON-PROBE: NOT DETECTED
B PERT DNA SPEC QL NAA+PROBE: NOT DETECTED
BASOPHILS # BLD: 0 K/UL (ref 0–0.2)
BASOPHILS NFR BLD: 0 % (ref 0–2)
BILIRUB SERPL-MCNC: 0.7 MG/DL (ref 0.3–1.2)
BUN SERPL-MCNC: 11 MG/DL (ref 8–23)
C PNEUM DNA NPH QL NAA+NON-PROBE: NOT DETECTED
CALCIUM SERPL-MCNC: 8.6 MG/DL (ref 8.6–10.4)
CHLORIDE SERPL-SCNC: 92 MMOL/L (ref 98–107)
CO2 SERPL-SCNC: 24 MMOL/L (ref 20–31)
CREAT SERPL-MCNC: 0.7 MG/DL (ref 0.7–1.2)
ECHO AO ASC DIAM: 3.1 CM
ECHO AO ASCENDING AORTA INDEX: 1.76 CM/M2
ECHO AO ROOT DIAM: 3.6 CM
ECHO AO ROOT INDEX: 2.05 CM/M2
ECHO AR MAX VEL PISA: 3.7 M/S
ECHO AV AREA PEAK VELOCITY: 2.2 CM2
ECHO AV AREA VTI: 2.6 CM2
ECHO AV AREA/BSA PEAK VELOCITY: 1.3 CM2/M2
ECHO AV AREA/BSA VTI: 1.5 CM2/M2
ECHO AV MEAN GRADIENT: 3 MMHG
ECHO AV MEAN VELOCITY: 0.8 M/S
ECHO AV PEAK GRADIENT: 6 MMHG
ECHO AV PEAK VELOCITY: 1.2 M/S
ECHO AV REGURGITANT PHT: 574 MS
ECHO AV VELOCITY RATIO: 0.67
ECHO AV VTI: 22.8 CM
ECHO BSA: 1.75 M2
ECHO EST RA PRESSURE: 10 MMHG
ECHO IVC PROX: 2.3 CM
ECHO LA AREA 2C: 25 CM2
ECHO LA AREA 4C: 23.3 CM2
ECHO LA DIAMETER INDEX: 3.07 CM/M2
ECHO LA DIAMETER: 5.4 CM
ECHO LA MAJOR AXIS: 6.4 CM
ECHO LA MINOR AXIS: 5.5 CM
ECHO LA TO AORTIC ROOT RATIO: 1.5
ECHO LA VOL BP: 85 ML (ref 18–58)
ECHO LA VOL MOD A2C: 91 ML (ref 18–58)
ECHO LA VOL MOD A4C: 69 ML (ref 18–58)
ECHO LA VOL/BSA BIPLANE: 48 ML/M2 (ref 16–34)
ECHO LA VOLUME INDEX MOD A2C: 52 ML/M2 (ref 16–34)
ECHO LA VOLUME INDEX MOD A4C: 39 ML/M2 (ref 16–34)
ECHO LV E' LATERAL VELOCITY: 11.9 CM/S
ECHO LV EDV A2C: 64 ML
ECHO LV EDV A4C: 40 ML
ECHO LV EDV INDEX A4C: 23 ML/M2
ECHO LV EDV NDEX A2C: 36 ML/M2
ECHO LV EF PHYSICIAN: 50 %
ECHO LV EJECTION FRACTION A2C: 48 %
ECHO LV EJECTION FRACTION A4C: 48 %
ECHO LV EJECTION FRACTION BIPLANE: 49 % (ref 55–100)
ECHO LV ESV A2C: 34 ML
ECHO LV ESV A4C: 21 ML
ECHO LV ESV INDEX A2C: 19 ML/M2
ECHO LV ESV INDEX A4C: 12 ML/M2
ECHO LV FRACTIONAL SHORTENING: 27 % (ref 28–44)
ECHO LV INTERNAL DIMENSION DIASTOLE INDEX: 2.78 CM/M2
ECHO LV INTERNAL DIMENSION DIASTOLIC: 4.9 CM (ref 4.2–5.9)
ECHO LV INTERNAL DIMENSION SYSTOLIC INDEX: 2.05 CM/M2
ECHO LV INTERNAL DIMENSION SYSTOLIC: 3.6 CM
ECHO LV IVSD: 1.3 CM (ref 0.6–1)
ECHO LV MASS 2D: 253.7 G (ref 88–224)
ECHO LV MASS INDEX 2D: 144.2 G/M2 (ref 49–115)
ECHO LV POSTERIOR WALL DIASTOLIC: 1.3 CM (ref 0.6–1)
ECHO LV RELATIVE WALL THICKNESS RATIO: 0.53
ECHO LVOT AREA: 3.5 CM2
ECHO LVOT AV VTI INDEX: 0.76
ECHO LVOT DIAM: 2.1 CM
ECHO LVOT MEAN GRADIENT: 1 MMHG
ECHO LVOT PEAK GRADIENT: 2 MMHG
ECHO LVOT PEAK VELOCITY: 0.8 M/S
ECHO LVOT STROKE VOLUME INDEX: 34 ML/M2
ECHO LVOT SV: 59.9 ML
ECHO LVOT VTI: 17.3 CM
ECHO MV A VELOCITY: 0.25 M/S
ECHO MV AREA VTI: 1.5 CM2
ECHO MV E DECELERATION TIME (DT): 148 MS
ECHO MV E VELOCITY: 1.15 M/S
ECHO MV E/A RATIO: 4.6
ECHO MV E/E' LATERAL: 9.66
ECHO MV LVOT VTI INDEX: 2.35
ECHO MV MAX VELOCITY: 1.4 M/S
ECHO MV MEAN GRADIENT: 2 MMHG
ECHO MV MEAN VELOCITY: 0.6 M/S
ECHO MV PEAK GRADIENT: 8 MMHG
ECHO MV REGURGITANT ALIASING (NYQUIST) VELOCITY: 41 CM/S
ECHO MV REGURGITANT PEAK GRADIENT: 108 MMHG
ECHO MV REGURGITANT PEAK VELOCITY: 5.2 M/S
ECHO MV REGURGITANT RADIUS PISA: 1 CM
ECHO MV REGURGITANT VTIA: 179 CM
ECHO MV VTI: 40.7 CM
ECHO PV MAX VELOCITY: 0.9 M/S
ECHO PV PEAK GRADIENT: 3 MMHG
ECHO RA AREA 4C: 22.5 CM2
ECHO RA END SYSTOLIC VOLUME APICAL 4 CHAMBER INDEX BSA: 40 ML/M2
ECHO RA VOLUME: 70 ML
ECHO RIGHT VENTRICULAR SYSTOLIC PRESSURE (RVSP): 49 MMHG
ECHO RV BASAL DIMENSION: 3.7 CM
ECHO RV FREE WALL PEAK S': 11 CM/S
ECHO RV TAPSE: 1.9 CM (ref 1.7–?)
ECHO TV REGURGITANT MAX VELOCITY: 3.14 M/S
ECHO TV REGURGITANT PEAK GRADIENT: 39 MMHG
EKG ATRIAL RATE: 56 BPM
EKG Q-T INTERVAL: 446 MS
EKG QRS DURATION: 96 MS
EKG QTC CALCULATION (BAZETT): 414 MS
EKG R AXIS: -22 DEGREES
EKG T AXIS: 59 DEGREES
EKG VENTRICULAR RATE: 52 BPM
EOSINOPHIL # BLD: 0 K/UL (ref 0–0.4)
EOSINOPHILS RELATIVE PERCENT: 0 % (ref 1–4)
ERYTHROCYTE [DISTWIDTH] IN BLOOD BY AUTOMATED COUNT: 19.1 % (ref 12.5–15.4)
FLUAV RNA NPH QL NAA+NON-PROBE: NOT DETECTED
FLUBV RNA NPH QL NAA+NON-PROBE: NOT DETECTED
GFR, ESTIMATED: 89 ML/MIN/1.73M2
GLUCOSE BLD-MCNC: 153 MG/DL (ref 75–110)
GLUCOSE BLD-MCNC: 182 MG/DL (ref 75–110)
GLUCOSE BLD-MCNC: 212 MG/DL (ref 75–110)
GLUCOSE SERPL-MCNC: 260 MG/DL (ref 70–99)
HADV DNA NPH QL NAA+NON-PROBE: NOT DETECTED
HCOV 229E RNA NPH QL NAA+NON-PROBE: NOT DETECTED
HCOV HKU1 RNA NPH QL NAA+NON-PROBE: NOT DETECTED
HCOV NL63 RNA NPH QL NAA+NON-PROBE: NOT DETECTED
HCOV OC43 RNA NPH QL NAA+NON-PROBE: NOT DETECTED
HCT VFR BLD AUTO: 34 % (ref 41–53)
HGB BLD-MCNC: 11.8 G/DL (ref 13.5–17.5)
HMPV RNA NPH QL NAA+NON-PROBE: NOT DETECTED
HPIV1 RNA NPH QL NAA+NON-PROBE: NOT DETECTED
HPIV2 RNA NPH QL NAA+NON-PROBE: NOT DETECTED
HPIV3 RNA NPH QL NAA+NON-PROBE: NOT DETECTED
HPIV4 RNA NPH QL NAA+NON-PROBE: NOT DETECTED
INR PPP: 1.8 (ref 0.9–1.2)
LYMPHOCYTES NFR BLD: 0.57 K/UL (ref 1–4.8)
LYMPHOCYTES RELATIVE PERCENT: 8 % (ref 24–44)
M PNEUMO DNA NPH QL NAA+NON-PROBE: NOT DETECTED
MCH RBC QN AUTO: 30.2 PG (ref 26–34)
MCHC RBC AUTO-ENTMCNC: 34.5 G/DL (ref 31–37)
MCV RBC AUTO: 87.5 FL (ref 80–100)
MONOCYTES NFR BLD: 0.14 K/UL (ref 0.1–0.8)
MONOCYTES NFR BLD: 2 % (ref 1–7)
MORPHOLOGY: NORMAL
NEUTROPHILS NFR BLD: 90 % (ref 36–66)
NEUTS SEG NFR BLD: 6.39 K/UL (ref 1.8–7.7)
PLATELET # BLD AUTO: 285 K/UL (ref 140–450)
PMV BLD AUTO: 6.6 FL (ref 6–12)
POTASSIUM SERPL-SCNC: 3.7 MMOL/L (ref 3.7–5.3)
PROT SERPL-MCNC: 5.9 G/DL (ref 6.4–8.3)
PROTHROMBIN TIME: 20.4 SEC (ref 11.8–14.6)
RBC # BLD AUTO: 3.89 M/UL (ref 4.5–5.9)
RSV RNA NPH QL NAA+NON-PROBE: NOT DETECTED
RV+EV RNA NPH QL NAA+NON-PROBE: NOT DETECTED
SARS-COV-2 RNA NPH QL NAA+NON-PROBE: NOT DETECTED
SODIUM SERPL-SCNC: 130 MMOL/L (ref 135–144)
SPECIMEN DESCRIPTION: NORMAL
WBC OTHER # BLD: 7.1 K/UL (ref 3.5–11)

## 2025-04-04 PROCEDURE — 80053 COMPREHEN METABOLIC PANEL: CPT

## 2025-04-04 PROCEDURE — 99233 SBSQ HOSP IP/OBS HIGH 50: CPT | Performed by: INTERNAL MEDICINE

## 2025-04-04 PROCEDURE — 2700000000 HC OXYGEN THERAPY PER DAY

## 2025-04-04 PROCEDURE — 85025 COMPLETE CBC W/AUTO DIFF WBC: CPT

## 2025-04-04 PROCEDURE — 6370000000 HC RX 637 (ALT 250 FOR IP): Performed by: FAMILY MEDICINE

## 2025-04-04 PROCEDURE — 85610 PROTHROMBIN TIME: CPT

## 2025-04-04 PROCEDURE — 93010 ELECTROCARDIOGRAM REPORT: CPT | Performed by: STUDENT IN AN ORGANIZED HEALTH CARE EDUCATION/TRAINING PROGRAM

## 2025-04-04 PROCEDURE — 94640 AIRWAY INHALATION TREATMENT: CPT

## 2025-04-04 PROCEDURE — 2060000000 HC ICU INTERMEDIATE R&B

## 2025-04-04 PROCEDURE — 36415 COLL VENOUS BLD VENIPUNCTURE: CPT

## 2025-04-04 PROCEDURE — 99223 1ST HOSP IP/OBS HIGH 75: CPT | Performed by: FAMILY MEDICINE

## 2025-04-04 PROCEDURE — 93306 TTE W/DOPPLER COMPLETE: CPT | Performed by: INTERNAL MEDICINE

## 2025-04-04 PROCEDURE — 82947 ASSAY GLUCOSE BLOOD QUANT: CPT

## 2025-04-04 PROCEDURE — 94761 N-INVAS EAR/PLS OXIMETRY MLT: CPT

## 2025-04-04 PROCEDURE — 93306 TTE W/DOPPLER COMPLETE: CPT

## 2025-04-04 PROCEDURE — 2500000003 HC RX 250 WO HCPCS: Performed by: FAMILY MEDICINE

## 2025-04-04 RX ORDER — INSULIN LISPRO 100 [IU]/ML
0-8 INJECTION, SOLUTION INTRAVENOUS; SUBCUTANEOUS
Status: DISCONTINUED | OUTPATIENT
Start: 2025-04-04 | End: 2025-04-07 | Stop reason: HOSPADM

## 2025-04-04 RX ORDER — WARFARIN SODIUM 1 MG/1
3 TABLET ORAL ONCE
Status: COMPLETED | OUTPATIENT
Start: 2025-04-04 | End: 2025-04-04

## 2025-04-04 RX ORDER — GLUCAGON 1 MG/ML
1 KIT INJECTION PRN
Status: DISCONTINUED | OUTPATIENT
Start: 2025-04-04 | End: 2025-04-07 | Stop reason: HOSPADM

## 2025-04-04 RX ORDER — GUAIFENESIN/DEXTROMETHORPHAN 100-10MG/5
5 SYRUP ORAL EVERY 4 HOURS PRN
Status: DISCONTINUED | OUTPATIENT
Start: 2025-04-04 | End: 2025-04-05

## 2025-04-04 RX ORDER — TAMSULOSIN HYDROCHLORIDE 0.4 MG/1
0.4 CAPSULE ORAL DAILY
Status: DISCONTINUED | OUTPATIENT
Start: 2025-04-04 | End: 2025-04-07 | Stop reason: HOSPADM

## 2025-04-04 RX ORDER — DEXTROSE MONOHYDRATE 100 MG/ML
INJECTION, SOLUTION INTRAVENOUS CONTINUOUS PRN
Status: DISCONTINUED | OUTPATIENT
Start: 2025-04-04 | End: 2025-04-07 | Stop reason: HOSPADM

## 2025-04-04 RX ADMIN — AMLODIPINE BESYLATE 5 MG: 5 TABLET ORAL at 07:40

## 2025-04-04 RX ADMIN — LISINOPRIL 20 MG: 10 TABLET ORAL at 07:40

## 2025-04-04 RX ADMIN — CARVEDILOL 6.25 MG: 3.12 TABLET, FILM COATED ORAL at 16:38

## 2025-04-04 RX ADMIN — BUDESONIDE AND FORMOTEROL FUMARATE DIHYDRATE 2 PUFF: 160; 4.5 AEROSOL RESPIRATORY (INHALATION) at 08:10

## 2025-04-04 RX ADMIN — INSULIN LISPRO 2 UNITS: 100 INJECTION, SOLUTION INTRAVENOUS; SUBCUTANEOUS at 13:02

## 2025-04-04 RX ADMIN — ASPIRIN 81 MG: 81 TABLET, COATED ORAL at 07:40

## 2025-04-04 RX ADMIN — BUDESONIDE AND FORMOTEROL FUMARATE DIHYDRATE 2 PUFF: 160; 4.5 AEROSOL RESPIRATORY (INHALATION) at 20:08

## 2025-04-04 RX ADMIN — SODIUM CHLORIDE, PRESERVATIVE FREE 10 ML: 5 INJECTION INTRAVENOUS at 07:40

## 2025-04-04 RX ADMIN — IPRATROPIUM BROMIDE AND ALBUTEROL SULFATE 1 DOSE: 2.5; .5 SOLUTION RESPIRATORY (INHALATION) at 14:44

## 2025-04-04 RX ADMIN — GUAIFENESIN 600 MG: 600 TABLET, EXTENDED RELEASE ORAL at 21:53

## 2025-04-04 RX ADMIN — TAMSULOSIN HYDROCHLORIDE 0.4 MG: 0.4 CAPSULE ORAL at 10:24

## 2025-04-04 RX ADMIN — WARFARIN SODIUM 3 MG: 1 TABLET ORAL at 14:31

## 2025-04-04 RX ADMIN — GUAIFENESIN 600 MG: 600 TABLET, EXTENDED RELEASE ORAL at 07:40

## 2025-04-04 RX ADMIN — LEVOTHYROXINE SODIUM 75 MCG: 0.07 TABLET ORAL at 07:40

## 2025-04-04 RX ADMIN — MICONAZOLE NITRATE: 20 POWDER TOPICAL at 21:57

## 2025-04-04 RX ADMIN — SODIUM CHLORIDE, PRESERVATIVE FREE 10 ML: 5 INJECTION INTRAVENOUS at 21:57

## 2025-04-04 RX ADMIN — ATORVASTATIN CALCIUM 20 MG: 20 TABLET, FILM COATED ORAL at 07:40

## 2025-04-04 RX ADMIN — IPRATROPIUM BROMIDE AND ALBUTEROL SULFATE 1 DOSE: 2.5; .5 SOLUTION RESPIRATORY (INHALATION) at 08:10

## 2025-04-04 RX ADMIN — CARVEDILOL 6.25 MG: 3.12 TABLET, FILM COATED ORAL at 07:39

## 2025-04-04 RX ADMIN — IPRATROPIUM BROMIDE AND ALBUTEROL SULFATE 1 DOSE: 2.5; .5 SOLUTION RESPIRATORY (INHALATION) at 20:08

## 2025-04-04 RX ADMIN — INSULIN LISPRO 2 UNITS: 100 INJECTION, SOLUTION INTRAVENOUS; SUBCUTANEOUS at 21:56

## 2025-04-04 NOTE — PLAN OF CARE
Problem: Chronic Conditions and Co-morbidities  Goal: Patient's chronic conditions and co-morbidity symptoms are monitored and maintained or improved  Outcome: Progressing  Flowsheets (Taken 4/3/2025 1830 by Eli Salter, RN)  Care Plan - Patient's Chronic Conditions and Co-Morbidity Symptoms are Monitored and Maintained or Improved: Monitor and assess patient's chronic conditions and comorbid symptoms for stability, deterioration, or improvement     Problem: Discharge Planning  Goal: Discharge to home or other facility with appropriate resources  Outcome: Progressing  Flowsheets (Taken 4/3/2025 1830 by Eli Salter, RN)  Discharge to home or other facility with appropriate resources:   Identify barriers to discharge with patient and caregiver   Arrange for needed discharge resources and transportation as appropriate

## 2025-04-04 NOTE — CONSULTS
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 No pertinent family history.  SOCIAL HISTORY:   TOBACCO: reports that he has never smoked. He has never used smokeless tobacco.  ETOH:  reports that he does not currently use alcohol.  DRUGS: reports no history of drug use.    ALLERGIES:    Allergies   Allergen Reactions   • Levofloxacin Swelling   • Codeine Hives and Rash   • Pulmicort [Budesonide] Rash     Also itchy   • Cashew Nut Oil    • Levaquin  [Levofloxacin In D5w] Other (See Comments)   • Peanut-Containing Drug Products Hives         HOME MEDICATIONS:  Prior to Admission medications    Medication Sig Start Date End Date Taking? Authorizing Provider   ipratropium 0.5 mg-albuterol 2.5 mg (DUONEB) 0.5-2.5 (3) MG/3ML SOLN nebulizer solution Inhale 3 mLs into the lungs 4 times daily 4/2/25   Kareem Pal MD   Cetirizine HCl 10 MG CAPS Take by mouth  Patient not taking: Reported on 3/17/2025    Kirill Trevizo MD   famotidine (PEPCID) 20 MG tablet Take by mouth  Patient not taking: Reported on 3/17/2025    Kirill Trevizo MD   umeclidinium bromide (INCRUSE ELLIPTA) 62.5 MCG/ACT inhaler Inhale 1 puff into the lungs    Kirill Trevizo MD   silver sulfADIAZINE (SILVADENE) 1 % cream Apply topically once or twice daily. 3/17/25   Kareem Pal MD   amLODIPine (NORVASC) 5 MG tablet Take 1 tablet by mouth daily 3/6/25   Kareem Pal MD   guaiFENesin (MUCINEX) 600 MG extended release tablet Take 1 tablet by

## 2025-04-04 NOTE — CARE COORDINATION
04/04/25 1328   Readmission Assessment   Number of Days since last admission? 8-30 days   Previous Disposition SNF   Who is being Interviewed Patient   What was the patient's/caregiver's perception as to why they think they needed to return back to the hospital? Other (Comment)  (N/A)   Did you visit your Primary Care Physician after you left the hospital, before you returned this time? Yes   Why weren't you able to visit your PCP? Other (Comment)  (N/A)   Did you see a specialist, such as Cardiac, Pulmonary, Orthopedic Physician, etc. after you left the hospital? Yes   Who advised the patient to return to the hospital? Self-referral   Does the patient report anything that got in the way of taking their medications? No   In our efforts to provide the best possible care to you and others like you, can you think of anything that we could have done to help you after you left the hospital the first time, so that you might not have needed to return so soon? Other (Comment)  (N/A)

## 2025-04-04 NOTE — ACP (ADVANCE CARE PLANNING)
Advance Care Planning     Advance Care Planning Activator (Inpatient)  Conversation Note      Date of ACP Conversation: 4/4/2025     Conversation Conducted with: Patient with Decision Making Capacity    ACP Activator: Roselyn Gutierrez RN    Health Care Decision Maker:  Wife    Current Designated Health Care Decision Maker:     Primary Decision Maker: Jenny Rodney - Spouse - [phone]    Today we documented Decision Maker(s) consistent with Legal Next of Kin hierarchy.    Care Preferences    Ventilation:  \"If you were in your present state of health and suddenly became very ill and were unable to breathe on your own, what would your preference be about the use of a ventilator (breathing machine) if it were available to you?\"      Would the patient desire the use of ventilator (breathing machine)?: yes    \"If your health worsens and it becomes clear that your chance of recovery is unlikely, what would your preference be about the use of a ventilator (breathing machine) if it were available to you?\"     Would the patient desire the use of ventilator (breathing machine)?: Yes      Resuscitation  \"CPR works best to restart the heart when there is a sudden event, like a heart attack, in someone who is otherwise healthy. Unfortunately, CPR does not typically restart the heart for people who have serious health conditions or who are very sick.\"    \"In the event your heart stopped as a result of an underlying serious health condition, would you want attempts to be made to restart your heart (answer \"yes\" for attempt to resuscitate) or would you prefer a natural death (answer \"no\" for do not attempt to resuscitate)?\" yes       [] Yes   [x] No   Educated Patient / Decision Maker regarding differences between Advance Directives and portable DNR orders.    Length of ACP Conversation in minutes:      Conversation Outcomes:  ACP discussion completed

## 2025-04-04 NOTE — CARE COORDINATION
Case Management Assessment  Initial Evaluation    Date/Time of Evaluation: 4/4/2025 1:33 PM  Assessment Completed by: Roselyn Gutierrez RN    If patient is discharged prior to next notation, then this note serves as note for discharge by case management.    Patient Name: Bill Rodney                   YOB: 1935  Diagnosis: Shortness of breath [R06.02]  Hyponatremia [E87.1]  COPD exacerbation (HCC) [J44.1]  Acute hypoxic respiratory failure [J96.01]                   Date / Time: 4/3/2025 12:37 PM    Patient Admission Status: Inpatient   Readmission Risk (Low < 19, Mod (19-27), High > 27): Readmission Risk Score: 23.2    Current PCP: Kareem Pal MD  PCP verified by CM? Yes    Chart Reviewed: Yes      History Provided by: Patient  Patient Orientation: Alert and Oriented    Patient Cognition: Alert    Hospitalization in the last 30 days (Readmission):  Yes    If yes, Readmission Assessment in  Navigator will be completed.    Advance Directives:      Code Status: Full Code   Patient's Primary Decision Maker is: Legal Next of Kin    Primary Decision Maker: Jenny Rodney - Spouse - [phone]    Discharge Planning:    Patient lives with: Spouse/Significant Other Type of Home: House  Primary Care Giver: Self  Patient Support Systems include: Spouse/Significant Other   Current Financial resources: Medicare  Current community resources: None  Current services prior to admission: Durable Medical Equipment            Current DME: [employer], Home Aerosol            Type of Home Care services:  None    ADLS  Prior functional level: Independent in ADLs/IADLs  Current functional level: Other (see comment) (Await PT/OT eval)    PT AM-PAC:   /24  OT AM-PAC:   /24    Family can provide assistance at DC: Yes  Would you like Case Management to discuss the discharge plan with any other family members/significant others, and if so, who? No  Plans to Return to Present Housing: Unknown at present  Other

## 2025-04-05 ENCOUNTER — APPOINTMENT (OUTPATIENT)
Dept: GENERAL RADIOLOGY | Age: 89
DRG: 189 | End: 2025-04-05
Payer: COMMERCIAL

## 2025-04-05 PROBLEM — I50.31 ACUTE DIASTOLIC CHF (CONGESTIVE HEART FAILURE) (HCC): Status: ACTIVE | Noted: 2025-04-05

## 2025-04-05 PROBLEM — J44.1 COPD EXACERBATION (HCC): Status: ACTIVE | Noted: 2025-04-05

## 2025-04-05 LAB
ALBUMIN SERPL-MCNC: 3.5 G/DL (ref 3.5–5.2)
ALBUMIN/GLOB SERPL: 1.6 {RATIO} (ref 1–2.5)
ALP SERPL-CCNC: 92 U/L (ref 40–129)
ALT SERPL-CCNC: 12 U/L (ref 5–41)
ANION GAP SERPL CALCULATED.3IONS-SCNC: 9 MMOL/L (ref 9–17)
AST SERPL-CCNC: 21 U/L
BASOPHILS # BLD: 0 K/UL (ref 0–0.2)
BASOPHILS NFR BLD: 0 % (ref 0–2)
BILIRUB SERPL-MCNC: 0.5 MG/DL (ref 0.3–1.2)
BUN SERPL-MCNC: 16 MG/DL (ref 8–23)
CALCIUM SERPL-MCNC: 8.4 MG/DL (ref 8.6–10.4)
CHLORIDE SERPL-SCNC: 92 MMOL/L (ref 98–107)
CO2 SERPL-SCNC: 26 MMOL/L (ref 20–31)
CREAT SERPL-MCNC: 0.6 MG/DL (ref 0.7–1.2)
EOSINOPHIL # BLD: 0 K/UL (ref 0–0.4)
EOSINOPHILS RELATIVE PERCENT: 0 % (ref 1–4)
ERYTHROCYTE [DISTWIDTH] IN BLOOD BY AUTOMATED COUNT: 19.3 % (ref 12.5–15.4)
EST. AVERAGE GLUCOSE BLD GHB EST-MCNC: 111 MG/DL
GFR, ESTIMATED: >90 ML/MIN/1.73M2
GLUCOSE BLD-MCNC: 109 MG/DL (ref 75–110)
GLUCOSE BLD-MCNC: 121 MG/DL (ref 75–110)
GLUCOSE BLD-MCNC: 128 MG/DL (ref 75–110)
GLUCOSE BLD-MCNC: 129 MG/DL (ref 75–110)
GLUCOSE SERPL-MCNC: 130 MG/DL (ref 70–99)
HBA1C MFR BLD: 5.5 % (ref 4–6)
HCT VFR BLD AUTO: 30.9 % (ref 41–53)
HGB BLD-MCNC: 10.8 G/DL (ref 13.5–17.5)
INR PPP: 2.4 (ref 0.9–1.2)
LYMPHOCYTES NFR BLD: 0.7 K/UL (ref 1–4.8)
LYMPHOCYTES RELATIVE PERCENT: 6 % (ref 24–44)
MCH RBC QN AUTO: 30.6 PG (ref 26–34)
MCHC RBC AUTO-ENTMCNC: 34.9 G/DL (ref 31–37)
MCV RBC AUTO: 87.9 FL (ref 80–100)
MONOCYTES NFR BLD: 1.2 K/UL (ref 0.1–1.2)
MONOCYTES NFR BLD: 12 % (ref 2–11)
NEUTROPHILS NFR BLD: 82 % (ref 36–66)
NEUTS SEG NFR BLD: 8.8 K/UL (ref 1.8–7.7)
PLATELET # BLD AUTO: 275 K/UL (ref 140–450)
PMV BLD AUTO: 6.3 FL (ref 6–12)
POTASSIUM SERPL-SCNC: 4.1 MMOL/L (ref 3.7–5.3)
PROT SERPL-MCNC: 5.7 G/DL (ref 6.4–8.3)
PROTHROMBIN TIME: 25.4 SEC (ref 11.8–14.6)
RBC # BLD AUTO: 3.51 M/UL (ref 4.5–5.9)
SODIUM SERPL-SCNC: 127 MMOL/L (ref 135–144)
WBC OTHER # BLD: 10.7 K/UL (ref 3.5–11)

## 2025-04-05 PROCEDURE — 80053 COMPREHEN METABOLIC PANEL: CPT

## 2025-04-05 PROCEDURE — 6360000002 HC RX W HCPCS: Performed by: FAMILY MEDICINE

## 2025-04-05 PROCEDURE — 2060000000 HC ICU INTERMEDIATE R&B

## 2025-04-05 PROCEDURE — 6370000000 HC RX 637 (ALT 250 FOR IP): Performed by: FAMILY MEDICINE

## 2025-04-05 PROCEDURE — 82947 ASSAY GLUCOSE BLOOD QUANT: CPT

## 2025-04-05 PROCEDURE — 99233 SBSQ HOSP IP/OBS HIGH 50: CPT | Performed by: INTERNAL MEDICINE

## 2025-04-05 PROCEDURE — 97162 PT EVAL MOD COMPLEX 30 MIN: CPT

## 2025-04-05 PROCEDURE — 94761 N-INVAS EAR/PLS OXIMETRY MLT: CPT

## 2025-04-05 PROCEDURE — 71046 X-RAY EXAM CHEST 2 VIEWS: CPT

## 2025-04-05 PROCEDURE — 36415 COLL VENOUS BLD VENIPUNCTURE: CPT

## 2025-04-05 PROCEDURE — 85610 PROTHROMBIN TIME: CPT

## 2025-04-05 PROCEDURE — 99233 SBSQ HOSP IP/OBS HIGH 50: CPT | Performed by: FAMILY MEDICINE

## 2025-04-05 PROCEDURE — 94640 AIRWAY INHALATION TREATMENT: CPT

## 2025-04-05 PROCEDURE — 83036 HEMOGLOBIN GLYCOSYLATED A1C: CPT

## 2025-04-05 PROCEDURE — 85025 COMPLETE CBC W/AUTO DIFF WBC: CPT

## 2025-04-05 PROCEDURE — 97166 OT EVAL MOD COMPLEX 45 MIN: CPT

## 2025-04-05 PROCEDURE — 2500000003 HC RX 250 WO HCPCS: Performed by: FAMILY MEDICINE

## 2025-04-05 PROCEDURE — 6370000000 HC RX 637 (ALT 250 FOR IP): Performed by: INTERNAL MEDICINE

## 2025-04-05 PROCEDURE — 97116 GAIT TRAINING THERAPY: CPT

## 2025-04-05 PROCEDURE — 97530 THERAPEUTIC ACTIVITIES: CPT

## 2025-04-05 RX ORDER — GUAIFENESIN/DEXTROMETHORPHAN 100-10MG/5
5 SYRUP ORAL EVERY 6 HOURS
Status: DISCONTINUED | OUTPATIENT
Start: 2025-04-05 | End: 2025-04-07 | Stop reason: HOSPADM

## 2025-04-05 RX ORDER — WARFARIN SODIUM 1 MG/1
3 TABLET ORAL
Status: COMPLETED | OUTPATIENT
Start: 2025-04-05 | End: 2025-04-05

## 2025-04-05 RX ORDER — IPRATROPIUM BROMIDE AND ALBUTEROL SULFATE 2.5; .5 MG/3ML; MG/3ML
1 SOLUTION RESPIRATORY (INHALATION)
Status: DISCONTINUED | OUTPATIENT
Start: 2025-04-05 | End: 2025-04-07

## 2025-04-05 RX ORDER — FUROSEMIDE 10 MG/ML
40 INJECTION INTRAMUSCULAR; INTRAVENOUS DAILY
Status: DISCONTINUED | OUTPATIENT
Start: 2025-04-05 | End: 2025-04-07 | Stop reason: HOSPADM

## 2025-04-05 RX ADMIN — SODIUM CHLORIDE, PRESERVATIVE FREE 10 ML: 5 INJECTION INTRAVENOUS at 21:00

## 2025-04-05 RX ADMIN — LEVOTHYROXINE SODIUM 75 MCG: 0.07 TABLET ORAL at 08:23

## 2025-04-05 RX ADMIN — FUROSEMIDE 40 MG: 10 INJECTION, SOLUTION INTRAMUSCULAR; INTRAVENOUS at 08:36

## 2025-04-05 RX ADMIN — GUAIFENESIN 600 MG: 600 TABLET, EXTENDED RELEASE ORAL at 21:00

## 2025-04-05 RX ADMIN — ATORVASTATIN CALCIUM 20 MG: 20 TABLET, FILM COATED ORAL at 08:23

## 2025-04-05 RX ADMIN — LISINOPRIL 20 MG: 10 TABLET ORAL at 08:23

## 2025-04-05 RX ADMIN — MICONAZOLE NITRATE: 20 POWDER TOPICAL at 21:00

## 2025-04-05 RX ADMIN — CARVEDILOL 6.25 MG: 3.12 TABLET, FILM COATED ORAL at 08:23

## 2025-04-05 RX ADMIN — IPRATROPIUM BROMIDE AND ALBUTEROL SULFATE 1 DOSE: 2.5; .5 SOLUTION RESPIRATORY (INHALATION) at 08:03

## 2025-04-05 RX ADMIN — BUDESONIDE AND FORMOTEROL FUMARATE DIHYDRATE 2 PUFF: 160; 4.5 AEROSOL RESPIRATORY (INHALATION) at 08:03

## 2025-04-05 RX ADMIN — SODIUM CHLORIDE, PRESERVATIVE FREE 10 ML: 5 INJECTION INTRAVENOUS at 08:36

## 2025-04-05 RX ADMIN — AMLODIPINE BESYLATE 5 MG: 5 TABLET ORAL at 08:23

## 2025-04-05 RX ADMIN — GUAIFENESIN AND DEXTROMETHORPHAN 5 ML: 100; 10 SYRUP ORAL at 14:12

## 2025-04-05 RX ADMIN — BUDESONIDE AND FORMOTEROL FUMARATE DIHYDRATE 2 PUFF: 160; 4.5 AEROSOL RESPIRATORY (INHALATION) at 19:59

## 2025-04-05 RX ADMIN — IPRATROPIUM BROMIDE AND ALBUTEROL SULFATE 1 DOSE: 2.5; .5 SOLUTION RESPIRATORY (INHALATION) at 19:53

## 2025-04-05 RX ADMIN — GUAIFENESIN AND DEXTROMETHORPHAN 5 ML: 100; 10 SYRUP ORAL at 21:00

## 2025-04-05 RX ADMIN — TAMSULOSIN HYDROCHLORIDE 0.4 MG: 0.4 CAPSULE ORAL at 08:23

## 2025-04-05 RX ADMIN — IPRATROPIUM BROMIDE AND ALBUTEROL SULFATE 1 DOSE: 2.5; .5 SOLUTION RESPIRATORY (INHALATION) at 16:30

## 2025-04-05 RX ADMIN — GUAIFENESIN 600 MG: 600 TABLET, EXTENDED RELEASE ORAL at 08:23

## 2025-04-05 RX ADMIN — CARVEDILOL 6.25 MG: 3.12 TABLET, FILM COATED ORAL at 17:27

## 2025-04-05 RX ADMIN — GUAIFENESIN AND DEXTROMETHORPHAN 5 ML: 100; 10 SYRUP ORAL at 08:36

## 2025-04-05 RX ADMIN — ALBUTEROL SULFATE 2.5 MG: 2.5 SOLUTION RESPIRATORY (INHALATION) at 11:43

## 2025-04-05 RX ADMIN — ACETAMINOPHEN 650 MG: 325 TABLET ORAL at 14:13

## 2025-04-05 RX ADMIN — WARFARIN SODIUM 3 MG: 1 TABLET ORAL at 17:27

## 2025-04-05 RX ADMIN — ASPIRIN 81 MG: 81 TABLET, COATED ORAL at 08:23

## 2025-04-05 RX ADMIN — MICONAZOLE NITRATE: 20 POWDER TOPICAL at 08:36

## 2025-04-05 ASSESSMENT — PAIN DESCRIPTION - DESCRIPTORS: DESCRIPTORS: ACHING

## 2025-04-05 ASSESSMENT — PAIN DESCRIPTION - LOCATION: LOCATION: SHOULDER

## 2025-04-05 ASSESSMENT — PAIN SCALES - GENERAL
PAINLEVEL_OUTOF10: 0
PAINLEVEL_OUTOF10: 5

## 2025-04-05 ASSESSMENT — PAIN DESCRIPTION - ORIENTATION: ORIENTATION: RIGHT

## 2025-04-05 NOTE — PLAN OF CARE
Problem: Chronic Conditions and Co-morbidities  Goal: Patient's chronic conditions and co-morbidity symptoms are monitored and maintained or improved  Outcome: Progressing     Problem: Discharge Planning  Goal: Discharge to home or other facility with appropriate resources  Outcome: Progressing     Problem: Safety - Adult  Goal: Free from fall injury  Outcome: Progressing     Problem: Respiratory - Adult  Goal: Achieves optimal ventilation and oxygenation  4/5/2025 0851 by Loren Lagunas RN  Outcome: Progressing  4/5/2025 0810 by Keri Berrios RCP  Outcome: Progressing  Flowsheets (Taken 4/4/2025 2009 by Ambar Cisneros RCP)  Achieves optimal ventilation and oxygenation:   Assess for changes in respiratory status   Position to facilitate oxygenation and minimize respiratory effort   Assess for changes in mentation and behavior   Oxygen supplementation based on oxygen saturation or arterial blood gases

## 2025-04-05 NOTE — PLAN OF CARE
Problem: Respiratory - Adult  Goal: Achieves optimal ventilation and oxygenation  4/5/2025 1955 by Loren Mullen, CALEB  Outcome: Progressing)  Achieves optimal ventilation and oxygenation:   Assess for changes in respiratory status   Position to facilitate oxygenation and minimize respiratory effort   Assess for changes in mentation and behavior   Oxygen supplementation based on oxygen saturation or arterial blood gases

## 2025-04-06 LAB
ALBUMIN SERPL-MCNC: 3.3 G/DL (ref 3.5–5.2)
ALBUMIN/GLOB SERPL: 1.9 {RATIO} (ref 1–2.5)
ALP SERPL-CCNC: 86 U/L (ref 40–129)
ALT SERPL-CCNC: 12 U/L (ref 5–41)
ANION GAP SERPL CALCULATED.3IONS-SCNC: 7 MMOL/L (ref 9–17)
AST SERPL-CCNC: 21 U/L
BASOPHILS # BLD: 0 K/UL (ref 0–0.2)
BASOPHILS NFR BLD: 0 % (ref 0–2)
BILIRUB SERPL-MCNC: 0.5 MG/DL (ref 0.3–1.2)
BUN SERPL-MCNC: 14 MG/DL (ref 8–23)
CALCIUM SERPL-MCNC: 8.1 MG/DL (ref 8.6–10.4)
CHLORIDE SERPL-SCNC: 94 MMOL/L (ref 98–107)
CO2 SERPL-SCNC: 29 MMOL/L (ref 20–31)
CREAT SERPL-MCNC: 0.6 MG/DL (ref 0.7–1.2)
EOSINOPHIL # BLD: 0.2 K/UL (ref 0–0.4)
EOSINOPHILS RELATIVE PERCENT: 2 % (ref 1–4)
ERYTHROCYTE [DISTWIDTH] IN BLOOD BY AUTOMATED COUNT: 19.4 % (ref 12.5–15.4)
GFR, ESTIMATED: >90 ML/MIN/1.73M2
GLUCOSE BLD-MCNC: 104 MG/DL (ref 75–110)
GLUCOSE BLD-MCNC: 134 MG/DL (ref 75–110)
GLUCOSE BLD-MCNC: 172 MG/DL (ref 75–110)
GLUCOSE BLD-MCNC: 91 MG/DL (ref 75–110)
GLUCOSE SERPL-MCNC: 96 MG/DL (ref 70–99)
HCT VFR BLD AUTO: 30.9 % (ref 41–53)
HGB BLD-MCNC: 10.5 G/DL (ref 13.5–17.5)
INR PPP: 2.4 (ref 0.9–1.2)
LYMPHOCYTES NFR BLD: 0.7 K/UL (ref 1–4.8)
LYMPHOCYTES RELATIVE PERCENT: 9 % (ref 24–44)
MCH RBC QN AUTO: 29.8 PG (ref 26–34)
MCHC RBC AUTO-ENTMCNC: 34.1 G/DL (ref 31–37)
MCV RBC AUTO: 87.5 FL (ref 80–100)
MONOCYTES NFR BLD: 1.3 K/UL (ref 0.1–1.2)
MONOCYTES NFR BLD: 15 % (ref 2–11)
NEUTROPHILS NFR BLD: 74 % (ref 36–66)
NEUTS SEG NFR BLD: 6.3 K/UL (ref 1.8–7.7)
PLATELET # BLD AUTO: 242 K/UL (ref 140–450)
PMV BLD AUTO: 6.4 FL (ref 6–12)
POTASSIUM SERPL-SCNC: 3.7 MMOL/L (ref 3.7–5.3)
PROT SERPL-MCNC: 5 G/DL (ref 6.4–8.3)
PROTHROMBIN TIME: 25.9 SEC (ref 11.8–14.6)
RBC # BLD AUTO: 3.53 M/UL (ref 4.5–5.9)
SODIUM SERPL-SCNC: 130 MMOL/L (ref 135–144)
WBC OTHER # BLD: 8.5 K/UL (ref 3.5–11)

## 2025-04-06 PROCEDURE — 99233 SBSQ HOSP IP/OBS HIGH 50: CPT | Performed by: FAMILY MEDICINE

## 2025-04-06 PROCEDURE — 51798 US URINE CAPACITY MEASURE: CPT

## 2025-04-06 PROCEDURE — 2060000000 HC ICU INTERMEDIATE R&B

## 2025-04-06 PROCEDURE — 36415 COLL VENOUS BLD VENIPUNCTURE: CPT

## 2025-04-06 PROCEDURE — 94760 N-INVAS EAR/PLS OXIMETRY 1: CPT

## 2025-04-06 PROCEDURE — 6370000000 HC RX 637 (ALT 250 FOR IP): Performed by: FAMILY MEDICINE

## 2025-04-06 PROCEDURE — 94761 N-INVAS EAR/PLS OXIMETRY MLT: CPT

## 2025-04-06 PROCEDURE — 85025 COMPLETE CBC W/AUTO DIFF WBC: CPT

## 2025-04-06 PROCEDURE — 2500000003 HC RX 250 WO HCPCS: Performed by: FAMILY MEDICINE

## 2025-04-06 PROCEDURE — 94640 AIRWAY INHALATION TREATMENT: CPT

## 2025-04-06 PROCEDURE — 2700000000 HC OXYGEN THERAPY PER DAY

## 2025-04-06 PROCEDURE — 85610 PROTHROMBIN TIME: CPT

## 2025-04-06 PROCEDURE — 82947 ASSAY GLUCOSE BLOOD QUANT: CPT

## 2025-04-06 PROCEDURE — 6370000000 HC RX 637 (ALT 250 FOR IP): Performed by: INTERNAL MEDICINE

## 2025-04-06 PROCEDURE — 6360000002 HC RX W HCPCS: Performed by: FAMILY MEDICINE

## 2025-04-06 PROCEDURE — 80053 COMPREHEN METABOLIC PANEL: CPT

## 2025-04-06 PROCEDURE — 99233 SBSQ HOSP IP/OBS HIGH 50: CPT | Performed by: INTERNAL MEDICINE

## 2025-04-06 RX ORDER — WARFARIN SODIUM 1 MG/1
3 TABLET ORAL
Status: COMPLETED | OUTPATIENT
Start: 2025-04-06 | End: 2025-04-06

## 2025-04-06 RX ORDER — HYDROCODONE BITARTRATE AND ACETAMINOPHEN 5; 325 MG/1; MG/1
1 TABLET ORAL EVERY 8 HOURS SCHEDULED
Status: DISCONTINUED | OUTPATIENT
Start: 2025-04-06 | End: 2025-04-07 | Stop reason: HOSPADM

## 2025-04-06 RX ADMIN — SODIUM CHLORIDE, PRESERVATIVE FREE 10 ML: 5 INJECTION INTRAVENOUS at 20:45

## 2025-04-06 RX ADMIN — IPRATROPIUM BROMIDE AND ALBUTEROL SULFATE 1 DOSE: 2.5; .5 SOLUTION RESPIRATORY (INHALATION) at 20:15

## 2025-04-06 RX ADMIN — LISINOPRIL 20 MG: 10 TABLET ORAL at 09:07

## 2025-04-06 RX ADMIN — IPRATROPIUM BROMIDE AND ALBUTEROL SULFATE 1 DOSE: 2.5; .5 SOLUTION RESPIRATORY (INHALATION) at 11:29

## 2025-04-06 RX ADMIN — HYDROCODONE BITARTRATE AND ACETAMINOPHEN 1 TABLET: 5; 325 TABLET ORAL at 14:16

## 2025-04-06 RX ADMIN — AMLODIPINE BESYLATE 5 MG: 5 TABLET ORAL at 09:07

## 2025-04-06 RX ADMIN — GUAIFENESIN AND DEXTROMETHORPHAN 5 ML: 100; 10 SYRUP ORAL at 20:44

## 2025-04-06 RX ADMIN — HYDROCODONE BITARTRATE AND ACETAMINOPHEN 1 TABLET: 5; 325 TABLET ORAL at 20:45

## 2025-04-06 RX ADMIN — BUDESONIDE AND FORMOTEROL FUMARATE DIHYDRATE 2 PUFF: 160; 4.5 AEROSOL RESPIRATORY (INHALATION) at 20:15

## 2025-04-06 RX ADMIN — ASPIRIN 81 MG: 81 TABLET, COATED ORAL at 09:07

## 2025-04-06 RX ADMIN — ALBUTEROL SULFATE 2.5 MG: 2.5 SOLUTION RESPIRATORY (INHALATION) at 02:40

## 2025-04-06 RX ADMIN — BUDESONIDE AND FORMOTEROL FUMARATE DIHYDRATE 2 PUFF: 160; 4.5 AEROSOL RESPIRATORY (INHALATION) at 07:58

## 2025-04-06 RX ADMIN — GUAIFENESIN 600 MG: 600 TABLET, EXTENDED RELEASE ORAL at 09:07

## 2025-04-06 RX ADMIN — FUROSEMIDE 40 MG: 10 INJECTION, SOLUTION INTRAMUSCULAR; INTRAVENOUS at 09:07

## 2025-04-06 RX ADMIN — GUAIFENESIN AND DEXTROMETHORPHAN 5 ML: 100; 10 SYRUP ORAL at 09:07

## 2025-04-06 RX ADMIN — CARVEDILOL 6.25 MG: 3.12 TABLET, FILM COATED ORAL at 18:26

## 2025-04-06 RX ADMIN — IPRATROPIUM BROMIDE AND ALBUTEROL SULFATE 1 DOSE: 2.5; .5 SOLUTION RESPIRATORY (INHALATION) at 07:58

## 2025-04-06 RX ADMIN — GUAIFENESIN 600 MG: 600 TABLET, EXTENDED RELEASE ORAL at 20:45

## 2025-04-06 RX ADMIN — GUAIFENESIN AND DEXTROMETHORPHAN 5 ML: 100; 10 SYRUP ORAL at 14:16

## 2025-04-06 RX ADMIN — IPRATROPIUM BROMIDE AND ALBUTEROL SULFATE 1 DOSE: 2.5; .5 SOLUTION RESPIRATORY (INHALATION) at 16:05

## 2025-04-06 RX ADMIN — GUAIFENESIN AND DEXTROMETHORPHAN 5 ML: 100; 10 SYRUP ORAL at 02:31

## 2025-04-06 RX ADMIN — ATORVASTATIN CALCIUM 20 MG: 20 TABLET, FILM COATED ORAL at 09:07

## 2025-04-06 RX ADMIN — MICONAZOLE NITRATE: 20 POWDER TOPICAL at 20:45

## 2025-04-06 RX ADMIN — WARFARIN SODIUM 3 MG: 1 TABLET ORAL at 18:26

## 2025-04-06 RX ADMIN — CARVEDILOL 6.25 MG: 3.12 TABLET, FILM COATED ORAL at 09:07

## 2025-04-06 RX ADMIN — SODIUM CHLORIDE, PRESERVATIVE FREE 10 ML: 5 INJECTION INTRAVENOUS at 09:08

## 2025-04-06 RX ADMIN — MICONAZOLE NITRATE: 20 POWDER TOPICAL at 09:08

## 2025-04-06 RX ADMIN — LEVOTHYROXINE SODIUM 75 MCG: 0.07 TABLET ORAL at 09:07

## 2025-04-06 RX ADMIN — TAMSULOSIN HYDROCHLORIDE 0.4 MG: 0.4 CAPSULE ORAL at 09:07

## 2025-04-06 NOTE — PLAN OF CARE
Problem: Chronic Conditions and Co-morbidities  Goal: Patient's chronic conditions and co-morbidity symptoms are monitored and maintained or improved  Outcome: Progressing     Problem: Discharge Planning  Goal: Discharge to home or other facility with appropriate resources  Outcome: Progressing     Problem: Safety - Adult  Goal: Free from fall injury  Outcome: Progressing     Problem: Respiratory - Adult  Goal: Achieves optimal ventilation and oxygenation  4/6/2025 1028 by Loren Lagunas RN  Outcome: Progressing  4/6/2025 0802 by Keri Berrios RCP  Outcome: Progressing     Problem: Pain  Goal: Verbalizes/displays adequate comfort level or baseline comfort level  Outcome: Progressing

## 2025-04-07 VITALS
BODY MASS INDEX: 21.22 KG/M2 | HEART RATE: 54 BPM | RESPIRATION RATE: 16 BRPM | TEMPERATURE: 98.1 F | OXYGEN SATURATION: 95 % | SYSTOLIC BLOOD PRESSURE: 139 MMHG | WEIGHT: 140 LBS | DIASTOLIC BLOOD PRESSURE: 53 MMHG | HEIGHT: 68 IN

## 2025-04-07 LAB
ALBUMIN SERPL-MCNC: 3.1 G/DL (ref 3.5–5.2)
ALBUMIN/GLOB SERPL: 1.7 {RATIO} (ref 1–2.5)
ALP SERPL-CCNC: 89 U/L (ref 40–129)
ALT SERPL-CCNC: 12 U/L (ref 5–41)
ANION GAP SERPL CALCULATED.3IONS-SCNC: 7 MMOL/L (ref 9–17)
AST SERPL-CCNC: 18 U/L
BASOPHILS # BLD: 0 K/UL (ref 0–0.2)
BASOPHILS NFR BLD: 0 % (ref 0–2)
BILIRUB SERPL-MCNC: 0.5 MG/DL (ref 0.3–1.2)
BUN SERPL-MCNC: 13 MG/DL (ref 8–23)
CALCIUM SERPL-MCNC: 8.2 MG/DL (ref 8.6–10.4)
CHLORIDE SERPL-SCNC: 96 MMOL/L (ref 98–107)
CO2 SERPL-SCNC: 32 MMOL/L (ref 20–31)
CREAT SERPL-MCNC: 0.7 MG/DL (ref 0.7–1.2)
EOSINOPHIL # BLD: 0.07 K/UL (ref 0–0.4)
EOSINOPHILS RELATIVE PERCENT: 1 % (ref 1–4)
ERYTHROCYTE [DISTWIDTH] IN BLOOD BY AUTOMATED COUNT: 19.5 % (ref 12.5–15.4)
GFR, ESTIMATED: 89 ML/MIN/1.73M2
GLUCOSE BLD-MCNC: 105 MG/DL (ref 75–110)
GLUCOSE BLD-MCNC: 117 MG/DL (ref 75–110)
GLUCOSE SERPL-MCNC: 91 MG/DL (ref 70–99)
HCT VFR BLD AUTO: 31.5 % (ref 41–53)
HGB BLD-MCNC: 10.7 G/DL (ref 13.5–17.5)
INR PPP: 2.9 (ref 0.9–1.2)
LYMPHOCYTES NFR BLD: 1.81 K/UL (ref 1–4.8)
LYMPHOCYTES RELATIVE PERCENT: 27 % (ref 24–44)
MCH RBC QN AUTO: 30 PG (ref 26–34)
MCHC RBC AUTO-ENTMCNC: 34 G/DL (ref 31–37)
MCV RBC AUTO: 88.1 FL (ref 80–100)
MONOCYTES NFR BLD: 0.34 K/UL (ref 0.1–0.8)
MONOCYTES NFR BLD: 5 % (ref 1–7)
MORPHOLOGY: ABNORMAL
NEUTROPHILS NFR BLD: 67 % (ref 36–66)
NEUTS SEG NFR BLD: 4.48 K/UL (ref 1.8–7.7)
PLATELET # BLD AUTO: 254 K/UL (ref 140–450)
PMV BLD AUTO: 6.3 FL (ref 6–12)
POTASSIUM SERPL-SCNC: 3.5 MMOL/L (ref 3.7–5.3)
PROT SERPL-MCNC: 4.9 G/DL (ref 6.4–8.3)
PROTHROMBIN TIME: 29.2 SEC (ref 11.8–14.6)
RBC # BLD AUTO: 3.57 M/UL (ref 4.5–5.9)
SODIUM SERPL-SCNC: 135 MMOL/L (ref 135–144)
WBC OTHER # BLD: 6.7 K/UL (ref 3.5–11)

## 2025-04-07 PROCEDURE — 94640 AIRWAY INHALATION TREATMENT: CPT

## 2025-04-07 PROCEDURE — 97116 GAIT TRAINING THERAPY: CPT

## 2025-04-07 PROCEDURE — 6370000000 HC RX 637 (ALT 250 FOR IP): Performed by: FAMILY MEDICINE

## 2025-04-07 PROCEDURE — 82947 ASSAY GLUCOSE BLOOD QUANT: CPT

## 2025-04-07 PROCEDURE — 36415 COLL VENOUS BLD VENIPUNCTURE: CPT

## 2025-04-07 PROCEDURE — 6370000000 HC RX 637 (ALT 250 FOR IP): Performed by: INTERNAL MEDICINE

## 2025-04-07 PROCEDURE — 97535 SELF CARE MNGMENT TRAINING: CPT

## 2025-04-07 PROCEDURE — 85025 COMPLETE CBC W/AUTO DIFF WBC: CPT

## 2025-04-07 PROCEDURE — 2500000003 HC RX 250 WO HCPCS: Performed by: FAMILY MEDICINE

## 2025-04-07 PROCEDURE — 94761 N-INVAS EAR/PLS OXIMETRY MLT: CPT

## 2025-04-07 PROCEDURE — 99239 HOSP IP/OBS DSCHRG MGMT >30: CPT | Performed by: FAMILY MEDICINE

## 2025-04-07 PROCEDURE — 80053 COMPREHEN METABOLIC PANEL: CPT

## 2025-04-07 PROCEDURE — 85610 PROTHROMBIN TIME: CPT

## 2025-04-07 PROCEDURE — 97530 THERAPEUTIC ACTIVITIES: CPT

## 2025-04-07 PROCEDURE — 6360000002 HC RX W HCPCS: Performed by: FAMILY MEDICINE

## 2025-04-07 RX ORDER — LORAZEPAM 1 MG/1
1 TABLET ORAL ONCE
Status: COMPLETED | OUTPATIENT
Start: 2025-04-07 | End: 2025-04-07

## 2025-04-07 RX ORDER — IPRATROPIUM BROMIDE AND ALBUTEROL SULFATE 2.5; .5 MG/3ML; MG/3ML
1 SOLUTION RESPIRATORY (INHALATION) 3 TIMES DAILY
Status: DISCONTINUED | OUTPATIENT
Start: 2025-04-07 | End: 2025-04-07 | Stop reason: HOSPADM

## 2025-04-07 RX ORDER — BUDESONIDE AND FORMOTEROL FUMARATE DIHYDRATE 160; 4.5 UG/1; UG/1
2 AEROSOL RESPIRATORY (INHALATION)
Qty: 10.2 G | Refills: 3
Start: 2025-04-07

## 2025-04-07 RX ORDER — WARFARIN SODIUM 2.5 MG/1
2.5 TABLET ORAL
Status: DISCONTINUED | OUTPATIENT
Start: 2025-04-07 | End: 2025-04-07 | Stop reason: HOSPADM

## 2025-04-07 RX ORDER — TAMSULOSIN HYDROCHLORIDE 0.4 MG/1
0.4 CAPSULE ORAL DAILY
Qty: 30 CAPSULE | Refills: 3
Start: 2025-04-07

## 2025-04-07 RX ORDER — GUAIFENESIN/DEXTROMETHORPHAN 100-10MG/5
5 SYRUP ORAL EVERY 6 HOURS
Qty: 120 ML | Refills: 0
Start: 2025-04-07 | End: 2025-04-17

## 2025-04-07 RX ORDER — FUROSEMIDE 40 MG/1
40 TABLET ORAL DAILY
Qty: 30 TABLET | Refills: 5
Start: 2025-04-07

## 2025-04-07 RX ORDER — HYDROCODONE BITARTRATE AND ACETAMINOPHEN 5; 325 MG/1; MG/1
1 TABLET ORAL EVERY 8 HOURS SCHEDULED
Qty: 9 TABLET | Refills: 0 | Status: SHIPPED | OUTPATIENT
Start: 2025-04-07 | End: 2025-04-07 | Stop reason: HOSPADM

## 2025-04-07 RX ORDER — ATORVASTATIN CALCIUM 20 MG/1
20 TABLET, FILM COATED ORAL DAILY
Qty: 30 TABLET | Refills: 3
Start: 2025-04-07

## 2025-04-07 RX ADMIN — HYDROCODONE BITARTRATE AND ACETAMINOPHEN 1 TABLET: 5; 325 TABLET ORAL at 06:45

## 2025-04-07 RX ADMIN — MICONAZOLE NITRATE: 20 POWDER TOPICAL at 08:53

## 2025-04-07 RX ADMIN — BUDESONIDE AND FORMOTEROL FUMARATE DIHYDRATE 2 PUFF: 160; 4.5 AEROSOL RESPIRATORY (INHALATION) at 07:46

## 2025-04-07 RX ADMIN — LEVOTHYROXINE SODIUM 75 MCG: 0.07 TABLET ORAL at 08:53

## 2025-04-07 RX ADMIN — IPRATROPIUM BROMIDE AND ALBUTEROL SULFATE 1 DOSE: .5; 2.5 SOLUTION RESPIRATORY (INHALATION) at 14:45

## 2025-04-07 RX ADMIN — AMLODIPINE BESYLATE 5 MG: 5 TABLET ORAL at 08:53

## 2025-04-07 RX ADMIN — GUAIFENESIN AND DEXTROMETHORPHAN 5 ML: 100; 10 SYRUP ORAL at 02:05

## 2025-04-07 RX ADMIN — IPRATROPIUM BROMIDE AND ALBUTEROL SULFATE 1 DOSE: 2.5; .5 SOLUTION RESPIRATORY (INHALATION) at 07:46

## 2025-04-07 RX ADMIN — SODIUM CHLORIDE, PRESERVATIVE FREE 10 ML: 5 INJECTION INTRAVENOUS at 08:53

## 2025-04-07 RX ADMIN — GUAIFENESIN 600 MG: 600 TABLET, EXTENDED RELEASE ORAL at 08:53

## 2025-04-07 RX ADMIN — FUROSEMIDE 40 MG: 10 INJECTION, SOLUTION INTRAMUSCULAR; INTRAVENOUS at 08:52

## 2025-04-07 RX ADMIN — LISINOPRIL 20 MG: 10 TABLET ORAL at 08:53

## 2025-04-07 RX ADMIN — LORAZEPAM 1 MG: 1 TABLET ORAL at 12:03

## 2025-04-07 RX ADMIN — TAMSULOSIN HYDROCHLORIDE 0.4 MG: 0.4 CAPSULE ORAL at 08:53

## 2025-04-07 RX ADMIN — GUAIFENESIN AND DEXTROMETHORPHAN 5 ML: 100; 10 SYRUP ORAL at 08:52

## 2025-04-07 RX ADMIN — ASPIRIN 81 MG: 81 TABLET, COATED ORAL at 08:53

## 2025-04-07 RX ADMIN — CARVEDILOL 6.25 MG: 3.12 TABLET, FILM COATED ORAL at 08:53

## 2025-04-07 RX ADMIN — HYDROCODONE BITARTRATE AND ACETAMINOPHEN 1 TABLET: 5; 325 TABLET ORAL at 13:29

## 2025-04-07 RX ADMIN — ATORVASTATIN CALCIUM 20 MG: 20 TABLET, FILM COATED ORAL at 08:53

## 2025-04-07 RX ADMIN — GUAIFENESIN AND DEXTROMETHORPHAN 5 ML: 100; 10 SYRUP ORAL at 13:30

## 2025-04-07 ASSESSMENT — PAIN SCALES - GENERAL
PAINLEVEL_OUTOF10: 9
PAINLEVEL_OUTOF10: 0

## 2025-04-07 ASSESSMENT — PAIN - FUNCTIONAL ASSESSMENT: PAIN_FUNCTIONAL_ASSESSMENT: ACTIVITIES ARE NOT PREVENTED

## 2025-04-07 ASSESSMENT — PAIN DESCRIPTION - LOCATION: LOCATION: NECK

## 2025-04-07 NOTE — DISCHARGE SUMMARY
conjunction with any daily progress note from day of discharge.    Discharge plan:     Disposition: SNF    Physician Follow Up:   No follow-up provider specified.     Requiring Further Evaluation/Follow Up POST HOSPITALIZATION/Incidental Findings:     Diet: cardiac diet    Activity: As tolerated    Instructions to Patient: F/U PCP 1-2 weeks after discharge from rehab    Discharge Medications:      Medication List        START taking these medications      amLODIPine 5 MG tablet  Commonly known as: NORVASC  Take 1 tablet by mouth daily     atorvastatin 20 MG tablet  Commonly known as: LIPITOR  Take 1 tablet by mouth daily  Replaces: simvastatin 40 MG tablet     budesonide-formoterol 160-4.5 MCG/ACT Aero  Commonly known as: SYMBICORT  Inhale 2 puffs into the lungs in the morning and 2 puffs in the evening.  Replaces: fluticasone-salmeterol 250-50 MCG/ACT Aepb diskus inhaler     furosemide 40 MG tablet  Commonly known as: LASIX  Take 1 tablet by mouth daily     guaiFENesin-dextromethorphan 100-10 MG/5ML syrup  Commonly known as: ROBITUSSIN DM  Take 5 mLs by mouth every 6 hours for 10 days     HYDROcodone-acetaminophen 5-325 MG per tablet  Commonly known as: NORCO  Take 1 tablet by mouth every 8 hours for 3 days. Max Daily Amount: 3 tablets     ipratropium 0.5 mg-albuterol 2.5 mg 0.5-2.5 (3) MG/3ML Soln nebulizer solution  Commonly known as: DUONEB  Inhale 3 mLs into the lungs 4 times daily     miconazole 2 % powder  Commonly known as: MICOTIN  Apply topically 2 times daily.     tamsulosin 0.4 MG capsule  Commonly known as: FLOMAX  Take 1 capsule by mouth daily     umeclidinium bromide 62.5 MCG/ACT inhaler  Commonly known as: INCRUSE ELLIPTA            CONTINUE taking these medications      * albuterol sulfate  (90 Base) MCG/ACT inhaler  Commonly known as: PROVENTIL;VENTOLIN;PROAIR  Inhale 2 puffs into the lungs every 6 hours as needed for Wheezing     * albuterol (2.5 MG/3ML) 0.083% nebulizer solution  Commonly

## 2025-04-07 NOTE — CARE COORDINATION
Spoke with patient and his wife and they are in agreement that patient should probably go to a SNF on discharge for strengthening and endurance.  Referral sent to [facility].  Spoke with Laura at [facility] and she will watch for the referral.    1055-Spoke with Teresa from [facility] and they do not have any beds in [place] but could take at [facility], wife is considering and will let us know.     1111-Spoke with patient's wife and she is agreeable to have patient go to [facility].  I informed Teresa at [facility] and they can accept today at [facility]. I informed patient and he is now saying he will not go to a SNF and wants to go home, I told him he will have to discuss with his wife when she gets here.    1125-Sent in for transport as a will call, pending patient discussion with his wife.    1323-Transport set up for 1500 to take patient to [facility].  Transport packet prepared, HENS completed. Call report to [phone].
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
## 2025-04-07 NOTE — RT PROTOCOL NOTE
RT Inhaler-Nebulizer Bronchodilator Protocol Note    There is a bronchodilator order in the chart from a provider indicating to follow the RT Bronchodilator Protocol and there is an “Initiate RT Inhaler-Nebulizer Bronchodilator Protocol” order as well (see protocol at bottom of note).    CXR Findings:  No results found.    The findings from the last RT Protocol Assessment were as follows:   History Pulmonary Disease: Chronic pulmonary disease  Respiratory Pattern: Dyspnea on exertion or RR 21-25 bpm  Breath Sounds: Intermittent or unilateral wheezes  Cough: Weak, productive  Indication for Bronchodilator Therapy: On home bronchodilators  Bronchodilator Assessment Score: 10    Aerosolized bronchodilator medication orders have been revised according to the RT Inhaler-Nebulizer Bronchodilator Protocol below.    Respiratory Therapist to perform RT Therapy Protocol Assessment initially then follow the protocol.  Repeat RT Therapy Protocol Assessment PRN for score 0-3 or on second treatment, BID, and PRN for scores above 3.    No Indications - adjust the frequency to every 6 hours PRN wheezing or bronchospasm, if no treatments needed after 48 hours then discontinue using Per Protocol order mode.     If indication present, adjust the RT bronchodilator orders based on the Bronchodilator Assessment Score as indicated below.  Use Inhaler orders unless patient has one or more of the following: on home nebulizer, not able to hold breath for 10 seconds, is not alert and oriented, cannot activate and use MDI correctly, or respiratory rate 25 breaths per minute or more, then use the equivalent nebulizer order(s) with same Frequency and PRN reasons based on the score.  If a patient is on this medication at home then do not decrease Frequency below that used at home.    0-3 - enter or revise RT bronchodilator order(s) to equivalent RT Bronchodilator order with Frequency of every 4 hours PRN for wheezing or increased work of 
RT Inhaler-Nebulizer Bronchodilator Protocol Note    There is a bronchodilator order in the chart from a provider indicating to follow the RT Bronchodilator Protocol and there is an “Initiate RT Inhaler-Nebulizer Bronchodilator Protocol” order as well (see protocol at bottom of note).    CXR Findings:  XR CHEST PORTABLE  Result Date: 4/3/2025  Increased lung markings bilaterally, may be related to pulmonary vascular congestion versus pneumonia.       The findings from the last RT Protocol Assessment were as follows:   History Pulmonary Disease: Chronic pulmonary disease  Respiratory Pattern: Dyspnea on exertion or RR 21-25 bpm  Breath Sounds: Intermittent or unilateral wheezes  Cough: Strong, spontaneous, non-productive  Indication for Bronchodilator Therapy: On home bronchodilators  Bronchodilator Assessment Score: 8    Aerosolized bronchodilator medication orders have been revised according to the RT Inhaler-Nebulizer Bronchodilator Protocol below.    Respiratory Therapist to perform RT Therapy Protocol Assessment initially then follow the protocol.  Repeat RT Therapy Protocol Assessment PRN for score 0-3 or on second treatment, BID, and PRN for scores above 3.    No Indications - adjust the frequency to every 6 hours PRN wheezing or bronchospasm, if no treatments needed after 48 hours then discontinue using Per Protocol order mode.     If indication present, adjust the RT bronchodilator orders based on the Bronchodilator Assessment Score as indicated below.  Use Inhaler orders unless patient has one or more of the following: on home nebulizer, not able to hold breath for 10 seconds, is not alert and oriented, cannot activate and use MDI correctly, or respiratory rate 25 breaths per minute or more, then use the equivalent nebulizer order(s) with same Frequency and PRN reasons based on the score.  If a patient is on this medication at home then do not decrease Frequency below that used at home.    0-3 - enter or 
RT Inhaler-Nebulizer Bronchodilator Protocol Note    There is a bronchodilator order in the chart from a provider indicating to follow the RT Bronchodilator Protocol and there is an “Initiate RT Inhaler-Nebulizer Bronchodilator Protocol” order as well (see protocol at bottom of note).    CXR Findings:  XR CHEST PORTABLE  Result Date: 4/3/2025  Increased lung markings bilaterally, may be related to pulmonary vascular congestion versus pneumonia.       The findings from the last RT Protocol Assessment were as follows:   History Pulmonary Disease: Chronic pulmonary disease  Respiratory Pattern: Mild dyspnea at rest, irregular pattern, or RR 21-25 bpm  Breath Sounds: Intermittent or unilateral wheezes  Cough: Weak, productive  Indication for Bronchodilator Therapy: On home bronchodilators  Bronchodilator Assessment Score: 12    Aerosolized bronchodilator medication orders have been revised according to the RT Inhaler-Nebulizer Bronchodilator Protocol below.    Respiratory Therapist to perform RT Therapy Protocol Assessment initially then follow the protocol.  Repeat RT Therapy Protocol Assessment PRN for score 0-3 or on second treatment, BID, and PRN for scores above 3.    No Indications - adjust the frequency to every 6 hours PRN wheezing or bronchospasm, if no treatments needed after 48 hours then discontinue using Per Protocol order mode.     If indication present, adjust the RT bronchodilator orders based on the Bronchodilator Assessment Score as indicated below.  Use Inhaler orders unless patient has one or more of the following: on home nebulizer, not able to hold breath for 10 seconds, is not alert and oriented, cannot activate and use MDI correctly, or respiratory rate 25 breaths per minute or more, then use the equivalent nebulizer order(s) with same Frequency and PRN reasons based on the score.  If a patient is on this medication at home then do not decrease Frequency below that used at home.    0-3 - enter or 
increased work of breathing using Per Protocol order mode.        4-6 - enter or revise RT Bronchodilator order(s) to two equivalent RT bronchodilator orders with one order with BID Frequency and one order with Frequency of every 4 hours PRN wheezing or increased work of breathing using Per Protocol order mode.        7-10 - enter or revise RT Bronchodilator order(s) to two equivalent RT bronchodilator orders with one order with TID Frequency and one order with Frequency of every 4 hours PRN wheezing or increased work of breathing using Per Protocol order mode.       11-13 - enter or revise RT Bronchodilator order(s) to one equivalent RT bronchodilator order with QID Frequency and an Albuterol order with Frequency of every 4 hours PRN wheezing or increased work of breathing using Per Protocol order mode.      Greater than 13 - enter or revise RT Bronchodilator order(s) to one equivalent RT bronchodilator order with every 4 hours Frequency and an Albuterol order with Frequency of every 2 hours PRN wheezing or increased work of breathing using Per Protocol order mode.     RT to enter RT Home Evaluation for COPD & MDI Assessment order using Per Protocol order mode.    Electronically signed by Keri Berrios RCP on 4/6/2025 at 8:04 AM

## 2025-04-07 NOTE — DISCHARGE INSTR - DIET

## 2025-04-07 NOTE — PLAN OF CARE
Problem: Chronic Conditions and Co-morbidities  Goal: Patient's chronic conditions and co-morbidity symptoms are monitored and maintained or improved  Outcome: Progressing     Problem: Discharge Planning  Goal: Discharge to home or other facility with appropriate resources  Outcome: Progressing     Problem: Safety - Adult  Goal: Free from fall injury  Outcome: Progressing     Problem: Respiratory - Adult  Goal: Achieves optimal ventilation and oxygenation  Outcome: Progressing  Flowsheets (Taken 4/6/2025 2015 by Ambar Cisneros RCP)  Achieves optimal ventilation and oxygenation:   Assess for changes in respiratory status   Assess for changes in mentation and behavior   Oxygen supplementation based on oxygen saturation or arterial blood gases   Position to facilitate oxygenation and minimize respiratory effort     Problem: Pain  Goal: Verbalizes/displays adequate comfort level or baseline comfort level  Outcome: Progressing  Flowsheets (Taken 4/7/2025 0808)  Verbalizes/displays adequate comfort level or baseline comfort level:   Encourage patient to monitor pain and request assistance   Assess pain using appropriate pain scale   Administer analgesics based on type and severity of pain and evaluate response

## 2025-04-07 NOTE — DISCHARGE INSTR - COC
Continuity of Care Form    Patient Name: Bill Rodney   :  1935  MRN:  [id number]    Admit date:  4/3/2025  Discharge date:  2025    Code Status Order: Full Code   Advance Directives:     Admitting Physician:  Jan Bello MD  PCP: Kareem Pal MD    Discharging Nurse: Edyta SAL  Discharging Hospital Unit/Room#: 344/344-01  Discharging Unit Phone Number: [phone]    Emergency Contact:   Extended Emergency Contact Information  Primary Emergency Contact: Jenny Rodney  Home Phone: [phone]  Mobile Phone: [phone]  Relation: Spouse    Past Surgical History:  Past Surgical History:   Procedure Laterality Date    HERNIA REPAIR      NOSE SURGERY      PROSTATE SURGERY      SHOULDER ARTHROSCOPY Right     SHOULDER SURGERY Right        Immunization History:   Immunization History   Administered Date(s) Administered    COVID-19, PFIZER Bivalent, DO NOT Dilute, (age 12y+), IM, 30 mcg/0.3 mL 2022, 2023    COVID-19, PFIZER PURPLE top, DILUTE for use, (age 12 y+), 30mcg/0.3mL 2021, 2021, 10/21/2021    COVID-19, PFIZER, , (age 12y+), IM, 30mcg/0.3mL 2023, 2024    Influenza Vaccine, unspecified formulation 10/06/2015, 2016    Influenza Virus Vaccine 2023    Influenza Whole 2014    Influenza, FLUAD, (age 65 y+), IM, Quadv, 0.5mL 2021, 2023    Influenza, FLUZONE High Dose (age 65 y+), IM, Quadv, 0.7mL 2020, 2022    Influenza, FLUZONE High Dose, (age 65 y+), IM, Trivalent PF, 0.5mL 2017, 2018, 10/18/2019    Pneumococcal, PCV-13, PREVNAR 13, (age 6w+), IM, 0.5mL 2017    Pneumococcal, PPSV23, PNEUMOVAX 23, (age 2y+), SC/IM, 0.5mL 1997, 10/01/2003, 2017    TDaP, ADACEL (age 10y-64y), BOOSTRIX (age 10y+), IM, 0.5mL 2018       Active Problems:  Patient Active Problem List   Diagnosis Code    Lumbar sprain S33.5XXA    Acute sinusitis J01.90    Arthropathy M12.9    Asthma

## 2025-04-08 ENCOUNTER — TELEPHONE (OUTPATIENT)
Age: 89
End: 2025-04-08

## 2025-04-08 NOTE — TELEPHONE ENCOUNTER
Care Transitions Initial Follow Up Call    Outreach made within 2 business days of discharge: Yes    Patient: Bill Rodney Patient : 1935   MRN: [id number]  Reason for Admission: Acute Hypoxic Respiratory failure.    Discharge Date: 25       Spoke with: Jenny    Discharge department/facility: [facility]    Patient was discharged to [facility]. They will call to set up his hospital follow up once he is discharged from facility.    Scheduled appointment with PCP within 7-14 days    Follow Up  Future Appointments   Date Time Provider Department Center   2025  9:30 AM Scott Kramer MD Resp Spec [id number]   2025  1:40 PM Kareem Pal MD [facility] [facility]       Katie Gunn

## 2025-04-23 ENCOUNTER — TELEPHONE (OUTPATIENT)
Age: 89
End: 2025-04-23

## 2025-04-23 RX ORDER — POTASSIUM CHLORIDE 1500 MG/1
20 TABLET, EXTENDED RELEASE ORAL DAILY
Qty: 90 TABLET | Refills: 1 | Status: SHIPPED | OUTPATIENT
Start: 2025-04-23

## 2025-04-23 NOTE — PROGRESS NOTES
Nurse Janel at [address] notified of new prescription sent to [facility] on [address]

## 2025-04-23 NOTE — TELEPHONE ENCOUNTER
Janel from [facility] called stating they are following patient for home care.  He has an appointment with you on 04/30/2025.  Hospital put him on Potassium Chloride ER 20 meq 1 daily and only has 1 pill left.  Can you send in a script to the [facility] on [address].

## 2025-04-23 NOTE — PROGRESS NOTES
Spoke to staff at [address].  They will send message to Nurse Janel and request that she call us regarding patient.

## 2025-04-23 NOTE — TELEPHONE ENCOUNTER
Janel from [address] called to speak w/Janel.  She said she is returning a call to her.  Please call her back at [phone]

## 2025-04-28 ENCOUNTER — OFFICE VISIT (OUTPATIENT)
Dept: PULMONOLOGY | Age: 89
End: 2025-04-28

## 2025-04-28 VITALS
OXYGEN SATURATION: 98 % | HEIGHT: 68 IN | RESPIRATION RATE: 18 BRPM | SYSTOLIC BLOOD PRESSURE: 118 MMHG | DIASTOLIC BLOOD PRESSURE: 51 MMHG | HEART RATE: 63 BPM | BODY MASS INDEX: 20.76 KG/M2 | WEIGHT: 137 LBS

## 2025-04-28 DIAGNOSIS — J45.50 SEVERE PERSISTENT ASTHMA WITHOUT COMPLICATION (HCC): Primary | ICD-10-CM

## 2025-04-28 DIAGNOSIS — I25.10 CAD, MULTIPLE VESSEL: ICD-10-CM

## 2025-04-28 DIAGNOSIS — I34.0 SEVERE MITRAL REGURGITATION: ICD-10-CM

## 2025-04-28 DIAGNOSIS — Z77.098 CHLORINE GAS EXPOSURE: ICD-10-CM

## 2025-04-28 DIAGNOSIS — I38 DIASTOLIC CHF DUE TO VALVULAR DISEASE (HCC): ICD-10-CM

## 2025-04-28 DIAGNOSIS — I50.30 DIASTOLIC CHF DUE TO VALVULAR DISEASE (HCC): ICD-10-CM

## 2025-04-28 RX ORDER — FUROSEMIDE 20 MG/1
20 TABLET ORAL DAILY
Qty: 30 TABLET | Refills: 3 | Status: SHIPPED | OUTPATIENT
Start: 2025-04-28

## 2025-04-28 RX ORDER — FUROSEMIDE 40 MG/1
40 TABLET ORAL DAILY
Qty: 30 TABLET | Refills: 5 | Status: SHIPPED | OUTPATIENT
Start: 2025-04-28 | End: 2025-04-28

## 2025-04-28 NOTE — PROGRESS NOTES
Bill Rodney  4/28/2025  Chief Complaint   Patient presents with    Asthma     Follow up         Bill Rodney  89 y.o. male   History of Present Illness  The patient presents for evaluation of congestive heart failure and asthma.    He was recently discharged from the hospital following a diagnosis of pneumonia. He is uncertain about the resolution of his pneumonia and is not experiencing any pain. His last hospitalization was on 04/07/2025, during which he was prescribed Lasix, a medication he has not yet started. His cardiologist is Dr. Trejo, whom he consults with regularly. His most recent consultation was with a nurse practitioner in the fall of 2024, and he has a forthcoming appointment scheduled. He had a scheduled appointment with Dr. Trejo on 04/14/2025, but it was missed due to his hospitalization.    He has been using inhalers for his respiratory condition, although he finds the purple one challenging to use due to its complexity.    MEDICATIONS  Current: Albuterol, ipratropium, furosemide.               Review of Systems -  General ROS: negative for - chills, fatigue, fever or weight loss  ENT ROS: negative for - headaches, oral lesions or sore throat  Cardiovascular ROS: no chest pain , orthopnea or pnd   Gastrointestinal ROS: no abdominal pain, change in bowel habits, or black or bloody stools  Skin - no rash   Neuro - no blurry vision , no loc . No focal weakness   msk - no jt tenderness or swelling    Vascular - no claudication , rest completed and negative   Lymphatic - complete and negative   Hematology - oncology - complete and negative   Allergy immunology - complete and negative    no burning or hematuria       LUNG CANCER SCREENING     CRITERIA MET    []     CT ORDERED  []      CRITERIA NOT MET   []      REFUSED                    []        REASON CRITERIA NOT MET     SMOKING LESS THAN 30 PY  []      AGE LESS THAN 50 or GREATER 80 YEARS  []      QUIT SMOKING 15 YEARS OR

## 2025-04-30 ENCOUNTER — OFFICE VISIT (OUTPATIENT)
Age: 89
End: 2025-04-30

## 2025-04-30 VITALS
OXYGEN SATURATION: 100 % | DIASTOLIC BLOOD PRESSURE: 58 MMHG | TEMPERATURE: 98.9 F | SYSTOLIC BLOOD PRESSURE: 110 MMHG | WEIGHT: 139 LBS | BODY MASS INDEX: 21.13 KG/M2 | HEART RATE: 67 BPM

## 2025-04-30 DIAGNOSIS — D68.2 HEREDITARY COAGULATION FACTOR DEFICIENCY (HCC): ICD-10-CM

## 2025-04-30 DIAGNOSIS — M77.8 ELBOW TENDONITIS: ICD-10-CM

## 2025-04-30 DIAGNOSIS — J96.01 ACUTE HYPOXIC RESPIRATORY FAILURE (HCC): Primary | ICD-10-CM

## 2025-04-30 DIAGNOSIS — J41.8 MIXED SIMPLE AND MUCOPURULENT CHRONIC BRONCHITIS (HCC): ICD-10-CM

## 2025-04-30 DIAGNOSIS — I50.32 CHRONIC DIASTOLIC CONGESTIVE HEART FAILURE (HCC): ICD-10-CM

## 2025-04-30 DIAGNOSIS — I82.5Y9 CHRONIC DEEP VEIN THROMBOSIS (DVT) OF PROXIMAL VEIN OF LOWER EXTREMITY, UNSPECIFIED LATERALITY (HCC): ICD-10-CM

## 2025-04-30 DIAGNOSIS — Z09 HOSPITAL DISCHARGE FOLLOW-UP: ICD-10-CM

## 2025-04-30 DIAGNOSIS — C61 MALIGNANT TUMOR OF PROSTATE (HCC): ICD-10-CM

## 2025-04-30 DIAGNOSIS — I48.0 PAROXYSMAL ATRIAL FIBRILLATION (HCC): ICD-10-CM

## 2025-04-30 RX ORDER — TAMSULOSIN HYDROCHLORIDE 0.4 MG/1
0.4 CAPSULE ORAL DAILY
Qty: 30 CAPSULE | Refills: 3 | Status: CANCELLED | OUTPATIENT
Start: 2025-04-30

## 2025-04-30 RX ORDER — MECLIZINE HYDROCHLORIDE 25 MG/1
25 TABLET ORAL 3 TIMES DAILY PRN
COMMUNITY
Start: 2024-10-25

## 2025-04-30 NOTE — PROGRESS NOTES
[facility]  [facility]  [address]  [facility] [address]  Dept: [phone]     Date of Visit:  2025  Patient Name: Bill Rodney   Patient :  1935     CHIEF COMPLAINT/HPI:     Chief Complaint   Patient presents with    Follow-Up from Hospital     Last week got out of [facility]. Med check. Right shoulder abrasion noticed for a few days.         HPI      Bill Rodney, 89 y.o. presents today for a hospital follow up.     Bill Rodney denies headaches, dizziness, chest pain, shortness of breath, heartburn/indigestion, nausea, vomiting, diarrhea, constipation, blood in stool, claudication, edema, skin changes, vision changes, fever, or chills.       REVIEW OF SYSTEM      Review of Systems:   Constitutional:  Negative for chills, fatigue, fever and unexpected weight change.   Eyes:  Negative for visual disturbance.   Respiratory:  Negative for cough, chest tightness, shortness of breath and wheezing.    Cardiovascular:  Negative for chest pain, palpitations and leg swelling.   Gastrointestinal:  Negative for abdominal distention, abdominal pain, blood in stool, constipation, diarrhea, nausea and vomiting.   Genitourinary:  Negative for dysuria, hematuria and urgency.   Musculoskeletal:  Negative for back pain, neck pain and neck stiffness.   Skin:  Negative for rash and wound.   Neurological:  Negative for syncope, weakness, light-headedness and headaches.   Hematological:  Negative for adenopathy. Does not bruise/bleed easily.   Psychiatric/Behavioral:  Negative for suicidal ideas. The patient is not nervous/anxious.      REVIEWED INFORMATION      Allergies   Allergen Reactions    Levofloxacin Swelling    Codeine Hives and Rash    Pulmicort [Budesonide] Rash     Also itchy    Cashew Nut Oil     Levaquin  [Levofloxacin In D5w] Other (See Comments)    Peanut-Containing Drug Products Hives       Current Outpatient Medications

## 2025-04-30 NOTE — PATIENT INSTRUCTIONS
Bill    Thank you for choosing [facility].  We know you have options when it comes to your healthcare; we appreciate that you chose us. Our goal is to provide exceptional  service and world class care to every patient.  You will be receiving a survey via email or text message asking for your feedback.  Please take a few minutes to share your thoughts about your recent visit. Your comments help us understand what we do well and ways we can improve.  Thank you in advance for your valuable feedback.      Dr. Kae Paz, CMA

## 2025-05-30 ENCOUNTER — TELEPHONE (OUTPATIENT)
Age: 89
End: 2025-05-30

## 2025-05-30 DIAGNOSIS — E78.00 HYPERCHOLESTEROLEMIA: Primary | ICD-10-CM

## 2025-05-30 RX ORDER — ATORVASTATIN CALCIUM 20 MG/1
20 TABLET, FILM COATED ORAL DAILY
Qty: 30 TABLET | Refills: 3 | Status: CANCELLED | OUTPATIENT
Start: 2025-05-30

## 2025-05-30 NOTE — TELEPHONE ENCOUNTER
Bill Rodney is calling to request a refill on the following medication(s):    Medication Request:  Requested Prescriptions     Pending Prescriptions Disp Refills    simvastatin (ZOCOR) 40 MG tablet 90 tablet 3     Sig: Take 1 tablet by mouth nightly       Last Visit Date (If Applicable):  4/30/2025    Next Visit Date:    6/30/2025

## 2025-05-30 NOTE — TELEPHONE ENCOUNTER
Unable to find order for Simvastatin 75 mg daily.  Recent med list from [facility] lists Simvastatin 40 mg.  Left voicemail message for patient's wife, requested she call back to clarify dose.

## 2025-05-30 NOTE — TELEPHONE ENCOUNTER
Wife, Jenny called back stating she was wrong about his statin medication. It should be Simvastatin 40 mg daily.

## 2025-06-02 RX ORDER — SIMVASTATIN 40 MG
40 TABLET ORAL NIGHTLY
Qty: 90 TABLET | Refills: 3 | Status: SHIPPED | OUTPATIENT
Start: 2025-06-02

## 2025-06-16 ENCOUNTER — TELEPHONE (OUTPATIENT)
Age: 89
End: 2025-06-16

## 2025-06-16 NOTE — TELEPHONE ENCOUNTER
Patient's wife called to report that he has redness in and around umbilicus.  She said that the redness is smooth, no drainage but there is an odor (read on internet to check).  Redness is quarter to half dollar size, does not itch.  She is wondering what they can use for it ?    I could also hear patient in the background saying that his throat was really dry and he wanted her to let us know.  Wife said that he is on a \"water pill\", wondering if that is causing the dry throat ?    They use the [facility] on [address].    Thank you.

## 2025-06-17 NOTE — TELEPHONE ENCOUNTER
Disc with wife  Clean out umbilicus  with soapy Qtip  Get OTC lotrimin to apply to rash BID till healed  CROW

## 2025-06-22 ENCOUNTER — HOSPITAL ENCOUNTER (EMERGENCY)
Age: 89
Discharge: HOME | End: 2025-06-22
Attending: EMERGENCY MEDICINE
Payer: MEDICARE

## 2025-06-22 ENCOUNTER — APPOINTMENT (OUTPATIENT)
Dept: GENERAL RADIOLOGY | Age: 89
End: 2025-06-22
Payer: MEDICARE

## 2025-06-22 VITALS
DIASTOLIC BLOOD PRESSURE: 70 MMHG | TEMPERATURE: 98.1 F | HEIGHT: 68 IN | BODY MASS INDEX: 20.46 KG/M2 | SYSTOLIC BLOOD PRESSURE: 186 MMHG | OXYGEN SATURATION: 94 % | HEART RATE: 61 BPM | WEIGHT: 135 LBS | RESPIRATION RATE: 24 BRPM

## 2025-06-22 DIAGNOSIS — J44.1 COPD EXACERBATION (HCC): Primary | ICD-10-CM

## 2025-06-22 DIAGNOSIS — I50.33 ACUTE ON CHRONIC DIASTOLIC CONGESTIVE HEART FAILURE (HCC): ICD-10-CM

## 2025-06-22 LAB
ALBUMIN SERPL-MCNC: 3.9 G/DL (ref 3.5–5.2)
ALBUMIN/GLOB SERPL: 1.6 {RATIO} (ref 1–2.5)
ALP SERPL-CCNC: 108 U/L (ref 40–129)
ALT SERPL-CCNC: 14 U/L (ref 10–50)
ANION GAP SERPL CALCULATED.3IONS-SCNC: 12 MMOL/L (ref 9–16)
AST SERPL-CCNC: 40 U/L (ref 10–50)
BASOPHILS # BLD: 0.1 K/UL (ref 0–0.2)
BASOPHILS NFR BLD: 1 % (ref 0–2)
BILIRUB SERPL-MCNC: 1.1 MG/DL (ref 0–1.2)
BNP SERPL-MCNC: 7531 PG/ML (ref 0–450)
BUN SERPL-MCNC: 18 MG/DL (ref 8–23)
CALCIUM SERPL-MCNC: 8.8 MG/DL (ref 8.6–10.4)
CHLORIDE SERPL-SCNC: 93 MMOL/L (ref 98–107)
CO2 SERPL-SCNC: 25 MMOL/L (ref 20–31)
CREAT SERPL-MCNC: 0.9 MG/DL (ref 0.7–1.2)
EOSINOPHIL # BLD: 0.6 K/UL (ref 0–0.4)
EOSINOPHILS RELATIVE PERCENT: 9 % (ref 1–4)
ERYTHROCYTE [DISTWIDTH] IN BLOOD BY AUTOMATED COUNT: 18.6 % (ref 12.5–15.4)
GFR, ESTIMATED: 82 ML/MIN/1.73M2
GLUCOSE SERPL-MCNC: 88 MG/DL (ref 74–99)
HCT VFR BLD AUTO: 33.6 % (ref 41–53)
HGB BLD-MCNC: 11 G/DL (ref 13.5–17.5)
INR PPP: 3.2 (ref 0.9–1.2)
LACTATE BLDV-SCNC: 1.6 MMOL/L (ref 0.5–2.2)
LYMPHOCYTES NFR BLD: 0.9 K/UL (ref 1–4.8)
LYMPHOCYTES RELATIVE PERCENT: 13 % (ref 24–44)
MAGNESIUM SERPL-MCNC: 2.1 MG/DL (ref 1.6–2.4)
MCH RBC QN AUTO: 27.3 PG (ref 26–34)
MCHC RBC AUTO-ENTMCNC: 32.8 G/DL (ref 31–37)
MCV RBC AUTO: 83.1 FL (ref 80–100)
MONOCYTES NFR BLD: 1 K/UL (ref 0.1–1.2)
MONOCYTES NFR BLD: 13 % (ref 2–11)
NEUTROPHILS NFR BLD: 64 % (ref 36–66)
NEUTS SEG NFR BLD: 4.6 K/UL (ref 1.8–7.7)
PLATELET # BLD AUTO: 175 K/UL (ref 140–450)
PMV BLD AUTO: 7.8 FL (ref 6–12)
POTASSIUM SERPL-SCNC: 4.3 MMOL/L (ref 3.7–5.3)
PROT SERPL-MCNC: 6.4 G/DL (ref 6.6–8.7)
PROTHROMBIN TIME: 34.8 SEC (ref 11.8–14.6)
RBC # BLD AUTO: 4.04 M/UL (ref 4.5–5.9)
SODIUM SERPL-SCNC: 130 MMOL/L (ref 136–145)
TROPONIN I SERPL HS-MCNC: 40 NG/L (ref 0–22)
WBC OTHER # BLD: 7.2 K/UL (ref 3.5–11)

## 2025-06-22 PROCEDURE — 96374 THER/PROPH/DIAG INJ IV PUSH: CPT

## 2025-06-22 PROCEDURE — 94640 AIRWAY INHALATION TREATMENT: CPT

## 2025-06-22 PROCEDURE — 96375 TX/PRO/DX INJ NEW DRUG ADDON: CPT

## 2025-06-22 PROCEDURE — 83605 ASSAY OF LACTIC ACID: CPT

## 2025-06-22 PROCEDURE — 83735 ASSAY OF MAGNESIUM: CPT

## 2025-06-22 PROCEDURE — 99285 EMERGENCY DEPT VISIT HI MDM: CPT

## 2025-06-22 PROCEDURE — 93005 ELECTROCARDIOGRAM TRACING: CPT

## 2025-06-22 PROCEDURE — 6370000000 HC RX 637 (ALT 250 FOR IP)

## 2025-06-22 PROCEDURE — 71046 X-RAY EXAM CHEST 2 VIEWS: CPT

## 2025-06-22 PROCEDURE — 85610 PROTHROMBIN TIME: CPT

## 2025-06-22 PROCEDURE — 85025 COMPLETE CBC W/AUTO DIFF WBC: CPT

## 2025-06-22 PROCEDURE — 80053 COMPREHEN METABOLIC PANEL: CPT

## 2025-06-22 PROCEDURE — 6360000002 HC RX W HCPCS

## 2025-06-22 PROCEDURE — 83880 ASSAY OF NATRIURETIC PEPTIDE: CPT

## 2025-06-22 PROCEDURE — 84484 ASSAY OF TROPONIN QUANT: CPT

## 2025-06-22 RX ORDER — IPRATROPIUM BROMIDE AND ALBUTEROL SULFATE 2.5; .5 MG/3ML; MG/3ML
1 SOLUTION RESPIRATORY (INHALATION) ONCE
Status: COMPLETED | OUTPATIENT
Start: 2025-06-22 | End: 2025-06-22

## 2025-06-22 RX ORDER — FUROSEMIDE 20 MG/1
20 TABLET ORAL 2 TIMES DAILY
Qty: 14 TABLET | Refills: 0 | Status: SHIPPED | OUTPATIENT
Start: 2025-06-22 | End: 2025-06-29

## 2025-06-22 RX ORDER — FUROSEMIDE 10 MG/ML
40 INJECTION INTRAMUSCULAR; INTRAVENOUS ONCE
Status: COMPLETED | OUTPATIENT
Start: 2025-06-22 | End: 2025-06-22

## 2025-06-22 RX ORDER — AZITHROMYCIN 250 MG/1
500 TABLET, FILM COATED ORAL ONCE
Status: COMPLETED | OUTPATIENT
Start: 2025-06-22 | End: 2025-06-22

## 2025-06-22 RX ORDER — AZITHROMYCIN 250 MG/1
250 TABLET, FILM COATED ORAL DAILY
Qty: 4 TABLET | Refills: 0 | Status: SHIPPED | OUTPATIENT
Start: 2025-06-22 | End: 2025-06-26

## 2025-06-22 RX ORDER — PREDNISONE 50 MG/1
50 TABLET ORAL DAILY
Qty: 5 TABLET | Refills: 0 | Status: SHIPPED | OUTPATIENT
Start: 2025-06-22 | End: 2025-06-27

## 2025-06-22 RX ORDER — METHYLPREDNISOLONE SODIUM SUCCINATE 40 MG/ML
125 INJECTION INTRAMUSCULAR; INTRAVENOUS ONCE
Status: COMPLETED | OUTPATIENT
Start: 2025-06-22 | End: 2025-06-22

## 2025-06-22 RX ADMIN — AZITHROMYCIN DIHYDRATE 500 MG: 250 TABLET, FILM COATED ORAL at 21:32

## 2025-06-22 RX ADMIN — IPRATROPIUM BROMIDE AND ALBUTEROL SULFATE 1 DOSE: .5; 2.5 SOLUTION RESPIRATORY (INHALATION) at 18:10

## 2025-06-22 RX ADMIN — METHYLPREDNISOLONE SODIUM SUCCINATE 125 MG: 40 INJECTION, POWDER, LYOPHILIZED, FOR SOLUTION INTRAMUSCULAR; INTRAVENOUS at 18:16

## 2025-06-22 RX ADMIN — FUROSEMIDE 40 MG: 10 INJECTION, SOLUTION INTRAMUSCULAR; INTRAVENOUS at 20:44

## 2025-06-22 ASSESSMENT — PAIN - FUNCTIONAL ASSESSMENT: PAIN_FUNCTIONAL_ASSESSMENT: NONE - DENIES PAIN

## 2025-06-22 NOTE — ED PROVIDER NOTES
Emergency Department     Faculty Attestation    I performed a history and physical examination of the patient and discussed management with the mid level provider. I reviewed the mid level provider's note and agree with the documented findings and plan of care. Any areas of disagreement are noted on the chart. I was personally present for the key portions of any procedures. I have documented in the chart those procedures where I was not present during the key portions. I have reviewed the emergency nurses triage note. I agree with the chief complaint, past medical history, past surgical history, allergies, medications, social and family history as documented unless otherwise noted below. Documentation of the HPI, Physical Exam and Medical Decision Making performed by medical students or scribes is based on my personal performance of the HPI, PE and MDM. For Physician Assistant/ Nurse Practitioner cases/documentation I have personally evaluated this patient and have completed at least one if not all key elements of the E/M (history, physical exam, and MDM). Additional findings are as noted.      Primary Care Physician:  Kareem Pal MD    CHIEF COMPLAINT       Chief Complaint   Patient presents with    Shortness of Breath     Starting last night he was unable to breath.    Bradycardia       RECENT VITALS:   Temp: 98.1 °F (36.7 °C),  Pulse: (!) 49, Respirations: 24, BP: (!) 140/73    LABS:  Labs Reviewed   CBC WITH AUTO DIFFERENTIAL   COMPREHENSIVE METABOLIC PANEL   LACTIC ACID   MAGNESIUM   TROPONIN     A-fib with slow ventricular response rate of 47 QRS durations 100 ms QT corrected 435 ms axis is -20 there is no acute ST or T wave changes seen he has a known history of A-fib    PERTINENT ATTENDING PHYSICIAN COMMENTS:  Patient is here with fatigue and shortness of breath he has a known history of A-fib with slow ventricular response he says he just been more winded than usual he said he has had industrial 
infarction (MI), arrhythmia, cardiac tamponade, valvular disease   Pulmonary: Pulmonary embolism (PE), pneumonia, asthma, COPD exacerbation, pulmonary edema, interstitial lung disease, simple pneumothorax, tension pneumothorax, pleural effusion   Vascular: Aortic dissection, pulmonary hypertension   Metabolic: Diabetic ketoacidosis (DKA), metabolic acidosis, carbon monoxide poisoning, anemia   Allergic/Immune: Anaphylaxis, angioedema, upper airway obstruction   Infectious: COVID-19, influenza, bronchitis, sepsis   Other: Anxiety/panic attack, neuromuscular weakness, deconditioning     PLAN:     Orders Placed This Encounter   Procedures    XR CHEST (2 VW)    CBC with Auto Differential    CMP    Lactic Acid    Magnesium    Troponin    Brain Natriuretic Peptide    Protime-INR    EKG 12 Lead (Chest Pain)    Saline lock IV       MEDICATIONS ORDERED:     Orders Placed This Encounter   Medications    ipratropium 0.5 mg-albuterol 2.5 mg (DUONEB) nebulizer solution 1 Dose     Initiate RT Bronchodilator Protocol:   Yes - Inpatient Protocol    methylPREDNISolone sodium succ (SOLU-MEDROL) injection 125 mg    furosemide (LASIX) injection 40 mg    azithromycin (ZITHROMAX) tablet 500 mg     Antimicrobial Indications:   COPD Exacerbation     COPD exacerbation duration of therapy:   5 days    azithromycin (ZITHROMAX) 250 MG tablet     Sig: Take 1 tablet by mouth daily for 4 days     Dispense:  4 tablet     Refill:  0    furosemide (LASIX) 20 MG tablet     Sig: Take 1 tablet by mouth 2 times daily for 7 days     Dispense:  14 tablet     Refill:  0    predniSONE (DELTASONE) 50 MG tablet     Sig: Take 1 tablet by mouth daily for 5 days     Dispense:  5 tablet     Refill:  0       DIAGNOSTIC RESULTS:   [  EKG   Please see attending physician's documentation for interpretation    RADIOLOGY:     XR CHEST (2 VW)   Final Result      CHF           []    LABORATORY:     Results for orders placed or performed during the hospital encounter of

## 2025-06-23 NOTE — DISCHARGE INSTRUCTIONS
Please take the steroid and antibiotic as prescribed.  He will take the azithromycin 250 mg tablets daily for 4 days and the prednisone tablet daily for 5 days.  There is a change to your Lasix dosing.  You will take 20 mg in the morning and at night for 7 days.  In the meantime you will need to call your primary care provider's office to arrange for close follow-up and further medication management.    If you develop worsening of your symptoms I highly advise that you return for further evaluation.

## 2025-06-24 ENCOUNTER — TELEPHONE (OUTPATIENT)
Age: 89
End: 2025-06-24

## 2025-06-24 LAB
EKG Q-T INTERVAL: 492 MS
EKG QRS DURATION: 100 MS
EKG QTC CALCULATION (BAZETT): 435 MS
EKG R AXIS: -20 DEGREES
EKG T AXIS: 68 DEGREES
EKG VENTRICULAR RATE: 47 BPM

## 2025-06-24 PROCEDURE — 93010 ELECTROCARDIOGRAM REPORT: CPT | Performed by: INTERNAL MEDICINE

## 2025-06-24 NOTE — TELEPHONE ENCOUNTER
Patients wife called he has appointment at 340 with you she said patients INR is 3.4 . Patient is having a tooth extraction on Thursday  and they will not do it if  his INR is above 3.0  He is on 5 days prednisone  and 5 days antibiotic   they wanted you to know this for tomorrows appointment so you can advise them.  I dont see where this was answered for pt

## 2025-06-25 ENCOUNTER — OFFICE VISIT (OUTPATIENT)
Age: 89
End: 2025-06-25
Payer: MEDICARE

## 2025-06-25 VITALS
HEART RATE: 55 BPM | BODY MASS INDEX: 20.92 KG/M2 | HEIGHT: 68 IN | DIASTOLIC BLOOD PRESSURE: 82 MMHG | RESPIRATION RATE: 12 BRPM | OXYGEN SATURATION: 96 % | SYSTOLIC BLOOD PRESSURE: 126 MMHG | WEIGHT: 138 LBS

## 2025-06-25 DIAGNOSIS — J45.40 MODERATE PERSISTENT ASTHMA, UNSPECIFIED WHETHER COMPLICATED: ICD-10-CM

## 2025-06-25 DIAGNOSIS — J41.8 MIXED SIMPLE AND MUCOPURULENT CHRONIC BRONCHITIS (HCC): ICD-10-CM

## 2025-06-25 DIAGNOSIS — J44.1 CHRONIC OBSTRUCTIVE PULMONARY DISEASE WITH ACUTE EXACERBATION (HCC): Primary | ICD-10-CM

## 2025-06-25 DIAGNOSIS — I50.32 CHRONIC DIASTOLIC CONGESTIVE HEART FAILURE (HCC): ICD-10-CM

## 2025-06-25 DIAGNOSIS — I95.1 ORTHOSTATIC HYPOTENSION: ICD-10-CM

## 2025-06-25 DIAGNOSIS — S91.209A AVULSION OF TOENAIL, INITIAL ENCOUNTER: ICD-10-CM

## 2025-06-25 PROCEDURE — 1123F ACP DISCUSS/DSCN MKR DOCD: CPT | Performed by: FAMILY MEDICINE

## 2025-06-25 PROCEDURE — 99214 OFFICE O/P EST MOD 30 MIN: CPT | Performed by: FAMILY MEDICINE

## 2025-06-25 PROCEDURE — G8427 DOCREV CUR MEDS BY ELIG CLIN: HCPCS | Performed by: FAMILY MEDICINE

## 2025-06-25 PROCEDURE — 1036F TOBACCO NON-USER: CPT | Performed by: FAMILY MEDICINE

## 2025-06-25 PROCEDURE — 1159F MED LIST DOCD IN RCRD: CPT | Performed by: FAMILY MEDICINE

## 2025-06-25 PROCEDURE — G8420 CALC BMI NORM PARAMETERS: HCPCS | Performed by: FAMILY MEDICINE

## 2025-06-25 PROCEDURE — 3023F SPIROM DOC REV: CPT | Performed by: FAMILY MEDICINE

## 2025-06-25 RX ORDER — AMOXICILLIN 500 MG/1
CAPSULE ORAL
COMMUNITY
Start: 2025-06-02

## 2025-06-25 NOTE — PROGRESS NOTES
[facility]  [facility] MEDICINE  [address]  [facility] [address]  Dept: [phone]     Date of Visit:  2025  Patient Name: Bill Rodney   Patient :  1935     CHIEF COMPLAINT/HPI:     Chief Complaint   Patient presents with    Follow-up     COPD        HPI      Bill Rodney, 89 y.o. presents today for a follow up after ED visit.     Bill Rodney denies headaches, dizziness, chest pain, shortness of breath, heartburn/indigestion, nausea, vomiting, diarrhea, constipation, blood in stool, claudication, edema, skin changes, vision changes, fever, or chills.       REVIEW OF SYSTEM      Review of Systems:   Constitutional:  Negative for chills, fatigue, fever and unexpected weight change.   Eyes:  Negative for visual disturbance.   Respiratory:  Negative for cough, chest tightness, shortness of breath and wheezing.    Cardiovascular:  Negative for chest pain, palpitations and leg swelling.   Gastrointestinal:  Negative for abdominal distention, abdominal pain, blood in stool, constipation, diarrhea, nausea and vomiting.   Genitourinary:  Negative for dysuria, hematuria and urgency.   Musculoskeletal:  Negative for back pain, neck pain and neck stiffness.   Skin:  Negative for rash and wound.   Neurological:  Negative for syncope, weakness, light-headedness and headaches.   Hematological:  Negative for adenopathy. Does not bruise/bleed easily.   Psychiatric/Behavioral:  Negative for suicidal ideas. The patient is not nervous/anxious.      REVIEWED INFORMATION      Allergies   Allergen Reactions    Levofloxacin Swelling    Codeine Hives and Rash    Pulmicort [Budesonide] Rash     Also itchy    Cashew Nut Oil     Levaquin  [Levofloxacin In D5w] Other (See Comments)    Peanut-Containing Drug Products Hives       Current Outpatient Medications   Medication Sig Dispense Refill    amoxicillin (AMOXIL) 500 MG capsule TAKE 1 CAP BY MOUTH 3

## 2025-07-08 ENCOUNTER — TELEPHONE (OUTPATIENT)
Age: 89
End: 2025-07-08

## 2025-07-08 NOTE — TELEPHONE ENCOUNTER
Pts heart rate is low running between 38-44   paramjit the [id number]   nurse called to make you aware if any changes call her  or the pts wife

## 2025-07-10 NOTE — TELEPHONE ENCOUNTER
[facility]  called patients vitals  B/p 102/58  HR radial pulse 48 please advise notify Nurse if any changes

## 2025-07-10 NOTE — TELEPHONE ENCOUNTER
[id number] Nurse called she will follow up with us next week with pts b/p and heart rate and she will stop the Carvedilol

## 2025-07-21 ENCOUNTER — TELEPHONE (OUTPATIENT)
Age: 89
End: 2025-07-21

## 2025-07-21 DIAGNOSIS — R63.4 WEIGHT LOSS: ICD-10-CM

## 2025-07-21 DIAGNOSIS — I10 ESSENTIAL HYPERTENSION: Primary | ICD-10-CM

## 2025-07-21 NOTE — TELEPHONE ENCOUNTER
Laverne with [id number] calling with concerns    Patient having lose stools for about 1 week  Has lost 6 lbs  in three weeks.  Has good appetite     [facility]    Please advise    Please notify Angela @ [phone]

## 2025-07-22 NOTE — TELEPHONE ENCOUNTER
Call patient and wife   Are they concerned about weight loss ?  Is he having more or loose stools ?diarrhea ?  Any change in diet ?  Does he take any protein supplements ??         If  they are concerned  , I will order labs to be done  CBC, CMP, TSH, free T4, BNP, CRP  Dx weight loss, fatigue , diarrhea    If not concerned , we can discuss at next appt    Once this is resolved., please notify [facility]

## 2025-07-22 NOTE — TELEPHONE ENCOUNTER
Left voicemail message on patient's phone number and wife's cell phone, requested they return call regarding questions from Dr. SIERRA.

## 2025-07-23 NOTE — TELEPHONE ENCOUNTER
Yes wife is concerned about his weight for the last year now. He is supposed to have a tooth extraction next Tuesday and they think he may have an infection in his bowels... no change in diet and has not been doing his supplements lately.       [id number] is supposed to come out Friday draw for his inr

## 2025-07-24 NOTE — TELEPHONE ENCOUNTER
Tell wife there is no reason he cannot proceed with tooth extraction next week  Lab slip sent to get labs done sometime in the next few weeks, no fasting required  Check weight weekly for the next 3 Thursdays and call results in 3 weeks and we will will possibly get a sooner appointment for him

## 2025-07-25 LAB
ALBUMIN: 3.6 G/DL
ALP BLD-CCNC: 100 U/L
ALT SERPL-CCNC: 13 U/L
ANION GAP SERPL CALCULATED.3IONS-SCNC: 7 MMOL/L
AST SERPL-CCNC: 26 U/L
BASOPHILS ABSOLUTE: 0 /ΜL
BASOPHILS RELATIVE PERCENT: 0.9 %
BILIRUB SERPL-MCNC: 0.8 MG/DL (ref 0.1–1.4)
BUN BLDV-MCNC: 19 MG/DL
CALCIUM SERPL-MCNC: 8.9 MG/DL
CHLORIDE BLD-SCNC: 105 MMOL/L
CO2: 28 MMOL/L
CREAT SERPL-MCNC: 0.72 MG/DL
EOSINOPHILS ABSOLUTE: 0.4 /ΜL
EOSINOPHILS RELATIVE PERCENT: 9.2 %
GFR, ESTIMATED: 88
GLUCOSE BLD-MCNC: 96 MG/DL
HCT VFR BLD CALC: 31.6 % (ref 41–53)
HEMOGLOBIN: 10.2 G/DL (ref 13.5–17.5)
LIPASE: 19 UNITS/L
LYMPHOCYTES ABSOLUTE: 0.7 /ΜL
LYMPHOCYTES RELATIVE PERCENT: 17.2 %
MCH RBC QN AUTO: 27 PG
MCHC RBC AUTO-ENTMCNC: 32.4 G/DL
MCV RBC AUTO: 83 FL
MONOCYTES ABSOLUTE: 0.7 /ΜL
MONOCYTES RELATIVE PERCENT: 17.5 %
NEUTROPHILS ABSOLUTE: 2.1 /ΜL
NEUTROPHILS RELATIVE PERCENT: 55.2 %
PDW BLD-RTO: 19.2 %
PLATELET # BLD: 190 K/ΜL
PMV BLD AUTO: 7.9 FL
POTASSIUM SERPL-SCNC: 4.1 MMOL/L
RBC # BLD: 3.8 10^6/ΜL
SED RATE, AUTOMATED: 8
SODIUM BLD-SCNC: 140 MMOL/L
T4 FREE: 1.04
TOTAL PROTEIN: 5.6 G/DL (ref 6.4–8.2)
TSH SERPL DL<=0.05 MIU/L-ACNC: 1.82 UIU/ML
WBC # BLD: 3.8 10^3/ML

## 2025-07-28 DIAGNOSIS — R63.4 WEIGHT LOSS: ICD-10-CM

## 2025-07-28 DIAGNOSIS — I10 ESSENTIAL HYPERTENSION: ICD-10-CM

## 2025-07-29 ENCOUNTER — OFFICE VISIT (OUTPATIENT)
Dept: PULMONOLOGY | Age: 89
End: 2025-07-29
Payer: COMMERCIAL

## 2025-07-29 VITALS
RESPIRATION RATE: 14 BRPM | WEIGHT: 133 LBS | OXYGEN SATURATION: 94 % | DIASTOLIC BLOOD PRESSURE: 64 MMHG | BODY MASS INDEX: 20.16 KG/M2 | SYSTOLIC BLOOD PRESSURE: 133 MMHG | HEIGHT: 68 IN | HEART RATE: 71 BPM

## 2025-07-29 DIAGNOSIS — I50.30 DIASTOLIC CHF DUE TO VALVULAR DISEASE (HCC): ICD-10-CM

## 2025-07-29 DIAGNOSIS — I34.0 SEVERE MITRAL REGURGITATION: ICD-10-CM

## 2025-07-29 DIAGNOSIS — Z77.098 CHLORINE GAS EXPOSURE: ICD-10-CM

## 2025-07-29 DIAGNOSIS — I38 DIASTOLIC CHF DUE TO VALVULAR DISEASE (HCC): ICD-10-CM

## 2025-07-29 DIAGNOSIS — J45.50 SEVERE PERSISTENT ASTHMA WITHOUT COMPLICATION (HCC): Primary | ICD-10-CM

## 2025-07-29 DIAGNOSIS — I25.10 CAD, MULTIPLE VESSEL: ICD-10-CM

## 2025-07-29 DIAGNOSIS — I50.33 ACUTE ON CHRONIC DIASTOLIC CONGESTIVE HEART FAILURE (HCC): ICD-10-CM

## 2025-07-29 PROCEDURE — 1123F ACP DISCUSS/DSCN MKR DOCD: CPT | Performed by: INTERNAL MEDICINE

## 2025-07-29 PROCEDURE — 1159F MED LIST DOCD IN RCRD: CPT | Performed by: INTERNAL MEDICINE

## 2025-07-29 PROCEDURE — 99214 OFFICE O/P EST MOD 30 MIN: CPT | Performed by: INTERNAL MEDICINE

## 2025-07-29 RX ORDER — ALBUTEROL SULFATE 90 UG/1
2 INHALANT RESPIRATORY (INHALATION) EVERY 6 HOURS PRN
Qty: 3 EACH | Refills: 3 | Status: SHIPPED | OUTPATIENT
Start: 2025-07-29 | End: 2025-10-27

## 2025-07-29 RX ORDER — FUROSEMIDE 20 MG/1
20 TABLET ORAL DAILY
Qty: 90 TABLET | Refills: 2 | Status: SHIPPED | OUTPATIENT
Start: 2025-07-29 | End: 2026-04-25

## 2025-07-30 NOTE — PROGRESS NOTES
Bill Rodney  7/30/2025  Chief Complaint   Patient presents with    Severe persistent asthma without complication     Follow up         Bill Rodney  89 y.o. male   History of Present Illness  The patient presents for left ankle swelling, respiratory issues, and weight management.    Health status has been fluctuating, but overall improvement is reported. Medication intake has been reduced compared to previous levels. Scheduled for a tooth extraction later today.Currently on warfarin therapy.    Lasix was prescribed during the last visit and has been taken twice daily. Due to increased swelling in the left ankle compared to the right, the dosage was reduced to once daily for about a week. The medication was not taken this morning. A refill of Lasix will be needed soon.    Weight has remained stable for several years, and there is interest in trying an appetite stimulant. Improvement is anticipated following the tooth extraction.    Inhalers are used as needed, and an emergency inhaler may be required. The nebulizer, which contains albuterol sulfate, has not been used recently. It is unclear if it also includes ipratropium.               Review of Systems -  General ROS: negative for - chills, fatigue, fever or weight loss  ENT ROS: negative for - headaches, oral lesions or sore throat  Cardiovascular ROS: no chest pain , orthopnea or pnd   Gastrointestinal ROS: no abdominal pain, change in bowel habits, or black or bloody stools  Skin - no rash   Neuro - no blurry vision , no loc . No focal weakness   msk - no jt tenderness or swelling    Vascular - no claudication , rest completed and negative   Lymphatic - complete and negative   Hematology - oncology - complete and negative   Allergy immunology - complete and negative    no burning or hematuria       LUNG CANCER SCREENING     CRITERIA MET    []     CT ORDERED  []      CRITERIA NOT MET   []      REFUSED                    []        REASON

## 2025-08-04 RX ORDER — LEVOTHYROXINE SODIUM 75 UG/1
75 TABLET ORAL DAILY
Qty: 90 TABLET | Refills: 3 | Status: SHIPPED | OUTPATIENT
Start: 2025-08-04

## 2025-08-06 ENCOUNTER — TELEPHONE (OUTPATIENT)
Age: 89
End: 2025-08-06

## 2025-08-06 DIAGNOSIS — I50.32 CHRONIC DIASTOLIC CONGESTIVE HEART FAILURE (HCC): ICD-10-CM

## 2025-08-06 RX ORDER — WARFARIN SODIUM 1 MG/1
1.5 TABLET ORAL DAILY
COMMUNITY

## 2025-08-06 RX ORDER — WARFARIN SODIUM 1 MG/1
1.5 TABLET ORAL DAILY
Qty: 45 TABLET | Refills: 3 | Status: CANCELLED | OUTPATIENT
Start: 2025-08-06

## 2025-08-08 DIAGNOSIS — I50.32 CHRONIC DIASTOLIC CONGESTIVE HEART FAILURE (HCC): ICD-10-CM

## 2025-08-08 RX ORDER — WARFARIN SODIUM 1 MG/1
TABLET ORAL
Qty: 100 TABLET | Refills: 5 | Status: SHIPPED | OUTPATIENT
Start: 2025-08-08

## 2025-08-13 ENCOUNTER — TELEPHONE (OUTPATIENT)
Age: 89
End: 2025-08-13

## 2025-08-15 ENCOUNTER — TELEPHONE (OUTPATIENT)
Age: 89
End: 2025-08-15

## 2025-08-15 DIAGNOSIS — N18.9 ANEMIA DUE TO CHRONIC KIDNEY DISEASE, UNSPECIFIED CKD STAGE: ICD-10-CM

## 2025-08-15 DIAGNOSIS — D63.1 ANEMIA DUE TO CHRONIC KIDNEY DISEASE, UNSPECIFIED CKD STAGE: ICD-10-CM

## 2025-08-15 DIAGNOSIS — R63.4 WEIGHT LOSS, ABNORMAL: Primary | ICD-10-CM

## 2025-08-15 DIAGNOSIS — N18.31 STAGE 3A CHRONIC KIDNEY DISEASE (HCC): ICD-10-CM

## 2025-08-20 ENCOUNTER — TELEPHONE (OUTPATIENT)
Age: 89
End: 2025-08-20